# Patient Record
Sex: FEMALE | Race: ASIAN | NOT HISPANIC OR LATINO | Employment: OTHER | URBAN - METROPOLITAN AREA
[De-identification: names, ages, dates, MRNs, and addresses within clinical notes are randomized per-mention and may not be internally consistent; named-entity substitution may affect disease eponyms.]

---

## 2017-01-09 ENCOUNTER — INFUSION (OUTPATIENT)
Dept: ONCOLOGY | Facility: HOSPITAL | Age: 72
End: 2017-01-09

## 2017-01-09 ENCOUNTER — LAB (OUTPATIENT)
Dept: LAB | Facility: HOSPITAL | Age: 72
End: 2017-01-09

## 2017-01-09 DIAGNOSIS — C50.919 MALIGNANT NEOPLASM OF FEMALE BREAST, UNSPECIFIED LATERALITY, UNSPECIFIED SITE OF BREAST: Primary | ICD-10-CM

## 2017-01-09 DIAGNOSIS — C50.011 MALIGNANT NEOPLASM OF AREOLA OF RIGHT BREAST IN FEMALE (HCC): Primary | ICD-10-CM

## 2017-01-09 LAB
BASOPHILS # BLD AUTO: 0.06 10*3/MM3 (ref 0–0.1)
BASOPHILS NFR BLD AUTO: 0.9 % (ref 0–1.1)
DEPRECATED RDW RBC AUTO: 39.6 FL (ref 37–49)
EOSINOPHIL # BLD AUTO: 0.17 10*3/MM3 (ref 0–0.36)
EOSINOPHIL NFR BLD AUTO: 2.7 % (ref 1–5)
ERYTHROCYTE [DISTWIDTH] IN BLOOD BY AUTOMATED COUNT: 12.2 % (ref 11.7–14.5)
HCT VFR BLD AUTO: 33.4 % (ref 34–45)
HGB BLD-MCNC: 11.6 G/DL (ref 11.5–14.9)
IMM GRANULOCYTES # BLD: 0.03 10*3/MM3 (ref 0–0.03)
IMM GRANULOCYTES NFR BLD: 0.5 % (ref 0–0.5)
LYMPHOCYTES # BLD AUTO: 2.1 10*3/MM3 (ref 1–3.5)
LYMPHOCYTES NFR BLD AUTO: 33 % (ref 20–49)
MCH RBC QN AUTO: 30.9 PG (ref 27–33)
MCHC RBC AUTO-ENTMCNC: 34.7 G/DL (ref 32–35)
MCV RBC AUTO: 89.1 FL (ref 83–97)
MONOCYTES # BLD AUTO: 0.62 10*3/MM3 (ref 0.25–0.8)
MONOCYTES NFR BLD AUTO: 9.7 % (ref 4–12)
NEUTROPHILS # BLD AUTO: 3.39 10*3/MM3 (ref 1.5–7)
NEUTROPHILS NFR BLD AUTO: 53.2 % (ref 39–75)
NRBC BLD MANUAL-RTO: 0 /100 WBC (ref 0–0)
PLATELET # BLD AUTO: 203 10*3/MM3 (ref 150–375)
PMV BLD AUTO: 8.7 FL (ref 8.9–12.1)
RBC # BLD AUTO: 3.75 10*6/MM3 (ref 3.9–5)
WBC NRBC COR # BLD: 6.37 10*3/MM3 (ref 4–10)

## 2017-01-09 PROCEDURE — 85025 COMPLETE CBC W/AUTO DIFF WBC: CPT | Performed by: INTERNAL MEDICINE

## 2017-01-09 PROCEDURE — 96402 CHEMO HORMON ANTINEOPL SQ/IM: CPT | Performed by: INTERNAL MEDICINE

## 2017-01-09 PROCEDURE — 36416 COLLJ CAPILLARY BLOOD SPEC: CPT | Performed by: INTERNAL MEDICINE

## 2017-01-09 PROCEDURE — 25010000002 FULVESTRANT PER 25 MG: Performed by: INTERNAL MEDICINE

## 2017-01-09 RX ADMIN — FULVESTRANT 500 MG: 50 INJECTION INTRAMUSCULAR at 11:30

## 2017-01-28 RX ORDER — LEVOTHYROXINE SODIUM 0.03 MG/1
TABLET ORAL
Refills: 2 | COMMUNITY
Start: 2016-12-27

## 2017-01-28 RX ORDER — BLOOD SUGAR DIAGNOSTIC
STRIP MISCELLANEOUS
Refills: 2 | COMMUNITY
Start: 2016-12-27

## 2017-01-30 ENCOUNTER — HOSPITAL ENCOUNTER (OUTPATIENT)
Dept: PET IMAGING | Facility: HOSPITAL | Age: 72
Discharge: HOME | End: 2017-01-30
Attending: INTERNAL MEDICINE | Admitting: INTERNAL MEDICINE

## 2017-01-30 DIAGNOSIS — C50.019: ICD-10-CM

## 2017-01-30 LAB — CREAT BLDA-MCNC: 0.7 MG/DL (ref 0.6–1.3)

## 2017-01-30 PROCEDURE — 82565 ASSAY OF CREATININE: CPT

## 2017-01-30 PROCEDURE — 25510000001 DIATRIZOATE MEGLUMINE & SODIUM PER 1 ML: Performed by: INTERNAL MEDICINE

## 2017-01-30 PROCEDURE — 0 IOPAMIDOL 61 % SOLUTION: Performed by: INTERNAL MEDICINE

## 2017-01-30 PROCEDURE — 71260 CT THORAX DX C+: CPT

## 2017-01-30 PROCEDURE — 74160 CT ABDOMEN W/CONTRAST: CPT

## 2017-01-30 RX ADMIN — DIATRIZOATE MEGLUMINE AND DIATRIZOATE SODIUM 30 ML: 660; 100 LIQUID ORAL; RECTAL at 10:50

## 2017-01-30 RX ADMIN — IOPAMIDOL 85 ML: 612 INJECTION, SOLUTION INTRAVENOUS at 11:30

## 2017-02-06 ENCOUNTER — DOCUMENTATION (OUTPATIENT)
Dept: ONCOLOGY | Facility: CLINIC | Age: 72
End: 2017-02-06

## 2017-02-06 ENCOUNTER — INFUSION (OUTPATIENT)
Dept: ONCOLOGY | Facility: HOSPITAL | Age: 72
End: 2017-02-06

## 2017-02-06 ENCOUNTER — OFFICE VISIT (OUTPATIENT)
Dept: ONCOLOGY | Facility: CLINIC | Age: 72
End: 2017-02-06

## 2017-02-06 ENCOUNTER — APPOINTMENT (OUTPATIENT)
Dept: LAB | Facility: HOSPITAL | Age: 72
End: 2017-02-06

## 2017-02-06 VITALS
BODY MASS INDEX: 23.03 KG/M2 | TEMPERATURE: 98.4 F | OXYGEN SATURATION: 98 % | HEIGHT: 61 IN | RESPIRATION RATE: 12 BRPM | SYSTOLIC BLOOD PRESSURE: 122 MMHG | HEART RATE: 69 BPM | WEIGHT: 122 LBS | DIASTOLIC BLOOD PRESSURE: 80 MMHG

## 2017-02-06 DIAGNOSIS — C50.012 MALIGNANT NEOPLASM OF AREOLA OF LEFT BREAST IN FEMALE (HCC): Primary | ICD-10-CM

## 2017-02-06 DIAGNOSIS — M81.0 OSTEOPOROSIS: ICD-10-CM

## 2017-02-06 DIAGNOSIS — C50.011 MALIGNANT NEOPLASM OF AREOLA OF RIGHT BREAST IN FEMALE (HCC): Primary | ICD-10-CM

## 2017-02-06 DIAGNOSIS — D64.9 ANEMIA, UNSPECIFIED TYPE: ICD-10-CM

## 2017-02-06 DIAGNOSIS — C78.02 MALIGNANT NEOPLASM METASTATIC TO LEFT LUNG (HCC): ICD-10-CM

## 2017-02-06 PROCEDURE — 99215 OFFICE O/P EST HI 40 MIN: CPT | Performed by: INTERNAL MEDICINE

## 2017-02-06 RX ORDER — EXEMESTANE 25 MG/1
25 TABLET ORAL DAILY
Qty: 30 TABLET | Refills: 11 | Status: SHIPPED | OUTPATIENT
Start: 2017-02-06 | End: 2017-03-02 | Stop reason: SDUPTHER

## 2017-02-06 NOTE — PROGRESS NOTES
REASONS FOR FOLLOWUP:   1. Metastatic breast cancer to the lungs, ER/AZ positive, HER2/berkley positive, undergoing hormonal therapy with Arimidex since the middle of March 2009.   2. Response to Arimidex as evidenced by serial CT exams, including on 01/10/2012, showing no significant disease.   3. New left upper lobe pulmonary nodule noted, measuring 1.1 cm by CT scan on 07/05/2012. No other evidence of metastatic disease. Hormonal therapy switched to Faslodex 07/12/2012.   4. Osteoporosis documented by bone density scan from 10/17/2011 and also 02/21/2014. Patient was started on Prolia therapy every 6 months. Patient had a tooth extraction in August 2015. Prolia has been on hold.    5. CT scans of chest, abdomen and pelvis on 04/07/2016 showed stable disease. Faslodex will be continued.      HISTORY OF PRESENT ILLNESS: Ms. Reese is a 71-year-old  female presenting today for 6-month followup, one week off to her recent CT scan for chest and abdomen and pelvis.  Patient is here by herself initially, later her nephew join her, after she was told having disease progression in left upper lobe of the lung.  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  Performance status is ECOG 0-1.  She has some chronic fatigue, however no weight loss, no bone pains.   She denies hot flashes and sweating. She has mild joint achiness, but not getting worse.       Unfortunately her CT scan on 01/30/2017 showed slight a disease progress in the left upper lobe of the lung, by 3 mm, up to 1.4 centimeters.  No evidence of new metastatic disease or bone metastases.    PAST MEDICAL HISTORY:   1. Left breast cancer, stage I, ER and AZ positive, status post mastectomy, chemotherapy, and tamoxifen for five years--finished in 2001.   2. Hypothyroidism diagnosed in 01/2009.   3. Hyperlipidemia.   4. Diabetes, type 2.   5. Osteoporosis, evidenced on bone density scans in October 2011  and 2014. The patient stopped Fosamax because of dental problem suspected to be related to bisphosphonate.       SURGICAL HISTORY: Modified left radical mastectomy and previous breast biopsy in .       ONCOLOGIC/HEMATOLOGIC HISTORY: History from previous dates can be found in the separate document.      CT scan on 2016 showed stable disease with the small left upper lobe pulmonary nodule, no change compared to previous imaging studies, no evidence of metastatic disease in the abdomen or pelvis or bony structure.      MEDICATIONS: The current medication list was reviewed with the patient and updated in the EMR this date per the medical assistant. Medication dosages and frequencies were confirmed to be accurate.       ALLERGIES: LETROZOLE (questionably causing skin rash).       SOCIAL HISTORY: The patient is . She finished college education. 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 No alcohol use. No illegal drug use. No risks for HIV.       FAMILY HISTORY: Her mother  of tuberculosis at the age of 48. A sister had thyroid cancer/lung cancer - no details are available. Two brothers had lung cancer and bone cancers in their 70s - no details available. Her maternal grandmother and her mother both had diabetes. No family history of hypertension or heart disease.       REVIEW OF SYSTEMS:   PAIN: See VITAL SIGNS below.   GENERAL: No change in appetite or weight, no fevers, chills or sweats.   SKIN: No rashes or nonhealing lesions. Fungal infection on her right foot has healed.   HEME/LYMPH: No anemia, easy bruising, bleeding or swollen nodes.   EYES: No vision changes or diplopia.   ENT: No tinnitus, hearing loss, gum bleeding, epistaxis , hoarseness or dysphagia.   RESPIRATORY: No cough, shortness of breath, hemoptysis or wheezing.   CVS: vagor chest pain, but no palpitations, orthopnea, dyspnea on exertion or PND.   GI: No abdominal pain, nausea, vomiting, constipation, diarrhea,  "melena or hematochezia.   : No dysuria or hematuria. No abnormal vaginal discharge or bleeding.   MUSCULOSKELETAL: has mild joint achiness.   NEUROLOGICAL: No dizziness, global weakness, loss of consciousness or seizures.   PSYCHIATRIC: No increased nervousness, mood changes or depression.       VITAL SIGNS:   Vitals:    17 1039   BP: 122/80   Pulse: 69   Resp: 12   Temp: 98.4 °F (36.9 °C)   TempSrc: Oral   SpO2: 98%   Weight: 122 lb (55.3 kg)   Height: 61.42\" (156 cm)  Comment: new height   PainSc: 0-No pain   ECO       PHYSICAL EXAMINATION:   GENERAL: Patient is a well-developed, reasonably-nourished  female not in distress.  However she is very nervous after told her disease progression in the lung.   SKIN: Warm, dry without rashes, purpura or petechiae.   HEAD: Normocephalic.   EYES: Pupils equal, round and reactive to light. EOMs intact. Conjunctivae normal.   EARS: Hearing intact.   NOSE: No discharge.   MOUTH: Tongue is well-papillated; no stomatitis or ulcers. Lips normal.   THROAT: Oropharynx without lesions or exudates.   NECK: Supple with good range of motion; no thyromegaly or masses.   LYMPHATICS: No cervical, supraclavicular, axillary or inguinal adenopathy.   CHEST: Lungs clear to auscultation.   CARDIAC: Regular rate and rhythm without murmurs, rubs or gallops.   ABDOMEN: Soft, nontender with no organomegaly or masses. bowel sounds present   EXTREMITIES: No clubbing, cyanosis or edema.   NEUROLOGICAL: No focal deficits.       LABORATORY DATA:   Lab Results   Component Value Date    WBC 6.37 2017    HGB 11.6 2017    HCT 33.4 (L) 2017    MCV 89.1 2017     2017     Lab Results   Component Value Date    NEUTROABS 3.39 2017     Lab Results   Component Value Date    GLUCOSE 123 (H) 2016    BUN 11 2016    CREATININE 0.70 2017    EGFRIFNONA 77 2016    EGFRIFAFRI 93 2016    BCR 14.7 2016    CO2 26.2 2016    " CALCIUM 10.0 09/06/2016    ALBUMIN 4.10 09/06/2016    LABIL2 1.1 09/06/2016    AST 17 09/06/2016    ALT 14 09/06/2016     SODIUM   Date Value Ref Range Status   09/06/2016 142 136 - 145 mmol/L Final   10/13/2015 145 136 - 145 mmol/L Final     POTASSIUM   Date Value Ref Range Status   09/06/2016 4.5 3.5 - 5.2 mmol/L Final   10/13/2015 4.1 3.5 - 5.2 mmol/L Final     TOTAL BILIRUBIN   Date Value Ref Range Status   09/06/2016 0.5 0.1 - 1.2 mg/dL Final   10/13/2015 0.3 0.1 - 1.2 mg/dL Final     ALKALINE PHOSPHATASE   Date Value Ref Range Status   09/06/2016 84 39 - 117 U/L Final   10/13/2015 98 39 - 117 U/L Final     Imaging studies:   CT CHEST AND ABDOMEN WITH IV CONTRAST 01/30/2017.   COMPARISON: CT chest and abdomen 04/07/2016, 08/14/2015, 02/16/2015      FINDINGS  CHEST: Left upper lobe pulmonary nodule has increased in size measuring  14 x 10 mm versus 11 x 9 mm on the previous examination. Tiny subpleural  nodule on image 44 in the left lower lobe is without change. No new  nodule is present. There has been left mastectomy. There is no  mediastinal, hilar or axillary or internal mammary chain omar  enlargement. Bone windows demonstrate no osseous lesion.      ABDOMEN: The liver, spleen, adrenal glands, pancreas appear within  normal limits. Small bilateral renal cysts are present. There are 3  small 2-3 mm right renal stones and there is 2 mm left lower pole renal  stone. No omar enlargement is evident. Bone windows demonstrate no  osseous lesion.      IMPRESSION:  Left upper lobe nodule exhibits mild increase in size  compared to exam 04/07/2016 consistent with progression. There has been  no other change. No evidence of metastatic disease to the abdomen.    ASSESSMENT:   1. Metastatic breast cancer with metastasis in the lungs, biopsy confirmed. ER/CO positive. Her2 positive.  She has disease progress in the left upper lobe, but a very minimal, increased by 3 mm.  I shared the images with patient and her   nephew.  I do not think this patient is candidate for chemotherapy at this point.  I propose continue hormonal therapy, with combined targeted treatment.  This patient had previous suspect allergic reaction to letrozole, only a few days into therapy back in 2009, so I discussed with the patient that we will not to use letrozole with a combination of Ibrance; instead I recommended using combination of Aromasin plus Afinitor.  I also discussed with her nephew today again after he come to the clinic.  If Afinitor is too expensive for her, in that case I would refer patient for stereotactic radiation therapy to the left upper lobe lesion, and a continue oral aromatase inhibitor Aromasin.  We need repeated a CT scan for chest in 3 months for evaluation of response.    2. Osteoporosis. The patient will continue oral calcium and vitamin D supplementation. We stopped the Prolia after 06/25/2015 because of her tooth extraction.  Patient is more than when they have fear out from the procedure, I proposed to resume Prolia treatment.   3. Mild anemia asymptomatic. Hb is close to normal. We'll monitor with repeat labs in 3 weeks, including on, ferritin, B12 and folic acid.      PLAN:   1. Discontinue Faslodex treatment.  2. Started Aromasin 25 mg daily, I e- scribed to her pharmacy.  3. Start Afinitor 10 mg daily as soon as medicine arrives.  I will ask my pharmacist after persistent order.   4. DEXA bone density scan.    5. Arrange Prolia treatment in about 3 weeks and repeat every 6 months.   6. Chemotherapy teaching lesson for Afinitor.   7. Patient will return in 3 weeks for a reevaluation, we'll check CBC, ferritin and iron B12 folic acid and a BMP, and a tolerance check, expecting she will be on Afinitor for about 7-10 days.         50 minutes, over half of that time were for counseling.  I personally reviewed her CT scan images for the chest.  Questions were answered to patient and her nephews satisfaction.          LETA MAY M.D., Ph.D.    02/06/2017     Dragon disclaimer:  Much of this encounter note is an electronic transcription/translation of spoken language to printed text. The electronic translation of spoken language may permit erroneous, or at times, nonsensical words or phrases to be inadvertently transcribed; Although I have reviewed the note for such errors, some may still exist.

## 2017-02-08 ENCOUNTER — DOCUMENTATION (OUTPATIENT)
Dept: ONCOLOGY | Facility: CLINIC | Age: 72
End: 2017-02-08

## 2017-02-09 ENCOUNTER — DOCUMENTATION (OUTPATIENT)
Dept: ONCOLOGY | Facility: CLINIC | Age: 72
End: 2017-02-09

## 2017-02-09 RX ORDER — EVEROLIMUS 10 MG/1
10 TABLET ORAL DAILY
Qty: 28 TABLET | Refills: 6 | Status: SHIPPED | OUTPATIENT
Start: 2017-02-09 | End: 2019-12-03

## 2017-02-13 ENCOUNTER — APPOINTMENT (OUTPATIENT)
Dept: LAB | Facility: HOSPITAL | Age: 72
End: 2017-02-13

## 2017-02-13 ENCOUNTER — OFFICE VISIT (OUTPATIENT)
Dept: ONCOLOGY | Facility: CLINIC | Age: 72
End: 2017-02-13

## 2017-02-13 VITALS — BODY MASS INDEX: 22.12 KG/M2 | WEIGHT: 118.7 LBS

## 2017-02-13 DIAGNOSIS — C50.012 MALIGNANT NEOPLASM OF AREOLA OF LEFT BREAST IN FEMALE (HCC): Primary | ICD-10-CM

## 2017-02-13 PROCEDURE — G0463 HOSPITAL OUTPT CLINIC VISIT: HCPCS | Performed by: NURSE PRACTITIONER

## 2017-02-13 PROCEDURE — 99214 OFFICE O/P EST MOD 30 MIN: CPT | Performed by: NURSE PRACTITIONER

## 2017-02-13 NOTE — PROGRESS NOTES
Subjective     No primary care provider on file.    PATIENT NAME:  Brie Reese  YOB: 1945  PATIENTS AGE:  71 y.o.  PATIENTS SEX:  female  DATE OF SERVICE:  02/13/2017  PROVIDER:  KENDELL Carbajal      __________ORAL CHEMOTHERAPY PATIENT EDUCATION__________    PATIENT EDUCATION:  Today I met with the patient to discuss ORAL chemotherapy/biotherapy recommended for treatment of her disease.  Also discussed were medication administration, adherence, and proper handling/disposal.  The patient received the Oral Chemotherapy/Biotherapy Plan Summary including diagnosis and specific treatment plan.    SIDE EFFECTS:  Common side effects were discussed with the patient and/or significant other.  Discussion included hair loss/discoloration, anemia/fatigue, infection/chills/fever, appetite, bleeding risk/precautions, constipation, diarrhea, mouth sores, taste alteration, loss of appetite,nausea/vomiting, peripheral neuropathy, skin/nail changes, rash, muscle aches/weakness, photosensitivity, weight gain/loss, hearing loss, dizziness, menopausal symptoms, menstrrual irregularity, reproductive risk, high blood pressure, heart damage, liver damage, lung damage, kidney damage, DVT/PE risk, fluid retention, pleural/pericardial effusion, somnolence, electrolyte/LFT imbalance.    Discussion also included side effects specific to drugs in the treatment plan, specifically Affinitor.    A total of 35 minutes were spent with the patient, with 100% of time spent in education and counseling.      KENDELL Carbajal

## 2017-02-17 ENCOUNTER — DOCUMENTATION (OUTPATIENT)
Dept: ONCOLOGY | Facility: CLINIC | Age: 72
End: 2017-02-17

## 2017-02-17 NOTE — PROGRESS NOTES
2/16/17-Contacted [facility] [phone] to check status of pts Afinitor. I spoke to Lukas who stated pt's copay is $2699.46. They spoke to pt and gave the phone number for [facility] for pt to call. They are waiting to hear back from pt.     I spoke to pt and discussed the copay. She stated she cannot afford this. I asked if she knew what her monthly income is and she didn't know at that moment. She stated she works extra shifts at work because she is the only one in the home. I told her I will look into assistance and contact her back. She is not eligible for the Afinitor copay card as she has a Medicare Part D plan.

## 2017-02-22 DIAGNOSIS — M81.0 OSTEOPOROSIS: ICD-10-CM

## 2017-02-22 DIAGNOSIS — C50.012 MALIGNANT NEOPLASM OF AREOLA OF LEFT BREAST IN FEMALE (HCC): Primary | ICD-10-CM

## 2017-02-27 ENCOUNTER — HOSPITAL ENCOUNTER (OUTPATIENT)
Dept: BONE DENSITY | Facility: HOSPITAL | Age: 72
Discharge: HOME | End: 2017-02-27
Attending: INTERNAL MEDICINE | Admitting: INTERNAL MEDICINE

## 2017-02-27 DIAGNOSIS — M81.0 OSTEOPOROSIS: ICD-10-CM

## 2017-02-27 PROCEDURE — 77080 DXA BONE DENSITY AXIAL: CPT

## 2017-03-02 ENCOUNTER — OFFICE VISIT (OUTPATIENT)
Dept: ONCOLOGY | Facility: CLINIC | Age: 72
End: 2017-03-02

## 2017-03-02 ENCOUNTER — INFUSION (OUTPATIENT)
Dept: ONCOLOGY | Facility: HOSPITAL | Age: 72
End: 2017-03-02

## 2017-03-02 ENCOUNTER — LAB (OUTPATIENT)
Dept: LAB | Facility: HOSPITAL | Age: 72
End: 2017-03-02

## 2017-03-02 VITALS
SYSTOLIC BLOOD PRESSURE: 110 MMHG | HEIGHT: 61 IN | RESPIRATION RATE: 16 BRPM | WEIGHT: 122.8 LBS | BODY MASS INDEX: 23.18 KG/M2 | HEART RATE: 72 BPM | TEMPERATURE: 98.4 F | DIASTOLIC BLOOD PRESSURE: 70 MMHG

## 2017-03-02 DIAGNOSIS — C78.02 MALIGNANT NEOPLASM METASTATIC TO LEFT LUNG (HCC): ICD-10-CM

## 2017-03-02 DIAGNOSIS — D50.9 IRON DEFICIENCY ANEMIA, UNSPECIFIED IRON DEFICIENCY ANEMIA TYPE: ICD-10-CM

## 2017-03-02 DIAGNOSIS — M81.0 OSTEOPOROSIS: ICD-10-CM

## 2017-03-02 DIAGNOSIS — C50.012 MALIGNANT NEOPLASM OF NIPPLE OF LEFT BREAST IN FEMALE (HCC): Primary | ICD-10-CM

## 2017-03-02 DIAGNOSIS — M81.0 OSTEOPOROSIS: Primary | ICD-10-CM

## 2017-03-02 DIAGNOSIS — C50.012 MALIGNANT NEOPLASM OF AREOLA OF LEFT BREAST IN FEMALE (HCC): ICD-10-CM

## 2017-03-02 LAB
ALBUMIN SERPL-MCNC: 4.2 G/DL (ref 3.5–5.2)
ALBUMIN/GLOB SERPL: 1.2 G/DL (ref 1.1–2.4)
ALP SERPL-CCNC: 104 U/L (ref 38–116)
ALT SERPL W P-5'-P-CCNC: 12 U/L (ref 0–33)
ANION GAP SERPL CALCULATED.3IONS-SCNC: 13.4 MMOL/L
AST SERPL-CCNC: 15 U/L (ref 0–32)
BASOPHILS # BLD AUTO: 0.04 10*3/MM3 (ref 0–0.1)
BASOPHILS NFR BLD AUTO: 0.7 % (ref 0–1.1)
BILIRUB SERPL-MCNC: 0.3 MG/DL (ref 0.1–1.2)
BUN BLD-MCNC: 13 MG/DL (ref 6–20)
BUN/CREAT SERPL: 17.3 (ref 7.3–30)
CALCIUM SPEC-SCNC: 9.4 MG/DL (ref 8.5–10.2)
CHLORIDE SERPL-SCNC: 99 MMOL/L (ref 98–107)
CO2 SERPL-SCNC: 27.6 MMOL/L (ref 22–29)
CREAT BLD-MCNC: 0.75 MG/DL (ref 0.6–1.1)
DEPRECATED RDW RBC AUTO: 40.4 FL (ref 37–49)
EOSINOPHIL # BLD AUTO: 0.15 10*3/MM3 (ref 0–0.36)
EOSINOPHIL NFR BLD AUTO: 2.7 % (ref 1–5)
ERYTHROCYTE [DISTWIDTH] IN BLOOD BY AUTOMATED COUNT: 12 % (ref 11.7–14.5)
FERRITIN SERPL-MCNC: 102.4 NG/ML
FOLATE SERPL-MCNC: 19.78 NG/ML (ref 4.78–24.2)
GFR SERPL CREATININE-BSD FRML MDRD: 76 ML/MIN/1.73
GFR SERPL CREATININE-BSD FRML MDRD: 93 ML/MIN/1.73
GLOBULIN UR ELPH-MCNC: 3.5 GM/DL (ref 1.8–3.5)
GLUCOSE BLD-MCNC: 306 MG/DL (ref 74–124)
HCT VFR BLD AUTO: 35.6 % (ref 34–45)
HGB BLD-MCNC: 11.8 G/DL (ref 11.5–14.9)
IMM GRANULOCYTES # BLD: 0.02 10*3/MM3 (ref 0–0.03)
IMM GRANULOCYTES NFR BLD: 0.4 % (ref 0–0.5)
IRON 24H UR-MRATE: 51 MCG/DL (ref 37–145)
IRON SATN MFR SERPL: 12 % (ref 14–48)
LYMPHOCYTES # BLD AUTO: 1.39 10*3/MM3 (ref 1–3.5)
LYMPHOCYTES NFR BLD AUTO: 25.1 % (ref 20–49)
MAGNESIUM SERPL-MCNC: 1.7 MG/DL (ref 1.8–2.5)
MCH RBC QN AUTO: 30.7 PG (ref 27–33)
MCHC RBC AUTO-ENTMCNC: 33.1 G/DL (ref 32–35)
MCV RBC AUTO: 92.7 FL (ref 83–97)
MONOCYTES # BLD AUTO: 0.4 10*3/MM3 (ref 0.25–0.8)
MONOCYTES NFR BLD AUTO: 7.2 % (ref 4–12)
NEUTROPHILS # BLD AUTO: 3.53 10*3/MM3 (ref 1.5–7)
NEUTROPHILS NFR BLD AUTO: 63.9 % (ref 39–75)
NRBC BLD MANUAL-RTO: 0 /100 WBC (ref 0–0)
PHOSPHATE SERPL-MCNC: 3.8 MG/DL (ref 2.5–4.5)
PLATELET # BLD AUTO: 235 10*3/MM3 (ref 150–375)
PMV BLD AUTO: 8.6 FL (ref 8.9–12.1)
POTASSIUM BLD-SCNC: 4.3 MMOL/L (ref 3.5–4.7)
PROT SERPL-MCNC: 7.7 G/DL (ref 6.3–8)
RBC # BLD AUTO: 3.84 10*6/MM3 (ref 3.9–5)
SODIUM BLD-SCNC: 140 MMOL/L (ref 134–145)
TIBC SERPL-MCNC: 410 MCG/DL (ref 249–505)
TRANSFERRIN SERPL-MCNC: 293 MG/DL (ref 200–360)
VIT B12 BLD-MCNC: 769 PG/ML (ref 250–999)
WBC NRBC COR # BLD: 5.53 10*3/MM3 (ref 4–10)

## 2017-03-02 PROCEDURE — 84100 ASSAY OF PHOSPHORUS: CPT | Performed by: INTERNAL MEDICINE

## 2017-03-02 PROCEDURE — 82728 ASSAY OF FERRITIN: CPT | Performed by: INTERNAL MEDICINE

## 2017-03-02 PROCEDURE — 85025 COMPLETE CBC W/AUTO DIFF WBC: CPT | Performed by: INTERNAL MEDICINE

## 2017-03-02 PROCEDURE — 25010000002 DENOSUMAB 60 MG/ML SOLUTION: Performed by: INTERNAL MEDICINE

## 2017-03-02 PROCEDURE — 36415 COLL VENOUS BLD VENIPUNCTURE: CPT | Performed by: INTERNAL MEDICINE

## 2017-03-02 PROCEDURE — 82607 VITAMIN B-12: CPT | Performed by: INTERNAL MEDICINE

## 2017-03-02 PROCEDURE — 96372 THER/PROPH/DIAG INJ SC/IM: CPT | Performed by: INTERNAL MEDICINE

## 2017-03-02 PROCEDURE — 82746 ASSAY OF FOLIC ACID SERUM: CPT | Performed by: INTERNAL MEDICINE

## 2017-03-02 PROCEDURE — 83540 ASSAY OF IRON: CPT | Performed by: INTERNAL MEDICINE

## 2017-03-02 PROCEDURE — 80053 COMPREHEN METABOLIC PANEL: CPT | Performed by: INTERNAL MEDICINE

## 2017-03-02 PROCEDURE — 99214 OFFICE O/P EST MOD 30 MIN: CPT | Performed by: INTERNAL MEDICINE

## 2017-03-02 PROCEDURE — 83735 ASSAY OF MAGNESIUM: CPT | Performed by: INTERNAL MEDICINE

## 2017-03-02 PROCEDURE — 84466 ASSAY OF TRANSFERRIN: CPT | Performed by: INTERNAL MEDICINE

## 2017-03-02 RX ORDER — EXEMESTANE 25 MG/1
25 TABLET ORAL DAILY
Qty: 90 TABLET | Refills: 3 | Status: SHIPPED | OUTPATIENT
Start: 2017-03-02 | End: 2018-03-20 | Stop reason: SDUPTHER

## 2017-03-02 RX ORDER — FERROUS SULFATE 325(65) MG
325 TABLET ORAL
Qty: 90 TABLET | Refills: 3 | Status: SHIPPED | OUTPATIENT
Start: 2017-03-02 | End: 2018-04-09 | Stop reason: SDUPTHER

## 2017-03-02 RX ORDER — ATORVASTATIN CALCIUM 10 MG/1
TABLET, FILM COATED ORAL
Refills: 0 | COMMUNITY
Start: 2017-02-07

## 2017-03-02 RX ADMIN — DENOSUMAB 60 MG: 60 INJECTION SUBCUTANEOUS at 10:19

## 2017-03-02 NOTE — PROGRESS NOTES
REASONS FOR FOLLOWUP:     1.  Metastatic breast cancer to the lungs, ER/NE positive, HER2/berkley positive, undergoing hormonal therapy with Arimidex since the middle of March 2009.     2.  Response to Arimidex as evidenced by serial CT exams, including on 01/10/2012, showing no significant disease.   3.  New left upper lobe pulmonary nodule noted, measuring 1.1 cm by CT scan on 07/05/2012. No other evidence of metastatic disease. Hormonal therapy switched to Faslodex 07/12/2012.   4. Osteoporosis documented by bone density scan from 10/17/2011 and also 02/21/2014. Patient was started on Prolia therapy every 6 months. Patient had a tooth extraction in August 2015. Prolia has been on hold.     5.  CT scans of chest, abdomen and pelvis on 04/07/2016 showed stable disease. Faslodex will be continued.    6.  CT scan examination reported a disease progress on 01/30/2017, patient was switched to Aromasin in early February 2017.  Plan to start concurrent Afinitor as soon as medicine is available.    7.  Repeated a bone density scan on February 27, 2017 reported worsening osteoporosis.  Prolia will be restarted back on 03/02/2017.        HISTORY OF PRESENT ILLNESS: Ms. Reese is a 71-year-old  female presenting today for 4-week followup, accompanied by her nephew.      I saw her about 04 weeks ago, switched her to Aromasin and Afinitor.  Patient reports she has not obtained Afinitor yet because of the cost is $2700 co-pay because of a Medicare part B plan and the medicine is beyond her affordability.  Our pharmacy staff is still looking for assistant program for patient.    She already started on Aromasin once a day since last visit with no apparent side effects.  However she is very nervous about her disease progress.     Physically she is at baseline condition, denies cough, hemoptysis no chest pain.  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 Performance status is ECOG 0-1. She has some  chronic fatigue, however no weight loss, no bone pains. She denies hot flashes and sweating. She has mild joint achiness, but not getting worse.       PAST MEDICAL HISTORY:   1. Left breast cancer, stage I, ER and AZ positive, status post mastectomy, chemotherapy, and tamoxifen for five years--finished in .   2. Hypothyroidism diagnosed in 2009.   3. Hyperlipidemia.   4. Diabetes, type 2.   5. Osteoporosis, evidenced on bone density scans in 2011 and 2014. The patient stopped Fosamax because of dental problem suspected to be related to bisphosphonate.       SURGICAL HISTORY: Modified left radical mastectomy and previous breast biopsy in .       ONCOLOGIC/HEMATOLOGIC HISTORY: History from previous dates can be found in the separate document.       CT scan on 2016 showed stable disease with the small left upper lobe pulmonary nodule, no change compared to previous imaging studies, no evidence of metastatic disease in the abdomen or pelvis or bony structure.       Unfortunately her CT scan on 2017 showed slight disease progress in the left upper lobe of the lung, by 3 mm, up to 1.4 centimeters.  No evidence of new metastatic disease or bone metastases.      Patient was seen on 2017.  We'll propose switch therapy to Aromasin plus Afinitor.      MEDICATIONS: The current medication list was reviewed with the patient and updated in the EMR this date per the medical assistant. Medication dosages and frequencies were confirmed to be accurate.       ALLERGIES: LETROZOLE (questionably causing skin rash).       SOCIAL HISTORY: The patient is . She finished college education. 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 No alcohol use. No illegal drug use. No risks for HIV.       FAMILY HISTORY: Her mother  of tuberculosis at the age of 48. A sister had thyroid cancer/lung cancer - no details are available. Two brothers had lung cancer and bone cancers in their  "70s - no details available. Her maternal grandmother and her mother both had diabetes. No family history of hypertension or heart disease.       REVIEW OF SYSTEMS:   PAIN: See VITAL SIGNS below.   GENERAL: No change in appetite or weight, no fevers, chills or sweats.   SKIN: No rashes or nonhealing lesions. Fungal infection on her right foot has healed.   HEME/LYMPH: No anemia, easy bruising, bleeding or swollen nodes.   EYES: No vision changes or diplopia.   ENT: No tinnitus, hearing loss, gum bleeding, epistaxis , hoarseness or dysphagia.   RESPIRATORY: No cough, shortness of breath, hemoptysis or wheezing.   CVS: vagor chest pain, but no palpitations, orthopnea, dyspnea on exertion or PND.   GI: No abdominal pain, nausea, vomiting, constipation, diarrhea, melena or hematochezia.   : No dysuria or hematuria. No abnormal vaginal discharge or bleeding.   MUSCULOSKELETAL: has mild joint achiness.   NEUROLOGICAL: No dizziness, global weakness, loss of consciousness or seizures.   PSYCHIATRIC: No increased nervousness, mood changes or depression.       VITAL SIGNS:   Vitals:    17 0858   BP: 110/70   Pulse: 72   Resp: 16   Temp: 98.4 °F (36.9 °C)   Weight: 122 lb 12.8 oz (55.7 kg)   Height: 61.42\" (156 cm)   PainSc:   9   PainLoc: Breast   ECO       PHYSICAL EXAMINATION:   GENERAL: Patient is a well-developed, reasonably-nourished  female not in distress.  However she is very anxious about her progressed metastatic disease.   SKIN: Warm, dry without rashes, purpura or petechiae.   HEAD: Normocephalic.   EYES: Pupils equal, round and reactive to light. EOMs intact. Conjunctivae normal.   EARS: Hearing intact.   NOSE: No discharge.   MOUTH: Tongue is well-papillated; no stomatitis or ulcers. Lips normal.   THROAT: Oropharynx without lesions or exudates.   NECK: Supple with good range of motion; no thyromegaly or masses.   LYMPHATICS: No cervical, supraclavicular, axillary or inguinal adenopathy.   CHEST: " Lungs clear to auscultation.   CARDIAC: Regular rate and rhythm without murmurs, rubs or gallops.   ABDOMEN: Soft, nontender with no organomegaly or masses. bowel sounds present   EXTREMITIES: No clubbing, cyanosis or edema.   NEUROLOGICAL: No focal deficits.       LABORATORY DATA:  Lab Results   Component Value Date    WBC 5.53 03/02/2017    HGB 11.8 03/02/2017    HCT 35.6 03/02/2017    MCV 92.7 03/02/2017     03/02/2017     Lab Results   Component Value Date    NEUTROABS 3.53 03/02/2017     Lab Results   Component Value Date    FOLATE 19.78 03/02/2017     Lab Results   Component Value Date    NJKSTACE14 769 03/02/2017     Lab Results   Component Value Date    IRON 51 03/02/2017    TIBC 410 03/02/2017    FERRITIN 102.40 03/02/2017     Iron saturation 12%    ASSESSMENT:   1.  Metastatic breast cancer with metastasis in the lungs, biopsy confirmed. ER/PA positive. Her2 positive. She has disease progress in the left upper lobe from CT scan on January 30, 2017.  I switch patient to Aromasin and Afinitor in early February 2017.  She already started on Aromasin, however has not obtained Afinitor yet, due to significant cost.  Our pharmacy staff still looking for assistant program for this patient.  Hopefully she can start Afinitor soon.     I discussed with patient, and I assured her that she has slow progressive disease, as evidenced by her history since she has metastatic disease since 2009.  It is not likely she would die from breast cancer anytime soon, she will need to live with breast cancer as a chronic disease.  As we know she has a HER-2 positive metastatic disease, however at this point there is no way to start anti-HER-2 therapy using Herceptin since that all will be combined with chemotherapy, and I do not think it is reasonable at this point to start her on chemotherapy because she only has a solitary metastatic left upper lobe pulmonary lesion.     If she cannot obtain Afinitor, we may have to look  for other options, such as stereotactic radiation therapy.     2.  Osteoporosis.  She had a repeated bone density scan on February 27, 2017 which showed a statistically significant worsening osteoporosis in the left hip.  I will restart her back on Procrit earlier today and repeat every 6 months. We stopped the Prolia after 06/25/2015 because of her tooth extraction.  It should be safe for restarting.  The patient will continue oral calcium and vitamin D supplementation.       3.  Mild anemia, asymptomatic. Hb is close to normal.  Laboratory study today reported and no evidence of vitamin B12 or folic acid deficiency.  Her iron panel showed reasonable ferritin level, however iron saturation is slightly low, so she has mild iron deficiency.  Discussed with the patient, for starting oral iron supplementation.  She voiced understanding.    PLAN:     1. Continue Aromasin 25 mg daily, I e- scribed new prescription for 90 day supply to her pharmacy to save money.    2. Try to obtain Afinitor, start 10 mg daily as soon as medicine arrives.    3. Prolia treatment today and repeat every 6 months.    4.  Start oral ferrous sulfate 325 mg once a day.  I described to her pharmacy.  5.  Patient will return in 1 month for reevaluation.           30 minutes, over half of that time were for counseling.  Questions were answered to patient and her nephew's satisfaction.           LETA MAY M.D., Ph.D.   03/02/2017      Dragon disclaimer:  Much of this encounter note is an electronic transcription/translation of spoken language to printed text. The electronic translation of spoken language may permit erroneous, or at times, nonsensical words or phrases to be inadvertently transcribed; Although I have reviewed the note for such errors, some may still exist.

## 2017-03-02 NOTE — PROGRESS NOTES
Pt  on CMP results, spoke with pt, she reports not yet haven taken her medication this morning because she wasn't sure if she could take it with the Prolia shot.  She reports her BG was 160 when she took it this morning before she came to the MD office.  She states she will take her medication once she gets home. Pt is currently asymptomatic    [employer] pt education provided

## 2017-03-30 ENCOUNTER — LAB (OUTPATIENT)
Dept: LAB | Facility: HOSPITAL | Age: 72
End: 2017-03-30

## 2017-03-30 ENCOUNTER — OFFICE VISIT (OUTPATIENT)
Dept: ONCOLOGY | Facility: CLINIC | Age: 72
End: 2017-03-30

## 2017-03-30 VITALS
SYSTOLIC BLOOD PRESSURE: 144 MMHG | TEMPERATURE: 97.9 F | DIASTOLIC BLOOD PRESSURE: 64 MMHG | OXYGEN SATURATION: 100 % | WEIGHT: 121.2 LBS | HEART RATE: 66 BPM | BODY MASS INDEX: 22.88 KG/M2 | RESPIRATION RATE: 16 BRPM | HEIGHT: 61 IN

## 2017-03-30 DIAGNOSIS — D50.9 IRON DEFICIENCY ANEMIA, UNSPECIFIED IRON DEFICIENCY ANEMIA TYPE: ICD-10-CM

## 2017-03-30 DIAGNOSIS — C50.012 MALIGNANT NEOPLASM OF NIPPLE OF LEFT BREAST IN FEMALE (HCC): Primary | ICD-10-CM

## 2017-03-30 DIAGNOSIS — C78.02 MALIGNANT NEOPLASM METASTATIC TO LEFT LUNG (HCC): ICD-10-CM

## 2017-03-30 DIAGNOSIS — C50.211 MALIGNANT NEOPLASM OF UPPER-INNER QUADRANT OF RIGHT FEMALE BREAST (HCC): Primary | ICD-10-CM

## 2017-03-30 DIAGNOSIS — M81.0 OSTEOPOROSIS: ICD-10-CM

## 2017-03-30 LAB
BASOPHILS # BLD AUTO: 0.05 10*3/MM3 (ref 0–0.1)
BASOPHILS NFR BLD AUTO: 0.9 % (ref 0–1.1)
DEPRECATED RDW RBC AUTO: 39.9 FL (ref 37–49)
EOSINOPHIL # BLD AUTO: 0.23 10*3/MM3 (ref 0–0.36)
EOSINOPHIL NFR BLD AUTO: 3.9 % (ref 1–5)
ERYTHROCYTE [DISTWIDTH] IN BLOOD BY AUTOMATED COUNT: 12.1 % (ref 11.7–14.5)
HCT VFR BLD AUTO: 33.5 % (ref 34–45)
HGB BLD-MCNC: 11.6 G/DL (ref 11.5–14.9)
IMM GRANULOCYTES # BLD: 0.01 10*3/MM3 (ref 0–0.03)
IMM GRANULOCYTES NFR BLD: 0.2 % (ref 0–0.5)
LYMPHOCYTES # BLD AUTO: 1.99 10*3/MM3 (ref 1–3.5)
LYMPHOCYTES NFR BLD AUTO: 33.8 % (ref 20–49)
MCH RBC QN AUTO: 31.3 PG (ref 27–33)
MCHC RBC AUTO-ENTMCNC: 34.6 G/DL (ref 32–35)
MCV RBC AUTO: 90.3 FL (ref 83–97)
MONOCYTES # BLD AUTO: 0.68 10*3/MM3 (ref 0.25–0.8)
MONOCYTES NFR BLD AUTO: 11.6 % (ref 4–12)
NEUTROPHILS # BLD AUTO: 2.92 10*3/MM3 (ref 1.5–7)
NEUTROPHILS NFR BLD AUTO: 49.6 % (ref 39–75)
NRBC BLD MANUAL-RTO: 0 /100 WBC (ref 0–0)
PLATELET # BLD AUTO: 222 10*3/MM3 (ref 150–375)
PMV BLD AUTO: 8.6 FL (ref 8.9–12.1)
RBC # BLD AUTO: 3.71 10*6/MM3 (ref 3.9–5)
WBC NRBC COR # BLD: 5.88 10*3/MM3 (ref 4–10)

## 2017-03-30 PROCEDURE — 99213 OFFICE O/P EST LOW 20 MIN: CPT | Performed by: INTERNAL MEDICINE

## 2017-03-30 PROCEDURE — 85025 COMPLETE CBC W/AUTO DIFF WBC: CPT | Performed by: INTERNAL MEDICINE

## 2017-03-30 PROCEDURE — 36416 COLLJ CAPILLARY BLOOD SPEC: CPT | Performed by: INTERNAL MEDICINE

## 2017-05-15 ENCOUNTER — LAB (OUTPATIENT)
Dept: LAB | Facility: HOSPITAL | Age: 72
End: 2017-05-15

## 2017-05-15 ENCOUNTER — HOSPITAL ENCOUNTER (OUTPATIENT)
Dept: PET IMAGING | Facility: HOSPITAL | Age: 72
Discharge: HOME | End: 2017-05-15
Attending: INTERNAL MEDICINE | Admitting: INTERNAL MEDICINE

## 2017-05-15 DIAGNOSIS — C50.919 MALIGNANT NEOPLASM OF FEMALE BREAST, UNSPECIFIED LATERALITY, UNSPECIFIED SITE OF BREAST: Primary | ICD-10-CM

## 2017-05-15 DIAGNOSIS — D64.9 ANEMIA, UNSPECIFIED TYPE: ICD-10-CM

## 2017-05-15 DIAGNOSIS — C78.02 MALIGNANT NEOPLASM METASTATIC TO LEFT LUNG (HCC): ICD-10-CM

## 2017-05-15 DIAGNOSIS — C50.211 MALIGNANT NEOPLASM OF UPPER-INNER QUADRANT OF RIGHT FEMALE BREAST (HCC): ICD-10-CM

## 2017-05-15 DIAGNOSIS — M81.0 OSTEOPOROSIS: ICD-10-CM

## 2017-05-15 LAB
ALBUMIN SERPL-MCNC: 4.8 G/DL (ref 3.5–5.2)
ALBUMIN/GLOB SERPL: 1.1 G/DL (ref 1.1–2.4)
ALP SERPL-CCNC: 134 U/L (ref 38–116)
ALT SERPL W P-5'-P-CCNC: 23 U/L (ref 0–33)
ANION GAP SERPL CALCULATED.3IONS-SCNC: 17.5 MMOL/L
AST SERPL-CCNC: 26 U/L (ref 0–32)
BASOPHILS # BLD AUTO: 0.07 10*3/MM3 (ref 0–0.1)
BASOPHILS NFR BLD AUTO: 1.3 % (ref 0–1.1)
BILIRUB SERPL-MCNC: 0.5 MG/DL (ref 0.1–1.2)
BUN BLD-MCNC: 9 MG/DL (ref 6–20)
BUN/CREAT SERPL: 12.5 (ref 7.3–30)
CALCIUM SPEC-SCNC: 9.5 MG/DL (ref 8.5–10.2)
CANCER AG15-3 SERPL-ACNC: 20.4 U/ML
CHLORIDE SERPL-SCNC: 97 MMOL/L (ref 98–107)
CO2 SERPL-SCNC: 26.5 MMOL/L (ref 22–29)
CREAT BLD-MCNC: 0.72 MG/DL (ref 0.6–1.1)
CREAT BLDA-MCNC: 0.8 MG/DL (ref 0.6–1.3)
DEPRECATED RDW RBC AUTO: 43.3 FL (ref 37–49)
EOSINOPHIL # BLD AUTO: 0.21 10*3/MM3 (ref 0–0.36)
EOSINOPHIL NFR BLD AUTO: 3.8 % (ref 1–5)
ERYTHROCYTE [DISTWIDTH] IN BLOOD BY AUTOMATED COUNT: 12.5 % (ref 11.7–14.5)
FERRITIN SERPL-MCNC: 142.8 NG/ML
GFR SERPL CREATININE-BSD FRML MDRD: 80 ML/MIN/1.73
GFR SERPL CREATININE-BSD FRML MDRD: 97 ML/MIN/1.73
GLOBULIN UR ELPH-MCNC: 4.2 GM/DL (ref 1.8–3.5)
GLUCOSE BLD-MCNC: 112 MG/DL (ref 74–124)
HCT VFR BLD AUTO: 40.4 % (ref 34–45)
HGB BLD-MCNC: 13.3 G/DL (ref 11.5–14.9)
IMM GRANULOCYTES # BLD: 0.02 10*3/MM3 (ref 0–0.03)
IMM GRANULOCYTES NFR BLD: 0.4 % (ref 0–0.5)
IRON 24H UR-MRATE: 105 MCG/DL (ref 37–145)
IRON SATN MFR SERPL: 23 % (ref 14–48)
LYMPHOCYTES # BLD AUTO: 1.91 10*3/MM3 (ref 1–3.5)
LYMPHOCYTES NFR BLD AUTO: 34.1 % (ref 20–49)
MCH RBC QN AUTO: 31.4 PG (ref 27–33)
MCHC RBC AUTO-ENTMCNC: 32.9 G/DL (ref 32–35)
MCV RBC AUTO: 95.5 FL (ref 83–97)
MONOCYTES # BLD AUTO: 0.47 10*3/MM3 (ref 0.25–0.8)
MONOCYTES NFR BLD AUTO: 8.4 % (ref 4–12)
NEUTROPHILS # BLD AUTO: 2.92 10*3/MM3 (ref 1.5–7)
NEUTROPHILS NFR BLD AUTO: 52 % (ref 39–75)
NRBC BLD MANUAL-RTO: 0.4 /100 WBC (ref 0–0)
PLATELET # BLD AUTO: 259 10*3/MM3 (ref 150–375)
PMV BLD AUTO: 9.1 FL (ref 8.9–12.1)
POTASSIUM BLD-SCNC: 4.5 MMOL/L (ref 3.5–4.7)
PROT SERPL-MCNC: 9 G/DL (ref 6.3–8)
RBC # BLD AUTO: 4.23 10*6/MM3 (ref 3.9–5)
SODIUM BLD-SCNC: 141 MMOL/L (ref 134–145)
TIBC SERPL-MCNC: 463 MCG/DL (ref 249–505)
TRANSFERRIN SERPL-MCNC: 331 MG/DL (ref 200–360)
WBC NRBC COR # BLD: 5.6 10*3/MM3 (ref 4–10)

## 2017-05-15 PROCEDURE — 85025 COMPLETE CBC W/AUTO DIFF WBC: CPT | Performed by: INTERNAL MEDICINE

## 2017-05-15 PROCEDURE — 84466 ASSAY OF TRANSFERRIN: CPT | Performed by: INTERNAL MEDICINE

## 2017-05-15 PROCEDURE — 80053 COMPREHEN METABOLIC PANEL: CPT | Performed by: INTERNAL MEDICINE

## 2017-05-15 PROCEDURE — 0 IOPAMIDOL 61 % SOLUTION: Performed by: INTERNAL MEDICINE

## 2017-05-15 PROCEDURE — 83540 ASSAY OF IRON: CPT | Performed by: INTERNAL MEDICINE

## 2017-05-15 PROCEDURE — 71260 CT THORAX DX C+: CPT

## 2017-05-15 PROCEDURE — 86300 IMMUNOASSAY TUMOR CA 15-3: CPT | Performed by: INTERNAL MEDICINE

## 2017-05-15 PROCEDURE — 36415 COLL VENOUS BLD VENIPUNCTURE: CPT | Performed by: INTERNAL MEDICINE

## 2017-05-15 PROCEDURE — 82728 ASSAY OF FERRITIN: CPT | Performed by: INTERNAL MEDICINE

## 2017-05-15 PROCEDURE — 82565 ASSAY OF CREATININE: CPT

## 2017-05-15 RX ADMIN — IOPAMIDOL 75 ML: 612 INJECTION, SOLUTION INTRAVENOUS at 08:01

## 2017-05-23 ENCOUNTER — OFFICE VISIT (OUTPATIENT)
Dept: ONCOLOGY | Facility: CLINIC | Age: 72
End: 2017-05-23

## 2017-05-23 ENCOUNTER — APPOINTMENT (OUTPATIENT)
Dept: LAB | Facility: HOSPITAL | Age: 72
End: 2017-05-23

## 2017-05-23 VITALS
OXYGEN SATURATION: 100 % | BODY MASS INDEX: 22.2 KG/M2 | RESPIRATION RATE: 14 BRPM | SYSTOLIC BLOOD PRESSURE: 128 MMHG | DIASTOLIC BLOOD PRESSURE: 60 MMHG | TEMPERATURE: 98 F | HEIGHT: 61 IN | HEART RATE: 72 BPM | WEIGHT: 117.6 LBS

## 2017-05-23 DIAGNOSIS — C50.412 MALIGNANT NEOPLASM OF UPPER-OUTER QUADRANT OF LEFT FEMALE BREAST (HCC): Primary | ICD-10-CM

## 2017-05-23 DIAGNOSIS — D50.0 IRON DEFICIENCY ANEMIA DUE TO CHRONIC BLOOD LOSS: ICD-10-CM

## 2017-05-23 DIAGNOSIS — C78.02 MALIGNANT NEOPLASM METASTATIC TO LEFT LUNG (HCC): ICD-10-CM

## 2017-05-23 DIAGNOSIS — C50.912 MALIGNANT NEOPLASM OF LEFT FEMALE BREAST, UNSPECIFIED SITE OF BREAST: ICD-10-CM

## 2017-05-23 DIAGNOSIS — M81.0 OSTEOPOROSIS: ICD-10-CM

## 2017-05-23 PROCEDURE — G0463 HOSPITAL OUTPT CLINIC VISIT: HCPCS | Performed by: INTERNAL MEDICINE

## 2017-05-23 PROCEDURE — 99215 OFFICE O/P EST HI 40 MIN: CPT | Performed by: INTERNAL MEDICINE

## 2017-05-27 ENCOUNTER — HOSPITAL ENCOUNTER (EMERGENCY)
Facility: HOSPITAL | Age: 72
Discharge: HOME | End: 2017-05-27
Attending: EMERGENCY MEDICINE | Admitting: EMERGENCY MEDICINE

## 2017-05-27 VITALS
BODY MASS INDEX: 21.6 KG/M2 | SYSTOLIC BLOOD PRESSURE: 152 MMHG | OXYGEN SATURATION: 99 % | DIASTOLIC BLOOD PRESSURE: 81 MMHG | WEIGHT: 110 LBS | RESPIRATION RATE: 16 BRPM | TEMPERATURE: 97.1 F | HEIGHT: 60 IN | HEART RATE: 88 BPM

## 2017-05-27 DIAGNOSIS — R21 RASH: Primary | ICD-10-CM

## 2017-05-27 PROCEDURE — 96375 TX/PRO/DX INJ NEW DRUG ADDON: CPT

## 2017-05-27 PROCEDURE — 96376 TX/PRO/DX INJ SAME DRUG ADON: CPT

## 2017-05-27 PROCEDURE — 25010000002 METHYLPREDNISOLONE PER 125 MG: Performed by: NURSE PRACTITIONER

## 2017-05-27 PROCEDURE — 96374 THER/PROPH/DIAG INJ IV PUSH: CPT

## 2017-05-27 PROCEDURE — 25010000002 DIPHENHYDRAMINE PER 50 MG: Performed by: NURSE PRACTITIONER

## 2017-05-27 PROCEDURE — 99282 EMERGENCY DEPT VISIT SF MDM: CPT

## 2017-05-27 RX ORDER — FAMOTIDINE 10 MG/ML
20 INJECTION, SOLUTION INTRAVENOUS ONCE
Status: COMPLETED | OUTPATIENT
Start: 2017-05-27 | End: 2017-05-27

## 2017-05-27 RX ORDER — METHYLPREDNISOLONE 4 MG/1
TABLET ORAL
Qty: 21 TABLET | Refills: 0 | OUTPATIENT
Start: 2017-05-27 | End: 2017-07-11

## 2017-05-27 RX ORDER — METHYLPREDNISOLONE SODIUM SUCCINATE 125 MG/2ML
125 INJECTION, POWDER, LYOPHILIZED, FOR SOLUTION INTRAMUSCULAR; INTRAVENOUS ONCE
Status: COMPLETED | OUTPATIENT
Start: 2017-05-27 | End: 2017-05-27

## 2017-05-27 RX ORDER — DIPHENHYDRAMINE HCL 25 MG
25 TABLET ORAL EVERY 6 HOURS PRN
Qty: 12 TABLET | Refills: 0 | Status: SHIPPED | OUTPATIENT
Start: 2017-05-27 | End: 2020-02-27

## 2017-05-27 RX ORDER — DIPHENHYDRAMINE HYDROCHLORIDE 50 MG/ML
12.5 INJECTION INTRAMUSCULAR; INTRAVENOUS ONCE
Status: COMPLETED | OUTPATIENT
Start: 2017-05-27 | End: 2017-05-27

## 2017-05-27 RX ADMIN — DIPHENHYDRAMINE HYDROCHLORIDE 12.5 MG: 50 INJECTION, SOLUTION INTRAMUSCULAR; INTRAVENOUS at 09:08

## 2017-05-27 RX ADMIN — FAMOTIDINE 20 MG: 10 INJECTION, SOLUTION INTRAVENOUS at 09:08

## 2017-05-27 RX ADMIN — METHYLPREDNISOLONE SODIUM SUCCINATE 125 MG: 125 INJECTION, POWDER, FOR SOLUTION INTRAMUSCULAR; INTRAVENOUS at 09:08

## 2017-05-27 RX ADMIN — DIPHENHYDRAMINE HYDROCHLORIDE 12.5 MG: 50 INJECTION INTRAMUSCULAR; INTRAVENOUS at 10:10

## 2017-06-02 ENCOUNTER — CONSULT (OUTPATIENT)
Dept: RADIATION ONCOLOGY | Facility: HOSPITAL | Age: 72
End: 2017-06-02

## 2017-06-02 ENCOUNTER — APPOINTMENT (OUTPATIENT)
Dept: RADIATION ONCOLOGY | Facility: HOSPITAL | Age: 72
End: 2017-06-02

## 2017-06-02 VITALS
SYSTOLIC BLOOD PRESSURE: 125 MMHG | DIASTOLIC BLOOD PRESSURE: 76 MMHG | BODY MASS INDEX: 23.44 KG/M2 | WEIGHT: 120 LBS | HEART RATE: 96 BPM | OXYGEN SATURATION: 100 %

## 2017-06-02 DIAGNOSIS — C78.02 MALIGNANT NEOPLASM METASTATIC TO LEFT LUNG (HCC): Primary | ICD-10-CM

## 2017-06-02 PROCEDURE — 77290 THER RAD SIMULAJ FIELD CPLX: CPT | Performed by: RADIOLOGY

## 2017-06-02 PROCEDURE — G0463 HOSPITAL OUTPT CLINIC VISIT: HCPCS | Performed by: RADIOLOGY

## 2017-06-02 PROCEDURE — 77263 THER RADIOLOGY TX PLNG CPLX: CPT | Performed by: RADIOLOGY

## 2017-06-02 PROCEDURE — 77334 RADIATION TREATMENT AID(S): CPT | Performed by: RADIOLOGY

## 2017-06-02 PROCEDURE — 99204 OFFICE O/P NEW MOD 45 MIN: CPT | Performed by: RADIOLOGY

## 2017-06-02 PROCEDURE — 77370 RADIATION PHYSICS CONSULT: CPT | Performed by: RADIOLOGY

## 2017-06-02 NOTE — PROGRESS NOTES
Subjective     Carlos Castillo MD PhD     Diagnosis Plan   1. Malignant neoplasm metastatic to left lung          Mrs. Reese is a very pleasant 71-year-old lady from the [facility] who underwent a modified radical mastectomy for breast cancer in 1995.  She was found to have metastatic disease to the lungs, 3 small nodules, in February 2009, biopsy describing an adenocarcinoma compatible with a breast primary.  She was treated with Arimidex initially with good response in long-term stability of the single residual lesion in the left upper lobe.  More recently, now 8 years since discovery of her lung disease the left upper lobe nodule show some slight increase in size.  CT scan of the chest on 1/31/17 noted the lesion to have enlarged from 11 x 9 mm to 14 x 10 mm.  No new disease was described.  Most recent CT scan of the chest last month on 5/15 demonstrates additional progression with lesion now measuring 16 x 12 mm.  Again no evidence for new disease.  The abdomen and pelvis are also free of any evidence of new metastatic disease on imaging.  Clinically she looks well and has nothing of significance relating to this nodule on review of systems save for a cough.  We are asked to evaluate her for possible stereotactic radiosurgery to this left upper lobe nodule.                                         Review of Systems   Constitutional: Positive for appetite change, fatigue and unexpected weight change. Negative for chills and fever.   HENT: Negative for trouble swallowing.    Eyes: Positive for visual disturbance.   Respiratory: Positive for cough. Negative for apnea, choking, chest tightness, shortness of breath, wheezing and stridor.    Cardiovascular: Negative for chest pain.   Gastrointestinal: Negative for nausea and vomiting.   Musculoskeletal: Negative for back pain and neck pain.   Neurological: Negative for dizziness, speech difficulty, light-headedness and headaches.   Psychiatric/Behavioral: Positive  for dysphoric mood.         Past Medical History:   Diagnosis Date   • Breast cancer, Left     1998, 2009 metastisized to lungs   • Diabetes mellitus     type 2    • Hyperlipidemia    • Hypothyroidism 01/2009   • Left upper pulmonary nodule 07/05/2012   • Osteoporosis 10/17/2011         Past Surgical History:   Procedure Laterality Date   • BREAST BIOPSY Left 1995   • MASTECTOMY RADICAL Left 1995   • MOUTH SURGERY  08/2015    tooth extraction          Social History     Social History   • Marital status:      Spouse name: N/A   • Number of children: N/A   • Years of education: College     Occupational History   •  [employer]     Social History Main Topics   • Smoking status: Never Smoker   • Smokeless tobacco: Never Used   • Alcohol use No   • Drug use: No   • Sexual activity: Not on file     Other Topics Concern   • Not on file     Social History Narrative         Family History   Problem Relation Age of Onset   • Tuberculosis Mother    • Diabetes Mother    • Thyroid cancer Sister    • Lung cancer Sister    • Lung cancer Brother      In his 70s.   • Bone cancer Brother      in his 70s.   • Diabetes Maternal Grandmother    • Lung cancer Brother      In his 70s.    • Bone cancer Brother      in his 70s.   • Hypertension Neg Hx    • Heart disease Neg Hx           Objective    Physical Exam  alert spontaneous in no apparent distress.    Current Outpatient Prescriptions on File Prior to Visit   Medication Sig Dispense Refill   • ACCU-CHEK DMITRIY PLUS test strip USE TO TEST BLOOD SUGAR DAILY AS DIRECTED  2   • ACETAMINOPHEN PO Take  by mouth as needed.     • atorvastatin (LIPITOR) 10 MG tablet TAKE 1 TABLET BY MOUTH AT BEDTIME  0   • CVS VITAMIN D 2000 UNITS capsule TAKE 1 CAPSULE EVERY DAY  6   • diphenhydrAMINE (BENADRYL) 25 MG tablet Take 1 tablet by mouth Every 6 (Six) Hours As Needed for Itching. 12 tablet 0   • everolimus (AFINITOR) 10 MG tablet Take 1 tablet by mouth Daily. 28 tablet 6   •  ferrous sulfate 325 (65 FE) MG tablet Take 1 tablet by mouth Daily With Breakfast. 90 tablet 3   • Fulvestrant (FASLODEX IM) Inject  into the shoulder, thigh, or buttocks every 30 (thirty) days.     • GLIMEPIRIDE PO Take 4 mg by mouth 2 (two) times a day.     • HYDROcodone-acetaminophen (NORCO) 5-325 MG per tablet Take 1 tablet by mouth Every 4 (Four) Hours As Needed for moderate pain (4-6). 16 tablet 0   • levothyroxine (SYNTHROID, LEVOTHROID) 25 MCG tablet TAKE 1 TABLET BY MOUTH EVERY DAY  2   • metFORMIN (GLUCOPHAGE) 500 MG tablet TAKE 3 TABLETS BY MOUTH EVERY MORNING, AND 2 TABLETS EVERY EVENING WITH FOOD FOR DIABETES  3   • MethylPREDNISolone (MEDROL, PERFECTO,) 4 MG tablet Take as directed on package instructions. 21 tablet 0   • NAPROXEN SODIUM PO Take  by mouth as needed.       No current facility-administered medications on file prior to visit.        ALLERGIES:    Allergies   Allergen Reactions   • Letrozole      Questionably causing skin rash.       /76  Pulse 96  Wt 120 lb (54.4 kg)  SpO2 100%  BMI 23.44 kg/m2     Current Status 5/23/2017   ECOG score 0         Assessment/Plan  Solitary metastatic lung nodule from breast primary now 22 years post diagnosis.  We will plan to treat her stereotactically with a dose aim of 25 Gy in a single fraction or 45 Gy in 3 fractions.  We started the planning process this morning with  4D CT simulation in the Department.  Planning will probably take a week to week and a half.        I spent greater than 45 minutes and face-to-face time with the patient and 40 minutes of that time was spent in counseling and coordination of care including indications, goals, logistics, alternatives, risks both common and rare, review of imaging, discussion her surveillance, and potential outcomes.            Doug Castro MD

## 2017-06-06 PROCEDURE — 77293 RESPIRATOR MOTION MGMT SIMUL: CPT | Performed by: RADIOLOGY

## 2017-06-06 PROCEDURE — 77301 RADIOTHERAPY DOSE PLAN IMRT: CPT | Performed by: RADIOLOGY

## 2017-06-06 PROCEDURE — 77300 RADIATION THERAPY DOSE PLAN: CPT | Performed by: RADIOLOGY

## 2017-06-08 PROCEDURE — 77470 SPECIAL RADIATION TREATMENT: CPT | Performed by: RADIOLOGY

## 2017-06-09 ENCOUNTER — DOCUMENTATION (OUTPATIENT)
Dept: RADIATION ONCOLOGY | Facility: HOSPITAL | Age: 72
End: 2017-06-09

## 2017-06-09 ENCOUNTER — RADIATION ONCOLOGY WEEKLY ASSESSMENT (OUTPATIENT)
Dept: RADIATION ONCOLOGY | Facility: HOSPITAL | Age: 72
End: 2017-06-09

## 2017-06-09 DIAGNOSIS — C78.02 MALIGNANT NEOPLASM METASTATIC TO LEFT LUNG (HCC): Primary | ICD-10-CM

## 2017-06-09 PROCEDURE — FACE2FACE: Performed by: RADIOLOGY

## 2017-06-09 PROCEDURE — 77435 SBRT MANAGEMENT: CPT | Performed by: RADIOLOGY

## 2017-06-09 PROCEDURE — 77338 DESIGN MLC DEVICE FOR IMRT: CPT | Performed by: RADIOLOGY

## 2017-06-09 PROCEDURE — 77373 STRTCTC BDY RAD THER TX DLVR: CPT | Performed by: RADIOLOGY

## 2017-06-09 PROCEDURE — 77336 RADIATION PHYSICS CONSULT: CPT | Performed by: RADIOLOGY

## 2017-06-09 NOTE — PROGRESS NOTES
Physician Weekly Management Note    Diagnosis: Breast cancer with solitary lung metastasis     RT Details:  Treatment #1/1 - SRS, 25 Gy    Notes on Treatment course, Films, Medical progress:    Tolerated single treatment without any treatment-related issues.  CT scan of the chest is scheduled for approximately 8/30/17.    Weekly Management:  Medication reviewed?   Yes  New medications given?   No  Problemlist reviewed?   Yes  Problem added?   No      Technical aspects reviewed:  Weekly OBI approved?   Yes  Weekly port films approved?   Yes  Change requests noted on port film?   No  Patient setup and plan reviewed?   Yes    Chart Reviewed:  Continue current treatment plan?   Yes  Treatment plan change requested?   No  CBC reviewed?   No  Concurrent Chemo?   No    Objective     Toxicities:   None     Review of Systems   As above    There were no vitals filed for this visit.    Current Status 5/23/2017   ECOG score 0       Physical Exam  As above      Problem Summary List    Diagnosis:  No diagnosis found.  Pathology:       Past Medical History:   Diagnosis Date   • Breast cancer, Left     1998, 2009 metastisized to lungs   • Diabetes mellitus     type 2    • Hyperlipidemia    • Hypothyroidism 01/2009   • Left upper pulmonary nodule 07/05/2012   • Osteoporosis 10/17/2011         Past Surgical History:   Procedure Laterality Date   • BREAST BIOPSY Left 1995   • MASTECTOMY RADICAL Left 1995   • MOUTH SURGERY  08/2015    tooth extraction          Current Outpatient Prescriptions on File Prior to Visit   Medication Sig Dispense Refill   • ACCU-CHEK DMITRIY PLUS test strip USE TO TEST BLOOD SUGAR DAILY AS DIRECTED  2   • ACETAMINOPHEN PO Take  by mouth as needed.     • atorvastatin (LIPITOR) 10 MG tablet TAKE 1 TABLET BY MOUTH AT BEDTIME  0   • CVS VITAMIN D 2000 UNITS capsule TAKE 1 CAPSULE EVERY DAY  6   • diphenhydrAMINE (BENADRYL) 25 MG tablet Take 1 tablet by mouth Every 6 (Six) Hours As Needed for Itching. 12 tablet 0    • everolimus (AFINITOR) 10 MG tablet Take 1 tablet by mouth Daily. 28 tablet 6   • ferrous sulfate 325 (65 FE) MG tablet Take 1 tablet by mouth Daily With Breakfast. 90 tablet 3   • Fulvestrant (FASLODEX IM) Inject  into the shoulder, thigh, or buttocks every 30 (thirty) days.     • GLIMEPIRIDE PO Take 4 mg by mouth 2 (two) times a day.     • HYDROcodone-acetaminophen (NORCO) 5-325 MG per tablet Take 1 tablet by mouth Every 4 (Four) Hours As Needed for moderate pain (4-6). 16 tablet 0   • levothyroxine (SYNTHROID, LEVOTHROID) 25 MCG tablet TAKE 1 TABLET BY MOUTH EVERY DAY  2   • metFORMIN (GLUCOPHAGE) 500 MG tablet TAKE 3 TABLETS BY MOUTH EVERY MORNING, AND 2 TABLETS EVERY EVENING WITH FOOD FOR DIABETES  3   • MethylPREDNISolone (MEDROL, PERFECTO,) 4 MG tablet Take as directed on package instructions. 21 tablet 0   • NAPROXEN SODIUM PO Take  by mouth as needed.       No current facility-administered medications on file prior to visit.        Allergies   Allergen Reactions   • Letrozole      Questionably causing skin rash.       Primary care MD:    Sussy Payne MD    Oncologist:  KIAN Castillo M.D.    Seen and approved by:  Doug Castro MD  06/09/2017

## 2017-06-09 NOTE — PROGRESS NOTES
"  Physician Stereotactic Management Note    Diagnosis:     1. Malignant neoplasm metastatic to left lung    Solitary lung metastasis from breast, left upper lobe    Treatment Number and Dose:  Treatment #1/1      Dose:  25/25 Gy    I was personally present at the machine for the delivery of the above treatment.     I provided direct supervision of the patient's set up, treatment parameters and image guidance. I provided corrections and approval of the above prior to the treatment, in real time. I was present for any adjustments required due to patient motion, target movement or equipment issues.    The ongoing images for localization, tumor tracking, gating and beam's eye view localization images will also be approved.     Notes on treatment course, films, medical progress and plan:    Doing well. Tolerated treatment without difficulty. No problems or questions.    Problem added:  None  Medications added:   None  Ancillary referrals made: None    I have reviewed and marked as \"reviewed\" the current medications, allergies and problem list in the patients EMR.    Patient's Care Team:  Patient Care Team:  Sussy Payne MD as PCP - General (Internal Medicine)  Carlos Castillo MD PhD as Consulting Physician (Hematology and Oncology)  Sussy Payne MD as Referring Physician (Internal Medicine)  Doug Castro MD as Consulting Physician (Radiation Oncology)    Seen and approved by:  Doug Castro MD, 06/09/2017  "

## 2017-06-21 ENCOUNTER — DOCUMENTATION (OUTPATIENT)
Dept: RADIATION ONCOLOGY | Facility: HOSPITAL | Age: 72
End: 2017-06-21

## 2017-06-21 NOTE — PROGRESS NOTES
COMPLETION / CLOSURE NOTE    Diagnosis: Breast cancer with solitary lung metastasis     Dates of treatment:  6/9/2017    Treatment Course:    Treatment Site - L LUNG  Treatment Intent - Curative  Total Dose in cGy - 2500  Number of Treatments - 1  Dose per fraction - See below  Fractions per day - 1 fx/day  Fractions per week - 1 fx/week  Treatment Type - Stereotactic, Rapid Arc  Energy - 6 MVP  Normalization - Volumetric Normalization-- see below  Imaging/Field Verification - IGRT using CBCT daily  Additional comments: - 25Gy single fraction % covers 95% PTV   CBCT Bony auto then soft tissue adjust to GTV    Tolerance: Tolerated the above treatments _without any immediate treatment related issues_____ and completed the total treatments in 0 elapsed days.    Disposition:  I understand Dr. Castillo has a CT the chest scheduled for late August.  I'll see her back when the study is complete.     Doug Castro Jr., MD

## 2017-06-27 ENCOUNTER — DOCUMENTATION (OUTPATIENT)
Dept: RADIATION ONCOLOGY | Facility: HOSPITAL | Age: 72
End: 2017-06-27

## 2017-06-28 ENCOUNTER — LAB (OUTPATIENT)
Dept: LAB | Facility: HOSPITAL | Age: 72
End: 2017-06-28
Attending: INTERNAL MEDICINE

## 2017-06-28 ENCOUNTER — TRANSCRIBE ORDERS (OUTPATIENT)
Dept: ADMINISTRATIVE | Facility: HOSPITAL | Age: 72
End: 2017-06-28

## 2017-06-28 DIAGNOSIS — Z00.01 ENCOUNTER FOR GENERAL ADULT MEDICAL EXAMINATION WITH ABNORMAL FINDINGS: Primary | ICD-10-CM

## 2017-06-28 DIAGNOSIS — Z00.01 ENCOUNTER FOR GENERAL ADULT MEDICAL EXAMINATION WITH ABNORMAL FINDINGS: ICD-10-CM

## 2017-06-28 LAB
25(OH)D3 SERPL-MCNC: 35.1 NG/ML (ref 30–100)
ALBUMIN SERPL-MCNC: 4.1 G/DL (ref 3.5–5.2)
ALBUMIN UR-MCNC: <1.2 MG/L
ALBUMIN/GLOB SERPL: 1.1 G/DL
ALP SERPL-CCNC: 100 U/L (ref 39–117)
ALT SERPL W P-5'-P-CCNC: 20 U/L (ref 1–33)
ANION GAP SERPL CALCULATED.3IONS-SCNC: 11.9 MMOL/L
AST SERPL-CCNC: 22 U/L (ref 1–32)
BACTERIA UR QL AUTO: NORMAL /HPF
BILIRUB SERPL-MCNC: 0.4 MG/DL (ref 0.1–1.2)
BILIRUB UR QL STRIP: NEGATIVE
BUN BLD-MCNC: 10 MG/DL (ref 8–23)
BUN/CREAT SERPL: 12.3 (ref 7–25)
CALCIUM SPEC-SCNC: 9.8 MG/DL (ref 8.6–10.5)
CHLORIDE SERPL-SCNC: 100 MMOL/L (ref 98–107)
CHOLEST SERPL-MCNC: 153 MG/DL (ref 0–200)
CLARITY UR: CLEAR
CO2 SERPL-SCNC: 26.1 MMOL/L (ref 22–29)
COLOR UR: YELLOW
CREAT BLD-MCNC: 0.81 MG/DL (ref 0.57–1)
CREAT UR-MCNC: 61.4 MG/DL
CREAT UR-MCNC: 61.4 MG/DL
CREAT UR-MCNC: 62.1 MG/DL
DEPRECATED RDW RBC AUTO: 40.9 FL (ref 37–54)
ERYTHROCYTE [DISTWIDTH] IN BLOOD BY AUTOMATED COUNT: 12.7 % (ref 11.7–13)
GFR SERPL CREATININE-BSD FRML MDRD: 70 ML/MIN/1.73
GFR SERPL CREATININE-BSD FRML MDRD: 85 ML/MIN/1.73
GLOBULIN UR ELPH-MCNC: 3.9 GM/DL
GLUCOSE BLD-MCNC: 142 MG/DL (ref 65–99)
GLUCOSE UR STRIP-MCNC: NEGATIVE MG/DL
HBA1C MFR BLD: 8.5 % (ref 4.8–5.6)
HCT VFR BLD AUTO: 32.5 % (ref 35.6–45.5)
HDLC SERPL-MCNC: 43 MG/DL (ref 40–60)
HGB BLD-MCNC: 11.6 G/DL (ref 11.9–15.5)
HGB UR QL STRIP.AUTO: NEGATIVE
HYALINE CASTS UR QL AUTO: NORMAL /LPF
KETONES UR QL STRIP: NEGATIVE
LDLC SERPL CALC-MCNC: 90 MG/DL (ref 0–100)
LDLC/HDLC SERPL: 2.1 {RATIO}
LEUKOCYTE ESTERASE UR QL STRIP.AUTO: NEGATIVE
MCH RBC QN AUTO: 31.6 PG (ref 26.9–32)
MCHC RBC AUTO-ENTMCNC: 35.7 G/DL (ref 32.4–36.3)
MCV RBC AUTO: 88.6 FL (ref 80.5–98.2)
MICROALBUMIN/CREAT UR: NORMAL MG/G
NITRITE UR QL STRIP: NEGATIVE
PH UR STRIP.AUTO: 6 [PH] (ref 5–8)
PLATELET # BLD AUTO: 223 10*3/MM3 (ref 140–500)
PMV BLD AUTO: 8.4 FL (ref 6–12)
POTASSIUM BLD-SCNC: 4.3 MMOL/L (ref 3.5–5.2)
PROT SERPL-MCNC: 8 G/DL (ref 6–8.5)
PROT UR QL STRIP: NEGATIVE
PROT UR-MCNC: 6 MG/DL
PROT UR-MCNC: 7 MG/DL
PROT/CREAT UR: 114 MG/G CREA
RBC # BLD AUTO: 3.67 10*6/MM3 (ref 3.9–5.2)
RBC # UR: NORMAL /HPF
REF LAB TEST METHOD: NORMAL
SODIUM BLD-SCNC: 138 MMOL/L (ref 136–145)
SP GR UR STRIP: 1.01 (ref 1–1.03)
SQUAMOUS #/AREA URNS HPF: NORMAL /HPF
TRIGL SERPL-MCNC: 98 MG/DL (ref 0–150)
TSH SERPL DL<=0.05 MIU/L-ACNC: 4.35 MIU/ML (ref 0.27–4.2)
UROBILINOGEN UR QL STRIP: NORMAL
VLDLC SERPL-MCNC: 19.6 MG/DL (ref 5–40)
WBC NRBC COR # BLD: 6.56 10*3/MM3 (ref 4.5–10.7)
WBC UR QL AUTO: NORMAL /HPF

## 2017-06-28 PROCEDURE — 80053 COMPREHEN METABOLIC PANEL: CPT

## 2017-06-28 PROCEDURE — 83036 HEMOGLOBIN GLYCOSYLATED A1C: CPT

## 2017-06-28 PROCEDURE — 36415 COLL VENOUS BLD VENIPUNCTURE: CPT

## 2017-06-28 PROCEDURE — 85027 COMPLETE CBC AUTOMATED: CPT

## 2017-06-28 PROCEDURE — 82306 VITAMIN D 25 HYDROXY: CPT

## 2017-06-28 PROCEDURE — 82043 UR ALBUMIN QUANTITATIVE: CPT

## 2017-06-28 PROCEDURE — 80061 LIPID PANEL: CPT

## 2017-06-28 PROCEDURE — 82570 ASSAY OF URINE CREATININE: CPT

## 2017-06-28 PROCEDURE — 81001 URINALYSIS AUTO W/SCOPE: CPT

## 2017-06-28 PROCEDURE — 84443 ASSAY THYROID STIM HORMONE: CPT

## 2017-06-28 PROCEDURE — 84156 ASSAY OF PROTEIN URINE: CPT

## 2017-07-02 ENCOUNTER — HOSPITAL ENCOUNTER (EMERGENCY)
Facility: HOSPITAL | Age: 72
Discharge: HOME | End: 2017-07-02
Attending: EMERGENCY MEDICINE | Admitting: EMERGENCY MEDICINE

## 2017-07-02 ENCOUNTER — APPOINTMENT (OUTPATIENT)
Dept: CT IMAGING | Facility: HOSPITAL | Age: 72
End: 2017-07-02

## 2017-07-02 VITALS
RESPIRATION RATE: 16 BRPM | TEMPERATURE: 97.7 F | HEART RATE: 64 BPM | SYSTOLIC BLOOD PRESSURE: 151 MMHG | HEIGHT: 61 IN | BODY MASS INDEX: 21.9 KG/M2 | WEIGHT: 116 LBS | OXYGEN SATURATION: 98 % | DIASTOLIC BLOOD PRESSURE: 77 MMHG
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
## 2017-07-02 DIAGNOSIS — S00.83XA FACIAL CONTUSION, INITIAL ENCOUNTER: ICD-10-CM

## 2017-07-02 PROCEDURE — 70450 CT HEAD/BRAIN W/O DYE: CPT

## 2017-07-02 PROCEDURE — 99282 EMERGENCY DEPT VISIT SF MDM: CPT

## 2017-07-02 NOTE — ED PROVIDER NOTES
" EMERGENCY DEPARTMENT ENCOUNTER    CHIEF COMPLAINT  Chief Complaint: Mechanical Fall  History given by: Patient, Son  History limited by: N/A  Room Number: 01/01  PMD: Sussy Payne MD      HPI:  Pt is a 71 y.o. female who presents complaining of a mechanical fall onset today. Pt's son states that the pt was walking when she tripped on the curb. Pt states that she caught herself with her hands and reports bilateral knee abrasions. Pt states that she has ambulated since falling. Pt reports head trauma, but denies LOC. Pt denies N/V, numbness/tingling, HA, CP, SOA, neck pain, back pain, and dizziness.     Duration: Today  Onset: Gradual  Timing: Constant  Location: N/A  Radiation: N/A  Quality: \"mechanical fall\"  Intensity/Severity: Moderate  Progression: Worsening  Associated Symptoms: Bilateral knee abrasions  Aggravating Factors: None  Alleviating Factors: None  Previous Episodes: None  Treatment before arrival: None    PAST MEDICAL HISTORY  Active Ambulatory Problems     Diagnosis Date Noted   • Malignant neoplasm of female breast 03/14/2016   • Anemia 04/12/2016   • Iron deficiency anemia 04/20/2016   • Malignant neoplasm metastatic to left lung 02/06/2017   • Osteoporosis 02/06/2017     Resolved Ambulatory Problems     Diagnosis Date Noted   • No Resolved Ambulatory Problems     Past Medical History:   Diagnosis Date   • Breast cancer, Left    • Diabetes mellitus    • Hyperlipidemia    • Hypothyroidism 01/2009   • Left upper pulmonary nodule 07/05/2012   • Osteoporosis 10/17/2011       PAST SURGICAL HISTORY  Past Surgical History:   Procedure Laterality Date   • BREAST BIOPSY Left 1995   • MASTECTOMY RADICAL Left 1995   • MOUTH SURGERY  08/2015    tooth extraction        FAMILY HISTORY  Family History   Problem Relation Age of Onset   • Tuberculosis Mother    • Diabetes Mother    • Thyroid cancer Sister    • Lung cancer Sister    • Lung cancer Brother      In his 70s.   • Bone cancer Brother      in his 70s.   • " Diabetes Maternal Grandmother    • Lung cancer Brother      In his 70s.    • Bone cancer Brother      in his 70s.   • Hypertension Neg Hx    • Heart disease Neg Hx        SOCIAL HISTORY  Social History     Social History   • Marital status:      Spouse name: N/A   • Number of children: N/A   • Years of education: College     Occupational History   •  [employer]     Social History Main Topics   • Smoking status: Never Smoker   • Smokeless tobacco: Never Used   • Alcohol use No   • Drug use: No   • Sexual activity: Not on file     Other Topics Concern   • Not on file     Social History Narrative       ALLERGIES  Letrozole    REVIEW OF SYSTEMS  Review of Systems   Constitutional: Negative for chills and fever.   HENT: Negative for congestion and sore throat.         Head trauma   Respiratory: Negative for cough and shortness of breath.    Cardiovascular: Negative for chest pain.   Gastrointestinal: Negative for abdominal pain, diarrhea and vomiting.   Genitourinary: Negative for difficulty urinating and dysuria.   Musculoskeletal: Negative for neck pain.   Skin: Positive for wound (bilateral knee abrasions). Negative for pallor and rash.   Neurological: Negative for weakness, numbness and headaches.   All other systems reviewed and are negative.      PHYSICAL EXAM  ED Triage Vitals   Temp Heart Rate Resp BP SpO2   07/02/17 1615 07/02/17 1615 07/02/17 1615 07/02/17 1615 07/02/17 1615   97.2 °F (36.2 °C) 78 16 132/73 98 %      Temp src Heart Rate Source Patient Position BP Location FiO2 (%)   07/02/17 1615 -- -- -- --   Tympanic           Physical Exam   Constitutional: She is oriented to person, place, and time and well-developed, well-nourished, and in no distress. No distress.   HENT:   Head: Normocephalic. Head is with abrasion (to R nose).       Eyes: EOM are normal. Pupils are equal, round, and reactive to light.   Neck: Normal range of motion. Neck supple.   Cardiovascular: Normal rate, regular  rhythm and normal heart sounds.    Pulmonary/Chest: Effort normal and breath sounds normal. No respiratory distress.   Abdominal: Soft. There is no tenderness. There is no rebound and no guarding.   Musculoskeletal: Normal range of motion. She exhibits no edema.        Right knee: Normal. She exhibits normal range of motion.        Left knee: Normal. She exhibits normal range of motion.   Neurological: She is alert and oriented to person, place, and time. She has normal sensation and normal strength.   Skin: Skin is warm and dry. Abrasion (to bilateral knees) noted. No rash noted.   Psychiatric: Mood and affect normal.   Nursing note and vitals reviewed.      RADIOLOGY  CT Head Without Contrast    (Results Pending)      CT Head - shows NAD. Interpreted by radiologist and discussed with me.    I ordered the above noted radiological studies. Interpreted by radiologist. Reviewed by me in PACS.       PROCEDURES  Procedures      PROGRESS AND CONSULTS  ED Course     5:40 PM:  Vitals: BP: 132/73 HR: 78 Temp: 97.2 °F (36.2 °C) (Tympanic) O2 sat: 98%  D/w pt plan for head CT for further evaluation.    1745- Reviewed pt's history and workup with Dr. Peoples. After bedside evaluation, Dr. Peoples agrees with the plan of care.    6:42 PM:  Vitals: BP: 132/73 HR: 78 Temp: 97.2 °F (36.2 °C) (Tympanic) O2 sat: 98%  Rechecked pt. Pt is resting comfortably. Discussed with pt normal head CT and plan for discharge. Pt understands and agrees with the plan, all questions answered.      MEDICAL DECISION MAKING  Results were reviewed/discussed with the patient and they were also made aware of online access. Pt also made aware that some labs, such as cultures, will not be resulted during ER visit and follow up with PMD is necessary.     MDM  Number of Diagnoses or Management Options  Facial contusion, initial encounter:   Fall, initial encounter:   Diagnosis management comments: No evidence of ICH.  No neuro deficits, ambulates well.          Amount and/or Complexity of Data Reviewed  Tests in the radiology section of CPT®: ordered and reviewed           DIAGNOSIS  Final diagnoses:   Fall, initial encounter   Facial contusion, initial encounter       DISPOSITION  1946- DISCHARGE    Patient discharged in stable condition.    Reviewed implications of results, diagnosis, meds, responsibility to follow up, warning signs and symptoms of possible worsening, potential complications and reasons to return to ER.    Patient/Family voiced understanding of above instructions.    Discussed plan for discharge, as there is no emergent indication for admission.  Pt/family is agreeable and understands need for follow up and repeat testing.  Pt is aware that discharge does not mean that nothing is wrong but it indicates no emergency is present that requires admission and they must continue care with follow-up as given below or physician of their choice.     FOLLOW-UP  Sussy Payne MD  [address] [id number] [phone]    In 2 days  if no improvement         Medication List      Stop          MethylPREDNISolone 4 MG tablet   Commonly known as:  MEDROL (PERFECTO)           Latest Documented Vital Signs:  As of 6:46 PM  BP- 132/73 HR- 78 Temp- 97.2 °F (36.2 °C) (Tympanic) O2 sat- 98%    --  Documentation assistance provided by hugo De Anda for BENJAMÍN Henao.  Information recorded by the scribe was done at my direction and has been verified and validated by me.     Pola De Anda  07/02/17 7299       BENJAMÍN Gaines  07/02/17 2042

## 2017-07-02 NOTE — ED PROVIDER NOTES
I supervised care provided by the midlevel provider.    We have discussed this patient's history, physical exam, and treatment plan.   I have reviewed the note and personally saw and examined the patient and agree with the plan of care.    Pt presents to the ED c/o facial injury sustained earlier today s/p fall in which pt stumbled forward. Pt denies LOC, abd pain, CP, dyspnea, nausea, and vomiting. On physical exam, there is a right facial abrasion. No step-off. PERRL. CT Head is negative acute.                   Documentation assistance provided by Nai Cutler. Information recorded by the scribe was done at my direction and has been verified and validated by me.     Entered by Nai Cutler, acting as scribe for Dr. Shelton MD.        Nai Cutler  07/02/17 1822       Jose L Peoples MD  07/02/17 1822

## 2017-07-10 ENCOUNTER — HOSPITAL ENCOUNTER (OUTPATIENT)
Dept: GENERAL RADIOLOGY | Facility: HOSPITAL | Age: 72
Discharge: HOME | End: 2017-07-10
Attending: INTERNAL MEDICINE | Admitting: INTERNAL MEDICINE

## 2017-07-10 DIAGNOSIS — R60.9 SWELLING: ICD-10-CM

## 2017-07-10 PROCEDURE — 72040 X-RAY EXAM NECK SPINE 2-3 VW: CPT

## 2017-08-18 ENCOUNTER — TELEPHONE (OUTPATIENT)
Dept: ONCOLOGY | Facility: CLINIC | Age: 72
End: 2017-08-18

## 2017-08-18 NOTE — TELEPHONE ENCOUNTER
----- Message from Carlos Castillo MD PhD sent at 8/17/2017 11:59 PM EDT -----  Regarding: RE: PT WANTS YOU TO KNOW ...  Yes, Yohana.  Thanks !    ----- Message -----     From: Beata Lebron     Sent: 8/17/2017   9:03 AM       To: Carlos Castillo MD PhD  Subject: PT WANTS YOU TO KNOW ...                         PT'S NEPHEW CALLED STATING THE PATIENT HAS A FAMILY EMERGENCY OUT OF THE COUN[place] AND SHE WILL NEED TO LEAVE FOR ABOUT 4 WEEKS (SHE WAS SCHEDULED TO HAVE CT SCAN AND FU WITH YOU IN EARLY SEPT).  SHE SHOULD RETURN MID-LATE SEPT AND WILL R/S HER APPTS THEN, BUT SHE WANTED YOU TO BE AWARE OF THIS AND MAKE SURE THIS WAS OKAY.    YOHANA

## 2017-09-25 ENCOUNTER — HOSPITAL ENCOUNTER (OUTPATIENT)
Dept: PET IMAGING | Facility: HOSPITAL | Age: 72
Discharge: HOME | End: 2017-09-25
Attending: INTERNAL MEDICINE | Admitting: INTERNAL MEDICINE

## 2017-09-25 ENCOUNTER — LAB (OUTPATIENT)
Dept: LAB | Facility: HOSPITAL | Age: 72
End: 2017-09-25

## 2017-09-25 DIAGNOSIS — C78.02 MALIGNANT NEOPLASM METASTATIC TO LEFT LUNG (HCC): ICD-10-CM

## 2017-09-25 DIAGNOSIS — C50.412 MALIGNANT NEOPLASM OF UPPER-OUTER QUADRANT OF LEFT FEMALE BREAST (HCC): ICD-10-CM

## 2017-09-25 DIAGNOSIS — D50.9 IRON DEFICIENCY ANEMIA, UNSPECIFIED IRON DEFICIENCY ANEMIA TYPE: ICD-10-CM

## 2017-09-25 DIAGNOSIS — D64.9 ANEMIA, UNSPECIFIED TYPE: Primary | ICD-10-CM

## 2017-09-25 LAB
ALBUMIN SERPL-MCNC: 4.1 G/DL (ref 3.5–5.2)
ALBUMIN/GLOB SERPL: 1.1 G/DL (ref 1.1–2.4)
ALP SERPL-CCNC: 106 U/L (ref 38–116)
ALT SERPL W P-5'-P-CCNC: 17 U/L (ref 0–33)
ANION GAP SERPL CALCULATED.3IONS-SCNC: 15.2 MMOL/L
AST SERPL-CCNC: 19 U/L (ref 0–32)
BASOPHILS # BLD AUTO: 0.05 10*3/MM3 (ref 0–0.1)
BASOPHILS NFR BLD AUTO: 1.1 % (ref 0–1.1)
BILIRUB SERPL-MCNC: 0.5 MG/DL (ref 0.1–1.2)
BUN BLD-MCNC: 15 MG/DL (ref 6–20)
BUN/CREAT SERPL: 19.5 (ref 7.3–30)
CALCIUM SPEC-SCNC: 9.6 MG/DL (ref 8.5–10.2)
CHLORIDE SERPL-SCNC: 99 MMOL/L (ref 98–107)
CO2 SERPL-SCNC: 26.8 MMOL/L (ref 22–29)
CREAT BLD-MCNC: 0.77 MG/DL (ref 0.6–1.1)
CREAT BLDA-MCNC: 0.9 MG/DL (ref 0.6–1.3)
DEPRECATED RDW RBC AUTO: 43.8 FL (ref 37–49)
EOSINOPHIL # BLD AUTO: 0.1 10*3/MM3 (ref 0–0.36)
EOSINOPHIL NFR BLD AUTO: 2.2 % (ref 1–5)
ERYTHROCYTE [DISTWIDTH] IN BLOOD BY AUTOMATED COUNT: 12.1 % (ref 11.7–14.5)
GFR SERPL CREATININE-BSD FRML MDRD: 74 ML/MIN/1.73
GFR SERPL CREATININE-BSD FRML MDRD: 90 ML/MIN/1.73
GLOBULIN UR ELPH-MCNC: 3.9 GM/DL (ref 1.8–3.5)
GLUCOSE BLD-MCNC: 103 MG/DL (ref 74–124)
HCT VFR BLD AUTO: 40.2 % (ref 34–45)
HGB BLD-MCNC: 13.1 G/DL (ref 11.5–14.9)
IMM GRANULOCYTES # BLD: 0.01 10*3/MM3 (ref 0–0.03)
IMM GRANULOCYTES NFR BLD: 0.2 % (ref 0–0.5)
LYMPHOCYTES # BLD AUTO: 1.71 10*3/MM3 (ref 1–3.5)
LYMPHOCYTES NFR BLD AUTO: 38.2 % (ref 20–49)
MCH RBC QN AUTO: 31.9 PG (ref 27–33)
MCHC RBC AUTO-ENTMCNC: 32.6 G/DL (ref 32–35)
MCV RBC AUTO: 97.8 FL (ref 83–97)
MONOCYTES # BLD AUTO: 0.39 10*3/MM3 (ref 0.25–0.8)
MONOCYTES NFR BLD AUTO: 8.7 % (ref 4–12)
NEUTROPHILS # BLD AUTO: 2.22 10*3/MM3 (ref 1.5–7)
NEUTROPHILS NFR BLD AUTO: 49.6 % (ref 39–75)
NRBC BLD MANUAL-RTO: 0 /100 WBC (ref 0–0)
PLATELET # BLD AUTO: 270 10*3/MM3 (ref 150–375)
PMV BLD AUTO: 8.6 FL (ref 8.9–12.1)
POTASSIUM BLD-SCNC: 4.3 MMOL/L (ref 3.5–4.7)
PROT SERPL-MCNC: 8 G/DL (ref 6.3–8)
RBC # BLD AUTO: 4.11 10*6/MM3 (ref 3.9–5)
SODIUM BLD-SCNC: 141 MMOL/L (ref 134–145)
WBC NRBC COR # BLD: 4.48 10*3/MM3 (ref 4–10)

## 2017-09-25 PROCEDURE — 0 IOPAMIDOL 61 % SOLUTION: Performed by: INTERNAL MEDICINE

## 2017-09-25 PROCEDURE — 71260 CT THORAX DX C+: CPT

## 2017-09-25 PROCEDURE — 82565 ASSAY OF CREATININE: CPT

## 2017-09-25 PROCEDURE — 85025 COMPLETE CBC W/AUTO DIFF WBC: CPT | Performed by: INTERNAL MEDICINE

## 2017-09-25 PROCEDURE — 80053 COMPREHEN METABOLIC PANEL: CPT

## 2017-09-25 PROCEDURE — 36415 COLL VENOUS BLD VENIPUNCTURE: CPT | Performed by: INTERNAL MEDICINE

## 2017-09-25 RX ADMIN — IOPAMIDOL 75 ML: 612 INJECTION, SOLUTION INTRAVENOUS at 08:32

## 2017-10-02 ENCOUNTER — INFUSION (OUTPATIENT)
Dept: ONCOLOGY | Facility: HOSPITAL | Age: 72
End: 2017-10-02

## 2017-10-02 ENCOUNTER — APPOINTMENT (OUTPATIENT)
Dept: LAB | Facility: HOSPITAL | Age: 72
End: 2017-10-02

## 2017-10-02 ENCOUNTER — OFFICE VISIT (OUTPATIENT)
Dept: ONCOLOGY | Facility: CLINIC | Age: 72
End: 2017-10-02

## 2017-10-02 VITALS
DIASTOLIC BLOOD PRESSURE: 70 MMHG | WEIGHT: 119.4 LBS | HEIGHT: 61 IN | HEART RATE: 68 BPM | RESPIRATION RATE: 16 BRPM | BODY MASS INDEX: 22.54 KG/M2 | TEMPERATURE: 98.1 F | SYSTOLIC BLOOD PRESSURE: 108 MMHG

## 2017-10-02 DIAGNOSIS — M81.6 LOCALIZED OSTEOPOROSIS WITHOUT CURRENT PATHOLOGICAL FRACTURE: Primary | ICD-10-CM

## 2017-10-02 DIAGNOSIS — Z17.0 MALIGNANT NEOPLASM OF CENTRAL PORTION OF LEFT BREAST IN FEMALE, ESTROGEN RECEPTOR POSITIVE (HCC): Primary | ICD-10-CM

## 2017-10-02 DIAGNOSIS — C50.112 MALIGNANT NEOPLASM OF CENTRAL PORTION OF LEFT BREAST IN FEMALE, ESTROGEN RECEPTOR POSITIVE (HCC): Primary | ICD-10-CM

## 2017-10-02 DIAGNOSIS — D50.9 IRON DEFICIENCY ANEMIA, UNSPECIFIED IRON DEFICIENCY ANEMIA TYPE: ICD-10-CM

## 2017-10-02 PROCEDURE — 99215 OFFICE O/P EST HI 40 MIN: CPT | Performed by: INTERNAL MEDICINE

## 2017-10-02 PROCEDURE — 25010000002 DENOSUMAB 60 MG/ML SOLUTION: Performed by: INTERNAL MEDICINE

## 2017-10-02 PROCEDURE — 96372 THER/PROPH/DIAG INJ SC/IM: CPT | Performed by: INTERNAL MEDICINE

## 2017-10-02 RX ORDER — EXEMESTANE 25 MG/1
TABLET ORAL
Refills: 3 | COMMUNITY
Start: 2017-09-05 | End: 2019-04-08 | Stop reason: SDUPTHER

## 2017-10-02 RX ADMIN — DENOSUMAB 60 MG: 60 INJECTION SUBCUTANEOUS at 09:02

## 2017-10-02 NOTE — PROGRESS NOTES
1. Metastatic breast cancer to the lungs, ER/MS positive, HER2/berkley positive, undergoing hormonal therapy with Arimidex since the middle of March 2009.   2. Response to Arimidex as evidenced by serial CT exams, including on 01/10/2012, showing no significant disease.   3. New left upper lobe pulmonary nodule noted, measuring 1.1 cm by CT scan on 07/05/2012. No other evidence of metastatic disease. Hormonal therapy switched to Faslodex 07/12/2012.   4. Osteoporosis documented by bone density scan from 10/17/2011 and also 02/21/2014. Patient was started on Prolia therapy every 6 months. Patient had tooth extraction in August 2015. Prolia has been on hold.   5. CT scans of chest, abdomen and pelvis on 04/07/2016 showed stable disease. Faslodex will be continued.   6. CT scan examination reported disease progress on 01/30/2017, patient was switched to Aromasin in early February 2017. However she was unable to start Afinitor due to cost.   7. Repeated bone density scan on February 27, 2017 reported worsening osteoporosis. Prolia was restarted back on 03/02/2017. Plan for Q6 month treatment.   8.  Repeated a CT of the chest on 5/15/2017 showed progressive left upper lobe pulmonary nodule.  Patient had stereotactic radiation therapy in early June 2017.  Aromasin was continued.   9.  CT scan of the chest on 9/25/2017 reported a post treatment changes in the left upper lobe.      HISTORY OF PRESENT ILLNESS: Ms. Reese is a 71-year-old  female presenting today for 4 month followup, one week after her chest CT scan.  Patient is accompanied by her nephew.       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 Her performance status is ECOG 0. No dyspnea.   She reports no fever sweating.    she is on Aromasin with good tolerance. She denies arthralgia, hot flashes or sweating. 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  She denies bone pains.     The chest CT scan on 09/25/2017  reported post irradiation therapy in the left upper lobe.        PAST MEDICAL HISTORY:    1.  Left breast cancer, stage I, ER and KS positive, status post mastectomy, chemotherapy, and tamoxifen for five years--finished in .    2.  Hypothyroidism diagnosed in 2009.    3.  Hyperlipidemia.    4.  Diabetes, type 2.    5.  Osteoporosis, evidenced on bone density scans in 2011 and 2014. The patient stopped Fosamax because of dental problem suspected to be related to bisphosphonate.       SURGICAL HISTORY: Modified left radical mastectomy and previous breast biopsy in .       ONCOLOGIC/HEMATOLOGIC HISTORY: History from previous dates can be found in the separate document.       CT scan on 2016 showed stable disease with the small left upper lobe pulmonary nodule, no change compared to previous imaging studies, no evidence of metastatic disease in the abdomen or pelvis or bony structure.       Unfortunately her CT scan on 2017 showed slight disease progress in the left upper lobe of the lung, by 3 mm, up to 1.4 centimeters. No evidence of new metastatic disease or bone metastases.       Patient was seen on 2017. We'll propose switch therapy to Aromasin plus Afinitor.  however she could not afford Afinitor because the cost. Our pharmacy staff trialed but was not successful. So she is only on Aromasin with good tolerance.        MEDICATIONS: The current medication list was reviewed with the patient and updated in the EMR this date per the medical assistant. Medication dosages and frequencies were confirmed to be accurate.       ALLERGIES: LETROZOLE (questionably causing skin rash).       SOCIAL HISTORY: The patient is . She finished college education. 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 No alcohol use. No illegal drug use. No risks for HIV.       FAMILY HISTORY: Her mother  of tuberculosis at the age of 48. A sister had thyroid cancer/lung cancer -  "no details are available. Two brothers had lung cancer and bone cancers in their 70s - no details available. Her maternal grandmother and her mother both had diabetes. No family history of hypertension or heart disease.       REVIEW OF SYSTEMS:   PAIN: See VITAL SIGNS below.   GENERAL: No change in appetite or weight, no fevers, chills or sweats.   SKIN: Diffuse skin rashes on her neck, upper chest, arms.    HEME/LYMPH: No anemia, easy bruising, bleeding or swollen nodes.   EYES: No vision changes or diplopia.   ENT: No tinnitus, hearing loss, gum bleeding, epistaxis , hoarseness or dysphagia.   RESPIRATORY: No cough, shortness of breath, hemoptysis or wheezing.   CVS:no chest pain, palpitations, orthopnea, dyspnea on exertion or PND.   GI: No abdominal pain, nausea, vomiting, constipation, diarrhea, melena or hematochezia.   : No dysuria or hematuria. No abnormal vaginal discharge or bleeding.   MUSCULOSKELETAL: has mild joint achiness.   NEUROLOGICAL: No dizziness, global weakness, loss of consciousness or seizures.   PSYCHIATRIC: No increased nervousness, mood changes or depression.       VITAL SIGNS:   Vitals:    10/02/17 0824   BP: 108/70   Pulse: 68   Resp: 16   Temp: 98.1 °F (36.7 °C)   Weight: 119 lb 6.4 oz (54.2 kg)   Height: 61.02\" (155 cm)  Comment: new ht.   PainSc: 8  Comment: left breast   ECO       PHYSICAL EXAMINATION:   GENERAL: Patient is a well-developed, reasonably-nourished  female not in distress. However she is very anxious.    SKIN: No rashes, no urpura or petechiae.   HEAD: Normocephalic.   EYES: Pupils equal, round and reactive to light. EOMs intact. Conjunctivae normal.   EARS: Hearing intact.   NOSE: No discharge.   MOUTH: Tongue is well-papillated; no stomatitis or ulcers. Lips normal.   THROAT: Oropharynx without lesions or exudates.   NECK: Supple with good range of motion; no thyromegaly or masses.   LYMPHATICS: No cervical, supraclavicular, axillary adenopathy.   CHEST: " Lungs clear to auscultation.   CARDIAC: Regular rate and rhythm without murmurs, rubs or gallops.   ABDOMEN: Soft, nontender with no organomegaly or masses. bowel sounds normal.   EXTREMITIES: No clubbing, cyanosis or edema.   NEUROLOGICAL: No focal deficits.       LABORATORY DATA:     Lab Results   Component Value Date    WBC 4.48 09/25/2017    HGB 13.1 09/25/2017    HCT 40.2 09/25/2017    MCV 97.8 (H) 09/25/2017     09/25/2017     Lab Results   Component Value Date    NEUTROABS 2.22 09/25/2017     Lab Results   Component Value Date    GLUCOSE 103 09/25/2017    BUN 15 09/25/2017    CREATININE 0.90 09/25/2017    EGFRIFNONA 74 09/25/2017    EGFRIFAFRI 90 09/25/2017    BCR 19.5 09/25/2017    K 4.3 09/25/2017    CO2 26.8 09/25/2017    CALCIUM 9.6 09/25/2017    ALBUMIN 4.10 09/25/2017    LABIL2 1.1 09/25/2017    AST 19 09/25/2017    ALT 17 09/25/2017     Sodium   Date Value Ref Range Status   09/25/2017 141 134 - 145 mmol/L Final     Potassium   Date Value Ref Range Status   09/25/2017 4.3 3.5 - 4.7 mmol/L Final     Total Bilirubin   Date Value Ref Range Status   09/25/2017 0.5 0.1 - 1.2 mg/dL Final     Alkaline Phosphatase   Date Value Ref Range Status   09/25/2017 106 38 - 116 U/L Final   ]    Lab Results   Component Value Date    IRON 105 05/15/2017    TIBC 463 05/15/2017    FERRITIN 142.80 05/15/2017    irron sats  23%     Image study:    CT SCAN OF THE CHEST WITH CONTRAST 09/25/2017       CLINICAL HISTORY: 71-year-old female with history of breast carcinoma.  Radiation therapy. Follow-up lung mass..      TECHNIQUE: Spiral CT images were obtained through the chest with IV  contrast and were reconstructed in 3 mm thick axial slices. Radiation  dose reduction techniques were utilized, including automated exposure  control and exposure modulation based on body size.       COMPARISON: CT scan of the chest dated 05/15/2017      FINDINGS: The previous CT chest showed a 1.6 cm diameter lobulated  fairly  well-circumscribed nodule in the left upper lobe near the lung  apex. There is now an irregular area of ill-defined focus of abnormal  increased density in the left upper lobe in the previous location of the  nodule which is no longer identified. This is consistent with radiation  therapy induced fibrosis and pneumonitis. No discrete lung nodules are  currently identified. There are no pleural effusions. There is no  mediastinal or hilar or axillary lymphadenopathy. The ascending thoracic  aorta is mildly dilated. It measures 3.2 cm in diameter. This is  unchanged. The patient is status post left mastectomy without breast  reconstruction. The chest wall is otherwise unremarkable.      IMPRESSION:  Interval resolution of a well-circumscribed pulmonary nodule  in the left upper lobe following radiation therapy. There is focal  ill-defined increased density in the previous location of the nodule  consistent with post RT change. Status post left mastectomy. Otherwise  unremarkable CT scan of the chest.      ASSESSMENT:   1. Metastatic breast cancer with metastasis in the lungs, biopsy confirmed. ER/FL positive. Her2 positive.  she had a good response for the past many years.  However since early 2017, CT scan showed disease progression with a solitary lung metastases.  We switched her to Aromasin in early February 2017, however she could not obtain Afinitor, because of the cost issue.  We obtained a chest CT scan on 5/15/2017 which showed indeed further disease progression.  Patient was treated with stereotactic radiation therapy in early June 2017.  We obtained CT scan last week on 9/25/2017 which showed post radiation therapy changes.      I personally reviewed the CT scan images.  I recommended patient continue taking Aromasin.         2. Osteoporosis. She had repeated bone density scan on February 27, 2017 which showed statistically significant worsening osteoporosis in the left hip.  We restarted her back on Prolia  On 03/02/17 and repeat every 6 months. The patient will continue oral calcium and vitamin D supplementation.       3. Mild anemia,with mild iron deficiency.  Patient has been taking oral iron supplementation with good results, laboratory study showed improved with ferritin and iron saturation, and normalized hemoglobin.  Patient was instructed to continue oral iron supplementation and oral vitamin B12 supplementation.          PLAN:       1. Continue Aromasin 25 mg daily.   2.  Patient is due for Aredia today.  3.  Continue oral ferrous sulfate 325 mg once a day.     4.  Repeated chest and abdomen CT in 6 month for reevaluation.  We will also obtain laboratory study for ferritin and iron level together with CBC and CMP.  She will be due for next dose of Prolia on day of MD visit.           More than 40 minutes, over half of that time were for counseling.  I reviewed his CT scan report with the patient and the her nephew.   Questions were answered to patient and her nephew's satisfaction.           LETA MAY M.D., Ph.D.   10/02/2017           Dragon disclaimer:  Much of this encounter note is an electronic transcription/translation of spoken language to printed text. The electronic translation of spoken language may permit erroneous, or at times, nonsensical words or phrases to be inadvertently transcribed; Although I have reviewed the note for such errors, some may still exist.

## 2017-10-29 ENCOUNTER — APPOINTMENT (OUTPATIENT)
Dept: GENERAL RADIOLOGY | Facility: HOSPITAL | Age: 72
End: 2017-10-29

## 2017-10-29 ENCOUNTER — HOSPITAL ENCOUNTER (EMERGENCY)
Facility: HOSPITAL | Age: 72
Discharge: HOME | End: 2017-10-29
Attending: EMERGENCY MEDICINE | Admitting: EMERGENCY MEDICINE

## 2017-10-29 ENCOUNTER — APPOINTMENT (OUTPATIENT)
Dept: CT IMAGING | Facility: HOSPITAL | Age: 72
End: 2017-10-29

## 2017-10-29 VITALS
BODY MASS INDEX: 21.9 KG/M2 | DIASTOLIC BLOOD PRESSURE: 59 MMHG | SYSTOLIC BLOOD PRESSURE: 128 MMHG | HEIGHT: 61 IN | RESPIRATION RATE: 16 BRPM | OXYGEN SATURATION: 98 % | HEART RATE: 77 BPM | WEIGHT: 116 LBS | TEMPERATURE: 99.6 F

## 2017-10-29 DIAGNOSIS — J18.9 PNEUMONIA OF LEFT UPPER LOBE DUE TO INFECTIOUS ORGANISM: Primary | ICD-10-CM

## 2017-10-29 LAB
FLUAV AG NPH QL: NEGATIVE
FLUBV AG NPH QL IA: NEGATIVE

## 2017-10-29 PROCEDURE — 70450 CT HEAD/BRAIN W/O DYE: CPT

## 2017-10-29 PROCEDURE — 71020 HC CHEST PA AND LATERAL: CPT

## 2017-10-29 PROCEDURE — 87804 INFLUENZA ASSAY W/OPTIC: CPT | Performed by: PHYSICIAN ASSISTANT

## 2017-10-29 PROCEDURE — 99283 EMERGENCY DEPT VISIT LOW MDM: CPT

## 2017-10-29 RX ORDER — AMOXICILLIN AND CLAVULANATE POTASSIUM 875; 125 MG/1; MG/1
1 TABLET, FILM COATED ORAL 2 TIMES DAILY
Qty: 14 TABLET | Refills: 0 | Status: SHIPPED | OUTPATIENT
Start: 2017-10-29 | End: 2019-01-08

## 2017-11-20 ENCOUNTER — HOSPITAL ENCOUNTER (EMERGENCY)
Facility: HOSPITAL | Age: 72
Discharge: HOME | End: 2017-11-20
Attending: EMERGENCY MEDICINE | Admitting: EMERGENCY MEDICINE

## 2017-11-20 VITALS
OXYGEN SATURATION: 98 % | SYSTOLIC BLOOD PRESSURE: 145 MMHG | TEMPERATURE: 98.3 F | DIASTOLIC BLOOD PRESSURE: 98 MMHG | HEART RATE: 72 BPM | WEIGHT: 121 LBS | BODY MASS INDEX: 22.84 KG/M2 | RESPIRATION RATE: 18 BRPM | HEIGHT: 61 IN

## 2017-11-20 DIAGNOSIS — N30.01 ACUTE CYSTITIS WITH HEMATURIA: Primary | ICD-10-CM

## 2017-11-20 LAB
ALBUMIN SERPL-MCNC: 3.9 G/DL (ref 3.5–5.2)
ALBUMIN/GLOB SERPL: 1 G/DL
ALP SERPL-CCNC: 144 U/L (ref 39–117)
ALT SERPL W P-5'-P-CCNC: 15 U/L (ref 1–33)
ANION GAP SERPL CALCULATED.3IONS-SCNC: 9.9 MMOL/L
AST SERPL-CCNC: 22 U/L (ref 1–32)
BACTERIA UR QL AUTO: ABNORMAL /HPF
BASOPHILS # BLD AUTO: 0.03 10*3/MM3 (ref 0–0.2)
BASOPHILS NFR BLD AUTO: 0.4 % (ref 0–1.5)
BILIRUB SERPL-MCNC: 0.3 MG/DL (ref 0.1–1.2)
BILIRUB UR QL STRIP: NEGATIVE
BUN BLD-MCNC: 15 MG/DL (ref 8–23)
BUN/CREAT SERPL: 17 (ref 7–25)
CALCIUM SPEC-SCNC: 9.4 MG/DL (ref 8.6–10.5)
CHLORIDE SERPL-SCNC: 97 MMOL/L (ref 98–107)
CLARITY UR: ABNORMAL
CO2 SERPL-SCNC: 26.1 MMOL/L (ref 22–29)
COLOR UR: YELLOW
CREAT BLD-MCNC: 0.88 MG/DL (ref 0.57–1)
DEPRECATED RDW RBC AUTO: 42.8 FL (ref 37–54)
EOSINOPHIL # BLD AUTO: 0.17 10*3/MM3 (ref 0–0.7)
EOSINOPHIL NFR BLD AUTO: 2.3 % (ref 0.3–6.2)
ERYTHROCYTE [DISTWIDTH] IN BLOOD BY AUTOMATED COUNT: 12.3 % (ref 11.7–13)
GFR SERPL CREATININE-BSD FRML MDRD: 63 ML/MIN/1.73
GFR SERPL CREATININE-BSD FRML MDRD: 77 ML/MIN/1.73
GLOBULIN UR ELPH-MCNC: 3.9 GM/DL
GLUCOSE BLD-MCNC: 210 MG/DL (ref 65–99)
GLUCOSE UR STRIP-MCNC: ABNORMAL MG/DL
HCT VFR BLD AUTO: 35.3 % (ref 35.6–45.5)
HGB BLD-MCNC: 11.7 G/DL (ref 11.9–15.5)
HGB UR QL STRIP.AUTO: ABNORMAL
HYALINE CASTS UR QL AUTO: ABNORMAL /LPF
IMM GRANULOCYTES # BLD: 0 10*3/MM3 (ref 0–0.03)
IMM GRANULOCYTES NFR BLD: 0 % (ref 0–0.5)
KETONES UR QL STRIP: NEGATIVE
LEUKOCYTE ESTERASE UR QL STRIP.AUTO: ABNORMAL
LYMPHOCYTES # BLD AUTO: 1.51 10*3/MM3 (ref 0.9–4.8)
LYMPHOCYTES NFR BLD AUTO: 20.5 % (ref 19.6–45.3)
MCH RBC QN AUTO: 31.9 PG (ref 26.9–32)
MCHC RBC AUTO-ENTMCNC: 33.1 G/DL (ref 32.4–36.3)
MCV RBC AUTO: 96.2 FL (ref 80.5–98.2)
MONOCYTES # BLD AUTO: 0.57 10*3/MM3 (ref 0.2–1.2)
MONOCYTES NFR BLD AUTO: 7.7 % (ref 5–12)
NEUTROPHILS # BLD AUTO: 5.08 10*3/MM3 (ref 1.9–8.1)
NEUTROPHILS NFR BLD AUTO: 69.1 % (ref 42.7–76)
NITRITE UR QL STRIP: NEGATIVE
PH UR STRIP.AUTO: 5.5 [PH] (ref 5–8)
PLATELET # BLD AUTO: 221 10*3/MM3 (ref 140–500)
PMV BLD AUTO: 8.7 FL (ref 6–12)
POTASSIUM BLD-SCNC: 4.4 MMOL/L (ref 3.5–5.2)
PROT SERPL-MCNC: 7.8 G/DL (ref 6–8.5)
PROT UR QL STRIP: ABNORMAL
RBC # BLD AUTO: 3.67 10*6/MM3 (ref 3.9–5.2)
RBC # UR: ABNORMAL /HPF
REF LAB TEST METHOD: ABNORMAL
SODIUM BLD-SCNC: 133 MMOL/L (ref 136–145)
SP GR UR STRIP: 1.02 (ref 1–1.03)
SQUAMOUS #/AREA URNS HPF: ABNORMAL /HPF
UROBILINOGEN UR QL STRIP: ABNORMAL
WBC NRBC COR # BLD: 7.36 10*3/MM3 (ref 4.5–10.7)
WBC UR QL AUTO: ABNORMAL /HPF

## 2017-11-20 PROCEDURE — 81001 URINALYSIS AUTO W/SCOPE: CPT | Performed by: EMERGENCY MEDICINE

## 2017-11-20 PROCEDURE — 85025 COMPLETE CBC W/AUTO DIFF WBC: CPT | Performed by: EMERGENCY MEDICINE

## 2017-11-20 PROCEDURE — 99284 EMERGENCY DEPT VISIT MOD MDM: CPT

## 2017-11-20 PROCEDURE — 80053 COMPREHEN METABOLIC PANEL: CPT | Performed by: EMERGENCY MEDICINE

## 2017-11-20 PROCEDURE — 87086 URINE CULTURE/COLONY COUNT: CPT | Performed by: EMERGENCY MEDICINE

## 2017-11-20 RX ORDER — SULFAMETHOXAZOLE AND TRIMETHOPRIM 800; 160 MG/1; MG/1
1 TABLET ORAL ONCE
Status: COMPLETED | OUTPATIENT
Start: 2017-11-20 | End: 2017-11-20

## 2017-11-20 RX ORDER — SULFAMETHOXAZOLE AND TRIMETHOPRIM 800; 160 MG/1; MG/1
1 TABLET ORAL 2 TIMES DAILY
Qty: 20 TABLET | Refills: 0 | Status: SHIPPED | OUTPATIENT
Start: 2017-11-20 | End: 2019-12-03

## 2017-11-20 RX ORDER — PHENAZOPYRIDINE HYDROCHLORIDE 100 MG/1
100 TABLET, FILM COATED ORAL 3 TIMES DAILY PRN
Qty: 9 TABLET | Refills: 0 | Status: SHIPPED | OUTPATIENT
Start: 2017-11-20 | End: 2020-02-27

## 2017-11-20 RX ADMIN — SULFAMETHOXAZOLE AND TRIMETHOPRIM 160 MG: 800; 160 TABLET ORAL at 02:37

## 2017-11-20 NOTE — ED PROVIDER NOTES
EMERGENCY DEPARTMENT ENCOUNTER    CHIEF COMPLAINT  Chief Complaint: Dysuria  History given by: Patient  History limited by:   Room Number: 12/12  PMD: Sussy Payne MD      HPI:  Pt is a 72 y.o. female who presents complaining of dysuria that onset 2 days ago. She describes the pain as a burning only during urination. Yesterday she reports having some hematuria. Pt denies any flank pain or abd pain. She also denies any fever, chills, or N/V. Pt reports some increased urinary frequency and urgency. Pt is currently on chemo for breast cancer.     Duration: 2 days  Onset: sudden  Timing: intermittent  Quality: burning  Intensity/Severity: moderate  Progression: unchanged  Associated Symptoms: hematuria, urinary frequency, urinary urgency  Aggravating Factors: urination  Alleviating Factors: none  Previous Episodes: none  Treatment before arrival: none    PAST MEDICAL HISTORY  Active Ambulatory Problems     Diagnosis Date Noted   • Malignant neoplasm of female breast 03/14/2016   • Anemia 04/12/2016   • Iron deficiency anemia 04/20/2016   • Malignant neoplasm metastatic to left lung 02/06/2017   • Osteoporosis 02/06/2017     Resolved Ambulatory Problems     Diagnosis Date Noted   • No Resolved Ambulatory Problems     Past Medical History:   Diagnosis Date   • Breast cancer, Left    • Diabetes mellitus    • Hyperlipidemia    • Hypothyroidism 01/2009   • Left upper pulmonary nodule 07/05/2012   • Osteoporosis 10/17/2011       PAST SURGICAL HISTORY  Past Surgical History:   Procedure Laterality Date   • BREAST BIOPSY Left 1995   • MASTECTOMY RADICAL Left 1995   • MOUTH SURGERY  08/2015    tooth extraction        FAMILY HISTORY  Family History   Problem Relation Age of Onset   • Tuberculosis Mother    • Diabetes Mother    • Thyroid cancer Sister    • Lung cancer Sister    • Lung cancer Brother      In his 70s.   • Bone cancer Brother      in his 70s.   • Diabetes Maternal Grandmother    • Lung cancer Brother      In his 70s.     • Bone cancer Brother      in his 70s.   • Hypertension Neg Hx    • Heart disease Neg Hx        SOCIAL HISTORY  Social History     Social History   • Marital status:      Spouse name: N/A   • Number of children: N/A   • Years of education: College     Occupational History   •  [employer]     Social History Main Topics   • Smoking status: Never Smoker   • Smokeless tobacco: Never Used   • Alcohol use No   • Drug use: No   • Sexual activity: Not on file     Other Topics Concern   • Not on file     Social History Narrative       ALLERGIES  Letrozole    REVIEW OF SYSTEMS  Review of Systems   Constitutional: Negative for fever.   HENT: Negative for sore throat.    Eyes: Negative.    Respiratory: Negative for cough and shortness of breath.    Cardiovascular: Negative for chest pain.   Gastrointestinal: Negative for abdominal pain, diarrhea and vomiting.   Genitourinary: Positive for dysuria, frequency, hematuria and urgency.   Musculoskeletal: Negative for neck pain.   Skin: Negative for rash.   Allergic/Immunologic: Negative.    Neurological: Negative for weakness, numbness and headaches.   Hematological: Negative.    Psychiatric/Behavioral: Negative.    All other systems reviewed and are negative.      PHYSICAL EXAM  ED Triage Vitals   Temp Heart Rate Resp BP SpO2   11/20/17 0036 11/20/17 0035 11/20/17 0035 -- 11/20/17 0035   98.3 °F (36.8 °C) 80 14  98 %      Temp src Heart Rate Source Patient Position BP Location FiO2 (%)   11/20/17 0036 -- -- -- --   Tympanic           Physical Exam   Constitutional: She is oriented to person, place, and time and well-developed, well-nourished, and in no distress. No distress.   HENT:   Head: Normocephalic and atraumatic.   Eyes: EOM are normal. Pupils are equal, round, and reactive to light.   Neck: Normal range of motion. Neck supple.   Cardiovascular: Normal rate, regular rhythm and normal heart sounds.    Pulmonary/Chest: Effort normal and breath sounds  normal. No respiratory distress.   Abdominal: Soft. There is no tenderness. There is no rebound and no guarding.   Musculoskeletal: Normal range of motion. She exhibits no edema.   Neurological: She is alert and oriented to person, place, and time. She has normal sensation and normal strength.   Skin: Skin is warm and dry. No rash noted.   Psychiatric: Mood and affect normal.   Nursing note and vitals reviewed.      LAB RESULTS  Lab Results (last 24 hours)     Procedure Component Value Units Date/Time    CBC & Differential [[id number]] Collected:  11/20/17 0108    Specimen:  Blood Updated:  11/20/17 0125    Narrative:       The following orders were created for panel order CBC & Differential.  Procedure                               Abnormality         Status                     ---------                               -----------         ------                     CBC Auto Differential[[id number]]        Abnormal            Final result                 Please view results for these tests on the individual orders.    Comprehensive Metabolic Panel [[id number]]  (Abnormal) Collected:  11/20/17 0108    Specimen:  Blood Updated:  11/20/17 0143     Glucose 210 (H) mg/dL      BUN 15 mg/dL      Creatinine 0.88 mg/dL      Sodium 133 (L) mmol/L      Potassium 4.4 mmol/L      Chloride 97 (L) mmol/L      CO2 26.1 mmol/L      Calcium 9.4 mg/dL      Total Protein 7.8 g/dL      Albumin 3.90 g/dL      ALT (SGPT) 15 U/L      AST (SGOT) 22 U/L      Alkaline Phosphatase 144 (H) U/L      Total Bilirubin 0.3 mg/dL      eGFR Non African Amer 63 mL/min/1.73      eGFR  African Amer 77 mL/min/1.73      Globulin 3.9 gm/dL      A/G Ratio 1.0 g/dL      BUN/Creatinine Ratio 17.0     Anion Gap 9.9 mmol/L     Narrative:       The MDRD GFR formula is only valid for adults with stable renal function between ages 18 and 70.    CBC Auto Differential [[id number]]  (Abnormal) Collected:  11/20/17 0108    Specimen:  Blood Updated:  11/20/17 0125     WBC  7.36 10*3/mm3      RBC 3.67 (L) 10*6/mm3      Hemoglobin 11.7 (L) g/dL      Hematocrit 35.3 (L) %      MCV 96.2 fL      MCH 31.9 pg      MCHC 33.1 g/dL      RDW 12.3 %      RDW-SD 42.8 fl      MPV 8.7 fL      Platelets 221 10*3/mm3      Neutrophil % 69.1 %      Lymphocyte % 20.5 %      Monocyte % 7.7 %      Eosinophil % 2.3 %      Basophil % 0.4 %      Immature Grans % 0.0 %      Neutrophils, Absolute 5.08 10*3/mm3      Lymphocytes, Absolute 1.51 10*3/mm3      Monocytes, Absolute 0.57 10*3/mm3      Eosinophils, Absolute 0.17 10*3/mm3      Basophils, Absolute 0.03 10*3/mm3      Immature Grans, Absolute 0.00 10*3/mm3     Urinalysis With / Culture If Indicated - Urine, Clean Catch [[id number]]  (Abnormal) Collected:  11/20/17 0116    Specimen:  Urine from Urine, Clean Catch Updated:  11/20/17 0136     Color, UA Yellow     Appearance, UA Cloudy (A)     pH, UA 5.5     Specific Gravity, UA 1.017     Glucose,  mg/dL (1+) (A)     Ketones, UA Negative     Bilirubin, UA Negative     Blood, UA Large (3+) (A)     Protein,  mg/dL (2+) (A)     Leuk Esterase, UA Large (3+) (A)     Nitrite, UA Negative     Urobilinogen, UA 0.2 E.U./dL    Urinalysis, Microscopic Only - Urine, Clean Catch [[id number]]  (Abnormal) Collected:  11/20/17 0116    Specimen:  Urine from Urine, Clean Catch Updated:  11/20/17 0159     RBC, UA  /HPF      Unable to determine due to loaded field (A)     WBC, UA Too Numerous to Count (A) /HPF      Bacteria, UA  /HPF      Unable to determine due to loaded field (A)     Squamous Epithelial Cells, UA  /HPF      Unable to determine due to loaded field (A)     Hyaline Casts, UA  /LPF      Unable to determine due to loaded field     Methodology Manual Light Microscopy    Urine Culture - Urine, Urine, Clean Catch [[id number]] Collected:  11/20/17 0116    Specimen:  Urine from Urine, Clean Catch Updated:  11/20/17 0135          I ordered the above labs and reviewed the  results    PROCEDURES  Procedures      PROGRESS AND CONSULTS  ED Course   12:53 AM  UA and blood work ordered for further evaluation.   2:06 AM  Rechecked with pt. Informed pt of her UA showing UTI and plan to discharge with abx. Pt understands and agrees with plan. All concerns addressed.       MEDICAL DECISION MAKING  Results were reviewed/discussed with the patient and they were also made aware of online access. Pt also made aware that some labs, such as cultures, will not be resulted during ER visit and follow up with PMD is necessary.     MDM  Number of Diagnoses or Management Options  Acute cystitis with hematuria:      Amount and/or Complexity of Data Reviewed  Clinical lab tests: ordered and reviewed (UA: WBC TNTC, Bacteria TNTC)           DIAGNOSIS  Final diagnoses:   Acute cystitis with hematuria       DISPOSITION  DISCHARGE    Patient discharged in stable condition.    Reviewed implications of results, diagnosis, meds, responsibility to follow up, warning signs and symptoms of possible worsening, potential complications and reasons to return to ER.    Patient/Family voiced understanding of above instructions.    Discussed plan for discharge, as there is no emergent indication for admission.  Pt/family is agreeable and understands need for follow up and repeat testing.  Pt is aware that discharge does not mean that nothing is wrong but it indicates no emergency is present that requires admission and they must continue care with follow-up as given below or physician of their choice.     FOLLOW-UP  Sussy Payne MD  [facility] [id number] [phone]    Call           Medication List      New Prescriptions          phenazopyridine 100 MG tablet   Commonly known as:  PYRIDIUM   Take 1 tablet by mouth 3 (Three) Times a Day As Needed for bladder spasms.         sulfamethoxazole-trimethoprim 800-160 MG per tablet   Commonly known as:  BACTRIM DS,SEPTRA DS   Take 1 tablet by mouth 2  (Two) Times a Day.         Stop          amoxicillin-clavulanate 875-125 MG per tablet   Commonly known as:  AUGMENTIN                   Latest Documented Vital Signs:  As of 2:08 AM  BP- 147/75 HR- 80 Temp- 98.3 °F (36.8 °C) (Tympanic) O2 sat- 98%    --  Documentation assistance provided by hugo Malone for Dr. Gonsalves.  Information recorded by the jennieibty was done at my direction and has been verified and validated by me.     Fidencio Malone  11/20/17 0158       Fidencio Malone  11/20/17 0208       Demarcus Gonsalves MD  11/20/17 0701

## 2017-11-21 LAB
BACTERIA SPEC AEROBE CULT: NORMAL
BACTERIA SPEC AEROBE CULT: NORMAL

## 2017-11-27 ENCOUNTER — APPOINTMENT (OUTPATIENT)
Dept: GENERAL RADIOLOGY | Facility: HOSPITAL | Age: 72
End: 2017-11-27

## 2017-11-27 ENCOUNTER — HOSPITAL ENCOUNTER (EMERGENCY)
Facility: HOSPITAL | Age: 72
Discharge: HOME | End: 2017-11-27
Attending: EMERGENCY MEDICINE | Admitting: EMERGENCY MEDICINE

## 2017-11-27 VITALS
RESPIRATION RATE: 16 BRPM | HEART RATE: 72 BPM | OXYGEN SATURATION: 94 % | WEIGHT: 120 LBS | HEIGHT: 61 IN | TEMPERATURE: 97.7 F | BODY MASS INDEX: 22.66 KG/M2 | DIASTOLIC BLOOD PRESSURE: 67 MMHG | SYSTOLIC BLOOD PRESSURE: 129 MMHG

## 2017-11-27 DIAGNOSIS — S80.02XA CONTUSION OF LEFT KNEE, INITIAL ENCOUNTER: Primary | ICD-10-CM

## 2017-11-27 PROCEDURE — 99283 EMERGENCY DEPT VISIT LOW MDM: CPT

## 2017-11-27 PROCEDURE — 73560 X-RAY EXAM OF KNEE 1 OR 2: CPT

## 2018-01-28 ENCOUNTER — HOSPITAL ENCOUNTER (EMERGENCY)
Facility: HOSPITAL | Age: 73
Discharge: HOME | End: 2018-01-28
Attending: EMERGENCY MEDICINE | Admitting: EMERGENCY MEDICINE

## 2018-01-28 VITALS
BODY MASS INDEX: 22.28 KG/M2 | HEART RATE: 70 BPM | SYSTOLIC BLOOD PRESSURE: 138 MMHG | RESPIRATION RATE: 16 BRPM | WEIGHT: 118 LBS | HEIGHT: 61 IN | OXYGEN SATURATION: 98 % | DIASTOLIC BLOOD PRESSURE: 74 MMHG | TEMPERATURE: 98.3 F

## 2018-01-28 DIAGNOSIS — M54.2 NECK PAIN: Primary | ICD-10-CM

## 2018-01-28 PROCEDURE — 99283 EMERGENCY DEPT VISIT LOW MDM: CPT

## 2018-01-28 RX ORDER — NAPROXEN 500 MG/1
500 TABLET ORAL 2 TIMES DAILY WITH MEALS
Qty: 20 TABLET | Refills: 0 | Status: SHIPPED | OUTPATIENT
Start: 2018-01-28 | End: 2019-12-03

## 2018-01-28 RX ORDER — CYCLOBENZAPRINE HCL 10 MG
10 TABLET ORAL 3 TIMES DAILY
Qty: 15 TABLET | Refills: 0 | Status: SHIPPED | OUTPATIENT
Start: 2018-01-28 | End: 2019-02-06

## 2018-02-09 ENCOUNTER — TRANSCRIBE ORDERS (OUTPATIENT)
Dept: LAB | Facility: HOSPITAL | Age: 73
End: 2018-02-09

## 2018-02-09 ENCOUNTER — LAB (OUTPATIENT)
Dept: LAB | Facility: HOSPITAL | Age: 73
End: 2018-02-09
Attending: INTERNAL MEDICINE

## 2018-02-09 DIAGNOSIS — L30.1: ICD-10-CM

## 2018-02-09 DIAGNOSIS — E55.9 VITAMIN D DEFICIENCY: ICD-10-CM

## 2018-02-09 DIAGNOSIS — Z00.01 ENCOUNTER FOR GENERAL ADULT MEDICAL EXAMINATION WITH ABNORMAL FINDINGS: ICD-10-CM

## 2018-02-09 DIAGNOSIS — E03.9 HYPOTHYROIDISM, UNSPECIFIED TYPE: ICD-10-CM

## 2018-02-09 DIAGNOSIS — Z00.01 ENCOUNTER FOR GENERAL ADULT MEDICAL EXAMINATION WITH ABNORMAL FINDINGS: Primary | ICD-10-CM

## 2018-02-09 LAB
25(OH)D3 SERPL-MCNC: 32.1 NG/ML (ref 30–100)
ALBUMIN SERPL-MCNC: 4.1 G/DL (ref 3.5–5.2)
ALBUMIN/GLOB SERPL: 1 G/DL
ALP SERPL-CCNC: 105 U/L (ref 39–117)
ALT SERPL W P-5'-P-CCNC: 26 U/L (ref 1–33)
ANION GAP SERPL CALCULATED.3IONS-SCNC: 13.1 MMOL/L
AST SERPL-CCNC: 23 U/L (ref 1–32)
BACTERIA UR QL AUTO: NORMAL /HPF
BILIRUB SERPL-MCNC: 0.4 MG/DL (ref 0.1–1.2)
BILIRUB UR QL STRIP: NEGATIVE
BUN BLD-MCNC: 22 MG/DL (ref 8–23)
BUN/CREAT SERPL: 22.7 (ref 7–25)
CALCIUM SPEC-SCNC: 9.9 MG/DL (ref 8.6–10.5)
CHLORIDE SERPL-SCNC: 98 MMOL/L (ref 98–107)
CHOLEST SERPL-MCNC: 136 MG/DL (ref 0–200)
CLARITY UR: CLEAR
CO2 SERPL-SCNC: 25.9 MMOL/L (ref 22–29)
COLOR UR: YELLOW
CREAT BLD-MCNC: 0.97 MG/DL (ref 0.57–1)
CREAT UR-MCNC: 118 MG/DL
DEPRECATED RDW RBC AUTO: 41.8 FL (ref 37–54)
ERYTHROCYTE [DISTWIDTH] IN BLOOD BY AUTOMATED COUNT: 12.2 % (ref 11.7–13)
GFR SERPL CREATININE-BSD FRML MDRD: 57 ML/MIN/1.73
GFR SERPL CREATININE-BSD FRML MDRD: 69 ML/MIN/1.73
GLOBULIN UR ELPH-MCNC: 4 GM/DL
GLUCOSE BLD-MCNC: 152 MG/DL (ref 65–99)
GLUCOSE UR STRIP-MCNC: NEGATIVE MG/DL
HBA1C MFR BLD: 8.4 % (ref 4.8–5.6)
HCT VFR BLD AUTO: 37.1 % (ref 35.6–45.5)
HDLC SERPL-MCNC: 43 MG/DL (ref 40–60)
HGB BLD-MCNC: 12.3 G/DL (ref 11.9–15.5)
HGB UR QL STRIP.AUTO: NEGATIVE
HYALINE CASTS UR QL AUTO: NORMAL /LPF
KETONES UR QL STRIP: NEGATIVE
LDLC SERPL CALC-MCNC: 53 MG/DL (ref 0–100)
LDLC/HDLC SERPL: 1.24 {RATIO}
LEUKOCYTE ESTERASE UR QL STRIP.AUTO: ABNORMAL
MCH RBC QN AUTO: 31.5 PG (ref 26.9–32)
MCHC RBC AUTO-ENTMCNC: 33.2 G/DL (ref 32.4–36.3)
MCV RBC AUTO: 94.9 FL (ref 80.5–98.2)
NITRITE UR QL STRIP: NEGATIVE
PH UR STRIP.AUTO: <=5 [PH] (ref 5–8)
PLATELET # BLD AUTO: 234 10*3/MM3 (ref 140–500)
PMV BLD AUTO: 8.7 FL (ref 6–12)
POTASSIUM BLD-SCNC: 4.4 MMOL/L (ref 3.5–5.2)
PROT SERPL-MCNC: 8.1 G/DL (ref 6–8.5)
PROT UR QL STRIP: NEGATIVE
PROT UR-MCNC: 9 MG/DL
RBC # BLD AUTO: 3.91 10*6/MM3 (ref 3.9–5.2)
RBC # UR: NORMAL /HPF
REF LAB TEST METHOD: NORMAL
SODIUM BLD-SCNC: 137 MMOL/L (ref 136–145)
SP GR UR STRIP: 1.02 (ref 1–1.03)
SQUAMOUS #/AREA URNS HPF: NORMAL /HPF
TRIGL SERPL-MCNC: 198 MG/DL (ref 0–150)
TSH SERPL DL<=0.05 MIU/L-ACNC: 6.45 MIU/ML (ref 0.27–4.2)
UROBILINOGEN UR QL STRIP: ABNORMAL
VLDLC SERPL-MCNC: 39.6 MG/DL (ref 5–40)
WBC NRBC COR # BLD: 7.03 10*3/MM3 (ref 4.5–10.7)
WBC UR QL AUTO: NORMAL /HPF

## 2018-02-09 PROCEDURE — 36415 COLL VENOUS BLD VENIPUNCTURE: CPT

## 2018-02-09 PROCEDURE — 81001 URINALYSIS AUTO W/SCOPE: CPT

## 2018-02-09 PROCEDURE — 84443 ASSAY THYROID STIM HORMONE: CPT

## 2018-02-09 PROCEDURE — 83036 HEMOGLOBIN GLYCOSYLATED A1C: CPT

## 2018-02-09 PROCEDURE — 84156 ASSAY OF PROTEIN URINE: CPT

## 2018-02-09 PROCEDURE — 80061 LIPID PANEL: CPT

## 2018-02-09 PROCEDURE — 82306 VITAMIN D 25 HYDROXY: CPT

## 2018-02-09 PROCEDURE — 80053 COMPREHEN METABOLIC PANEL: CPT

## 2018-02-09 PROCEDURE — 82570 ASSAY OF URINE CREATININE: CPT

## 2018-02-09 PROCEDURE — 85027 COMPLETE CBC AUTOMATED: CPT

## 2018-02-20 ENCOUNTER — DOCUMENTATION (OUTPATIENT)
Dept: ONCOLOGY | Facility: CLINIC | Age: 73
End: 2018-02-20

## 2018-02-20 NOTE — PROGRESS NOTES
DOCTOR #2 NOTE:    In Angeli Castillo's absence, I took a phone call from the patient's oral surgeon.  The oral surgeon stated the patient needs 6 teeth removed.  She wanted to know recent bisphosphonate history.  I told her Prolia was given in March and in October 2017.  Next dose is scheduled when she sees Dr. Castillo, June 2018.    Surgeon states these teeth need to come out.  Therefore, although there is a risk of osteonecrosis of the jaw, regarding Prolia timing, it seems now would be a reasonable time to remove these teeth.

## 2018-03-20 RX ORDER — EXEMESTANE 25 MG/1
25 TABLET ORAL DAILY
Qty: 90 TABLET | Refills: 3 | Status: SHIPPED | OUTPATIENT
Start: 2018-03-20 | End: 2018-06-18

## 2018-04-09 RX ORDER — FERROUS SULFATE 325(65) MG
325 TABLET ORAL
Qty: 90 TABLET | Refills: 2 | Status: SHIPPED | OUTPATIENT
Start: 2018-04-09 | End: 2023-02-27

## 2018-05-09 ENCOUNTER — HOSPITAL ENCOUNTER (EMERGENCY)
Facility: HOSPITAL | Age: 73
Discharge: HOME | End: 2018-05-09
Attending: EMERGENCY MEDICINE | Admitting: EMERGENCY MEDICINE

## 2018-05-09 VITALS
WEIGHT: 116 LBS | DIASTOLIC BLOOD PRESSURE: 96 MMHG | HEART RATE: 62 BPM | BODY MASS INDEX: 21.9 KG/M2 | SYSTOLIC BLOOD PRESSURE: 131 MMHG | HEIGHT: 61 IN | RESPIRATION RATE: 18 BRPM | TEMPERATURE: 98.3 F | OXYGEN SATURATION: 98 %

## 2018-05-09 DIAGNOSIS — E11.65 TYPE 2 DIABETES MELLITUS WITH HYPERGLYCEMIA, WITHOUT LONG-TERM CURRENT USE OF INSULIN (HCC): Primary | ICD-10-CM

## 2018-05-09 LAB
GLUCOSE BLDC GLUCOMTR-MCNC: 108 MG/DL (ref 70–130)
GLUCOSE BLDC GLUCOMTR-MCNC: 154 MG/DL (ref 70–130)
GLUCOSE BLDC GLUCOMTR-MCNC: 227 MG/DL (ref 70–130)
HOLD SPECIMEN: NORMAL
HOLD SPECIMEN: NORMAL
WHOLE BLOOD HOLD SPECIMEN: NORMAL
WHOLE BLOOD HOLD SPECIMEN: NORMAL

## 2018-05-09 PROCEDURE — 82962 GLUCOSE BLOOD TEST: CPT

## 2018-05-09 PROCEDURE — 99283 EMERGENCY DEPT VISIT LOW MDM: CPT

## 2018-05-09 PROCEDURE — 36415 COLL VENOUS BLD VENIPUNCTURE: CPT

## 2018-05-09 RX ORDER — SODIUM CHLORIDE 0.9 % (FLUSH) 0.9 %
10 SYRINGE (ML) INJECTION AS NEEDED
Status: DISCONTINUED | OUTPATIENT
Start: 2018-05-09 | End: 2018-05-09 | Stop reason: HOSPADM

## 2018-05-09 NOTE — ED PROVIDER NOTES
MD ATTESTATION NOTE    The RUDI and I have discussed this patient's history, physical exam, and treatment plan.  I have reviewed the documentation and personally had a face to face interaction with the patient. I affirm the documentation and agree with the treatment and plan.  The attached note describes my personal findings.    Pt reports that she has h/o diabetes for which pt is on Glipizide and Metformin. Pt reports that this AM, pt checked her blood glucose. Pt's glucometer read as 600; consequently, pt took 3 pills of Metformin and 1 pill of Glipizide. Pt reports that she was asymptomatic with her elevated BG and is currently denying chest pain, dyspnea, dizziness/lightheadedness, N/V/D, and palpitations. On physical exam, pt is alert and oriented x3. Heart is RRR. Lungs are CTAB. Skin is warm and dry. Pt's BG is currently 108 -> pt will be provided with food in the ER and her BG will be rechecked afterwards. If pt is not hypoglycemic, pt will be discharged. Pt was strongly educated on importance of taking her diabetes medications as prescribed.        Documentation assistance provided by Nai Cutler. Information recorded by the scribe was done at my direction and has been verified and validated by me.     Entered by Nai Cutler, acting as scribe for Dr. Shelton MD.           Nai Cutler  05/09/18 [id number]       Jose L Peoples MD  05/09/18 2190

## 2018-05-09 NOTE — ED PROVIDER NOTES
EMERGENCY DEPARTMENT ENCOUNTER    CHIEF COMPLAINT  Chief Complaint: hyperglycemia   History given by: patient, family  History limited by: N/A  Room Number: 40/40  PMD: Sussy Payne MD      HPI:  Pt is a 72 y.o. female who states that she has hx of type 2 diabetes for which she is on 500mg metformin (3 pills in the morning, 2 pills at night) and 4mg glimepiride BID. She states that today at about 1140, she noticed that her blood glucose was elevated at 600 which is not normal for her. Consequently, she took 3 of her metformin pills and 1 of her glimepiride pills which are her regular morning doses (did not take any additional doses of either medication) and came to ED for further evaluation. She has not yet rechecked her blood glucose level. She reports that she ate a taco last night and bread this morning but this is usual for her. She notes that she has been asymptomatic and denies fevers, chills, cough, chest pain, dyspnea, pain and difficulty with urination, abd pain, and N/V/D. Past Medical History of diabetes, breast cancer with metastases to lung (currently undergoing radiation therapy), hyperlipidemia, and hypothyroidism.     Duration: occurred today at about 1140  Timing: constant  Location: generalized  Radiation: none  Quality: reported elevated blood glucose of 600  Intensity/Severity: moderate  Progression: unknown- pt has not yet rechecked blood glucose after taking metformin and glimepiride  Associated Symptoms: none  Aggravating Factors: none  Alleviating Factors: none  Previous Episodes: none  Treatment before arrival: Pt states that she took 3 of her metformin pills and 1 of her glimepiride pills prior to ED arrival for hyperglycemia.     PAST MEDICAL HISTORY  Active Ambulatory Problems     Diagnosis Date Noted   • Malignant neoplasm of female breast 03/14/2016   • Anemia 04/12/2016   • Iron deficiency anemia 04/20/2016   • Malignant neoplasm metastatic to left lung 02/06/2017   • Osteoporosis  02/06/2017     Resolved Ambulatory Problems     Diagnosis Date Noted   • No Resolved Ambulatory Problems     Past Medical History:   Diagnosis Date   • Breast cancer, Left    • Diabetes mellitus    • Hyperlipidemia    • Hypothyroidism 01/2009   • Left upper pulmonary nodule 07/05/2012   • Osteoporosis 10/17/2011       PAST SURGICAL HISTORY  Past Surgical History:   Procedure Laterality Date   • BREAST BIOPSY Left 1995   • MASTECTOMY RADICAL Left 1995   • MOUTH SURGERY  08/2015    tooth extraction        FAMILY HISTORY  Family History   Problem Relation Age of Onset   • Tuberculosis Mother    • Diabetes Mother    • Thyroid cancer Sister    • Lung cancer Sister    • Lung cancer Brother      In his 70s.   • Bone cancer Brother      in his 70s.   • Diabetes Maternal Grandmother    • Lung cancer Brother      In his 70s.    • Bone cancer Brother      in his 70s.   • Hypertension Neg Hx    • Heart disease Neg Hx        SOCIAL HISTORY  Social History     Social History   • Marital status:      Spouse name: N/A   • Number of children: N/A   • Years of education: College     Occupational History   •  [employer]     Social History Main Topics   • Smoking status: Never Smoker   • Smokeless tobacco: Never Used   • Alcohol use No   • Drug use: No   • Sexual activity: Not on file     Other Topics Concern   • Not on file     Social History Narrative   • No narrative on file         ALLERGIES  Review of patient's allergies indicates no known allergies.    REVIEW OF SYSTEMS  Review of Systems   Constitutional: Negative for chills and fever.   HENT: Negative for sore throat.    Respiratory: Negative for cough and shortness of breath.    Cardiovascular: Negative for chest pain.   Gastrointestinal: Negative for abdominal pain, diarrhea, nausea and vomiting.   Genitourinary: Negative for difficulty urinating and dysuria.   Musculoskeletal: Negative for back pain.   Psychiatric/Behavioral: The patient is not  nervous/anxious.        PHYSICAL EXAM  ED Triage Vitals   Temp Heart Rate Resp BP SpO2   05/09/18 1722 05/09/18 1300 05/09/18 1300 05/09/18 1358 05/09/18 1300   98.4 °F (36.9 °C) 88 16 133/71 94 %     Physical Exam   Constitutional: She is oriented to person, place, and time and well-developed, well-nourished, and in no distress.   HENT:   Head: Normocephalic.   Mouth/Throat: Mucous membranes are normal.   Eyes: No scleral icterus.   Neck: Normal range of motion.   Cardiovascular: Normal rate, regular rhythm and normal heart sounds.    Pulmonary/Chest: Effort normal and breath sounds normal. No respiratory distress.   Abdominal: Soft. There is no tenderness. There is no rebound and no guarding.   Musculoskeletal: Normal range of motion.   Neurological: She is alert and oriented to person, place, and time. She has normal motor skills and normal sensation.   Skin: Skin is warm and dry.   Psychiatric: Mood and affect normal.   Nursing note and vitals reviewed.      LAB RESULTS  Recent Results (from the past 24 hour(s))   POC Glucose Once    Collection Time: 05/09/18  1:09 PM   Result Value Ref Range    Glucose 227 (H) 70 - 130 mg/dL   Light Blue Top    Collection Time: 05/09/18  2:13 PM   Result Value Ref Range    Extra Tube hold for add-on    Green Top (Gel)    Collection Time: 05/09/18  2:13 PM   Result Value Ref Range    Extra Tube Hold for add-ons.    Lavender Top    Collection Time: 05/09/18  2:13 PM   Result Value Ref Range    Extra Tube hold for add-on    Gold Top - SST    Collection Time: 05/09/18  2:13 PM   Result Value Ref Range    Extra Tube Hold for add-ons.    POC Glucose Once    Collection Time: 05/09/18  5:27 PM   Result Value Ref Range    Glucose 108 70 - 130 mg/dL   POC Glucose Once    Collection Time: 05/09/18  6:33 PM   Result Value Ref Range    Glucose 154 (H) 70 - 130 mg/dL       I ordered the above labs and reviewed the results      PROGRESS AND CONSULTS  1727- Blood glucose is 108.   1730-  Reviewed pt's history and workup with Dr. Peoples.  At bedside evaluation, they agree with the plan of care.  1732- Pt used her home blood glucose monitor in ED and her blood glucose reading was 113. Pt has been provided with food.   1835- Per RN, after pt ate food, her blood glucose increased to 154.   1837- Rechecked pt. She is resting comfortably and is in no acute distress. Reviewed implications of results (including initial blood glucose of 227, 2nd blood glucose of 108, and final blood glucose of 154), diagnosis, meds, responsibility to follow up, warning signs and symptoms of possible worsening, potential complications and reasons to return to ER with patient.  Discussed all results and noted any abnormalities with patient.  Discussed absolute need to recheck abnormalities and condition with PMD. Advised pt to monitor blood glucose closely, to take her diabetes medications as prescribed, to never take additional doses of her diabetes medications, and to well hydrate.   Discussed plan for discharge, as there is no emergent indication for admission.  Pt is agreeable and understands need for follow up and repeat testing.  Pt is aware that discharge does not mean that nothing is wrong but it indicates no emergency is present.  Pt is discharged with instructions to follow up with primary care doctor to have their blood pressure rechecked.       DIAGNOSIS  Final diagnoses:   Type 2 diabetes mellitus with hyperglycemia, without long-term current use of insulin       FOLLOW UP   Sussy Payne MD  [facility] [id number] [phone]    Call in 1 day          COURSE & MEDICAL DECISION MAKING  Pertinent Labs that were ordered and reviewed are noted above.  Results were reviewed/discussed with the patient and they were also made aware of online assess.   Pt also made aware that some labs, such as cultures, will not be resulted during ER visit and follow up with PMD is necessary.  "    MEDICATIONS GIVEN IN ER  Medications   sodium chloride 0.9 % flush 10 mL (not administered)       /86 (Patient Position: Lying)   Pulse 61   Temp 98.4 °F (36.9 °C) (Oral)   Resp 16   Ht 154.9 cm (61\")   Wt 52.6 kg (116 lb)   SpO2 99%   BMI 21.92 kg/m²       I personally reviewed the past medical history, past surgical history, social history, family history, current medications and allergies as they appear in this chart.  The scribe's note accurately reflects the work and decisions made by me.     Documentation assistance provided by hugo Cutler for LIANG Landa on 5/9/2018 at 6:53 PM. Information recorded by the scribe was done at my direction and has been verified and validated by me.       Brynn Cutler  05/09/18 1902       Mya Pedersen, KENDELL  05/09/18 1937    "

## 2018-05-09 NOTE — DISCHARGE INSTRUCTIONS
Continue current home medications  Increase fluids  Follow up with pmd as needed  Return to er for any new or worsening symptoms

## 2018-05-09 NOTE — ED TRIAGE NOTES
Pt states she checked her blood sugar at home. Meter read 600. STATes she immediately took 3 metformin pills and 1 glipizide.  No other complaints at this time

## 2018-06-18 ENCOUNTER — HOSPITAL ENCOUNTER (OUTPATIENT)
Dept: PET IMAGING | Facility: HOSPITAL | Age: 73
Discharge: HOME | End: 2018-06-18
Attending: INTERNAL MEDICINE

## 2018-06-18 ENCOUNTER — TELEPHONE (OUTPATIENT)
Dept: ONCOLOGY | Facility: CLINIC | Age: 73
End: 2018-06-18

## 2018-06-21 ENCOUNTER — HOSPITAL ENCOUNTER (OUTPATIENT)
Dept: PET IMAGING | Facility: HOSPITAL | Age: 73
End: 2018-06-21
Attending: INTERNAL MEDICINE

## 2018-06-21 ENCOUNTER — HOSPITAL ENCOUNTER (OUTPATIENT)
Dept: CT IMAGING | Facility: HOSPITAL | Age: 73
Discharge: HOME | End: 2018-06-21
Attending: INTERNAL MEDICINE | Admitting: INTERNAL MEDICINE

## 2018-06-21 ENCOUNTER — HOSPITAL ENCOUNTER (OUTPATIENT)
Dept: CT IMAGING | Facility: HOSPITAL | Age: 73
End: 2018-06-21
Attending: INTERNAL MEDICINE

## 2018-06-21 DIAGNOSIS — Z17.0 MALIGNANT NEOPLASM OF CENTRAL PORTION OF LEFT BREAST IN FEMALE, ESTROGEN RECEPTOR POSITIVE (HCC): ICD-10-CM

## 2018-06-21 DIAGNOSIS — D50.9 IRON DEFICIENCY ANEMIA, UNSPECIFIED IRON DEFICIENCY ANEMIA TYPE: ICD-10-CM

## 2018-06-21 DIAGNOSIS — C50.112 MALIGNANT NEOPLASM OF CENTRAL PORTION OF LEFT BREAST IN FEMALE, ESTROGEN RECEPTOR POSITIVE (HCC): ICD-10-CM

## 2018-06-21 LAB
ALBUMIN SERPL-MCNC: 4 G/DL (ref 3.5–5.2)
ALBUMIN/GLOB SERPL: 0.9 G/DL
ALP SERPL-CCNC: 107 U/L (ref 39–117)
ALT SERPL W P-5'-P-CCNC: 23 U/L (ref 1–33)
ANION GAP SERPL CALCULATED.3IONS-SCNC: 11.4 MMOL/L
AST SERPL-CCNC: 24 U/L (ref 1–32)
BASOPHILS # BLD AUTO: 0.02 10*3/MM3 (ref 0–0.2)
BASOPHILS NFR BLD AUTO: 0.4 % (ref 0–1.5)
BILIRUB SERPL-MCNC: 0.4 MG/DL (ref 0.1–1.2)
BUN BLD-MCNC: 18 MG/DL (ref 8–23)
BUN/CREAT SERPL: 24.7 (ref 7–25)
CALCIUM SPEC-SCNC: 9.5 MG/DL (ref 8.6–10.5)
CHLORIDE SERPL-SCNC: 102 MMOL/L (ref 98–107)
CO2 SERPL-SCNC: 25.6 MMOL/L (ref 22–29)
CREAT BLD-MCNC: 0.73 MG/DL (ref 0.57–1)
CREAT BLDA-MCNC: 0.7 MG/DL (ref 0.6–1.3)
DEPRECATED RDW RBC AUTO: 43 FL (ref 37–54)
EOSINOPHIL # BLD AUTO: 0.12 10*3/MM3 (ref 0–0.7)
EOSINOPHIL NFR BLD AUTO: 2.6 % (ref 0.3–6.2)
ERYTHROCYTE [DISTWIDTH] IN BLOOD BY AUTOMATED COUNT: 12.5 % (ref 11.7–13)
FERRITIN SERPL-MCNC: 219.6 NG/ML (ref 13–150)
GFR SERPL CREATININE-BSD FRML MDRD: 79 ML/MIN/1.73
GFR SERPL CREATININE-BSD FRML MDRD: 95 ML/MIN/1.73
GLOBULIN UR ELPH-MCNC: 4.3 GM/DL
GLUCOSE BLD-MCNC: 142 MG/DL (ref 65–99)
HCT VFR BLD AUTO: 36.9 % (ref 35.6–45.5)
HGB BLD-MCNC: 12.3 G/DL (ref 11.9–15.5)
IMM GRANULOCYTES # BLD: 0 10*3/MM3 (ref 0–0.03)
IMM GRANULOCYTES NFR BLD: 0 % (ref 0–0.5)
IRON 24H UR-MRATE: 60 MCG/DL (ref 37–145)
IRON SATN MFR SERPL: 15 % (ref 20–50)
LYMPHOCYTES # BLD AUTO: 1.72 10*3/MM3 (ref 0.9–4.8)
LYMPHOCYTES NFR BLD AUTO: 37.7 % (ref 19.6–45.3)
MCH RBC QN AUTO: 31.3 PG (ref 26.9–32)
MCHC RBC AUTO-ENTMCNC: 33.3 G/DL (ref 32.4–36.3)
MCV RBC AUTO: 93.9 FL (ref 80.5–98.2)
MONOCYTES # BLD AUTO: 0.39 10*3/MM3 (ref 0.2–1.2)
MONOCYTES NFR BLD AUTO: 8.6 % (ref 5–12)
NEUTROPHILS # BLD AUTO: 2.31 10*3/MM3 (ref 1.9–8.1)
NEUTROPHILS NFR BLD AUTO: 50.7 % (ref 42.7–76)
PLATELET # BLD AUTO: 226 10*3/MM3 (ref 140–500)
PMV BLD AUTO: 8.7 FL (ref 6–12)
POTASSIUM BLD-SCNC: 4.1 MMOL/L (ref 3.5–5.2)
PROT SERPL-MCNC: 8.3 G/DL (ref 6–8.5)
RBC # BLD AUTO: 3.93 10*6/MM3 (ref 3.9–5.2)
SODIUM BLD-SCNC: 139 MMOL/L (ref 136–145)
TIBC SERPL-MCNC: 413 MCG/DL (ref 298–536)
TRANSFERRIN SERPL-MCNC: 277 MG/DL (ref 200–360)
WBC NRBC COR # BLD: 4.56 10*3/MM3 (ref 4.5–10.7)

## 2018-06-21 PROCEDURE — 25010000002 IOPAMIDOL 61 % SOLUTION: Performed by: INTERNAL MEDICINE

## 2018-06-21 PROCEDURE — 71260 CT THORAX DX C+: CPT

## 2018-06-21 PROCEDURE — 85025 COMPLETE CBC W/AUTO DIFF WBC: CPT | Performed by: INTERNAL MEDICINE

## 2018-06-21 PROCEDURE — 83540 ASSAY OF IRON: CPT | Performed by: INTERNAL MEDICINE

## 2018-06-21 PROCEDURE — 74160 CT ABDOMEN W/CONTRAST: CPT

## 2018-06-21 PROCEDURE — 82728 ASSAY OF FERRITIN: CPT | Performed by: INTERNAL MEDICINE

## 2018-06-21 PROCEDURE — 84466 ASSAY OF TRANSFERRIN: CPT | Performed by: INTERNAL MEDICINE

## 2018-06-21 PROCEDURE — 82565 ASSAY OF CREATININE: CPT

## 2018-06-21 PROCEDURE — 80053 COMPREHEN METABOLIC PANEL: CPT | Performed by: INTERNAL MEDICINE

## 2018-06-21 RX ADMIN — IOPAMIDOL 85 ML: 612 INJECTION, SOLUTION INTRAVENOUS at 08:48

## 2018-06-22 ENCOUNTER — HOSPITAL ENCOUNTER (OUTPATIENT)
Dept: CT IMAGING | Facility: HOSPITAL | Age: 73
Discharge: HOME | End: 2018-06-22
Attending: INTERNAL MEDICINE

## 2018-06-22 DIAGNOSIS — C50.919 BREAST CANCER IN FEMALE (HCC): ICD-10-CM

## 2018-06-22 PROCEDURE — 71260 CT THORAX DX C+: CPT

## 2018-06-22 PROCEDURE — 25010000002 IOPAMIDOL 61 % SOLUTION: Performed by: INTERNAL MEDICINE

## 2018-06-22 PROCEDURE — 74160 CT ABDOMEN W/CONTRAST: CPT

## 2018-06-22 RX ADMIN — IOPAMIDOL 85 ML: 612 INJECTION, SOLUTION INTRAVENOUS at 13:50

## 2018-06-26 ENCOUNTER — INFUSION (OUTPATIENT)
Dept: ONCOLOGY | Facility: HOSPITAL | Age: 73
End: 2018-06-26

## 2018-06-26 ENCOUNTER — OFFICE VISIT (OUTPATIENT)
Dept: ONCOLOGY | Facility: CLINIC | Age: 73
End: 2018-06-26

## 2018-06-26 ENCOUNTER — APPOINTMENT (OUTPATIENT)
Dept: LAB | Facility: HOSPITAL | Age: 73
End: 2018-06-26

## 2018-06-26 ENCOUNTER — LAB (OUTPATIENT)
Dept: LAB | Facility: HOSPITAL | Age: 73
End: 2018-06-26
Attending: INTERNAL MEDICINE

## 2018-06-26 ENCOUNTER — TRANSCRIBE ORDERS (OUTPATIENT)
Dept: LAB | Facility: HOSPITAL | Age: 73
End: 2018-06-26

## 2018-06-26 VITALS
BODY MASS INDEX: 23.16 KG/M2 | DIASTOLIC BLOOD PRESSURE: 70 MMHG | HEIGHT: 60 IN | TEMPERATURE: 98.6 F | RESPIRATION RATE: 16 BRPM | WEIGHT: 118 LBS | HEART RATE: 62 BPM | SYSTOLIC BLOOD PRESSURE: 120 MMHG | OXYGEN SATURATION: 100 %

## 2018-06-26 DIAGNOSIS — M81.6 LOCALIZED OSTEOPOROSIS WITHOUT CURRENT PATHOLOGICAL FRACTURE: ICD-10-CM

## 2018-06-26 DIAGNOSIS — M81.6 LOCALIZED OSTEOPOROSIS WITHOUT CURRENT PATHOLOGICAL FRACTURE: Primary | ICD-10-CM

## 2018-06-26 DIAGNOSIS — Z17.0 MALIGNANT NEOPLASM OF CENTRAL PORTION OF LEFT BREAST IN FEMALE, ESTROGEN RECEPTOR POSITIVE (HCC): Primary | ICD-10-CM

## 2018-06-26 DIAGNOSIS — C78.02 MALIGNANT NEOPLASM METASTATIC TO LEFT LUNG (HCC): ICD-10-CM

## 2018-06-26 DIAGNOSIS — C50.112 MALIGNANT NEOPLASM OF CENTRAL PORTION OF LEFT BREAST IN FEMALE, ESTROGEN RECEPTOR POSITIVE (HCC): Primary | ICD-10-CM

## 2018-06-26 DIAGNOSIS — E03.9 HYPOTHYROIDISM, UNSPECIFIED TYPE: ICD-10-CM

## 2018-06-26 DIAGNOSIS — N18.9 CRI (CHRONIC RENAL INSUFFICIENCY), UNSPECIFIED STAGE: ICD-10-CM

## 2018-06-26 DIAGNOSIS — N18.9 CRI (CHRONIC RENAL INSUFFICIENCY), UNSPECIFIED STAGE: Primary | ICD-10-CM

## 2018-06-26 DIAGNOSIS — D50.9 IRON DEFICIENCY ANEMIA, UNSPECIFIED IRON DEFICIENCY ANEMIA TYPE: ICD-10-CM

## 2018-06-26 LAB
ALBUMIN SERPL-MCNC: 3.9 G/DL (ref 3.5–5.2)
ALBUMIN UR-MCNC: <1.2 MG/L
ALBUMIN/GLOB SERPL: 1.1 G/DL
ALP SERPL-CCNC: 114 U/L (ref 39–117)
ALT SERPL W P-5'-P-CCNC: 31 U/L (ref 1–33)
ANION GAP SERPL CALCULATED.3IONS-SCNC: 9.2 MMOL/L
ANION GAP SERPL CALCULATED.3IONS-SCNC: 9.9 MMOL/L
AST SERPL-CCNC: 19 U/L (ref 1–32)
BACTERIA UR QL AUTO: NORMAL /HPF
BILIRUB SERPL-MCNC: 0.3 MG/DL (ref 0.1–1.2)
BILIRUB UR QL STRIP: NEGATIVE
BUN BLD-MCNC: 11 MG/DL (ref 8–23)
BUN BLD-MCNC: 11 MG/DL (ref 8–23)
BUN/CREAT SERPL: 13.9 (ref 7–25)
BUN/CREAT SERPL: 14.1 (ref 7–25)
CALCIUM SPEC-SCNC: 9.6 MG/DL (ref 8.6–10.5)
CALCIUM SPEC-SCNC: 9.6 MG/DL (ref 8.6–10.5)
CHLORIDE SERPL-SCNC: 100 MMOL/L (ref 98–107)
CHLORIDE SERPL-SCNC: 98 MMOL/L (ref 98–107)
CLARITY UR: CLEAR
CO2 SERPL-SCNC: 29.1 MMOL/L (ref 22–29)
CO2 SERPL-SCNC: 29.8 MMOL/L (ref 22–29)
COLOR UR: YELLOW
CREAT BLD-MCNC: 0.78 MG/DL (ref 0.57–1)
CREAT BLD-MCNC: 0.79 MG/DL (ref 0.57–1)
GFR SERPL CREATININE-BSD FRML MDRD: 72 ML/MIN/1.73
GFR SERPL CREATININE-BSD FRML MDRD: 73 ML/MIN/1.73
GFR SERPL CREATININE-BSD FRML MDRD: 87 ML/MIN/1.73
GFR SERPL CREATININE-BSD FRML MDRD: 88 ML/MIN/1.73
GLOBULIN UR ELPH-MCNC: 3.6 GM/DL
GLUCOSE BLD-MCNC: 223 MG/DL (ref 65–99)
GLUCOSE BLD-MCNC: 224 MG/DL (ref 65–99)
GLUCOSE UR STRIP-MCNC: NEGATIVE MG/DL
HBA1C MFR BLD: 8.4 % (ref 4.8–5.6)
HGB UR QL STRIP.AUTO: NEGATIVE
HYALINE CASTS UR QL AUTO: NORMAL /LPF
KETONES UR QL STRIP: NEGATIVE
LEUKOCYTE ESTERASE UR QL STRIP.AUTO: NEGATIVE
MAGNESIUM SERPL-MCNC: 1.8 MG/DL (ref 1.6–2.4)
NITRITE UR QL STRIP: NEGATIVE
PH UR STRIP.AUTO: <=5 [PH] (ref 5–8)
PHOSPHATE SERPL-MCNC: 4.1 MG/DL (ref 2.5–4.5)
POTASSIUM BLD-SCNC: 4.7 MMOL/L (ref 3.5–5.2)
POTASSIUM BLD-SCNC: 4.7 MMOL/L (ref 3.5–5.2)
PROT SERPL-MCNC: 7.5 G/DL (ref 6–8.5)
PROT UR QL STRIP: NEGATIVE
RBC # UR: NORMAL /HPF
REF LAB TEST METHOD: NORMAL
SODIUM BLD-SCNC: 137 MMOL/L (ref 136–145)
SODIUM BLD-SCNC: 139 MMOL/L (ref 136–145)
SP GR UR STRIP: 1.02 (ref 1–1.03)
SQUAMOUS #/AREA URNS HPF: NORMAL /HPF
TSH SERPL DL<=0.05 MIU/L-ACNC: 4.19 MIU/ML (ref 0.27–4.2)
UROBILINOGEN UR QL STRIP: NORMAL
WBC UR QL AUTO: NORMAL /HPF

## 2018-06-26 PROCEDURE — 83735 ASSAY OF MAGNESIUM: CPT | Performed by: INTERNAL MEDICINE

## 2018-06-26 PROCEDURE — 96372 THER/PROPH/DIAG INJ SC/IM: CPT | Performed by: INTERNAL MEDICINE

## 2018-06-26 PROCEDURE — 25010000002 DENOSUMAB 60 MG/ML SOLUTION: Performed by: INTERNAL MEDICINE

## 2018-06-26 PROCEDURE — 81001 URINALYSIS AUTO W/SCOPE: CPT

## 2018-06-26 PROCEDURE — 36415 COLL VENOUS BLD VENIPUNCTURE: CPT

## 2018-06-26 PROCEDURE — 99214 OFFICE O/P EST MOD 30 MIN: CPT | Performed by: INTERNAL MEDICINE

## 2018-06-26 PROCEDURE — 84443 ASSAY THYROID STIM HORMONE: CPT

## 2018-06-26 PROCEDURE — 80053 COMPREHEN METABOLIC PANEL: CPT

## 2018-06-26 PROCEDURE — 82043 UR ALBUMIN QUANTITATIVE: CPT

## 2018-06-26 PROCEDURE — 83036 HEMOGLOBIN GLYCOSYLATED A1C: CPT

## 2018-06-26 PROCEDURE — 84100 ASSAY OF PHOSPHORUS: CPT | Performed by: INTERNAL MEDICINE

## 2018-06-26 RX ORDER — PHENOL 1.4 %
600 AEROSOL, SPRAY (ML) MUCOUS MEMBRANE DAILY
COMMUNITY
End: 2022-08-22 | Stop reason: SDUPTHER

## 2018-06-26 RX ADMIN — DENOSUMAB 60 MG: 60 INJECTION SUBCUTANEOUS at 09:37

## 2018-06-26 NOTE — PROGRESS NOTES
DOCTOR #2 NOTE:    1. Metastatic breast cancer to the lungs, ER/NE positive, HER2/berkley positive, undergoing hormonal therapy with Arimidex since the middle of March 2009.   2. Response to Arimidex as evidenced by serial CT exams, including on 01/10/2012, showing no significant disease.   3. New left upper lobe pulmonary nodule noted, measuring 1.1 cm by CT scan on 07/05/2012. No other evidence of metastatic disease. Hormonal therapy switched to Faslodex 07/12/2012.   4. Osteoporosis documented by bone density scan from 10/17/2011 and also 02/21/2014. Patient was started on Prolia therapy every 6 months. Patient had tooth extraction in August 2015. Prolia has been on hold.   5. CT scans of chest, abdomen and pelvis on 04/07/2016 showed stable disease. Faslodex will be continued.   6. CT scan examination reported disease progress on 01/30/2017, patient was switched to Aromasin in early February 2017. However she was unable to start Afinitor due to cost.   7. Repeated bone density scan on February 27, 2017 reported worsening osteoporosis. Prolia was restarted back on 03/02/2017. Plan for Q6 month treatment.   8.  Repeated a CT of the chest on 5/15/2017 showed progressive left upper lobe pulmonary nodule.  Patient had stereotactic radiation therapy in early June 2017.  Aromasin was continued.   9.  CT scan of the chest on 9/25/2017 reported post treatment changes in the left upper lobe.   10.  CT scan examination for chest abdomen and pelvis with IV contrast on 6/21/2018 reported no evidence of disease progression.        HISTORY OF PRESENT ILLNESS: Ms. Reese is a 72-year-old  female presenting today for 9-month followup, 5 days after her CT scans.  Patient is accompanied by her nephew.       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 Her performance status is ECOG 0. No dyspnea, no cough, hemoptysis.   She reports no fever or sweating.    she is on Aromasin with good  tolerance. She denies arthralgia, hot flashes or sweating. 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  She denies bone pains.     The CT scans for chest abdomen pelvis with IV contrast on 6/21/2018 reported no evidence of disease progression.      This patient had teeth extraction in February 2018.  She now has full-mouth denture.      PAST MEDICAL HISTORY:    1.  Left breast cancer, stage I, ER and NE positive, status post mastectomy, chemotherapy, and tamoxifen for five years--finished in 2001.    2.  Hypothyroidism diagnosed in 01/2009.    3.  Hyperlipidemia.    4.  Diabetes, type 2.    5.  Osteoporosis, evidenced on bone density scans in October 2011 and February 2014. The patient stopped Fosamax because of dental problem suspected to be related to bisphosphonate.   6.  Teeth extraction in February 2018.      SURGICAL HISTORY: Modified left radical mastectomy and previous breast biopsy in 1995.       ONCOLOGIC/HEMATOLOGIC HISTORY: History from previous dates can be found in the separate document.       CT scan on 04/07/2016 showed stable disease with the small left upper lobe pulmonary nodule, no change compared to previous imaging studies, no evidence of metastatic disease in the abdomen or pelvis or bony structure.       Unfortunately her CT scan on 01/30/2017 showed slight disease progress in the left upper lobe of the lung, by 3 mm, up to 1.4 centimeters. No evidence of new metastatic disease or bone metastases.       Patient was seen on February 7, 2017. We'll propose switch therapy to Aromasin plus Afinitor.  however she could not afford Afinitor because the cost. Our pharmacy staff tried to apply for premedication, but was not successful. So she is only on Aromasin with good tolerance.      Repeated a CT of the chest on 5/15/2017 showed progressive left upper lobe pulmonary nodule.  Patient had stereotactic radiation therapy in early June 2017.  Aromasin was continued.       MEDICATIONS: The  "current medication list was reviewed with the patient and updated in the EMR this date per the medical assistant. Medication dosages and frequencies were confirmed to be accurate.       ALLERGIES: LETROZOLE (questionably causing skin rash).       SOCIAL HISTORY: The patient is . She finished college education. 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 No alcohol use. No illegal drug use. No risks for HIV.       FAMILY HISTORY: Her mother  of tuberculosis at the age of 48. A sister had thyroid cancer/lung cancer - no details are available. Two brothers had lung cancer and bone cancers in their 70s - no details available. Her maternal grandmother and her mother both had diabetes. No family history of hypertension or heart disease.       REVIEW OF SYSTEMS:   PAIN: See VITAL SIGNS below.   GENERAL: No change in appetite or weight, no fevers, chills or sweats.   SKIN: Diffuse skin rashes on her neck, upper chest, arms.    HEME/LYMPH: No anemia, easy bruising, bleeding or swollen nodes.   EYES: No vision changes or diplopia.   ENT: No tinnitus, hearing loss, gum bleeding, epistaxis , hoarseness or dysphagia.   RESPIRATORY: No cough, shortness of breath, hemoptysis or wheezing.   CVS:no chest pain, palpitations, orthopnea, dyspnea on exertion.   GI: No abdominal pain, nausea, vomiting, constipation, diarrhea, melena or hematochezia.   : No dysuria or hematuria. No abnormal vaginal discharge or bleeding.   MUSCULOSKELETAL: has mild joint achiness.   NEUROLOGICAL: No dizziness, global weakness, loss of consciousness or seizures.   PSYCHIATRIC: No increased nervousness, mood changes or depression.       VITAL SIGNS:   Vitals:    18 0840   BP: 120/70   Pulse: 62   Resp: 16   Temp: 98.6 °F (37 °C)   SpO2: 100%  Comment: at rest   Weight: 53.5 kg (118 lb)   Height: 152 cm (59.84\")  Comment: new ht.without shoes   PainSc: 0-No pain   ECO       PHYSICAL EXAMINATION:   GENERAL: well-developed, " fairly-nourished  female not in distress.   SKIN: No rashes, no urpura or petechiae.   HEAD: Normocephalic.   EYES: Pupils equal, round and reactive to light. EOMs intact. Conjunctivae normal.   EARS: Hearing intact.   NOSE: No discharge.   MOUTH: Tongue is well-papillated; no stomatitis or ulcers. Lips normal.  She has full mouth dentures.  THROAT: Oropharynx without lesions or exudates.   NECK: Supple with good range of motion; no thyromegaly or masses.   LYMPHATICS: No cervical, supraclavicular, axillary adenopathy.   CHEST: Lungs clear to auscultation.   CARDIAC: Regular rate and rhythm without murmurs, rubs or gallops.   ABDOMEN: Soft, no tender, no organomegaly or masses. bowel sounds normal.   EXTREMITIES: No clubbing, cyanosis or edema.   NEUROLOGICAL: No focal deficits.       LABORATORY DATA:     Lab Results   Component Value Date    WBC 4.56 06/21/2018    HGB 12.3 06/21/2018    HCT 36.9 06/21/2018    MCV 93.9 06/21/2018     06/21/2018     Lab Results   Component Value Date    NEUTROABS 2.31 06/21/2018     Lab Results   Component Value Date    GLUCOSE 224 (H) 06/26/2018    BUN 11 06/26/2018    CREATININE 0.79 06/26/2018    EGFRIFNONA 72 06/26/2018    EGFRIFAFRI 87 06/26/2018    BCR 13.9 06/26/2018    K 4.7 06/26/2018    CO2 29.8 (H) 06/26/2018    CALCIUM 9.6 06/26/2018    ALBUMIN 4.00 06/21/2018    LABIL2 0.9 06/21/2018    AST 24 06/21/2018    ALT 23 06/21/2018     Sodium   Date Value Ref Range Status   06/26/2018 139 136 - 145 mmol/L Final     Potassium   Date Value Ref Range Status   06/26/2018 4.7 3.5 - 5.2 mmol/L Final     Total Bilirubin   Date Value Ref Range Status   06/21/2018 0.4 0.1 - 1.2 mg/dL Final     Alkaline Phosphatase   Date Value Ref Range Status   06/21/2018 107 39 - 117 U/L Final   ]    Lab Results   Component Value Date    IRON 60 06/21/2018    TIBC 413 06/21/2018    FERRITIN 219.60 (H) 06/21/2018    iron sats  15%     Image study:  CT SCANS OF THE CHEST AND ABDOMEN WITH  INTRAVENOUS CONTRAST     HISTORY: Metastatic breast cancer. Follow-up evaluation.     The CT scan was performed through the chest and abdomen with oral and  intravenous contrast and compared to the previous CT scan of the chest  dated 09/25/2017 and CT scan of the abdomen dated 01/30/2017. The  following findings are present:  1. There is progressive localized fibrotic scarring and parenchymal  reaction in the left upper lobe consistent with further radiation  changes at the site of a previous metastatic lesion. No definite  underlying mass is seen by CT scan. The lungs are otherwise clear.  2. There is no mediastinal or hilar or axillary adenopathy. There is a  very tiny pericardial effusion which is unchanged.  3. The liver, spleen, pancreas, both adrenal glands, both kidneys appear  normal and unchanged except for a 4 mm nonobstructing stone in the right  kidney and small cortical cysts in each kidney that are unchanged.  4. At bone windows, no suspicious bone lesions are seen.         ASSESSMENT:   1. Metastatic breast cancer with metastasis in the lungs, biopsy confirmed. ER/AZ positive. Her2 positive.  she had good response for the past many years.  However since early 2017, CT scan showed disease progression with a solitary lung metastases.  We switched her to Aromasin in early February 2017, however she could not obtain Afinitor, because of the cost issue.      We obtained a chest CT scan on 5/15/2017 which showed indeed further disease progression.  Patient was treated with stereotactic radiation therapy in early June 2017.  We obtained CT scan twice on 9/25/2017 and on 6/21/2018 which showed post radiation therapy changes.      I recommended patient to continue Aromasin.         2. Osteoporosis. She had repeated bone density scan on February 27, 2017 which showed statistically significant worsening osteoporosis in the left hip.  We restarted her back on Prolia on 03/02/17 and repeat every 6 months. The  patient will continue oral calcium and vitamin D supplementation.     She is due for Prolia today.  She had multiple teeth extraction in late February 2018.  Her previous prolia was in December 2017.  We will resume Prolia today.      3. Mild anemia,with mild iron deficiency.  Patient has been taking oral iron supplementation with good results, laboratory study showed improved with ferritin and iron saturation, and normalized hemoglobin.  Patient was instructed to continue oral iron supplementation and oral vitamin B12 supplementation.          PLAN:       1. Continue Aromasin 25 mg daily.   2.  Patient is due for Prolia today.  3.  Continue oral ferrous sulfate 325 mg once a day.     4.  She will return in 6 month for reevaluation.  We will also obtain laboratory study for ferritin and iron level together with CBC and CMP.          More than 25 minutes, over half of that time were for counseling.  I reviewed his CT scan report with the patient and the her nephew.           LETA MAY M.D., Ph.D.   6/26/2018

## 2018-10-08 ENCOUNTER — LAB (OUTPATIENT)
Dept: LAB | Facility: HOSPITAL | Age: 73
End: 2018-10-08
Attending: INTERNAL MEDICINE

## 2018-10-08 ENCOUNTER — TRANSCRIBE ORDERS (OUTPATIENT)
Dept: ADMINISTRATIVE | Facility: HOSPITAL | Age: 73
End: 2018-10-08

## 2018-10-08 DIAGNOSIS — E11.8 TYPE 2 DIABETES MELLITUS WITH COMPLICATION, UNSPECIFIED WHETHER LONG TERM INSULIN USE: ICD-10-CM

## 2018-10-08 DIAGNOSIS — E03.9 HYPOTHYROIDISM, UNSPECIFIED TYPE: Primary | ICD-10-CM

## 2018-10-08 DIAGNOSIS — L30.1: ICD-10-CM

## 2018-10-08 DIAGNOSIS — E03.9 HYPOTHYROIDISM, UNSPECIFIED TYPE: ICD-10-CM

## 2018-10-08 DIAGNOSIS — D64.9 ANEMIA, UNSPECIFIED TYPE: ICD-10-CM

## 2018-10-08 LAB
ANION GAP SERPL CALCULATED.3IONS-SCNC: 10.9 MMOL/L
BUN BLD-MCNC: 13 MG/DL (ref 8–23)
BUN/CREAT SERPL: 16.5 (ref 7–25)
CALCIUM SPEC-SCNC: 9.5 MG/DL (ref 8.6–10.5)
CHLORIDE SERPL-SCNC: 101 MMOL/L (ref 98–107)
CHOLEST SERPL-MCNC: 141 MG/DL (ref 0–200)
CO2 SERPL-SCNC: 25.1 MMOL/L (ref 22–29)
CREAT BLD-MCNC: 0.79 MG/DL (ref 0.57–1)
DEPRECATED RDW RBC AUTO: 42.2 FL (ref 37–54)
ERYTHROCYTE [DISTWIDTH] IN BLOOD BY AUTOMATED COUNT: 12.4 % (ref 11.7–13)
GFR SERPL CREATININE-BSD FRML MDRD: 72 ML/MIN/1.73
GFR SERPL CREATININE-BSD FRML MDRD: 87 ML/MIN/1.73
GLUCOSE BLD-MCNC: 223 MG/DL (ref 65–99)
HBA1C MFR BLD: 8.48 % (ref 4.8–5.6)
HCT VFR BLD AUTO: 34.9 % (ref 35.6–45.5)
HDLC SERPL-MCNC: 51 MG/DL (ref 40–60)
HGB BLD-MCNC: 11.7 G/DL (ref 11.9–15.5)
LDLC SERPL CALC-MCNC: 73 MG/DL (ref 0–100)
LDLC/HDLC SERPL: 1.42 {RATIO}
MCH RBC QN AUTO: 31.4 PG (ref 26.9–32)
MCHC RBC AUTO-ENTMCNC: 33.5 G/DL (ref 32.4–36.3)
MCV RBC AUTO: 93.6 FL (ref 80.5–98.2)
PLATELET # BLD AUTO: 244 10*3/MM3 (ref 140–500)
PMV BLD AUTO: 8.4 FL (ref 6–12)
POTASSIUM BLD-SCNC: 4.1 MMOL/L (ref 3.5–5.2)
RBC # BLD AUTO: 3.73 10*6/MM3 (ref 3.9–5.2)
SODIUM BLD-SCNC: 137 MMOL/L (ref 136–145)
TRIGL SERPL-MCNC: 87 MG/DL (ref 0–150)
TSH SERPL DL<=0.05 MIU/L-ACNC: 3.39 MIU/ML (ref 0.27–4.2)
VLDLC SERPL-MCNC: 17.4 MG/DL (ref 5–40)
WBC NRBC COR # BLD: 4.63 10*3/MM3 (ref 4.5–10.7)

## 2018-10-08 PROCEDURE — 85027 COMPLETE CBC AUTOMATED: CPT

## 2018-10-08 PROCEDURE — 36415 COLL VENOUS BLD VENIPUNCTURE: CPT

## 2018-10-08 PROCEDURE — 80048 BASIC METABOLIC PNL TOTAL CA: CPT

## 2018-10-08 PROCEDURE — 84443 ASSAY THYROID STIM HORMONE: CPT

## 2018-10-08 PROCEDURE — 83036 HEMOGLOBIN GLYCOSYLATED A1C: CPT

## 2018-10-08 PROCEDURE — 80061 LIPID PANEL: CPT

## 2019-01-07 DIAGNOSIS — C78.02 MALIGNANT NEOPLASM METASTATIC TO LEFT LUNG (HCC): ICD-10-CM

## 2019-01-07 DIAGNOSIS — M81.6 LOCALIZED OSTEOPOROSIS WITHOUT CURRENT PATHOLOGICAL FRACTURE: ICD-10-CM

## 2019-01-08 ENCOUNTER — LAB (OUTPATIENT)
Dept: LAB | Facility: HOSPITAL | Age: 74
End: 2019-01-08

## 2019-01-08 ENCOUNTER — TELEPHONE (OUTPATIENT)
Dept: ONCOLOGY | Facility: CLINIC | Age: 74
End: 2019-01-08

## 2019-01-08 ENCOUNTER — APPOINTMENT (OUTPATIENT)
Dept: ONCOLOGY | Facility: HOSPITAL | Age: 74
End: 2019-01-08

## 2019-01-08 ENCOUNTER — OFFICE VISIT (OUTPATIENT)
Dept: ONCOLOGY | Facility: CLINIC | Age: 74
End: 2019-01-08

## 2019-01-08 VITALS
HEART RATE: 74 BPM | TEMPERATURE: 97.7 F | DIASTOLIC BLOOD PRESSURE: 73 MMHG | RESPIRATION RATE: 14 BRPM | SYSTOLIC BLOOD PRESSURE: 144 MMHG | BODY MASS INDEX: 21.03 KG/M2 | OXYGEN SATURATION: 98 % | HEIGHT: 61 IN | WEIGHT: 111.4 LBS

## 2019-01-08 DIAGNOSIS — M81.6 LOCALIZED OSTEOPOROSIS WITHOUT CURRENT PATHOLOGICAL FRACTURE: ICD-10-CM

## 2019-01-08 DIAGNOSIS — C78.02 MALIGNANT NEOPLASM METASTATIC TO LEFT LUNG (HCC): ICD-10-CM

## 2019-01-08 DIAGNOSIS — D64.9 ANEMIA, UNSPECIFIED TYPE: Primary | ICD-10-CM

## 2019-01-08 DIAGNOSIS — C50.112 MALIGNANT NEOPLASM OF CENTRAL PORTION OF LEFT BREAST IN FEMALE, ESTROGEN RECEPTOR POSITIVE (HCC): ICD-10-CM

## 2019-01-08 DIAGNOSIS — Z17.0 MALIGNANT NEOPLASM OF CENTRAL PORTION OF LEFT BREAST IN FEMALE, ESTROGEN RECEPTOR POSITIVE (HCC): ICD-10-CM

## 2019-01-08 DIAGNOSIS — E83.42 HYPOMAGNESEMIA: ICD-10-CM

## 2019-01-08 LAB
ALBUMIN SERPL-MCNC: 3.9 G/DL (ref 3.5–5.2)
ALBUMIN/GLOB SERPL: 1.1 G/DL (ref 1.1–2.4)
ALP SERPL-CCNC: 107 U/L (ref 38–116)
ALT SERPL W P-5'-P-CCNC: 23 U/L (ref 0–33)
ANION GAP SERPL CALCULATED.3IONS-SCNC: 10.9 MMOL/L
AST SERPL-CCNC: 23 U/L (ref 0–32)
BASOPHILS # BLD AUTO: 0.04 10*3/MM3 (ref 0–0.1)
BASOPHILS NFR BLD AUTO: 0.9 % (ref 0–1.1)
BILIRUB SERPL-MCNC: 0.4 MG/DL (ref 0.1–1.2)
BUN BLD-MCNC: 12 MG/DL (ref 6–20)
BUN/CREAT SERPL: 13 (ref 7.3–30)
CALCIUM SPEC-SCNC: 9.4 MG/DL (ref 8.5–10.2)
CHLORIDE SERPL-SCNC: 104 MMOL/L (ref 98–107)
CO2 SERPL-SCNC: 27.1 MMOL/L (ref 22–29)
CREAT BLD-MCNC: 0.92 MG/DL (ref 0.6–1.1)
DEPRECATED RDW RBC AUTO: 41.3 FL (ref 37–49)
EOSINOPHIL # BLD AUTO: 0.11 10*3/MM3 (ref 0–0.36)
EOSINOPHIL NFR BLD AUTO: 2.5 % (ref 1–5)
ERYTHROCYTE [DISTWIDTH] IN BLOOD BY AUTOMATED COUNT: 11.9 % (ref 11.7–14.5)
FERRITIN SERPL-MCNC: 219.1 NG/ML
FOLATE SERPL-MCNC: 12.78 NG/ML (ref 4.78–24.2)
GFR SERPL CREATININE-BSD FRML MDRD: 60 ML/MIN/1.73
GFR SERPL CREATININE-BSD FRML MDRD: 73 ML/MIN/1.73
GLOBULIN UR ELPH-MCNC: 3.6 GM/DL (ref 1.8–3.5)
GLUCOSE BLD-MCNC: 254 MG/DL (ref 74–124)
HCT VFR BLD AUTO: 34.4 % (ref 34–45)
HGB BLD-MCNC: 11.3 G/DL (ref 11.5–14.9)
IMM GRANULOCYTES # BLD AUTO: 0.01 10*3/MM3 (ref 0–0.03)
IMM GRANULOCYTES NFR BLD AUTO: 0.2 % (ref 0–0.5)
IRON 24H UR-MRATE: 103 MCG/DL (ref 37–145)
IRON SATN MFR SERPL: 29 % (ref 14–48)
LYMPHOCYTES # BLD AUTO: 1.56 10*3/MM3 (ref 1–3.5)
LYMPHOCYTES NFR BLD AUTO: 35.2 % (ref 20–49)
MAGNESIUM SERPL-MCNC: 1.6 MG/DL (ref 1.8–2.5)
MCH RBC QN AUTO: 31.1 PG (ref 27–33)
MCHC RBC AUTO-ENTMCNC: 32.8 G/DL (ref 32–35)
MCV RBC AUTO: 94.8 FL (ref 83–97)
MONOCYTES # BLD AUTO: 0.42 10*3/MM3 (ref 0.25–0.8)
MONOCYTES NFR BLD AUTO: 9.5 % (ref 4–12)
NEUTROPHILS # BLD AUTO: 2.29 10*3/MM3 (ref 1.5–7)
NEUTROPHILS NFR BLD AUTO: 51.7 % (ref 39–75)
NRBC BLD AUTO-RTO: 0 /100 WBC (ref 0–0)
PHOSPHATE SERPL-MCNC: 4.6 MG/DL (ref 2.5–4.5)
PLATELET # BLD AUTO: 253 10*3/MM3 (ref 150–375)
PMV BLD AUTO: 8.4 FL (ref 8.9–12.1)
POTASSIUM BLD-SCNC: 4.4 MMOL/L (ref 3.5–4.7)
PROT SERPL-MCNC: 7.5 G/DL (ref 6.3–8)
RBC # BLD AUTO: 3.63 10*6/MM3 (ref 3.9–5)
SODIUM BLD-SCNC: 142 MMOL/L (ref 134–145)
TIBC SERPL-MCNC: 353 MCG/DL (ref 249–505)
TRANSFERRIN SERPL-MCNC: 252 MG/DL (ref 200–360)
VIT B12 BLD-MCNC: 598 PG/ML (ref 211–946)
WBC NRBC COR # BLD: 4.43 10*3/MM3 (ref 4–10)

## 2019-01-08 PROCEDURE — 80053 COMPREHEN METABOLIC PANEL: CPT

## 2019-01-08 PROCEDURE — 99215 OFFICE O/P EST HI 40 MIN: CPT | Performed by: INTERNAL MEDICINE

## 2019-01-08 PROCEDURE — 82746 ASSAY OF FOLIC ACID SERUM: CPT | Performed by: INTERNAL MEDICINE

## 2019-01-08 PROCEDURE — 83735 ASSAY OF MAGNESIUM: CPT

## 2019-01-08 PROCEDURE — 84466 ASSAY OF TRANSFERRIN: CPT | Performed by: INTERNAL MEDICINE

## 2019-01-08 PROCEDURE — 36415 COLL VENOUS BLD VENIPUNCTURE: CPT | Performed by: INTERNAL MEDICINE

## 2019-01-08 PROCEDURE — 84100 ASSAY OF PHOSPHORUS: CPT

## 2019-01-08 PROCEDURE — 85025 COMPLETE CBC W/AUTO DIFF WBC: CPT

## 2019-01-08 PROCEDURE — 82728 ASSAY OF FERRITIN: CPT | Performed by: INTERNAL MEDICINE

## 2019-01-08 PROCEDURE — 83540 ASSAY OF IRON: CPT | Performed by: INTERNAL MEDICINE

## 2019-01-08 PROCEDURE — 82607 VITAMIN B-12: CPT | Performed by: INTERNAL MEDICINE

## 2019-01-08 RX ORDER — LANOLIN ALCOHOL/MO/W.PET/CERES
400 CREAM (GRAM) TOPICAL 2 TIMES DAILY
Qty: 180 TABLET | Refills: 3 | Status: SHIPPED | OUTPATIENT
Start: 2019-01-08 | End: 2019-12-10 | Stop reason: SDUPTHER

## 2019-01-08 NOTE — PROGRESS NOTES
DOCTOR #2 NOTE:    1. Metastatic breast cancer to the lungs, ER/HI positive, HER2/berkley positive, undergoing hormonal therapy with Arimidex since the middle of March 2009.   2. Response to Arimidex as evidenced by serial CT exams, including on 01/10/2012, showing no significant disease.   3. New left upper lobe pulmonary nodule noted, measuring 1.1 cm by CT scan on 07/05/2012. No other evidence of metastatic disease. Hormonal therapy switched to Faslodex 07/12/2012.   4. Osteoporosis documented by bone density scan from 10/17/2011 and also 02/21/2014. Patient was started on Prolia therapy every 6 months. Patient had tooth extraction in August 2015. Prolia has been on hold.   5. CT scans of chest, abdomen and pelvis on 04/07/2016 showed stable disease. Faslodex will be continued.   6. CT scan examination reported disease progress on 01/30/2017, patient was switched to Aromasin in early February 2017. However she was unable to start Afinitor due to cost.   7. Repeated bone density scan on 2/27/2017 reported worsening osteoporosis. Prolia was restarted back on 03/02/2017. Plan for Q6 month treatment.   8.  Repeated chest CT on 5/15/2017 showed progressive left upper lobe pulmonary nodule.  Patient had stereotactic radiation therapy in early June 2017.  Aromasin was continued.   9.  CT scan of the chest on 9/25/2017 reported post treatment changes in the left upper lobe.   10.  CT scan examination for chest abdomen and pelvis with IV contrast on 6/21/2018 reported no evidence of disease progression.        HISTORY OF PRESENT ILLNESS: Ms. Reese is a 73-year-old  female presenting today for 6-month followup, accompanied by her nephew.       She reports at baseline condition, she just returned from a trip in[place]  [place] she has retired.  Her performance status is ECOG 0.  Upon further questioning, patient reports she continues to have pain in the left lower jaw, since her teeth extraction in February 2018.  She now has  full-mouth denture.    Patient otherwise reports no dyspnea, no cough, hemoptysis.  She reports no fever or sweating.   She is on Aromasin with good tolerance. She denies arthralgia, hot flashes or sweating.  She denies bone pains.         PAST MEDICAL HISTORY:    1.  Left breast cancer, stage I, ER and [place] positive, status post mastectomy, chemotherapy, and tamoxifen for five years--finished in 2001.    2.  Hypothyroidism diagnosed in 01/2009.    3.  Hyperlipidemia.    4.  Diabetes, type 2.    5.  Osteoporosis, evidenced on bone density scans in October 2011 and February 2014. The patient stopped Fosamax because of dental problem suspected to be related to bisphosphonate.   6.  Teeth extraction in February 2018.      SURGICAL HISTORY: Modified left radical mastectomy and previous breast biopsy in 1995.       ONCOLOGIC/HEMATOLOGIC HISTORY: History from previous dates can be found in the separate document.       CT scan on 04/07/2016 showed stable disease with the small left upper lobe pulmonary nodule, no change compared to previous imaging studies, no evidence of metastatic disease in the abdomen or pelvis or bony structure.       Unfortunately her CT scan on 01/30/2017 showed slight disease progress in the left upper lobe of the lung, by 3 mm, up to 1.4 centimeters. No evidence of new metastatic disease or bone metastases.       Patient was seen on February 7, 2017. We'll propose switch therapy to Aromasin plus Afinitor.  however she could not afford Afinitor because the cost. Our pharmacy staff tried to apply for premedication, but was not successful. So she is only on Aromasin with good tolerance.      Repeated a CT of the chest on 5/15/2017 showed progressive left upper lobe pulmonary nodule.  Patient had stereotactic radiation therapy in early June 2017.  Aromasin was continued.       MEDICATIONS: The current medication list was reviewed with the patient and updated in the EMR this date per the medical  "assistant. Medication dosages and frequencies were confirmed to be accurate.       ALLERGIES: LETROZOLE (questionably causing skin rash).       SOCIAL HISTORY: The patient is . She finished college education. 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 No alcohol use. No illegal drug use. No risks for HIV.       FAMILY HISTORY: Her mother  of tuberculosis at the age of 48. A sister had thyroid cancer/lung cancer - no details are available. Two brothers had lung cancer and bone cancers in their 70s - no details available. Her maternal grandmother and her mother both had diabetes. No family history of hypertension or heart disease.       REVIEW OF SYSTEMS:   PAIN: See VITAL SIGNS below.   GENERAL: No change in appetite or weight, no fevers, chills or sweats.   SKIN: Diffuse skin rashes on her neck, upper chest, arms.    HEME/LYMPH: No anemia, easy bruising, bleeding or swollen nodes.   EYES: No vision changes or diplopia.   ENT: No tinnitus, hearing loss, gum bleeding, epistaxis , hoarseness or dysphagia.   RESPIRATORY: No cough, shortness of breath, hemoptysis or wheezing.   CVS:no chest pain, palpitations, orthopnea, dyspnea on exertion.   GI: No abdominal pain, nausea, vomiting, constipation, diarrhea, melena or hematochezia.   : No dysuria or hematuria. No abnormal vaginal discharge or bleeding.   MUSCULOSKELETAL: has mild joint achiness.   NEUROLOGICAL: No dizziness, global weakness, loss of consciousness or seizures.   PSYCHIATRIC: No increased nervousness, mood changes or depression.       VITAL SIGNS:   Vitals:    19 1002   BP: 144/73   Pulse: 74   Resp: 14   Temp: 97.7 °F (36.5 °C)   SpO2: 98%  Comment: at rest   Weight: 50.5 kg (111 lb 6.4 oz)   Height: 154 cm (60.63\")  Comment: new ht.   PainSc: Mild to moderate    PainLoc: Left low gum/ jaw    ECO         PHYSICAL EXAMINATION:   GENERAL: well-developed, fairly-nourished  female not in distress.   SKIN: No rashes, no " urpura or petechiae.   HEAD: Normocephalic.   EYES: Pupils equal, round and reactive to light. Conjunctivae normal.   EARS: Hearing intact.   NOSE: No discharge.   MOUTH: Tongue is well-papillated; no stomatitis or ulcers. Lips normal.  She has full mouth dentures.  THROAT: Oropharynx without lesions or exudates.   NECK: no thyromegaly or masses.   LYMPHATICS: No cervical, supraclavicular, axillary adenopathy.   CHEST: Lungs clear to auscultation.  Normal respiratory effort.  CARDIAC: Regular rate and rhythm without murmurs.  Normal S1 and S2.  ABDOMEN: Soft, no tender, no organomegaly or masses. bowel sounds normal.   EXTREMITIES: No clubbing, cyanosis or edema.   NEUROLOGICAL: No focal deficits.       LABORATORY DATA:     Lab Results   Component Value Date    WBC 4.43 01/08/2019    HGB 11.3 (L) 01/08/2019    HCT 34.4 01/08/2019    MCV 94.8 01/08/2019     01/08/2019     Lab Results   Component Value Date    NEUTROABS 2.29 01/08/2019     Lab Results   Component Value Date    GLUCOSE 254 (H) 01/08/2019    BUN 12 01/08/2019    CREATININE 0.92 01/08/2019    EGFRIFNONA 60 (L) 01/08/2019    EGFRIFAFRI 73 01/08/2019    BCR 13.0 01/08/2019    K 4.4 01/08/2019    CO2 27.1 01/08/2019    CALCIUM 9.4 01/08/2019    ALBUMIN 3.90 01/08/2019    AST 23 01/08/2019    ALT 23 01/08/2019     Sodium   Date Value Ref Range Status   01/08/2019 142 134 - 145 mmol/L Final     Potassium   Date Value Ref Range Status   01/08/2019 4.4 3.5 - 4.7 mmol/L Final     Total Bilirubin   Date Value Ref Range Status   01/08/2019 0.4 0.1 - 1.2 mg/dL Final     Alkaline Phosphatase   Date Value Ref Range Status   01/08/2019 107 38 - 116 U/L Final   ]  Magnesium 1.6.          ASSESSMENT:   1. Metastatic breast cancer with metastasis in the lungs, biopsy confirmed. ER/LA positive. Her2 positive.  she had good response to Arimidex for the past many years.  However since early 2017, CT scan showed disease progression with a solitary lung metastases.  We  switched her to Aromasin in early February 2017, however she could not obtain Afinitor, because of the cost issue.      We obtained a chest CT scan on 5/15/2017 which showed indeed further disease progression.  Patient was treated with stereotactic radiation therapy in early June 2017.  We obtained CT scan twice on 9/25/2017 and on 6/21/2018 which showed post radiation therapy changes.      Review of systems today has no suspicion for disease recurrence.  Physical examination is also benign. I recommended patient to continue Aromasin.  We'll obtain CT scan examination in 6 months for reassessment.        2. Osteoporosis. She had repeated bone density scan on February 27, 2017 which showed statistically significant worsening osteoporosis in the left hip.  We restarted her back on Prolia on 03/02/17 and repeat every 6 months. The patient will continue oral calcium and vitamin D supplementation.     Her last dose of Prolia was in June 2018.  Patient reports today that she continues to have pain involving the left lower jaw after her teeth extraction in February 2018.  I am concerned about a possibility of necrosis of the jaw bone, so I recommended to discontinue prolia.  She will need to continue to follow-up with her dentist.      3. Mild anemia,with mild iron deficiency.  Patient has been taking oral iron supplementation with good results, laboratory study showed improved with ferritin and iron saturation, and normalized hemoglobin.     For the past 6 months, patient has worsening anemia, however still mild.  I recommended to retest her iron panel together with a B12 and folic acid level for reassessment.      Patient was instructed to continue oral iron supplementation and oral vitamin B12 supplementation.       4.  Hypomagnesemia.  Mild.  It was started patient on oral magnesium oxide.       PLAN:       1. Continue Aromasin 25 mg daily.   2.  Discontinued Prolia.   3.  Repeat iron study together with B12 and folic  acid level.  4.  Continue oral ferrous sulfate 325 mg once a day.     5.  Start oral magnesium oxide 400 mg twice a day.  I e- scribed to her pharmacy.   6.  She will return in 6 month for reevaluation.  We'll obtain chest, abdomen and pelvis CT scan examination.  We will also obtain laboratory study for ferritin and iron level together with CBC, magnesium and CMP.            His nephew helped with history today.        LETA MAY M.D., Ph.D.   1/8/2019          Addendum:    Lab Results   Component Value Date    MLNIQUVX60 598 01/08/2019     Lab Results   Component Value Date    FOLATE 12.78 01/08/2019     Lab Results   Component Value Date    IRON 103 01/08/2019    TIBC 353 01/08/2019    FERRITIN 219.10 01/08/2019   Iron saturation 29%.     Laboratory study showed no evidence of deficiency of iron, B12 or folic acid.       LETA MAY M.D., Ph.D.   1/10/2019        CC: Sussy Payne M.D.

## 2019-01-08 NOTE — PROGRESS NOTES
1. Metastatic breast cancer to the lungs, ER/VA positive, HER2/berkley positive, undergoing hormonal therapy with Arimidex since the middle of March 2009.   2. Response to Arimidex as evidenced by serial CT exams, including on 01/10/2012, showing no significant disease.   3. New left upper lobe pulmonary nodule noted, measuring 1.1 cm by CT scan on 07/05/2012. No other evidence of metastatic disease. Hormonal therapy switched to Faslodex 07/12/2012.   4. Osteoporosis documented by bone density scan from 10/17/2011 and also 02/21/2014. Patient was started on Prolia therapy every 6 months. Patient had tooth extraction in August 2015. Prolia has been on hold.   5. CT scans of chest, abdomen and pelvis on 04/07/2016 showed stable disease. Faslodex will be continued.   6. CT scan examination reported disease progress on 01/30/2017, patient was switched to Aromasin in early February 2017. However she was unable to start Afinitor due to cost.   7. Repeated bone density scan on February 27, 2017 reported worsening osteoporosis. Prolia was restarted back on 03/02/2017. Plan for Q6 month treatment.   8.  Repeated a CT of the chest on 5/15/2017 showed progressive left upper lobe pulmonary nodule.  Patient had stereotactic radiation therapy in early June 2017.  Aromasin was continued.   9.  CT scan of the chest on 9/25/2017 reported a post treatment changes in the left upper lobe.      HISTORY OF PRESENT ILLNESS: Ms. Reese is a 71-year-old  female presenting today for 4 month followup, one week after her chest CT scan.  Patient is accompanied by her nephew.       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 Her performance status is ECOG 0. No dyspnea.   She reports no fever sweating.    she is on Aromasin with good tolerance. She denies arthralgia, hot flashes or sweating. 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  She denies bone pains.     The chest CT scan on 09/25/2017  reported post irradiation therapy in the left upper lobe.        PAST MEDICAL HISTORY:    1.  Left breast cancer, stage I, ER and [place] positive, status post mastectomy, chemotherapy, and tamoxifen for five years--finished in .    2.  Hypothyroidism diagnosed in 2009.    3.  Hyperlipidemia.    4.  Diabetes, type 2.    5.  Osteoporosis, evidenced on bone density scans in 2011 and 2014. The patient stopped Fosamax because of dental problem suspected to be related to bisphosphonate.       SURGICAL HISTORY: Modified left radical mastectomy and previous breast biopsy in .       ONCOLOGIC/HEMATOLOGIC HISTORY: History from previous dates can be found in the separate document.       CT scan on 2016 showed stable disease with the small left upper lobe pulmonary nodule, no change compared to previous imaging studies, no evidence of metastatic disease in the abdomen or pelvis or bony structure.       Unfortunately her CT scan on 2017 showed slight disease progress in the left upper lobe of the lung, by 3 mm, up to 1.4 centimeters. No evidence of new metastatic disease or bone metastases.       Patient was seen on 2017. We'll propose switch therapy to Aromasin plus Afinitor.  however she could not afford Afinitor because the cost. Our pharmacy staff trialed but was not successful. So she is only on Aromasin with good tolerance.        MEDICATIONS: The current medication list was reviewed with the patient and updated in the EMR this date per the medical assistant. Medication dosages and frequencies were confirmed to be accurate.       ALLERGIES: LETROZOLE (questionably causing skin rash).       SOCIAL HISTORY: The patient is . She finished college education. 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 No alcohol use. No illegal drug use. No risks for HIV.       FAMILY HISTORY: Her mother  of tuberculosis at the age of 48. A sister had thyroid cancer/lung cancer -  "no details are available. Two brothers had lung cancer and bone cancers in their 70s - no details available. Her maternal grandmother and her mother both had diabetes. No family history of hypertension or heart disease.       REVIEW OF SYSTEMS:   PAIN: See VITAL SIGNS below.   GENERAL: No change in appetite or weight, no fevers, chills or sweats.   SKIN: Diffuse skin rashes on her neck, upper chest, arms.    HEME/LYMPH: No anemia, easy bruising, bleeding or swollen nodes.   EYES: No vision changes or diplopia.   ENT: No tinnitus, hearing loss, gum bleeding, epistaxis , hoarseness or dysphagia.   RESPIRATORY: No cough, shortness of breath, hemoptysis or wheezing.   CVS:no chest pain, palpitations, orthopnea, dyspnea on exertion or PND.   GI: No abdominal pain, nausea, vomiting, constipation, diarrhea, melena or hematochezia.   : No dysuria or hematuria. No abnormal vaginal discharge or bleeding.   MUSCULOSKELETAL: has mild joint achiness.   NEUROLOGICAL: No dizziness, global weakness, loss of consciousness or seizures.   PSYCHIATRIC: No increased nervousness, mood changes or depression.       VITAL SIGNS:   Vitals:    19 1002   BP: 144/73   Pulse: 74   Resp: 14   Temp: 97.7 °F (36.5 °C)   SpO2: 98%  Comment: at rest   Weight: 50.5 kg (111 lb 6.4 oz)   Height: 154 cm (60.63\")  Comment: new ht.   PainSc: 10-Worst pain ever   PainLoc: Comment: left breast   ECO       PHYSICAL EXAMINATION:   GENERAL: Patient is a well-developed, reasonably-nourished  female not in distress. However she is very anxious.    SKIN: No rashes, no urpura or petechiae.   HEAD: Normocephalic.   EYES: Pupils equal, round and reactive to light. EOMs intact. Conjunctivae normal.   EARS: Hearing intact.   NOSE: No discharge.   MOUTH: Tongue is well-papillated; no stomatitis or ulcers. Lips normal.   THROAT: Oropharynx without lesions or exudates.   NECK: Supple with good range of motion; no thyromegaly or masses.   LYMPHATICS: No " cervical, supraclavicular, axillary adenopathy.   CHEST: Lungs clear to auscultation.   CARDIAC: Regular rate and rhythm without murmurs, rubs or gallops.   ABDOMEN: Soft, nontender with no organomegaly or masses. bowel sounds normal.   EXTREMITIES: No clubbing, cyanosis or edema.   NEUROLOGICAL: No focal deficits.       LABORATORY DATA:     Lab Results   Component Value Date    WBC 4.43 01/08/2019    HGB 11.3 (L) 01/08/2019    HCT 34.4 01/08/2019    MCV 94.8 01/08/2019     01/08/2019     Lab Results   Component Value Date    NEUTROABS 2.29 01/08/2019     Lab Results   Component Value Date    GLUCOSE 223 (H) 10/08/2018    BUN 13 10/08/2018    CREATININE 0.79 10/08/2018    EGFRIFNONA 72 10/08/2018    EGFRIFAFRI 87 10/08/2018    BCR 16.5 10/08/2018    K 4.1 10/08/2018    CO2 25.1 10/08/2018    CALCIUM 9.5 10/08/2018    ALBUMIN 3.90 06/26/2018    AST 19 06/26/2018    ALT 31 06/26/2018     Sodium   Date Value Ref Range Status   10/08/2018 137 136 - 145 mmol/L Final     Potassium   Date Value Ref Range Status   10/08/2018 4.1 3.5 - 5.2 mmol/L Final     Total Bilirubin   Date Value Ref Range Status   06/26/2018 0.3 0.1 - 1.2 mg/dL Final     Alkaline Phosphatase   Date Value Ref Range Status   06/26/2018 114 39 - 117 U/L Final   ]    Lab Results   Component Value Date    IRON 60 06/21/2018    TIBC 413 06/21/2018    FERRITIN 219.60 (H) 06/21/2018    irron sats  23%     Image study:    CT SCAN OF THE CHEST WITH CONTRAST 09/25/2017       CLINICAL HISTORY: 71-year-old female with history of breast carcinoma.  Radiation therapy. Follow-up lung mass..      TECHNIQUE: Spiral CT images were obtained through the chest with IV  contrast and were reconstructed in 3 mm thick axial slices. Radiation  dose reduction techniques were utilized, including automated exposure  control and exposure modulation based on body size.       COMPARISON: CT scan of the chest dated 05/15/2017      FINDINGS: The previous CT chest showed a 1.6 cm  diameter lobulated  fairly well-circumscribed nodule in the left upper lobe near the lung  apex. There is now an irregular area of ill-defined focus of abnormal  increased density in the left upper lobe in the previous location of the  nodule which is no longer identified. This is consistent with radiation  therapy induced fibrosis and pneumonitis. No discrete lung nodules are  currently identified. There are no pleural effusions. There is no  mediastinal or hilar or axillary lymphadenopathy. The ascending thoracic  aorta is mildly dilated. It measures 3.2 cm in diameter. This is  unchanged. The patient is status post left mastectomy without breast  reconstruction. The chest wall is otherwise unremarkable.      IMPRESSION:  Interval resolution of a well-circumscribed pulmonary nodule  in the left upper lobe following radiation therapy. There is focal  ill-defined increased density in the previous location of the nodule  consistent with post RT change. Status post left mastectomy. Otherwise  unremarkable CT scan of the chest.      ASSESSMENT:   1. Metastatic breast cancer with metastasis in the lungs, biopsy confirmed. ER/LA positive. Her2 positive.  she had a good response for the past many years.  However since early 2017, CT scan showed disease progression with a solitary lung metastases.  We switched her to Aromasin in early February 2017, however she could not obtain Afinitor, because of the cost issue.  We obtained a chest CT scan on 5/15/2017 which showed indeed further disease progression.  Patient was treated with stereotactic radiation therapy in early June 2017.  We obtained CT scan last week on 9/25/2017 which showed post radiation therapy changes.      I personally reviewed the CT scan images.  I recommended patient continue taking Aromasin.         2. Osteoporosis. She had repeated bone density scan on February 27, 2017 which showed statistically significant worsening osteoporosis in the left hip.  We  restarted her back on Prolia On 03/02/17 and repeat every 6 months. The patient will continue oral calcium and vitamin D supplementation.       3. Mild anemia,with mild iron deficiency.  Patient has been taking oral iron supplementation with good results, laboratory study showed improved with ferritin and iron saturation, and normalized hemoglobin.  Patient was instructed to continue oral iron supplementation and oral vitamin B12 supplementation.          PLAN:       1. Continue Aromasin 25 mg daily.   2.  Patient is due for Aredia today.  3.  Continue oral ferrous sulfate 325 mg once a day.     4.  Repeated chest and abdomen CT in 6 month for reevaluation.  We will also obtain laboratory study for ferritin and iron level together with CBC and CMP.  She will be due for next dose of Prolia on day of MD visit.           More than 40 minutes, over half of that time were for counseling.  I reviewed his CT scan report with the patient and the her nephew.   Questions were answered to patient and her nephew's satisfaction.           LETA MAY M.D., Ph.D.   10/02/2017           Dragon disclaimer:  Much of this encounter note is an electronic transcription/translation of spoken language to printed text. The electronic translation of spoken language may permit erroneous, or at times, nonsensical words or phrases to be inadvertently transcribed; Although I have reviewed the note for such errors, some may still exist.

## 2019-01-08 NOTE — TELEPHONE ENCOUNTER
----- Message from Zoraida Palacios sent at 1/8/2019 11:30 AM EST -----  [phone] her nephew Pelon  Would like to let Dr. Castillo know why her vit. D might be low

## 2019-02-06 ENCOUNTER — HOSPITAL ENCOUNTER (EMERGENCY)
Facility: HOSPITAL | Age: 74
Discharge: HOME | End: 2019-02-06
Attending: EMERGENCY MEDICINE | Admitting: EMERGENCY MEDICINE

## 2019-02-06 ENCOUNTER — APPOINTMENT (OUTPATIENT)
Dept: GENERAL RADIOLOGY | Facility: HOSPITAL | Age: 74
End: 2019-02-06

## 2019-02-06 VITALS
RESPIRATION RATE: 16 BRPM | OXYGEN SATURATION: 100 % | HEART RATE: 67 BPM | BODY MASS INDEX: 21.99 KG/M2 | HEIGHT: 61 IN | TEMPERATURE: 99 F | DIASTOLIC BLOOD PRESSURE: 97 MMHG | SYSTOLIC BLOOD PRESSURE: 126 MMHG | WEIGHT: 116.5 LBS

## 2019-02-06 DIAGNOSIS — S20.212A RIB CONTUSION, LEFT, INITIAL ENCOUNTER: Primary | ICD-10-CM

## 2019-02-06 DIAGNOSIS — J06.9 VIRAL URI WITH COUGH: ICD-10-CM

## 2019-02-06 DIAGNOSIS — D17.0 LIPOMA OF NECK: ICD-10-CM

## 2019-02-06 PROCEDURE — 99284 EMERGENCY DEPT VISIT MOD MDM: CPT

## 2019-02-06 PROCEDURE — 71101 X-RAY EXAM UNILAT RIBS/CHEST: CPT

## 2019-02-06 RX ORDER — BACLOFEN 10 MG/1
10 TABLET ORAL 3 TIMES DAILY
Qty: 20 TABLET | Refills: 0 | Status: SHIPPED | OUTPATIENT
Start: 2019-02-06 | End: 2019-02-06 | Stop reason: SDUPTHER

## 2019-02-06 RX ORDER — BACLOFEN 10 MG/1
10 TABLET ORAL 3 TIMES DAILY
Qty: 20 TABLET | Refills: 0 | Status: SHIPPED | OUTPATIENT
Start: 2019-02-06 | End: 2019-02-06

## 2019-02-06 RX ORDER — METHOCARBAMOL 500 MG/1
500 TABLET, FILM COATED ORAL 3 TIMES DAILY
Qty: 15 TABLET | Refills: 0 | Status: SHIPPED | OUTPATIENT
Start: 2019-02-06 | End: 2020-02-27

## 2019-02-06 RX ORDER — GUAIFENESIN DEXTROMETHORPHAN HYDROBROMIDE ORAL SOLUTION 10; 100 MG/5ML; MG/5ML
5 SOLUTION ORAL EVERY 12 HOURS
Qty: 180 ML | Refills: 0 | Status: SHIPPED | OUTPATIENT
Start: 2019-02-06 | End: 2022-08-22

## 2019-02-06 RX ORDER — BACLOFEN 10 MG/1
10 TABLET ORAL ONCE
Status: COMPLETED | OUTPATIENT
Start: 2019-02-06 | End: 2019-02-06

## 2019-02-06 RX ORDER — LIDOCAINE 50 MG/G
1 PATCH TOPICAL EVERY 24 HOURS
Qty: 15 PATCH | Refills: 0 | Status: SHIPPED | OUTPATIENT
Start: 2019-02-06 | End: 2020-02-27

## 2019-02-06 RX ADMIN — BACLOFEN 10 MG: 10 TABLET ORAL at 21:34

## 2019-02-07 NOTE — ED NOTES
"Pt to room 25 with c/o cough x 1 month, \"sometimes phlegm, sometimes dry\", denies CP denies SOB. Pt also states she fell about 3 weeks ago and has had left sided rib pain since.     Nicky Garcia, RN  02/06/19 2046    "

## 2019-02-07 NOTE — ED PROVIDER NOTES
Pt is a 73 y.o. female who presents to the ED complaining of L sided rib pain for the past 3 weeks following mechanical fall. Pt family states she has been taking OTC medication every 4-6 hours for the pain.    On exam, pt is well appearing and in NAD    Imaging reviewed and XR results negative.    Plan: I agree with plan to discharge due to lack of acute finding, and plan for symptomatic management with OTC medication.    The RUDI and I have discussed this patient's history, physical exam, and treatment plan.  I have reviewed the documentation and personally had a face to face interaction with the patient. I affirm the documentation and agree with the treatment and plan.  The attached note describes my personal findings.      Documentation assistance provided by hugo Milligan for Dr. Dhillon. Information recorded by the scribe was done at my direction and has been verified and validated by me.    Documentation assistance provided by hugo Sanchez for Dr. Dhillon.  Information recorded by the scribe was done at my direction and has been verified and validated by me.       Mya Sanchez  02/06/19 [id number]       Aftab Dhillon MD  02/07/19 [id number]

## 2019-02-07 NOTE — ED NOTES
"Pt reports productive cough x1 month. Pt states \"I went to my PCP today and was suppose to have an xray but they were out of town.\" Denies fever and chest pain. C/o slipping and falling 3 weeks ago. C/o left rib pain and left shoulder pain.          Shyann Garcias RN  02/06/19 2034    "

## 2019-02-07 NOTE — ED PROVIDER NOTES
EMERGENCY DEPARTMENT ENCOUNTER    CHIEF COMPLAINT  Chief Complaint: left rib pain  History given by: patient; patient's spouse  History limited by: nothing  Room Number: 25/25  PMD: Sussy Payne MD      HPI:  Pt is a 73 y.o. female who presents to the ED [with her spouse bedside] complaining of left rib pain that began about three weeks ago s/p a mechanical fall. Patient denies that the pain worsens with coughing or deep inspiration. Patient also complains of left shoulder pain for the past three weeks r/t the mechanical fall, posterior neck pain for the past year, and a productive cough for the past month. Patient denies chest pain, shortness of breath, fever, or abdominal pain. Patient reports that she went to her PCP earlier today to have an XR done of her left ribs, but her PCP was out of town so she came to the ER for further evaluation. Patient has a hx of osteoporosis, left breast cancer, diabetes mellitus, and hyperlipidemia. The patient is not currently anticoagulated. There are no other complaints at this time.    Duration: three weeks  Onset: sudden  Timing: constant  Location: left ribs  Radiation: none specified  Quality: pain  Intensity/Severity: moderate  Progression: not specified  Associated Symptoms: left shoulder pain for the past three weeks r/t the mechanical fall, posterior neck pain for the past year, and a productive cough for the past month  Aggravating Factors: Patient denies that the pain worsens with coughing or deep inspiration.  Alleviating Factors: none specified  Previous Episodes: none specified  Treatment before arrival: Patient reports that she went to her PCP earlier today to have an XR done of her left ribs, but her PCP was out of town so she came to the ER for further evaluation.    PAST MEDICAL HISTORY  Active Ambulatory Problems     Diagnosis Date Noted   • Malignant neoplasm of female breast (CMS/HCC) 03/14/2016   • Anemia 04/12/2016   • Iron deficiency anemia 04/20/2016   •  Malignant neoplasm metastatic to left lung (CMS/HCC) 02/06/2017   • Osteoporosis 02/06/2017   • Hypomagnesemia 01/08/2019     Resolved Ambulatory Problems     Diagnosis Date Noted   • No Resolved Ambulatory Problems     Past Medical History:   Diagnosis Date   • Breast cancer, Left    • Diabetes mellitus (CMS/HCC)    • Hyperlipidemia    • Hypothyroidism 01/2009   • Left upper pulmonary nodule 07/05/2012   • Osteoporosis 10/17/2011       PAST SURGICAL HISTORY  Past Surgical History:   Procedure Laterality Date   • BREAST BIOPSY Left 1995   • MASTECTOMY RADICAL Left 1995   • MOUTH SURGERY  08/2015    tooth extraction        FAMILY HISTORY  Family History   Problem Relation Age of Onset   • Tuberculosis Mother    • Diabetes Mother    • Thyroid cancer Sister    • Lung cancer Sister    • Lung cancer Brother         In his 70s.   • Bone cancer Brother         in his 70s.   • Diabetes Maternal Grandmother    • Lung cancer Brother         In his 70s.    • Bone cancer Brother         in his 70s.   • Hypertension Neg Hx    • Heart disease Neg Hx        SOCIAL HISTORY  Social History     Socioeconomic History   • Marital status:      Spouse name: Not on file   • Number of children: Not on file   • Years of education: College   • Highest education level: Not on file   Social Needs   • Financial resource strain: Not on file   • Food insecurity - worry: Not on file   • Food insecurity - inability: Not on file   • Transportation needs - medical: Not on file   • Transportation needs - non-medical: Not on file   Occupational History     Employer: [employer]   Tobacco Use   • Smoking status: Never Smoker   • Smokeless tobacco: Never Used   Substance and Sexual Activity   • Alcohol use: No   • Drug use: No   • Sexual activity: Not on file   Other Topics Concern   • Not on file   Social History Narrative   • Not on file       ALLERGIES  Patient has no known allergies.    REVIEW OF SYSTEMS  Review of Systems    Constitutional: Negative for fever.   HENT: Negative for sore throat.    Eyes: Negative.    Respiratory: Positive for cough (productive). Negative for shortness of breath.    Cardiovascular: Negative for chest pain.   Gastrointestinal: Negative for abdominal pain, diarrhea and vomiting.   Genitourinary: Negative for dysuria.   Musculoskeletal: Positive for arthralgias (left rib and left shoulder) and neck pain (posterior).   Skin: Negative for rash.   Neurological: Negative for weakness, numbness and headaches.   Hematological: Negative.    Psychiatric/Behavioral: Negative.    All other systems reviewed and are negative.      PHYSICAL EXAM  ED Triage Vitals   Temp Heart Rate Resp BP SpO2   02/06/19 2032 02/06/19 2032 02/06/19 2032 02/06/19 2048 02/06/19 2032   99 °F (37.2 °C) 91 16 127/70 99 %      Temp src Heart Rate Source Patient Position BP Location FiO2 (%)   02/06/19 2032 -- 02/06/19 2048 02/06/19 2048 --   Tympanic  Lying Right arm        Physical Exam   Constitutional: She is oriented to person, place, and time. No distress.   HENT:   Head: Normocephalic and atraumatic.   Eyes: EOM are normal. Pupils are equal, round, and reactive to light.   Neck: Normal range of motion. Neck supple.   There is a lipoma to the left of C7. There is tenderness to the C7. There are trapeizus muscle spasms present on exam.   Cardiovascular: Normal rate, regular rhythm, normal heart sounds and intact distal pulses. Exam reveals no gallop and no friction rub.   No murmur heard.  Pulmonary/Chest: Effort normal. No respiratory distress. She has no wheezes. She has rhonchi (mild) in the left lower field. She has no rales. She exhibits tenderness (anterior lateral lower left ribs).   Abdominal: Soft. There is no tenderness. There is no rebound and no guarding.   Musculoskeletal: Normal range of motion. She exhibits no edema.   Neurological: She is alert and oriented to person, place, and time. She has normal sensation and normal  strength.   Skin: Skin is warm and dry. No rash noted.   Psychiatric: Mood and affect normal.   Nursing note and vitals reviewed.      RADIOLOGY  XR Ribs Left With PA Chest   Final Result   Negative left rib series.       This report was finalized on 2/6/2019 9:42 PM by Jarrett Peralta M.D.               I ordered the above noted radiological studies. Interpreted by radiologist. Reviewed by me in PACS.       PROCEDURES  Procedures      PROGRESS AND CONSULTS  2113 Ordered Baclofen for pain. Also ordered Left Ribs XR for further evaluation.    2255 Reviewed patient's history and workup with Dr. Dhillon (ER Physician). After a bedside evaluation, he agrees with the plan to discharge the patient home.    2309 Rechecked the patient who is resting comfortably and in NAD. Patient is stable. BP- 130/72 HR- 67 Temp- 99 °F (37.2 °C) (Tympanic) O2 sat- 98% on RA. Informed the patient of her unremarkable L Ribs XR. Discussed the plan for discharge with a prescription for Robitussin-DM, Lidoderm, and Robaxin and instructions to f/u with her PCP for further evaluation and management. Strict RTER warnings given. Pt understands and agrees with the plan, all questions answered.      MEDICAL DECISION MAKING  Results were reviewed/discussed with the patient and they were also made aware of online access. Pt also made aware that some labs, such as cultures, will not be resulted during ER visit and follow up with PMD is necessary.     MDM  Number of Diagnoses or Management Options  Lipoma of neck:   Rib contusion, left, initial encounter:   Viral URI with cough:      Amount and/or Complexity of Data Reviewed  Tests in the radiology section of CPT®: ordered and reviewed (Xr Ribs Left With Pa Chest    Result Date: 2/6/2019  Narrative: LEFT RIB SERIES WITH PA CHEST X-RAY  CLINICAL HISTORY: Fall left anteriolateral rib injury.  FINDINGS: A total of 7 views of the left ribs were obtained. The exam includes a PA chest x-ray. There is no evidence  of rib fracture or other osseous abnormality of the ribs. There is no pneumothorax identified. Masslike density in the left upper lobe is noted and has been described on previous chest CT from June 21, 2018 in this patient with history of metastatic breast cancer.       Impression: Negative left rib series.  This report was finalized on 2/6/2019 9:42 PM by Jarrett Peralta M.D.)  Decide to obtain previous medical records or to obtain history from someone other than the patient: yes (Epic)  Obtain history from someone other than the patient: yes (Patient's spouse)  Review and summarize past medical records: yes (The patient has no pertinent recent records in [facility].)  Independent visualization of images, tracings, or specimens: yes    Patient Progress  Patient progress: stable         DIAGNOSIS  Final diagnoses:   Rib contusion, left, initial encounter   Viral URI with cough   Lipoma of neck       DISPOSITION  DISCHARGE    Patient discharged in stable condition.    Reviewed implications of results, diagnosis, meds, responsibility to follow up, warning signs and symptoms of possible worsening, potential complications and reasons to return to ER, including any new or worsening symptoms.    Patient/Family voiced understanding of above instructions.    Discussed plan for discharge, as there is no emergent indication for admission. Patient referred to primary care provider for BP management due to today's BP. Pt/family is agreeable and understands need for follow up and repeat testing.  Pt is aware that discharge does not mean that nothing is wrong but it indicates no emergency is present that requires admission and they must continue care with follow-up as given below or physician of their choice.     FOLLOW-UP  Sussy Payne MD  [address] [facility] [place] [address]  [phone]    Schedule an appointment as soon as possible for a visit in 1 week  For further evaluation and treatment if not better          Medication List      New Prescriptions    dextromethorphan-guaifenesin  MG/5ML liquid  Commonly known as:  ROBITUSSIN-DM  Take 5 mL by mouth Every 12 (Twelve) Hours.     lidocaine 5 %  Commonly known as:  LIDODERM  Place 1 patch on the skin as directed by provider Daily. Remove & Discard   patch within 12 hours or as directed by MD     methocarbamol 500 MG tablet  Commonly known as:  ROBAXIN  Take 1 tablet by mouth 3 (Three) Times a Day.              Latest Documented Vital Signs:  As of 6:26 AM  BP- 126/97 HR- 67 Temp- 99 °F (37.2 °C) (Tympanic) O2 sat- 100%    --  Documentation assistance provided by hugo Mae for Fidencio Noriega PA-C.  Information recorded by the scribe was done at my direction and has been verified and validated by me.       Ameena Mae  02/06/19 [id number]       Saulo Noriega III, PA  02/07/19 2254

## 2019-04-02 ENCOUNTER — TRANSCRIBE ORDERS (OUTPATIENT)
Dept: ADMINISTRATIVE | Facility: HOSPITAL | Age: 74
End: 2019-04-02

## 2019-04-02 ENCOUNTER — LAB (OUTPATIENT)
Dept: LAB | Facility: HOSPITAL | Age: 74
End: 2019-04-02

## 2019-04-02 ENCOUNTER — HOSPITAL ENCOUNTER (OUTPATIENT)
Dept: GENERAL RADIOLOGY | Facility: HOSPITAL | Age: 74
Discharge: HOME | End: 2019-04-02
Admitting: INTERNAL MEDICINE

## 2019-04-02 DIAGNOSIS — E11.22 TYPE 2 DIABETES MELLITUS WITH STAGE 2 CHRONIC KIDNEY DISEASE, UNSPECIFIED WHETHER LONG TERM INSULIN USE (HCC): Primary | ICD-10-CM

## 2019-04-02 DIAGNOSIS — D64.9 ANEMIA, UNSPECIFIED TYPE: ICD-10-CM

## 2019-04-02 DIAGNOSIS — N18.2 TYPE 2 DIABETES MELLITUS WITH STAGE 2 CHRONIC KIDNEY DISEASE, UNSPECIFIED WHETHER LONG TERM INSULIN USE (HCC): Primary | ICD-10-CM

## 2019-04-02 DIAGNOSIS — R52 PAIN: ICD-10-CM

## 2019-04-02 DIAGNOSIS — E11.22 TYPE 2 DIABETES MELLITUS WITH STAGE 2 CHRONIC KIDNEY DISEASE, UNSPECIFIED WHETHER LONG TERM INSULIN USE (HCC): ICD-10-CM

## 2019-04-02 DIAGNOSIS — N18.2 TYPE 2 DIABETES MELLITUS WITH STAGE 2 CHRONIC KIDNEY DISEASE, UNSPECIFIED WHETHER LONG TERM INSULIN USE (HCC): ICD-10-CM

## 2019-04-02 LAB
DEPRECATED RDW RBC AUTO: 42.8 FL (ref 37–54)
ERYTHROCYTE [DISTWIDTH] IN BLOOD BY AUTOMATED COUNT: 12.1 % (ref 12.3–15.4)
HBA1C MFR BLD: 8.66 % (ref 4.8–5.6)
HCT VFR BLD AUTO: 36.3 % (ref 34–46.6)
HGB BLD-MCNC: 11.8 G/DL (ref 12–15.9)
MCH RBC QN AUTO: 31.3 PG (ref 26.6–33)
MCHC RBC AUTO-ENTMCNC: 32.5 G/DL (ref 31.5–35.7)
MCV RBC AUTO: 96.3 FL (ref 79–97)
PLATELET # BLD AUTO: 257 10*3/MM3 (ref 140–450)
PMV BLD AUTO: 8.9 FL (ref 6–12)
RBC # BLD AUTO: 3.77 10*6/MM3 (ref 3.77–5.28)
TSH SERPL DL<=0.05 MIU/L-ACNC: 2.79 MIU/ML (ref 0.27–4.2)
WBC NRBC COR # BLD: 5.11 10*3/MM3 (ref 3.4–10.8)

## 2019-04-02 PROCEDURE — 83036 HEMOGLOBIN GLYCOSYLATED A1C: CPT

## 2019-04-02 PROCEDURE — 72040 X-RAY EXAM NECK SPINE 2-3 VW: CPT

## 2019-04-02 PROCEDURE — 36415 COLL VENOUS BLD VENIPUNCTURE: CPT

## 2019-04-02 PROCEDURE — 84443 ASSAY THYROID STIM HORMONE: CPT

## 2019-04-02 PROCEDURE — 85027 COMPLETE CBC AUTOMATED: CPT

## 2019-04-08 RX ORDER — EXEMESTANE 25 MG/1
TABLET ORAL
Qty: 90 TABLET | Refills: 3 | Status: SHIPPED | OUTPATIENT
Start: 2019-04-08 | End: 2020-02-27

## 2019-05-06 ENCOUNTER — TELEPHONE (OUTPATIENT)
Dept: ONCOLOGY | Facility: HOSPITAL | Age: 74
End: 2019-05-06

## 2019-05-06 NOTE — TELEPHONE ENCOUNTER
----- Message from Pool Huff sent at 5/6/2019  9:02 AM EDT -----  Contact: [phone]  Pt is having cataract surg on wed the 5-8-19

## 2019-05-06 NOTE — TELEPHONE ENCOUNTER
Pt called and wanted to know if it would be ok with Dr Castillo for her to have cataract surgery. It is scheduled for 5/08.Reviewed with Dr Castillo and pt may have cataract surgery. Pt made aware and v/u.

## 2019-06-10 ENCOUNTER — HOSPITAL ENCOUNTER (OUTPATIENT)
Dept: CT IMAGING | Facility: HOSPITAL | Age: 74
Discharge: HOME | End: 2019-06-10
Attending: INTERNAL MEDICINE | Admitting: INTERNAL MEDICINE

## 2019-06-10 DIAGNOSIS — E83.42 HYPOMAGNESEMIA: ICD-10-CM

## 2019-06-10 DIAGNOSIS — D64.9 ANEMIA, UNSPECIFIED TYPE: ICD-10-CM

## 2019-06-10 DIAGNOSIS — C50.112 MALIGNANT NEOPLASM OF CENTRAL PORTION OF LEFT BREAST IN FEMALE, ESTROGEN RECEPTOR POSITIVE (HCC): ICD-10-CM

## 2019-06-10 DIAGNOSIS — Z17.0 MALIGNANT NEOPLASM OF CENTRAL PORTION OF LEFT BREAST IN FEMALE, ESTROGEN RECEPTOR POSITIVE (HCC): ICD-10-CM

## 2019-06-10 LAB
ALBUMIN SERPL-MCNC: 4.3 G/DL (ref 3.5–5.2)
ALBUMIN/GLOB SERPL: 1.2 G/DL
ALP SERPL-CCNC: 112 U/L (ref 39–117)
ALT SERPL W P-5'-P-CCNC: 13 U/L (ref 1–33)
ANION GAP SERPL CALCULATED.3IONS-SCNC: 12.3 MMOL/L
AST SERPL-CCNC: 19 U/L (ref 1–32)
BASOPHILS # BLD AUTO: 0.05 10*3/MM3 (ref 0–0.2)
BASOPHILS NFR BLD AUTO: 1.2 % (ref 0–1.5)
BILIRUB SERPL-MCNC: 0.3 MG/DL (ref 0.2–1.2)
BUN BLD-MCNC: 12 MG/DL (ref 8–23)
BUN/CREAT SERPL: 14.5 (ref 7–25)
CALCIUM SPEC-SCNC: 10.2 MG/DL (ref 8.6–10.5)
CHLORIDE SERPL-SCNC: 103 MMOL/L (ref 98–107)
CO2 SERPL-SCNC: 28.7 MMOL/L (ref 22–29)
CREAT BLD-MCNC: 0.83 MG/DL (ref 0.57–1)
CREAT BLDA-MCNC: 0.8 MG/DL (ref 0.6–1.3)
DEPRECATED RDW RBC AUTO: 43.4 FL (ref 37–54)
EOSINOPHIL # BLD AUTO: 0.16 10*3/MM3 (ref 0–0.4)
EOSINOPHIL NFR BLD AUTO: 4 % (ref 0.3–6.2)
ERYTHROCYTE [DISTWIDTH] IN BLOOD BY AUTOMATED COUNT: 12 % (ref 12.3–15.4)
FERRITIN SERPL-MCNC: 188 NG/ML (ref 13–150)
GFR SERPL CREATININE-BSD FRML MDRD: 68 ML/MIN/1.73
GFR SERPL CREATININE-BSD FRML MDRD: 82 ML/MIN/1.73
GLOBULIN UR ELPH-MCNC: 3.6 GM/DL
GLUCOSE BLD-MCNC: 201 MG/DL (ref 65–99)
HCT VFR BLD AUTO: 35.9 % (ref 34–46.6)
HGB BLD-MCNC: 11.5 G/DL (ref 12–15.9)
IMM GRANULOCYTES # BLD AUTO: 0.01 10*3/MM3 (ref 0–0.05)
IMM GRANULOCYTES NFR BLD AUTO: 0.2 % (ref 0–0.5)
IRON 24H UR-MRATE: 75 MCG/DL (ref 37–145)
IRON SATN MFR SERPL: 18 % (ref 20–50)
LYMPHOCYTES # BLD AUTO: 1.34 10*3/MM3 (ref 0.7–3.1)
LYMPHOCYTES NFR BLD AUTO: 33.3 % (ref 19.6–45.3)
MAGNESIUM SERPL-MCNC: 1.8 MG/DL (ref 1.6–2.4)
MCH RBC QN AUTO: 31.8 PG (ref 26.6–33)
MCHC RBC AUTO-ENTMCNC: 32 G/DL (ref 31.5–35.7)
MCV RBC AUTO: 99.2 FL (ref 79–97)
MONOCYTES # BLD AUTO: 0.37 10*3/MM3 (ref 0.1–0.9)
MONOCYTES NFR BLD AUTO: 9.2 % (ref 5–12)
NEUTROPHILS # BLD AUTO: 2.09 10*3/MM3 (ref 1.7–7)
NEUTROPHILS NFR BLD AUTO: 52.1 % (ref 42.7–76)
NRBC BLD AUTO-RTO: 0 /100 WBC (ref 0–0.2)
PHOSPHATE SERPL-MCNC: 3.5 MG/DL (ref 2.5–4.5)
PLATELET # BLD AUTO: 253 10*3/MM3 (ref 140–450)
PMV BLD AUTO: 8.5 FL (ref 6–12)
POTASSIUM BLD-SCNC: 4.9 MMOL/L (ref 3.5–5.2)
PROT SERPL-MCNC: 7.9 G/DL (ref 6–8.5)
RBC # BLD AUTO: 3.62 10*6/MM3 (ref 3.77–5.28)
SODIUM BLD-SCNC: 144 MMOL/L (ref 136–145)
TIBC SERPL-MCNC: 410 MCG/DL (ref 298–536)
TRANSFERRIN SERPL-MCNC: 275 MG/DL (ref 200–360)
WBC NRBC COR # BLD: 4.02 10*3/MM3 (ref 3.4–10.8)

## 2019-06-10 PROCEDURE — 84466 ASSAY OF TRANSFERRIN: CPT | Performed by: INTERNAL MEDICINE

## 2019-06-10 PROCEDURE — 84100 ASSAY OF PHOSPHORUS: CPT | Performed by: INTERNAL MEDICINE

## 2019-06-10 PROCEDURE — 25010000002 IOPAMIDOL 61 % SOLUTION: Performed by: INTERNAL MEDICINE

## 2019-06-10 PROCEDURE — 80053 COMPREHEN METABOLIC PANEL: CPT | Performed by: INTERNAL MEDICINE

## 2019-06-10 PROCEDURE — 83735 ASSAY OF MAGNESIUM: CPT | Performed by: INTERNAL MEDICINE

## 2019-06-10 PROCEDURE — 71260 CT THORAX DX C+: CPT

## 2019-06-10 PROCEDURE — 85025 COMPLETE CBC W/AUTO DIFF WBC: CPT | Performed by: INTERNAL MEDICINE

## 2019-06-10 PROCEDURE — 83540 ASSAY OF IRON: CPT | Performed by: INTERNAL MEDICINE

## 2019-06-10 PROCEDURE — 74177 CT ABD & PELVIS W/CONTRAST: CPT

## 2019-06-10 PROCEDURE — 82565 ASSAY OF CREATININE: CPT

## 2019-06-10 PROCEDURE — 82728 ASSAY OF FERRITIN: CPT | Performed by: INTERNAL MEDICINE

## 2019-06-10 RX ADMIN — IOPAMIDOL 85 ML: 612 INJECTION, SOLUTION INTRAVENOUS at 09:40

## 2019-06-17 ENCOUNTER — OFFICE VISIT (OUTPATIENT)
Dept: ONCOLOGY | Facility: CLINIC | Age: 74
End: 2019-06-17

## 2019-06-17 ENCOUNTER — APPOINTMENT (OUTPATIENT)
Dept: LAB | Facility: HOSPITAL | Age: 74
End: 2019-06-17

## 2019-06-17 VITALS
TEMPERATURE: 98.6 F | BODY MASS INDEX: 20.67 KG/M2 | WEIGHT: 109.5 LBS | RESPIRATION RATE: 14 BRPM | HEIGHT: 61 IN | OXYGEN SATURATION: 96 % | HEART RATE: 74 BPM | SYSTOLIC BLOOD PRESSURE: 121 MMHG | DIASTOLIC BLOOD PRESSURE: 75 MMHG

## 2019-06-17 DIAGNOSIS — D64.9 ANEMIA, UNSPECIFIED TYPE: ICD-10-CM

## 2019-06-17 DIAGNOSIS — C50.112 MALIGNANT NEOPLASM OF CENTRAL PORTION OF LEFT BREAST IN FEMALE, ESTROGEN RECEPTOR POSITIVE (HCC): Primary | ICD-10-CM

## 2019-06-17 DIAGNOSIS — M81.6 LOCALIZED OSTEOPOROSIS WITHOUT CURRENT PATHOLOGICAL FRACTURE: ICD-10-CM

## 2019-06-17 DIAGNOSIS — Z17.0 MALIGNANT NEOPLASM OF CENTRAL PORTION OF LEFT BREAST IN FEMALE, ESTROGEN RECEPTOR POSITIVE (HCC): Primary | ICD-10-CM

## 2019-06-17 PROCEDURE — G0463 HOSPITAL OUTPT CLINIC VISIT: HCPCS | Performed by: INTERNAL MEDICINE

## 2019-06-17 PROCEDURE — 99214 OFFICE O/P EST MOD 30 MIN: CPT | Performed by: INTERNAL MEDICINE

## 2019-06-17 NOTE — PROGRESS NOTES
DOCTOR #2 NOTE:    1. Metastatic breast cancer to the lungs, ER/WA positive, HER2/berkley positive, undergoing hormonal therapy with Arimidex since the middle of March 2009.   2. Response to Arimidex as evidenced by serial CT exams, including on 01/10/2012, showing no significant disease.   3. New left upper lobe pulmonary nodule noted, measuring 1.1 cm by CT scan on 07/05/2012. No other evidence of metastatic disease. Hormonal therapy switched to Faslodex 07/12/2012.   4. Osteoporosis documented by bone density scan from 10/17/2011 and also 02/21/2014. Patient was started on Prolia therapy every 6 months. Patient had tooth extraction in August 2015. Prolia has been on hold.   5. CT scans of chest, abdomen and pelvis on 04/07/2016 showed stable disease. Faslodex will be continued.   6. CT scan examination reported disease progress on 01/30/2017, patient was switched to Aromasin in early February 2017. However she was unable to start Afinitor due to cost.   7. Repeated bone density scan on 2/27/2017 reported worsening osteoporosis. Prolia was restarted back on 03/02/2017. Plan for Q6 month treatment.   8.  Repeated chest CT on 5/15/2017 showed progressive left upper lobe pulmonary nodule.  Patient had stereotactic radiation therapy in early June 2017.  Aromasin was continued.   9.  CT scan of the chest on 9/25/2017 reported post treatment changes in the left upper lobe.   10.  CT scan examination for chest abdomen and pelvis with IV contrast on 6/21/2018 reported no evidence of disease progression.        HISTORY OF PRESENT ILLNESS: Ms. Reese is a 73-year-old  female presenting today for 6-month followup to discuss results of CT scan examination and the laboratory studies, accompanied by her nephew who helped with history.       She reports at baseline condition, she complains of diffuse generalized body achiness.  She denies weight loss no fever no sweating no headaches.  Her performance status is ECOG 0.  Patient  reports she continues to have pain in the left lower jaw, since her teeth extraction in February 2018.  She now has full-mouth denture.    Patient otherwise reports no dyspnea, no cough, hemoptysis.  She reports no fever or sweating.   She is on Aromasin with good tolerance. She denies arthralgia, hot flashes or sweating.  She denies bone pains.       CT scan examination reported no active disease on 6/10/2019.  Had a reasonable iron studies and also improve the magnesium level.  Mild anemic with hemoglobin 11.5.    PAST MEDICAL HISTORY:    1.  Left breast cancer, stage I, ER and CA positive, status post mastectomy, chemotherapy, and tamoxifen for five years--finished in 2001.    2.  Hypothyroidism diagnosed in 01/2009.    3.  Hyperlipidemia.    4.  Diabetes, type 2.    5.  Osteoporosis, evidenced on bone density scans in October 2011 and February 2014. The patient stopped Fosamax because of dental problem suspected to be related to bisphosphonate.   6.  Teeth extraction in February 2018.      SURGICAL HISTORY: Modified left radical mastectomy and previous breast biopsy in 1995.       ONCOLOGIC/HEMATOLOGIC HISTORY: History from previous dates can be found in the separate document.       CT scan on 04/07/2016 showed stable disease with the small left upper lobe pulmonary nodule, no change compared to previous imaging studies, no evidence of metastatic disease in the abdomen or pelvis or bony structure.       Unfortunately her CT scan on 01/30/2017 showed slight disease progress in the left upper lobe of the lung, by 3 mm, up to 1.4 centimeters. No evidence of new metastatic disease or bone metastases.       Patient was seen on February 7, 2017. We'll propose switch therapy to Aromasin plus Afinitor.  however she could not afford Afinitor because the cost. Our pharmacy staff tried to apply for premedication, but was not successful. So she is only on Aromasin with good tolerance.      Repeated a CT of the chest on  5/15/2017 showed progressive left upper lobe pulmonary nodule.  Patient had stereotactic radiation therapy in early 2017.  Aromasin was continued.       MEDICATIONS: The current medication list was reviewed with the patient and updated in the EMR this date per the medical assistant. Medication dosages and frequencies were confirmed to be accurate.       ALLERGIES: LETROZOLE (questionably causing skin rash).       SOCIAL HISTORY: The patient is . She finished college education. 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 No alcohol use. No illegal drug use. No risks for HIV.       FAMILY HISTORY: Her mother  of tuberculosis at the age of 48. A sister had thyroid cancer/lung cancer - no details are available. Two brothers had lung cancer and bone cancers in their 70s - no details available. Her maternal grandmother and her mother both had diabetes. No family history of hypertension or heart disease.       REVIEW OF SYSTEMS:   PAIN: See VITAL SIGNS below.   GENERAL: No change in appetite or weight, no fevers, chills or sweats.   SKIN: Diffuse skin rashes on her neck, upper chest, arms.    HEME/LYMPH: No anemia, easy bruising, bleeding or swollen nodes.   EYES: No vision changes or diplopia.   ENT: No tinnitus, hearing loss, gum bleeding, epistaxis , hoarseness or dysphagia.   RESPIRATORY: No cough, shortness of breath, hemoptysis or wheezing.   CVS:no chest pain, palpitations, orthopnea, dyspnea on exertion.   GI: No abdominal pain, nausea, vomiting, constipation, diarrhea, melena or hematochezia.   : No dysuria or hematuria. No abnormal vaginal discharge or bleeding.   MUSCULOSKELETAL: has mild joint achiness.   NEUROLOGICAL: No dizziness, global weakness, loss of consciousness or seizures.   PSYCHIATRIC: No increased nervousness, mood changes or depression.       VITAL SIGNS:   Vitals:    19 1020   BP: 121/75   Pulse: 74   Resp: 14   Temp: 98.6 °F (37 °C)   SpO2: 96%   Weight: 49.7 kg  "(109 lb 8 oz)   Height: 154 cm (60.63\")  Comment: new ht.   PainSc: 10-Worst pain ever   PainLoc: Comment: both breast     ECO         PHYSICAL EXAMINATION:     GENERAL:  Well-developed, well-nourished in no acute distress.   SKIN:  Warm, dry without rashes, purpura or petechiae.  HEAD:  Normocephalic.  EYES:  Pupils equal, round and reactive to light.  EOMs intact.  Conjunctivae normal.  EARS:  Hearing intact.  NOSE:  Septum midline.  No excoriations or nasal discharge.  MOUTH:  Tongue is well-papillated; no stomatitis or ulcers.  Lips normal.  THROAT:  Oropharynx without lesions or exudates.  NECK:  Supple with good range of motion; no thyromegaly or masses, no JVD.  LYMPHATICS:  No cervical, supraclavicular, axillary or inguinal adenopathy.  CHEST:  Lungs clear to percussion and auscultation. Good airflow.  CARDIAC:  Regular rate and rhythm without murmurs, rubs or gallops. Normal S1,S2.  ABDOMEN:  Soft, nontender with no organomegaly or masses.  EXTREMITIES:  No clubbing, cyanosis or edema.  NEUROLOGICAL:  Cranial Nerves II-XII grossly intact.  No focal neurological deficits.   PSYCHIATRIC:  Normal affect and mood.          LABORATORY DATA:     Lab Results   Component Value Date    WBC 4.02 06/10/2019    HGB 11.5 (L) 06/10/2019    HCT 35.9 06/10/2019    MCV 99.2 (H) 06/10/2019     06/10/2019     Lab Results   Component Value Date    NEUTROABS 2.09 06/10/2019     Lab Results   Component Value Date    GLUCOSE 201 (H) 06/10/2019    BUN 12 06/10/2019    CREATININE 0.80 06/10/2019    EGFRIFNONA 68 06/10/2019    EGFRIFAFRI 82 06/10/2019    BCR 14.5 06/10/2019    K 4.9 06/10/2019    CO2 28.7 06/10/2019    CALCIUM 10.2 06/10/2019    ALBUMIN 4.30 06/10/2019    AST 19 06/10/2019    ALT 13 06/10/2019     Sodium   Date Value Ref Range Status   06/10/2019 144 136 - 145 mmol/L Final     Potassium   Date Value Ref Range Status   06/10/2019 4.9 3.5 - 5.2 mmol/L Final     Total Bilirubin   Date Value Ref Range Status "   06/10/2019 0.3 0.2 - 1.2 mg/dL Final     Alkaline Phosphatase   Date Value Ref Range Status   06/10/2019 112 39 - 117 U/L Final   ]      Lab Results   Component Value Date    ZXJXGLHK79 598 01/08/2019     Lab Results   Component Value Date    FOLATE 12.78 01/08/2019     Lab Results   Component Value Date    IRON 75 06/10/2019    TIBC 410 06/10/2019    FERRITIN 188.00 (H) 06/10/2019   Iron saturation 18%.       IMAGE STUDY:  CT CHEST W CONTRAST-, CT ABDOMEN PELVIS W CONTRAST-6/10/2019.       CLINICAL HISTORY: 79-year-old female with history of breast carcinoma.  Status post mastectomy. Restaging. Evaluate for metastatic disease.     TECHNIQUE: Spiral CT images were acquired through the chest abdomen and  pelvis with oral and IV contrast and were reconstructed in 3 mm thick  axial slices.     Radiation dose reduction techniques were utilized, including automated  exposure control and exposure modulation based on body size.     COMPARISON: CT imaging of the chest and abdomen dated 06/21/2018.     FINDINGS: The patient is status post left mastectomy without breast  reconstruction. The chest wall is otherwise unremarkable. There is no  mediastinal or hilar or axillary lymphadenopathy. Lung window images  again show a wedge-shaped area of dense consolidation and volume loss in  the medial aspect of the left upper lobe consistent with fibrosis and  atelectasis due to radiation therapy that appears unchanged. There are  no acute infiltrates or discrete lung masses. There are no pleural  effusions.     The liver, spleen, pancreas, kidneys, and adrenal glands appear within  normal limits. The stomach and small and large bowel are unremarkable.  No lymphadenopathy is identified in the abdomen or pelvis.     Bone window images demonstrate no lytic or blastic lesions.     IMPRESSION:  Status post left mastectomy without breast reconstruction.  Focal area of dense consolidation and atelectasis in the left upper lobe  showing no  change since a CT dated 06/21/2018. This is consistent with  radiation therapy induced fibrosis and atelectasis. There is no evidence  of metastatic disease in the chest, abdomen or pelvis.     This report was finalized on 6/11/2019 10:39 AM by Dr. Juvenal Otto M.D.       ASSESSMENT:   1. Metastatic breast cancer with metastasis in the lungs, biopsy confirmed. ER/MT positive. Her2 positive.  she had good response to Arimidex for many years.  However since early 2017, CT scan showed disease progression with a solitary lung metastases.  We switched her to Aromasin in early February 2017, however she could not obtain Afinitor, because of the cost issue.      We obtained a chest CT scan on 5/15/2017 which showed further disease progression.  Patient was treated with stereotactic radiation therapy in early June 2017.  We obtained CT scan twice on 9/25/2017 and on 6/21/2018 which showed post radiation therapy changes.      Most recently we obtained a CT scan examination again on 6/10/2019 which showed no evidence of active disease.  I discussed with the patient today, and recommended patient to continue Aromasin.  We'll obtain CT scan examination in 12 months for reassessment.        2. Osteoporosis. She had bone density scan on 2/27/2017 which showed statistically significant worsening osteoporosis in the left hip.  We restarted her back on Prolia on 03/02/17 and repeat every 6 months. The patient will continue oral calcium and vitamin D supplementation.     Her last dose of Prolia was in June 2018.  Patient reported that she had pain involving the left lower jaw after her teeth extraction in February 2018.  We concerned about possibility of necrosis of the jaw bone, so I recommended to discontinue prolia.          3. Mild anemia,with mild iron deficiency.  Patient has been taking oral iron supplementation with good results, laboratory study showed improved with ferritin and iron saturation, and normalized hemoglobin.      Her iron study on 16 2019 reported excellent ferritin 188, and the marginal iron saturation 18%.     Patient was instructed to continue oral iron supplementation and oral vitamin B12 supplementation.       4.  Hypomagnesemia.  She was started on oral magnesium oxide.  Repeat labs showed improved and marginally normal magnesium level 1.8 on 6/10/2019.  I asked patient to continue oral magnesium oxide.       PLAN:       1. Continue Aromasin 25 mg daily.   2. Continue oral ferrous sulfate 325 mg once a day.     3.  Continue oral magnesium oxide 400 mg twice a day.    4.  She will return in 6 month for reevaluation.  We'll obtain chest, abdomen and pelvis CT scan examination in 12 months.  We will also obtain laboratory study for CBC, magnesium and CMP.            His nephew helped with history today.        LETA MAY M.D., Ph.D.   6/17/2019.          CC: Sussy Payne M.D.

## 2019-06-29 ENCOUNTER — HOSPITAL ENCOUNTER (EMERGENCY)
Facility: HOSPITAL | Age: 74
Discharge: HOME | End: 2019-06-29
Attending: EMERGENCY MEDICINE | Admitting: EMERGENCY MEDICINE

## 2019-06-29 ENCOUNTER — APPOINTMENT (OUTPATIENT)
Dept: GENERAL RADIOLOGY | Facility: HOSPITAL | Age: 74
End: 2019-06-29

## 2019-06-29 VITALS
RESPIRATION RATE: 16 BRPM | HEIGHT: 61 IN | HEART RATE: 67 BPM | WEIGHT: 111.1 LBS | BODY MASS INDEX: 20.97 KG/M2 | DIASTOLIC BLOOD PRESSURE: 64 MMHG | OXYGEN SATURATION: 97 % | SYSTOLIC BLOOD PRESSURE: 123 MMHG | TEMPERATURE: 100.6 F

## 2019-06-29 DIAGNOSIS — J20.4 ACUTE BRONCHITIS DUE TO PARAINFLUENZA VIRUS: Primary | ICD-10-CM

## 2019-06-29 LAB
ALBUMIN SERPL-MCNC: 3.9 G/DL (ref 3.5–5.2)
ALBUMIN/GLOB SERPL: 1.1 G/DL
ALP SERPL-CCNC: 118 U/L (ref 39–117)
ALT SERPL W P-5'-P-CCNC: 12 U/L (ref 1–33)
ANION GAP SERPL CALCULATED.3IONS-SCNC: 11.3 MMOL/L (ref 5–15)
AST SERPL-CCNC: 12 U/L (ref 1–32)
B PARAPERT DNA SPEC QL NAA+PROBE: NOT DETECTED
B PERT DNA SPEC QL NAA+PROBE: NOT DETECTED
BASOPHILS # BLD AUTO: 0.03 10*3/MM3 (ref 0–0.2)
BASOPHILS NFR BLD AUTO: 0.5 % (ref 0–1.5)
BILIRUB SERPL-MCNC: 0.2 MG/DL (ref 0.2–1.2)
BUN BLD-MCNC: 9 MG/DL (ref 8–23)
BUN/CREAT SERPL: 10.5 (ref 7–25)
C PNEUM DNA NPH QL NAA+NON-PROBE: NOT DETECTED
CALCIUM SPEC-SCNC: 9.3 MG/DL (ref 8.6–10.5)
CHLORIDE SERPL-SCNC: 100 MMOL/L (ref 98–107)
CO2 SERPL-SCNC: 24.7 MMOL/L (ref 22–29)
CREAT BLD-MCNC: 0.86 MG/DL (ref 0.57–1)
D-LACTATE SERPL-SCNC: 1.9 MMOL/L (ref 0.5–2)
DEPRECATED RDW RBC AUTO: 40.2 FL (ref 37–54)
EOSINOPHIL # BLD AUTO: 0.14 10*3/MM3 (ref 0–0.4)
EOSINOPHIL NFR BLD AUTO: 2.1 % (ref 0.3–6.2)
ERYTHROCYTE [DISTWIDTH] IN BLOOD BY AUTOMATED COUNT: 11.6 % (ref 12.3–15.4)
FLUAV H1 2009 PAND RNA NPH QL NAA+PROBE: NOT DETECTED
FLUAV H1 HA GENE NPH QL NAA+PROBE: NOT DETECTED
FLUAV H3 RNA NPH QL NAA+PROBE: NOT DETECTED
FLUAV SUBTYP SPEC NAA+PROBE: NOT DETECTED
FLUBV RNA ISLT QL NAA+PROBE: NOT DETECTED
GFR SERPL CREATININE-BSD FRML MDRD: 65 ML/MIN/1.73
GFR SERPL CREATININE-BSD FRML MDRD: 79 ML/MIN/1.73
GLOBULIN UR ELPH-MCNC: 3.6 GM/DL
GLUCOSE BLD-MCNC: 245 MG/DL (ref 65–99)
HADV DNA SPEC NAA+PROBE: NOT DETECTED
HCOV 229E RNA SPEC QL NAA+PROBE: NOT DETECTED
HCOV HKU1 RNA SPEC QL NAA+PROBE: NOT DETECTED
HCOV NL63 RNA SPEC QL NAA+PROBE: NOT DETECTED
HCOV OC43 RNA SPEC QL NAA+PROBE: NOT DETECTED
HCT VFR BLD AUTO: 32 % (ref 34–46.6)
HGB BLD-MCNC: 10.5 G/DL (ref 12–15.9)
HMPV RNA NPH QL NAA+NON-PROBE: NOT DETECTED
HPIV1 RNA SPEC QL NAA+PROBE: NOT DETECTED
HPIV2 RNA SPEC QL NAA+PROBE: NOT DETECTED
HPIV3 RNA NPH QL NAA+PROBE: DETECTED
HPIV4 P GENE NPH QL NAA+PROBE: NOT DETECTED
IMM GRANULOCYTES # BLD AUTO: 0.03 10*3/MM3 (ref 0–0.05)
IMM GRANULOCYTES NFR BLD AUTO: 0.5 % (ref 0–0.5)
LYMPHOCYTES # BLD AUTO: 1.18 10*3/MM3 (ref 0.7–3.1)
LYMPHOCYTES NFR BLD AUTO: 18 % (ref 19.6–45.3)
M PNEUMO IGG SER IA-ACNC: NOT DETECTED
MCH RBC QN AUTO: 31.2 PG (ref 26.6–33)
MCHC RBC AUTO-ENTMCNC: 32.8 G/DL (ref 31.5–35.7)
MCV RBC AUTO: 95 FL (ref 79–97)
MONOCYTES # BLD AUTO: 0.57 10*3/MM3 (ref 0.1–0.9)
MONOCYTES NFR BLD AUTO: 8.7 % (ref 5–12)
NEUTROPHILS # BLD AUTO: 4.61 10*3/MM3 (ref 1.7–7)
NEUTROPHILS NFR BLD AUTO: 70.2 % (ref 42.7–76)
NRBC BLD AUTO-RTO: 0 /100 WBC (ref 0–0.2)
PLATELET # BLD AUTO: 254 10*3/MM3 (ref 140–450)
PMV BLD AUTO: 8.6 FL (ref 6–12)
POTASSIUM BLD-SCNC: 4.2 MMOL/L (ref 3.5–5.2)
PROCALCITONIN SERPL-MCNC: 0.03 NG/ML (ref 0.1–0.25)
PROT SERPL-MCNC: 7.5 G/DL (ref 6–8.5)
RBC # BLD AUTO: 3.37 10*6/MM3 (ref 3.77–5.28)
RHINOVIRUS RNA SPEC NAA+PROBE: NOT DETECTED
RSV RNA NPH QL NAA+NON-PROBE: NOT DETECTED
SODIUM BLD-SCNC: 136 MMOL/L (ref 136–145)
TROPONIN T SERPL-MCNC: <0.01 NG/ML (ref 0–0.03)
WBC NRBC COR # BLD: 6.56 10*3/MM3 (ref 3.4–10.8)

## 2019-06-29 PROCEDURE — 87798 DETECT AGENT NOS DNA AMP: CPT | Performed by: EMERGENCY MEDICINE

## 2019-06-29 PROCEDURE — 87486 CHLMYD PNEUM DNA AMP PROBE: CPT | Performed by: EMERGENCY MEDICINE

## 2019-06-29 PROCEDURE — 99284 EMERGENCY DEPT VISIT MOD MDM: CPT

## 2019-06-29 PROCEDURE — 93005 ELECTROCARDIOGRAM TRACING: CPT | Performed by: EMERGENCY MEDICINE

## 2019-06-29 PROCEDURE — 93010 ELECTROCARDIOGRAM REPORT: CPT | Performed by: INTERNAL MEDICINE

## 2019-06-29 PROCEDURE — 87633 RESP VIRUS 12-25 TARGETS: CPT | Performed by: EMERGENCY MEDICINE

## 2019-06-29 PROCEDURE — 83605 ASSAY OF LACTIC ACID: CPT | Performed by: EMERGENCY MEDICINE

## 2019-06-29 PROCEDURE — 84484 ASSAY OF TROPONIN QUANT: CPT | Performed by: EMERGENCY MEDICINE

## 2019-06-29 PROCEDURE — 87040 BLOOD CULTURE FOR BACTERIA: CPT | Performed by: EMERGENCY MEDICINE

## 2019-06-29 PROCEDURE — 85025 COMPLETE CBC W/AUTO DIFF WBC: CPT | Performed by: EMERGENCY MEDICINE

## 2019-06-29 PROCEDURE — 87581 M.PNEUMON DNA AMP PROBE: CPT | Performed by: EMERGENCY MEDICINE

## 2019-06-29 PROCEDURE — 80053 COMPREHEN METABOLIC PANEL: CPT | Performed by: EMERGENCY MEDICINE

## 2019-06-29 PROCEDURE — 71046 X-RAY EXAM CHEST 2 VIEWS: CPT

## 2019-06-29 PROCEDURE — 84145 PROCALCITONIN (PCT): CPT | Performed by: EMERGENCY MEDICINE

## 2019-06-29 RX ORDER — ACETAMINOPHEN 500 MG
1000 TABLET ORAL ONCE
Status: COMPLETED | OUTPATIENT
Start: 2019-06-29 | End: 2019-06-29

## 2019-06-29 RX ORDER — SODIUM CHLORIDE 0.9 % (FLUSH) 0.9 %
10 SYRINGE (ML) INJECTION AS NEEDED
Status: DISCONTINUED | OUTPATIENT
Start: 2019-06-29 | End: 2019-06-30 | Stop reason: HOSPADM

## 2019-06-29 RX ADMIN — ACETAMINOPHEN 1000 MG: 500 TABLET, FILM COATED ORAL at 20:59

## 2019-06-29 RX ADMIN — SODIUM CHLORIDE, POTASSIUM CHLORIDE, SODIUM LACTATE AND CALCIUM CHLORIDE 1000 ML: 600; 310; 30; 20 INJECTION, SOLUTION INTRAVENOUS at 20:15

## 2019-07-04 LAB
BACTERIA SPEC AEROBE CULT: NORMAL
BACTERIA SPEC AEROBE CULT: NORMAL

## 2019-10-28 ENCOUNTER — APPOINTMENT (OUTPATIENT)
Dept: LAB | Facility: HOSPITAL | Age: 74
End: 2019-10-28

## 2019-10-28 ENCOUNTER — TRANSCRIBE ORDERS (OUTPATIENT)
Dept: ADMINISTRATIVE | Facility: HOSPITAL | Age: 74
End: 2019-10-28

## 2019-10-28 ENCOUNTER — LAB (OUTPATIENT)
Dept: LAB | Facility: HOSPITAL | Age: 74
End: 2019-10-28

## 2019-10-28 DIAGNOSIS — R79.9 ABNORMAL FINDING OF BLOOD CHEMISTRY, UNSPECIFIED: ICD-10-CM

## 2019-10-28 DIAGNOSIS — I10 HYPERTENSION, UNSPECIFIED TYPE: Primary | ICD-10-CM

## 2019-10-28 DIAGNOSIS — I10 HYPERTENSION, UNSPECIFIED TYPE: ICD-10-CM

## 2019-10-28 LAB
ALBUMIN SERPL-MCNC: 4 G/DL (ref 3.5–5.2)
ALBUMIN/GLOB SERPL: 1.1 G/DL
ALP SERPL-CCNC: 116 U/L (ref 39–117)
ALT SERPL W P-5'-P-CCNC: 31 U/L (ref 1–33)
ANION GAP SERPL CALCULATED.3IONS-SCNC: 9.8 MMOL/L (ref 5–15)
AST SERPL-CCNC: 27 U/L (ref 1–32)
BILIRUB SERPL-MCNC: 0.4 MG/DL (ref 0.2–1.2)
BUN BLD-MCNC: 11 MG/DL (ref 8–23)
BUN/CREAT SERPL: 13.6 (ref 7–25)
CALCIUM SPEC-SCNC: 9.2 MG/DL (ref 8.6–10.5)
CHLORIDE SERPL-SCNC: 101 MMOL/L (ref 98–107)
CHOLEST SERPL-MCNC: 126 MG/DL (ref 0–200)
CO2 SERPL-SCNC: 27.2 MMOL/L (ref 22–29)
CREAT BLD-MCNC: 0.81 MG/DL (ref 0.57–1)
GFR SERPL CREATININE-BSD FRML MDRD: 70 ML/MIN/1.73
GFR SERPL CREATININE-BSD FRML MDRD: 84 ML/MIN/1.73
GLOBULIN UR ELPH-MCNC: 3.5 GM/DL
GLUCOSE BLD-MCNC: 156 MG/DL (ref 65–99)
HBA1C MFR BLD: 8.65 % (ref 4.8–5.6)
HDLC SERPL-MCNC: 49 MG/DL (ref 40–60)
LDLC SERPL CALC-MCNC: 67 MG/DL (ref 0–100)
LDLC/HDLC SERPL: 1.37 {RATIO}
POTASSIUM BLD-SCNC: 4.4 MMOL/L (ref 3.5–5.2)
PROT SERPL-MCNC: 7.5 G/DL (ref 6–8.5)
SODIUM BLD-SCNC: 138 MMOL/L (ref 136–145)
TRIGL SERPL-MCNC: 50 MG/DL (ref 0–150)
VLDLC SERPL-MCNC: 10 MG/DL (ref 5–40)

## 2019-10-28 PROCEDURE — 80053 COMPREHEN METABOLIC PANEL: CPT

## 2019-10-28 PROCEDURE — 80061 LIPID PANEL: CPT

## 2019-10-28 PROCEDURE — 83036 HEMOGLOBIN GLYCOSYLATED A1C: CPT

## 2019-10-28 PROCEDURE — 36415 COLL VENOUS BLD VENIPUNCTURE: CPT

## 2019-12-03 ENCOUNTER — OFFICE VISIT (OUTPATIENT)
Dept: ONCOLOGY | Facility: CLINIC | Age: 74
End: 2019-12-03

## 2019-12-03 ENCOUNTER — LAB (OUTPATIENT)
Dept: LAB | Facility: HOSPITAL | Age: 74
End: 2019-12-03

## 2019-12-03 VITALS
HEIGHT: 61 IN | WEIGHT: 109.4 LBS | BODY MASS INDEX: 20.65 KG/M2 | DIASTOLIC BLOOD PRESSURE: 72 MMHG | HEART RATE: 75 BPM | TEMPERATURE: 98.2 F | RESPIRATION RATE: 14 BRPM | SYSTOLIC BLOOD PRESSURE: 116 MMHG | OXYGEN SATURATION: 99 %

## 2019-12-03 DIAGNOSIS — C50.112 MALIGNANT NEOPLASM OF CENTRAL PORTION OF LEFT BREAST IN FEMALE, ESTROGEN RECEPTOR POSITIVE (HCC): Primary | ICD-10-CM

## 2019-12-03 DIAGNOSIS — D50.9 IRON DEFICIENCY ANEMIA, UNSPECIFIED IRON DEFICIENCY ANEMIA TYPE: ICD-10-CM

## 2019-12-03 DIAGNOSIS — E83.42 HYPOMAGNESEMIA: ICD-10-CM

## 2019-12-03 DIAGNOSIS — Z17.0 MALIGNANT NEOPLASM OF CENTRAL PORTION OF LEFT BREAST IN FEMALE, ESTROGEN RECEPTOR POSITIVE (HCC): ICD-10-CM

## 2019-12-03 DIAGNOSIS — Z17.0 MALIGNANT NEOPLASM OF CENTRAL PORTION OF LEFT BREAST IN FEMALE, ESTROGEN RECEPTOR POSITIVE (HCC): Primary | ICD-10-CM

## 2019-12-03 DIAGNOSIS — C50.112 MALIGNANT NEOPLASM OF CENTRAL PORTION OF LEFT BREAST IN FEMALE, ESTROGEN RECEPTOR POSITIVE (HCC): ICD-10-CM

## 2019-12-03 LAB
ALBUMIN SERPL-MCNC: 4.3 G/DL (ref 3.5–5.2)
ALBUMIN/GLOB SERPL: 1.2 G/DL (ref 1.1–2.4)
ALP SERPL-CCNC: 109 U/L (ref 38–116)
ALT SERPL W P-5'-P-CCNC: 16 U/L (ref 0–33)
ANION GAP SERPL CALCULATED.3IONS-SCNC: 11.7 MMOL/L (ref 5–15)
AST SERPL-CCNC: 15 U/L (ref 0–32)
BASOPHILS # BLD AUTO: 0.04 10*3/MM3 (ref 0–0.2)
BASOPHILS NFR BLD AUTO: 0.9 % (ref 0–1.5)
BILIRUB SERPL-MCNC: 0.4 MG/DL (ref 0.2–1.2)
BUN BLD-MCNC: 16 MG/DL (ref 6–20)
BUN/CREAT SERPL: 18.6 (ref 7.3–30)
CALCIUM SPEC-SCNC: 9.8 MG/DL (ref 8.5–10.2)
CHLORIDE SERPL-SCNC: 100 MMOL/L (ref 98–107)
CO2 SERPL-SCNC: 28.3 MMOL/L (ref 22–29)
CREAT BLD-MCNC: 0.86 MG/DL (ref 0.6–1.1)
DEPRECATED RDW RBC AUTO: 41.7 FL (ref 37–54)
EOSINOPHIL # BLD AUTO: 0.14 10*3/MM3 (ref 0–0.4)
EOSINOPHIL NFR BLD AUTO: 3 % (ref 0.3–6.2)
ERYTHROCYTE [DISTWIDTH] IN BLOOD BY AUTOMATED COUNT: 12 % (ref 12.3–15.4)
GFR SERPL CREATININE-BSD FRML MDRD: 65 ML/MIN/1.73
GFR SERPL CREATININE-BSD FRML MDRD: 78 ML/MIN/1.73
GLOBULIN UR ELPH-MCNC: 3.5 GM/DL (ref 1.8–3.5)
GLUCOSE BLD-MCNC: 156 MG/DL (ref 74–124)
HCT VFR BLD AUTO: 34.7 % (ref 34–46.6)
HGB BLD-MCNC: 11.7 G/DL (ref 12–15.9)
IMM GRANULOCYTES # BLD AUTO: 0.01 10*3/MM3 (ref 0–0.05)
IMM GRANULOCYTES NFR BLD AUTO: 0.2 % (ref 0–0.5)
LYMPHOCYTES # BLD AUTO: 1.3 10*3/MM3 (ref 0.7–3.1)
LYMPHOCYTES NFR BLD AUTO: 28.3 % (ref 19.6–45.3)
MAGNESIUM SERPL-MCNC: 1.5 MG/DL (ref 1.8–2.5)
MCH RBC QN AUTO: 32 PG (ref 26.6–33)
MCHC RBC AUTO-ENTMCNC: 33.7 G/DL (ref 31.5–35.7)
MCV RBC AUTO: 94.8 FL (ref 79–97)
MONOCYTES # BLD AUTO: 0.42 10*3/MM3 (ref 0.1–0.9)
MONOCYTES NFR BLD AUTO: 9.1 % (ref 5–12)
NEUTROPHILS # BLD AUTO: 2.69 10*3/MM3 (ref 1.7–7)
NEUTROPHILS NFR BLD AUTO: 58.5 % (ref 42.7–76)
NRBC BLD AUTO-RTO: 0 /100 WBC (ref 0–0.2)
PLATELET # BLD AUTO: 234 10*3/MM3 (ref 140–450)
PMV BLD AUTO: 8.4 FL (ref 6–12)
POTASSIUM BLD-SCNC: 4.3 MMOL/L (ref 3.5–4.7)
PROT SERPL-MCNC: 7.8 G/DL (ref 6.3–8)
RBC # BLD AUTO: 3.66 10*6/MM3 (ref 3.77–5.28)
SODIUM BLD-SCNC: 140 MMOL/L (ref 134–145)
WBC NRBC COR # BLD: 4.6 10*3/MM3 (ref 3.4–10.8)

## 2019-12-03 PROCEDURE — 83735 ASSAY OF MAGNESIUM: CPT

## 2019-12-03 PROCEDURE — 80053 COMPREHEN METABOLIC PANEL: CPT

## 2019-12-03 PROCEDURE — 99214 OFFICE O/P EST MOD 30 MIN: CPT | Performed by: INTERNAL MEDICINE

## 2019-12-03 PROCEDURE — 85025 COMPLETE CBC W/AUTO DIFF WBC: CPT

## 2019-12-03 PROCEDURE — 36415 COLL VENOUS BLD VENIPUNCTURE: CPT

## 2019-12-03 RX ORDER — SENNOSIDES 8.6 MG
650 CAPSULE ORAL EVERY 8 HOURS PRN
Qty: 180 TABLET | Refills: 1 | Status: SHIPPED | OUTPATIENT
Start: 2019-12-03

## 2019-12-03 RX ORDER — MAGNESIUM OXIDE 400 MG/1
2 TABLET ORAL 2 TIMES DAILY
Refills: 3 | COMMUNITY
Start: 2019-10-10 | End: 2022-08-22 | Stop reason: SDUPTHER

## 2019-12-03 RX ORDER — LISINOPRIL 10 MG/1
10 TABLET ORAL DAILY
Refills: 2 | COMMUNITY
Start: 2019-11-04 | End: 2020-06-14

## 2019-12-03 RX ORDER — OMEPRAZOLE 20 MG/1
CAPSULE, DELAYED RELEASE ORAL
Refills: 2 | COMMUNITY
Start: 2019-09-20 | End: 2021-03-29

## 2019-12-03 NOTE — PROGRESS NOTES
DOCTOR #2 NOTE:    1. Metastatic breast cancer to the lungs, ER/OR positive, HER2/berkley positive, undergoing hormonal therapy with Arimidex since the middle of March 2009.   2. Response to Arimidex as evidenced by serial CT exams, including on 01/10/2012, showing no significant disease.   3. New left upper lobe pulmonary nodule noted, measuring 1.1 cm by CT scan on 07/05/2012. No other evidence of metastatic disease. Hormonal therapy switched to Faslodex 07/12/2012.   4. Osteoporosis documented by bone density scan from 10/17/2011 and also 02/21/2014. Patient was started on Prolia therapy every 6 months. Patient had tooth extraction in August 2015. Prolia has been on hold.   5. CT scans of chest, abdomen and pelvis on 04/07/2016 showed stable disease. Faslodex will be continued.   6. CT scan examination reported disease progress on 01/30/2017, patient was switched to Aromasin in early February 2017. However she was unable to start Afinitor due to cost.   7. Repeated bone density scan on 2/27/2017 reported worsening osteoporosis. Prolia was restarted back on 03/02/2017. Plan for Q6 month treatment.   8.  Repeated chest CT on 5/15/2017 showed progressive left upper lobe pulmonary nodule.  Patient had stereotactic radiation therapy in early June 2017.  Aromasin was continued.   9.  CT scan of the chest on 9/25/2017 reported post treatment changes in the left upper lobe.   10.  CT scan examination for chest abdomen and pelvis with IV contrast on 6/21/2018 reported no evidence of disease progression.        HISTORY OF PRESENT ILLNESS: Ms. Reese is a 74-year-old  female who has a history of type 2 diabetes, hypothyroidism, hyperlipidemia presenting today for 6-month followup, accompanied by her nephew who helped with history.       She reports at baseline condition, she retired.  She denies weight loss, no fever, no sweating, no headaches.  Her performance status is ECOG 0.  Patient had teeth extraction in February  2018.  She now has full-mouth denture.    Patient otherwise reports no dyspnea, no cough, hemoptysis.  She reports no fever or sweating.   She is on Aromasin with good tolerance. She denies arthralgia, hot flashes or sweating.  She denies bone pains.       Laboratory study today reported marginal anemia hemoglobin 11.7, otherwise normal CBC.  Chemistry lab reported glucose 156 otherwise unremarkable.       PAST MEDICAL HISTORY:    1.  Left breast cancer, stage I, ER and MA positive, status post mastectomy, chemotherapy, and tamoxifen for five years--finished in 2001.    2.  Hypothyroidism diagnosed in 01/2009.    3.  Hyperlipidemia.    4.  Diabetes, type 2.    5.  Osteoporosis, evidenced on bone density scans in October 2011 and February 2014. The patient stopped Fosamax because of dental problem suspected to be related to bisphosphonate.   6.  Teeth extraction in February 2018.      SURGICAL HISTORY: Modified left radical mastectomy and previous breast biopsy in 1995.       ONCOLOGIC/HEMATOLOGIC HISTORY: History from previous dates can be found in the separate document.       CT scan on 04/07/2016 showed stable disease with the small left upper lobe pulmonary nodule, no change compared to previous imaging studies, no evidence of metastatic disease in the abdomen or pelvis or bony structure.       Unfortunately her CT scan on 01/30/2017 showed slight disease progress in the left upper lobe of the lung, by 3 mm, up to 1.4 centimeters. No evidence of new metastatic disease or bone metastases.       Patient was seen on February 7, 2017. We'll propose switch therapy to Aromasin plus Afinitor.  however she could not afford Afinitor because the cost. Our pharmacy staff tried to apply for premedication, but was not successful. So she is only on Aromasin with good tolerance.      Repeated a CT of the chest on 5/15/2017 showed progressive left upper lobe pulmonary nodule.  Patient had stereotactic radiation therapy in early  2017.  Aromasin was continued.     CT scan examination reported no active disease on 6/10/2019.  Had a reasonable iron studies including ferritin 188 ng/mL, free iron 75 TIBC 410 and iron saturation 18%.  And also improve the magnesium level 1.8.  Mild anemic with hemoglobin 11.5 but normal platelets 253,000 and WBC 4020.  Chemistry lab reported normal CMP except elevated glucose 201.         MEDICATIONS: The current medication list was reviewed with the patient and updated in the EMR this date per the medical assistant. Medication dosages and frequencies were confirmed to be accurate.       ALLERGIES: LETROZOLE (questionably causing skin rash).       SOCIAL HISTORY: The patient is . She finished college education. 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 No alcohol use. No illegal drug use. No risks for HIV.       FAMILY HISTORY: Her mother  of tuberculosis at the age of 48. A sister had thyroid cancer/lung cancer - no details are available. Two brothers had lung cancer and bone cancers in their 70s - no details available. Her maternal grandmother and her mother both had diabetes. No family history of hypertension or heart disease.       REVIEW OF SYSTEMS:   PAIN: See VITAL SIGNS below.   GENERAL: No change in appetite or weight, no fevers, chills or sweats.   SKIN: Diffuse skin rashes on her neck, upper chest, arms.    HEME/LYMPH: No anemia, easy bruising, bleeding or swollen nodes.   EYES: No vision changes or diplopia.   ENT: No tinnitus, hearing loss, gum bleeding, epistaxis , hoarseness or dysphagia.   RESPIRATORY: No cough, shortness of breath, hemoptysis or wheezing.   CVS:no chest pain, palpitations, orthopnea, dyspnea on exertion.   GI: No abdominal pain, nausea, vomiting, constipation, diarrhea, melena or hematochezia.   : No dysuria or hematuria. No abnormal vaginal discharge or bleeding.   MUSCULOSKELETAL: has mild joint achiness.   NEUROLOGICAL: No dizziness, global  "weakness, loss of consciousness or seizures.   PSYCHIATRIC: No increased nervousness, mood changes or depression.    No change of review of system on 12/3/2019.      VITAL SIGNS:   Vitals:    19 0846   BP: 116/72   Pulse: 75   Resp: 14   Temp: 98.2 °F (36.8 °C)   SpO2: 99%   Weight: 49.6 kg (109 lb 6.4 oz)   Height: 155 cm (61.02\")  Comment: new ht.   PainSc:   9   PainLoc: Comment: left breast     ECO         PHYSICAL EXAMINATION:     GENERAL:  Well-developed, well-nourished  female in no acute distress.   SKIN:  Warm, dry without rashes, purpura or petechiae.  HEAD:  Normocephalic.  EYES:  Pupils equal, round and reactive to light.   Conjunctivae normal.  EARS:  Hearing intact.  NOSE:  No nasal discharge.  MOUTH:  Tongue is well-papillated; no stomatitis or ulcers.  Lips normal.  THROAT:  Oropharynx without lesions or exudates.  NECK:  Supple with good range of motion; no thyromegaly or masses.  LYMPHATICS:  No cervical, supraclavicular, axillary adenopathy.  CHEST:  Lungs clear to auscultation. Good airflow.  Normal respiratory effort.  CARDIAC:  Regular rate and rhythm without murmurs, rubs or gallops. Normal S1,S2.  ABDOMEN:  Soft, nontender with no organomegaly or masses.  Bowel sounds normal.  EXTREMITIES:  No clubbing, cyanosis or edema.  NEUROLOGICAL:  Cranial Nerves II-XII grossly intact.  No focal neurological deficits.   PSYCHIATRIC:  Normal affect and mood.          LABORATORY DATA:     Lab Results   Component Value Date    WBC 4.60 2019    HGB 11.7 (L) 2019    HCT 34.7 2019    MCV 94.8 2019     2019     Lab Results   Component Value Date    NEUTROABS 2.69 2019     Lab Results   Component Value Date    GLUCOSE 156 (H) 2019    BUN 16 2019    CREATININE 0.86 2019    EGFRIFNONA 65 2019    EGFRIFAFRI 78 2019    BCR 18.6 2019    K 4.3 2019    CO2 28.3 2019    CALCIUM 9.8 2019    ALBUMIN 4.30 " 12/03/2019    AST 15 12/03/2019    ALT 16 12/03/2019     Sodium   Date Value Ref Range Status   12/03/2019 140 134 - 145 mmol/L Final     Potassium   Date Value Ref Range Status   12/03/2019 4.3 3.5 - 4.7 mmol/L Final     Total Bilirubin   Date Value Ref Range Status   12/03/2019 0.4 0.2 - 1.2 mg/dL Final     Alkaline Phosphatase   Date Value Ref Range Status   12/03/2019 109 38 - 116 U/L Final   ]    IMAGE STUDY: No images studies in 6/10/2019.        ASSESSMENT:   1. Metastatic breast cancer with metastasis in the lungs, biopsy confirmed. ER/[place] positive. Her2 positive.  she had good response to Arimidex for many years.  However since early 2017, CT scan showed disease progression with a solitary lung metastases.  We switched her to Aromasin in early February 2017, however she could not obtain Afinitor, because of the cost issue.      We obtained a chest CT scan on 5/15/2017 which showed further disease progression.  Patient was treated with stereotactic radiation therapy in early June 2017.  We obtained CT scan twice on 9/25/2017 and on 6/21/2018 which showed post radiation therapy changes.      We obtained CT scan examination again on 6/10/2019 which showed no evidence of active disease.      Patient reports she is at baseline condition.  Physical examination today is no evidence for palpable disease.  She continues to tolerate endocrine therapy with Aromasin.  I recommended to obtain CT scan examination in 6 months for reevaluation of her metastatic breast cancer, which would be about 12 months from her previous scans.         2. Osteoporosis. She had bone density scan on 2/27/2017 which showed statistically significant worsening osteoporosis in the left hip.  We restarted her back on Prolia on 03/02/17 and repeat every 6 months.     Her last dose of Prolia was in June 2018.  Patient reported that she had pain involving the left lower jaw after her teeth extraction in February 2018.  We concerned about  possibility of necrosis of the jaw bone, so I recommended to discontinue prolia.      The patient will continue oral calcium and vitamin D supplementation.     3. Mild anemia,with mild iron deficiency.  Patient has been taking oral iron supplementation with good results, laboratory study showed improved with ferritin and iron saturation, and normalized hemoglobin.     Her iron study on 6/10/2019 reported excellent ferritin 188, and the marginal iron saturation 18%.     Patient was instructed to continue oral iron supplementation and oral vitamin B12 supplementation.  We will repeat iron studies in 6 months.      4.  Hypomagnesemia.  She was started on oral magnesium oxide.  Repeat labs showed improved and marginally normal magnesium level 1.8 on 6/10/2019.  I asked patient to continue oral magnesium oxide.       PLAN:       1. Continue Aromasin 25 mg daily.   2. Continue oral ferrous sulfate 325 mg once a day.     3.  Continue oral magnesium oxide 400 mg twice a day.    4.  Obtain CT scan for chest abdomen pelvis with IV contrast together with laboratory studies CBC CMP, iron studies and magnesium level in 6 months, 1 week prior to MD visit.     His nephew helped with history today.        LETA MAY M.D., Ph.D.   12/3/2019          CC: Sussy Payne M.D.

## 2019-12-06 ENCOUNTER — TELEPHONE (OUTPATIENT)
Dept: ONCOLOGY | Facility: HOSPITAL | Age: 74
End: 2019-12-06

## 2019-12-06 NOTE — TELEPHONE ENCOUNTER
LVM for patient to call us back and inbasket sent to Dr Castillo to let him know this is already on patient's med list. We will confirm with patient when she returns call. Note printed and left in triage to follow up on.     ----- Message from Carlos Castillo MD PhD sent at 12/5/2019 10:13 PM EST -----  Please call patient, and prescribe oral magnesium oxide 400 mg twice a day.     Thank you very much!     WJZ     ----- Message -----  From: Lab, Background User  Sent: 12/3/2019   8:43 AM  To: Carlos Castillo MD PhD

## 2019-12-10 ENCOUNTER — TELEPHONE (OUTPATIENT)
Dept: ONCOLOGY | Facility: HOSPITAL | Age: 74
End: 2019-12-10

## 2019-12-10 RX ORDER — LANOLIN ALCOHOL/MO/W.PET/CERES
400 CREAM (GRAM) TOPICAL 2 TIMES DAILY
Qty: 60 TABLET | Refills: 1 | Status: SHIPPED | OUTPATIENT
Start: 2019-12-10 | End: 2020-02-27 | Stop reason: SDUPTHER

## 2019-12-10 NOTE — TELEPHONE ENCOUNTER
Called pt and informed her of her low magnesium level. Advised her to take oral magnesium 400mg BID. She v/u. Escribed this to pt's pharmacy.

## 2020-01-13 ENCOUNTER — HOSPITAL ENCOUNTER (OUTPATIENT)
Dept: GENERAL RADIOLOGY | Facility: HOSPITAL | Age: 75
Discharge: HOME | End: 2020-01-13
Admitting: INTERNAL MEDICINE

## 2020-01-13 DIAGNOSIS — R05.9 COUGH: ICD-10-CM

## 2020-01-13 PROCEDURE — 71046 X-RAY EXAM CHEST 2 VIEWS: CPT

## 2020-02-27 ENCOUNTER — APPOINTMENT (OUTPATIENT)
Dept: GENERAL RADIOLOGY | Facility: HOSPITAL | Age: 75
End: 2020-02-27

## 2020-02-27 ENCOUNTER — HOSPITAL ENCOUNTER (EMERGENCY)
Facility: HOSPITAL | Age: 75
Discharge: HOME | End: 2020-02-28
Attending: EMERGENCY MEDICINE | Admitting: EMERGENCY MEDICINE

## 2020-02-27 DIAGNOSIS — R05.9 COUGH: Primary | ICD-10-CM

## 2020-02-27 LAB

## 2020-02-27 PROCEDURE — 0100U HC BIOFIRE FILMARRAY RESP PANEL 2: CPT | Performed by: NURSE PRACTITIONER

## 2020-02-27 PROCEDURE — 99284 EMERGENCY DEPT VISIT MOD MDM: CPT

## 2020-02-27 PROCEDURE — 71046 X-RAY EXAM CHEST 2 VIEWS: CPT

## 2020-02-27 RX ORDER — PROMETHAZINE HYDROCHLORIDE AND CODEINE PHOSPHATE 6.25; 1 MG/5ML; MG/5ML
5 SYRUP ORAL ONCE
Status: COMPLETED | OUTPATIENT
Start: 2020-02-27 | End: 2020-02-28

## 2020-02-27 RX ORDER — PROMETHAZINE HYDROCHLORIDE, PHENYLEPHRINE HYDROCHLORIDE AND CODEINE PHOSPHATE 6.25; 5; 1 MG/5ML; MG/5ML; MG/5ML
5 SOLUTION ORAL EVERY 4 HOURS PRN
Qty: 118 ML | Refills: 0 | Status: SHIPPED | OUTPATIENT
Start: 2020-02-27 | End: 2020-06-14 | Stop reason: SDUPTHER

## 2020-02-28 VITALS
OXYGEN SATURATION: 100 % | RESPIRATION RATE: 18 BRPM | HEART RATE: 77 BPM | DIASTOLIC BLOOD PRESSURE: 71 MMHG | TEMPERATURE: 96.9 F | WEIGHT: 117 LBS | HEIGHT: 61 IN | BODY MASS INDEX: 22.09 KG/M2 | SYSTOLIC BLOOD PRESSURE: 134 MMHG

## 2020-02-28 RX ADMIN — PROMETHAZINE HYDROCHLORIDE AND CODEINE PHOSPHATE 5 ML: 6.25; 1 SOLUTION ORAL at 00:26

## 2020-05-26 ENCOUNTER — HOSPITAL ENCOUNTER (OUTPATIENT)
Dept: PET IMAGING | Facility: HOSPITAL | Age: 75
End: 2020-05-26

## 2020-05-26 RX ORDER — EXEMESTANE 25 MG/1
TABLET ORAL
Qty: 90 TABLET | Refills: 1 | Status: SHIPPED | OUTPATIENT
Start: 2020-05-26 | End: 2020-12-08 | Stop reason: SDUPTHER

## 2020-06-03 ENCOUNTER — HOSPITAL ENCOUNTER (OUTPATIENT)
Dept: PET IMAGING | Facility: HOSPITAL | Age: 75
Discharge: HOME | End: 2020-06-03
Admitting: INTERNAL MEDICINE

## 2020-06-03 ENCOUNTER — LAB (OUTPATIENT)
Dept: LAB | Facility: HOSPITAL | Age: 75
End: 2020-06-03

## 2020-06-03 DIAGNOSIS — C50.112 MALIGNANT NEOPLASM OF CENTRAL PORTION OF LEFT BREAST IN FEMALE, ESTROGEN RECEPTOR POSITIVE (HCC): ICD-10-CM

## 2020-06-03 DIAGNOSIS — D50.9 IRON DEFICIENCY ANEMIA, UNSPECIFIED IRON DEFICIENCY ANEMIA TYPE: ICD-10-CM

## 2020-06-03 DIAGNOSIS — Z17.0 MALIGNANT NEOPLASM OF CENTRAL PORTION OF LEFT BREAST IN FEMALE, ESTROGEN RECEPTOR POSITIVE (HCC): ICD-10-CM

## 2020-06-03 LAB
ALBUMIN SERPL-MCNC: 4.4 G/DL (ref 3.5–5.2)
ALBUMIN/GLOB SERPL: 1.2 G/DL (ref 1.1–2.4)
ALP SERPL-CCNC: 116 U/L (ref 38–116)
ALT SERPL W P-5'-P-CCNC: 19 U/L (ref 0–33)
ANION GAP SERPL CALCULATED.3IONS-SCNC: 12.3 MMOL/L (ref 5–15)
AST SERPL-CCNC: 21 U/L (ref 0–32)
BASOPHILS # BLD AUTO: 0.04 10*3/MM3 (ref 0–0.2)
BASOPHILS NFR BLD AUTO: 0.9 % (ref 0–1.5)
BILIRUB SERPL-MCNC: 0.3 MG/DL (ref 0.2–1.2)
BUN BLD-MCNC: 26 MG/DL (ref 6–20)
BUN/CREAT SERPL: 29.5 (ref 7.3–30)
CALCIUM SPEC-SCNC: 9.7 MG/DL (ref 8.5–10.2)
CHLORIDE SERPL-SCNC: 100 MMOL/L (ref 98–107)
CO2 SERPL-SCNC: 28.7 MMOL/L (ref 22–29)
CREAT BLD-MCNC: 0.88 MG/DL (ref 0.6–1.1)
CREAT BLDA-MCNC: 1 MG/DL (ref 0.6–1.3)
DEPRECATED RDW RBC AUTO: 43.5 FL (ref 37–54)
EOSINOPHIL # BLD AUTO: 0.61 10*3/MM3 (ref 0–0.4)
EOSINOPHIL NFR BLD AUTO: 13 % (ref 0.3–6.2)
ERYTHROCYTE [DISTWIDTH] IN BLOOD BY AUTOMATED COUNT: 12.1 % (ref 12.3–15.4)
FERRITIN SERPL-MCNC: 210.9 NG/ML (ref 13–150)
GFR SERPL CREATININE-BSD FRML MDRD: 63 ML/MIN/1.73
GFR SERPL CREATININE-BSD FRML MDRD: 76 ML/MIN/1.73
GLOBULIN UR ELPH-MCNC: 3.7 GM/DL (ref 1.8–3.5)
GLUCOSE BLD-MCNC: 166 MG/DL (ref 74–124)
HCT VFR BLD AUTO: 36 % (ref 34–46.6)
HGB BLD-MCNC: 11.7 G/DL (ref 12–15.9)
IMM GRANULOCYTES # BLD AUTO: 0.01 10*3/MM3 (ref 0–0.05)
IMM GRANULOCYTES NFR BLD AUTO: 0.2 % (ref 0–0.5)
IRON 24H UR-MRATE: 94 MCG/DL (ref 37–145)
IRON SATN MFR SERPL: 24 % (ref 14–48)
LYMPHOCYTES # BLD AUTO: 1.38 10*3/MM3 (ref 0.7–3.1)
LYMPHOCYTES NFR BLD AUTO: 29.5 % (ref 19.6–45.3)
MCH RBC QN AUTO: 31.8 PG (ref 26.6–33)
MCHC RBC AUTO-ENTMCNC: 32.5 G/DL (ref 31.5–35.7)
MCV RBC AUTO: 97.8 FL (ref 79–97)
MONOCYTES # BLD AUTO: 0.49 10*3/MM3 (ref 0.1–0.9)
MONOCYTES NFR BLD AUTO: 10.5 % (ref 5–12)
NEUTROPHILS # BLD AUTO: 2.15 10*3/MM3 (ref 1.7–7)
NEUTROPHILS NFR BLD AUTO: 45.9 % (ref 42.7–76)
NRBC BLD AUTO-RTO: 0 /100 WBC (ref 0–0.2)
PLATELET # BLD AUTO: 260 10*3/MM3 (ref 140–450)
PMV BLD AUTO: 8.7 FL (ref 6–12)
POTASSIUM BLD-SCNC: 4 MMOL/L (ref 3.5–4.7)
PROT SERPL-MCNC: 8.1 G/DL (ref 6.3–8)
RBC # BLD AUTO: 3.68 10*6/MM3 (ref 3.77–5.28)
SODIUM BLD-SCNC: 141 MMOL/L (ref 134–145)
TIBC SERPL-MCNC: 395 MCG/DL (ref 249–505)
TRANSFERRIN SERPL-MCNC: 282 MG/DL (ref 200–360)
WBC NRBC COR # BLD: 4.68 10*3/MM3 (ref 3.4–10.8)

## 2020-06-03 PROCEDURE — 71260 CT THORAX DX C+: CPT

## 2020-06-03 PROCEDURE — 84466 ASSAY OF TRANSFERRIN: CPT

## 2020-06-03 PROCEDURE — 80053 COMPREHEN METABOLIC PANEL: CPT

## 2020-06-03 PROCEDURE — 0 DIATRIZOATE MEGLUMINE & SODIUM PER 1 ML: Performed by: INTERNAL MEDICINE

## 2020-06-03 PROCEDURE — 85025 COMPLETE CBC W/AUTO DIFF WBC: CPT

## 2020-06-03 PROCEDURE — 83540 ASSAY OF IRON: CPT

## 2020-06-03 PROCEDURE — 82565 ASSAY OF CREATININE: CPT

## 2020-06-03 PROCEDURE — 82728 ASSAY OF FERRITIN: CPT

## 2020-06-03 PROCEDURE — 36415 COLL VENOUS BLD VENIPUNCTURE: CPT

## 2020-06-03 PROCEDURE — 74177 CT ABD & PELVIS W/CONTRAST: CPT

## 2020-06-03 PROCEDURE — 25010000002 IOPAMIDOL 61 % SOLUTION: Performed by: INTERNAL MEDICINE

## 2020-06-03 RX ADMIN — DIATRIZOATE MEGLUMINE AND DIATRIZOATE SODIUM 30 ML: 660; 100 LIQUID ORAL; RECTAL at 07:30

## 2020-06-03 RX ADMIN — IOPAMIDOL 85 ML: 612 INJECTION, SOLUTION INTRAVENOUS at 08:20

## 2020-06-09 ENCOUNTER — OFFICE VISIT (OUTPATIENT)
Dept: ONCOLOGY | Facility: CLINIC | Age: 75
End: 2020-06-09

## 2020-06-09 ENCOUNTER — APPOINTMENT (OUTPATIENT)
Dept: LAB | Facility: HOSPITAL | Age: 75
End: 2020-06-09

## 2020-06-09 VITALS
DIASTOLIC BLOOD PRESSURE: 75 MMHG | HEIGHT: 61 IN | WEIGHT: 119.6 LBS | RESPIRATION RATE: 14 BRPM | BODY MASS INDEX: 22.58 KG/M2 | TEMPERATURE: 98.4 F | OXYGEN SATURATION: 97 % | SYSTOLIC BLOOD PRESSURE: 147 MMHG | HEART RATE: 77 BPM

## 2020-06-09 DIAGNOSIS — C78.02 MALIGNANT NEOPLASM METASTATIC TO LEFT LUNG (HCC): ICD-10-CM

## 2020-06-09 DIAGNOSIS — M81.6 LOCALIZED OSTEOPOROSIS WITHOUT CURRENT PATHOLOGICAL FRACTURE: ICD-10-CM

## 2020-06-09 DIAGNOSIS — E83.42 HYPOMAGNESEMIA: ICD-10-CM

## 2020-06-09 DIAGNOSIS — Z17.0 MALIGNANT NEOPLASM OF CENTRAL PORTION OF LEFT BREAST IN FEMALE, ESTROGEN RECEPTOR POSITIVE (HCC): Primary | ICD-10-CM

## 2020-06-09 DIAGNOSIS — C50.112 MALIGNANT NEOPLASM OF CENTRAL PORTION OF LEFT BREAST IN FEMALE, ESTROGEN RECEPTOR POSITIVE (HCC): Primary | ICD-10-CM

## 2020-06-09 DIAGNOSIS — D50.9 IRON DEFICIENCY ANEMIA, UNSPECIFIED IRON DEFICIENCY ANEMIA TYPE: ICD-10-CM

## 2020-06-09 PROCEDURE — 99214 OFFICE O/P EST MOD 30 MIN: CPT | Performed by: INTERNAL MEDICINE

## 2020-06-09 NOTE — PROGRESS NOTES
REASON FOR FOLLOW-UP:    1. Metastatic breast cancer to the lungs, ER/DC positive, HER2/berkley positive, undergoing hormonal therapy with Arimidex since the middle of March 2009.   2. Response to Arimidex as evidenced by serial CT exams, including on 01/10/2012, showing no significant disease.   3. New left upper lobe pulmonary nodule noted, measuring 1.1 cm by CT scan on 07/05/2012. No other evidence of metastatic disease. Hormonal therapy switched to Faslodex 07/12/2012.   4. Osteoporosis documented by bone density scan from 10/17/2011 and also 02/21/2014. Patient was started on Prolia therapy every 6 months. Patient had tooth extraction in August 2015. Prolia has been on hold.   5. CT scans of chest, abdomen and pelvis on 04/07/2016 showed stable disease. Faslodex will be continued.   6. CT scan examination reported disease progress on 01/30/2017, patient was switched to Aromasin in early February 2017. However she was unable to start Afinitor due to cost.   7. Repeated bone density scan on 2/27/2017 reported worsening osteoporosis. Prolia was restarted back on 03/02/2017. Plan for Q6 month treatment.   8.  Repeated chest CT on 5/15/2017 showed progressive left upper lobe pulmonary nodule.  Patient had stereotactic radiation therapy in early June 2017.  Aromasin was continued.   9.  CT scan of the chest on 9/25/2017 reported post treatment changes in the left upper lobe.   10.  CT scan examination for chest abdomen and pelvis with IV contrast on 6/21/2018 reported no evidence of disease progression.    11.  CT abdomen/chest/pelvis from 6/3/2020 showed no acute polyp process or suspicious pulmonary lesion has developed.      HISTORY OF PRESENT ILLNESS: Ms. Reese is a 74 y.o.  female who has a history of type 2 diabetes, hypothyroidism, hyperlipidemia presenting today for 6-month followup for her metastatic breast cancer to discuss the results of CT scan examination and laboratory studies obtained on 6/3/2020.       Patient is taking Exemestane as prescribed, as well as magnesium and Vitamin D.    Patient did have a cough in the office and she stated that she went to the ED to be tested for COVID-19 but the test was negative. She also denies leaving the country recently she has not left for the [facility] for a period of time now.     Patient denies abdominal pain, bone pain, edema in the legs.  She has excellent performance that is ECOG 0.      Her CT scan for chest abdomen and pelvis with IV contrast on 6/3/2020 reported no evidence of worsening metastatic disease.      Laboratory studies on 6/3/2020 reported stable mild anemia with Hb 11.7, with MCV 97.8, normal platelets 260,000 and a WBC 4680.  Iron study reported ferritin 210, saturation 24%.  Unremarkable CMP with normal glucose, electrolytes and liver function panel and renal function.        PAST MEDICAL HISTORY:    1.  Left breast cancer, stage I, ER and [place] positive, status post mastectomy, chemotherapy, and tamoxifen for five years--finished in 2001.    2.  Hypothyroidism diagnosed in 01/2009.    3.  Hyperlipidemia.    4.  Diabetes, type 2.    5.  Osteoporosis, evidenced on bone density scans in October 2011 and February 2014. The patient stopped Fosamax because of dental problem suspected to be related to bisphosphonate.   6.  Teeth extraction in February 2018.      SURGICAL HISTORY: Modified left radical mastectomy and previous breast biopsy in 1995.       ONCOLOGIC/HEMATOLOGIC HISTORY: History from previous dates can be found in the separate document.       CT scan on 04/07/2016 showed stable disease with the small left upper lobe pulmonary nodule, no change compared to previous imaging studies, no evidence of metastatic disease in the abdomen or pelvis or bony structure.       Unfortunately her CT scan on 01/30/2017 showed slight disease progress in the left upper lobe of the lung, by 3 mm, up to 1.4 centimeters. No evidence of new metastatic disease or bone  metastases.       Patient was seen on February 7, 2017. We'll propose switch therapy to Aromasin plus Afinitor.  however she could not afford Afinitor because the cost. Our pharmacy staff tried to apply for premedication, but was not successful. So she is only on Aromasin with good tolerance.      Repeated a CT of the chest on 5/15/2017 showed progressive left upper lobe pulmonary nodule.  Patient had stereotactic radiation therapy in early June 2017.  Aromasin was continued.     CT scan examination reported no active disease on 6/10/2019.  Had a reasonable iron studies including ferritin 188 ng/mL, free iron 75 TIBC 410 and iron saturation 18%.  And also improve the magnesium level 1.8.  Mild anemic with hemoglobin 11.5 but normal platelets 253,000 and WBC 4020.  Chemistry lab reported normal CMP except elevated glucose which was at 201.     Patient presented on 12/3/2019 for 6-month followup.  She reports at baseline condition, she retired.  She denies weight loss, no fever, no sweating, no headaches.  Her performance status is ECOG 0.  Patient had teeth extraction in February 2018.  She now has full-mouth denture.    Patient otherwise reports no dyspnea, no cough, hemoptysis.  She reports no fever or sweating.   She is on Aromasin with good tolerance. She denies arthralgia, hot flashes or sweating.  She denies bone pains.       Laboratory study on 12/3/2019 reported marginal anemia hemoglobin 11.7, otherwise normal CBC.  Chemistry lab reported magnesium 1.5, glucose 156 otherwise unremarkable.       MEDICATIONS: The current medication list was reviewed with the patient and updated in the EMR this date per the medical assistant. Medication dosages and frequencies were confirmed to be accurate.       ALLERGIES: LETROZOLE (questionably causing skin rash).       SOCIAL HISTORY: The patient is . She finished college education. 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 She has never smoked. No alcohol  "use. No illegal drug use. No risks for HIV.       FAMILY HISTORY: Her mother  of tuberculosis at the age of 48. A sister had thyroid cancer/lung cancer - no details are available. Two brothers had lung cancer and bone cancers in their 70s - no details available. Her maternal grandmother and her mother both had diabetes. No family history of hypertension or heart disease.       REVIEW OF SYSTEMS:   GENERAL: No change in appetite or weight, no fevers, chills or sweats.   SKIN: Diffuse skin rashes on her neck, upper chest, arms.    HEME/LYMPH: No anemia, easy bruising, bleeding or swollen nodes.   EYES: No vision changes or diplopia.   ENT: No tinnitus, hearing loss, gum bleeding, epistaxis , hoarseness or dysphagia.   RESPIRATORY: No cough, shortness of breath, hemoptysis or wheezing.   CVS:no chest pain, palpitations, orthopnea, dyspnea on exertion.   GI: No abdominal pain, nausea, vomiting, constipation, diarrhea, melena or hematochezia.   : No dysuria or hematuria. No abnormal vaginal discharge or bleeding.   MUSCULOSKELETAL: no joint pain or bone pain at this time.   NEUROLOGICAL: No dizziness, global weakness, loss of consciousness or seizures.   PSYCHIATRIC: No increased nervousness, mood changes or depression.       VITAL SIGNS:   Vitals:    20 0905   BP: 147/75   Pulse: 77   Resp: 14   Temp: 98.4 °F (36.9 °C)   SpO2: 97%   Weight: 54.3 kg (119 lb 9.6 oz)   Height: 154.9 cm (60.98\")   PainSc: 0-No pain     ECO      PHYSICAL EXAMINATION:     GENERAL:  Well-developed, well-nourished  female in no acute distress.   SKIN:  Warm, dry without rashes, purpura or petechiae.  HEAD:  Normocephalic.  EYES:  Pupils equal, round and reactive to light.  EOMs intact.  Conjunctivae normal.  EARS:  Hearing intact.  NOSE: Patient wears mask due to the pandemic coronavirus infection.   MOUTH: Same as above.  THROAT: Same as above.  NECK:  Supple; no thyromegaly or masses, no JVD.  LYMPHATICS:  No cervical, " supraclavicular, axillary adenopathy.  CHEST: Normal respiratory effort. Lungs clear to auscultation. Good airflow.  CARDIAC:  Regular rate and rhythm without murmurs, rubs or gallops. Normal S1,S2.  ABDOMEN:  Soft, nontender with no organomegaly or masses.  Bowel sounds normal.  EXTREMITIES:  No clubbing, cyanosis or edema.  NEUROLOGICAL:  Cranial Nerves II-XII grossly intact.  No focal neurological deficits.  PSYCHIATRIC:  Normal affect and mood.        LABORATORY DATA:     Component      Latest Ref Rng & Units 6/3/2020   WBC      3.40 - 10.80 10*3/mm3 4.68   RBC      3.77 - 5.28 10*6/mm3 3.68 (L)   Hemoglobin      12.0 - 15.9 g/dL 11.7 (L)   Hematocrit      34.0 - 46.6 % 36.0   MCV      79.0 - 97.0 fL 97.8 (H)   MCH      26.6 - 33.0 pg 31.8   MCHC      31.5 - 35.7 g/dL 32.5   RDW      12.3 - 15.4 % 12.1 (L)   RDW-SD      37.0 - 54.0 fl 43.5   MPV      6.0 - 12.0 fL 8.7   Platelets      140 - 450 10*3/mm3 260   Neutrophil Rel %      42.7 - 76.0 % 45.9   Lymphocyte Rel %      19.6 - 45.3 % 29.5   Monocyte Rel %      5.0 - 12.0 % 10.5   Eosinophil Rel %      0.3 - 6.2 % 13.0 (H)   Basophil Rel %      0.0 - 1.5 % 0.9   Immature Granulocyte Rel %      0.0 - 0.5 % 0.2   Neutrophils Absolute      1.70 - 7.00 10*3/mm3 2.15   Lymphocytes Absolute      0.70 - 3.10 10*3/mm3 1.38   Monocytes Absolute      0.10 - 0.90 10*3/mm3 0.49   Eosinophils Absolute      0.00 - 0.40 10*3/mm3 0.61 (H)   Basophils Absolute      0.00 - 0.20 10*3/mm3 0.04   Immature Grans, Absolute      0.00 - 0.05 10*3/mm3 0.01   nRBC      0.0 - 0.2 /100 WBC 0.0   Glucose      74 - 124 mg/dL 166 (H)   BUN      6 - 20 mg/dL 26 (H)   Creatinine      0.60 - 1.10 mg/dL 0.88   Sodium      134 - 145 mmol/L 141   Potassium      3.5 - 4.7 mmol/L 4.0   Chloride      98 - 107 mmol/L 100       RECENT IMAGING:  CT CHEST W CONTRAST-, CT ABDOMEN/PELVIS/CHEST W CONTRAST- 6/3/2020     Radiation dose reduction techniques were utilized, including automated  exposure control  and exposure modulation based on body size.     CLINICAL INFORMATION: Followup metastatic breast carcinoma to the lungs.  Previous mastectomy and chemotherapy.     COMPARISON: CT chest, abdomen and pelvis 06/10/2019.     FINDINGS: Mastectomy site on the left is stable and satisfactory in  appearance. Right breast parenchyma is satisfactory in appearance.  Typical axillary lymph nodes. No lytic or sclerotic bone lesion. Similar  to the previous examination there is an area of consolidation/fibrosis  extending from the left suprahilar region to the pleural surface of the  left upper lobe apex. This is near identical to the previous  examination. No pleural effusion, acute airspace disease or pulmonary  mass has developed. Cardiac size stable, no pericardial abnormality.  Esophagus is satisfactory in course and caliber. No mediastinal or hilar  mass/lymphadenopathy. Diameter of thoracic aorta is within normal  limits.     The liver is satisfactory in appearance. The gallbladder is normal, no  biliary duct dilatation or pancreatic abnormality. Spleen is small in  size, stable, both adrenal glands have a typical appearance.     There are 3 tiny nonobstructing right renal calculi the largest of these  3 mm. There are 2 small right-sided lower pole renal cyst the largest of  these 6 mm. There is 5 mm cyst arising from the lower pole of the left  kidney and a nonobstructing 1-2 mm lower pole calculus. The renal  cortical pattern of enhancement is symmetric and satisfactory, no  suspicious renal mass.     Diameter of the aorta is within normal limits. Urinary bladder is  collapsed. No intraluminal filling defects seen. The uterus is  retroverted, size appropriate. No adnexal abnormality. No large or small  bowel abnormality seen. The stomach is satisfactory in appearance.     CONCLUSION: Stable imaging of the chest, there is again within the left  upper lobe and area consolidation/fibrosis similar to the  previous  examination. No acute polyp process or suspicious pulmonary lesion has  developed. There are bilateral small renal cysts and nonobstructing  renal calculi, no indication of abdominal/pelvic metastatic disease.    ASSESSMENT:   1. Metastatic breast cancer with metastasis in the lungs, biopsy confirmed. ER/PA positive. Her2 positive.    · She had good response to Arimidex for many years.  However since early 2017, CT scan showed disease progression with a solitary lung metastases.  We switched her to Aromasin in early February 2017, however she could not obtain Afinitor, because of the cost issue.     · We obtained a chest CT scan on 5/15/2017 which showed further disease progression.  Patient was treated with stereotactic radiation therapy in early June 2017.    · We obtained CT scan twice on 9/25/2017 and on 6/21/2018 which showed post radiation therapy changes.    · We obtained CT scan examination again on 6/10/2019 which showed no evidence of active disease.    · Patient reports she is at baseline condition.  Physical examination is no evidence for palpable disease.  She continues to tolerate endocrine therapy with Aromasin as of 12/3/2020.    · CT scan examination on 6/3/2020 showed no evidence of disease progression.  Aromasin will be continued.     2. Osteoporosis. She had bone density scan on 2/27/2017 which showed statistically significant worsening osteoporosis in the left hip.  We restarted her back on Prolia on 03/02/17 and repeat every 6 months.   · Her last dose of Prolia was in June 2018.  Patient reported that she had pain involving the left lower jaw after her teeth extraction in February 2018.  We concerned about possibility of necrosis of the jaw bone, so I recommended to discontinue prolia.    · The patient will continue oral calcium and vitamin D supplementation.     3. Mild anemia,with mild iron deficiency.    · Patient has been taking oral iron supplementation with good results,  laboratory study showed improved with ferritin and iron saturation, and normalized hemoglobin.    · Her iron study on 6/10/2019 reported excellent ferritin 188, and the marginal iron saturation 18%.   · Iron study on 6/3/2020 reported ferritin 210 and iron saturation 24%.  Patient was instructed to continue oral iron supplementation and oral vitamin B12 supplementation.  We will repeat iron studies in 6 months.      4.  Hypomagnesemia.    · She was started on oral magnesium oxide.  Repeat labs showed improved and marginally normal magnesium level 1.8 on 6/10/2019.    · Recurrent hypomagnesemia 1.5 on 12/3/2019.  I asked patient to continue oral magnesium oxide.    PLAN:   1. Continue Aromasin 25 mg daily.   2. Continue oral ferrous sulfate 325 mg once a day.     3.  Continue oral magnesium oxide 400 mg twice a day.    4.  Follow-up with me in 6 months with laboratory study including CBC CMP, ferritin, iron profile and magnesium check.    I reviewed the results of CT scan images with the patient and her family member.  Questions were answered to their satisfaction.     By signing my name here, I Justin Presley, attest that all documentation on 06/09/20 has been prepared under the direction and in the presence of Dr. Leta May MD.      I reviewed the note scribed by Justin Presley and made appropriate corrections.      LETA MAY M.D., Ph.D.          CC: Sussy Payne M.D.

## 2020-06-14 ENCOUNTER — HOSPITAL ENCOUNTER (EMERGENCY)
Facility: HOSPITAL | Age: 75
Discharge: HOME | End: 2020-06-14
Attending: EMERGENCY MEDICINE | Admitting: EMERGENCY MEDICINE

## 2020-06-14 ENCOUNTER — APPOINTMENT (OUTPATIENT)
Dept: GENERAL RADIOLOGY | Facility: HOSPITAL | Age: 75
End: 2020-06-14

## 2020-06-14 ENCOUNTER — APPOINTMENT (OUTPATIENT)
Dept: CT IMAGING | Facility: HOSPITAL | Age: 75
End: 2020-06-14

## 2020-06-14 VITALS
HEIGHT: 60 IN | WEIGHT: 120 LBS | BODY MASS INDEX: 23.56 KG/M2 | HEART RATE: 72 BPM | RESPIRATION RATE: 16 BRPM | SYSTOLIC BLOOD PRESSURE: 123 MMHG | TEMPERATURE: 98.2 F | DIASTOLIC BLOOD PRESSURE: 68 MMHG | OXYGEN SATURATION: 98 %

## 2020-06-14 DIAGNOSIS — C50.919 METASTATIC BREAST CANCER: ICD-10-CM

## 2020-06-14 DIAGNOSIS — R05.9 COUGH: ICD-10-CM

## 2020-06-14 DIAGNOSIS — R05.3 CHRONIC COUGH: Primary | ICD-10-CM

## 2020-06-14 LAB
ANION GAP SERPL CALCULATED.3IONS-SCNC: 11.8 MMOL/L (ref 5–15)
BASOPHILS # BLD AUTO: 0.04 10*3/MM3 (ref 0–0.2)
BASOPHILS NFR BLD AUTO: 0.7 % (ref 0–1.5)
BUN BLD-MCNC: 15 MG/DL (ref 8–23)
BUN/CREAT SERPL: 13.6 (ref 7–25)
CALCIUM SPEC-SCNC: 8.7 MG/DL (ref 8.6–10.5)
CHLORIDE SERPL-SCNC: 102 MMOL/L (ref 98–107)
CO2 SERPL-SCNC: 25.2 MMOL/L (ref 22–29)
CREAT BLD-MCNC: 1.1 MG/DL (ref 0.57–1)
DEPRECATED RDW RBC AUTO: 44 FL (ref 37–54)
EOSINOPHIL # BLD AUTO: 0.28 10*3/MM3 (ref 0–0.4)
EOSINOPHIL NFR BLD AUTO: 5 % (ref 0.3–6.2)
ERYTHROCYTE [DISTWIDTH] IN BLOOD BY AUTOMATED COUNT: 12.3 % (ref 12.3–15.4)
GFR SERPL CREATININE-BSD FRML MDRD: 49 ML/MIN/1.73
GFR SERPL CREATININE-BSD FRML MDRD: 59 ML/MIN/1.73
GLUCOSE BLD-MCNC: 256 MG/DL (ref 65–99)
HCT VFR BLD AUTO: 32.7 % (ref 34–46.6)
HGB BLD-MCNC: 10.9 G/DL (ref 12–15.9)
IMM GRANULOCYTES # BLD AUTO: 0.02 10*3/MM3 (ref 0–0.05)
IMM GRANULOCYTES NFR BLD AUTO: 0.4 % (ref 0–0.5)
LYMPHOCYTES # BLD AUTO: 1.32 10*3/MM3 (ref 0.7–3.1)
LYMPHOCYTES NFR BLD AUTO: 23.4 % (ref 19.6–45.3)
MCH RBC QN AUTO: 32 PG (ref 26.6–33)
MCHC RBC AUTO-ENTMCNC: 33.3 G/DL (ref 31.5–35.7)
MCV RBC AUTO: 95.9 FL (ref 79–97)
MONOCYTES # BLD AUTO: 0.49 10*3/MM3 (ref 0.1–0.9)
MONOCYTES NFR BLD AUTO: 8.7 % (ref 5–12)
NEUTROPHILS # BLD AUTO: 3.49 10*3/MM3 (ref 1.7–7)
NEUTROPHILS NFR BLD AUTO: 61.8 % (ref 42.7–76)
NRBC BLD AUTO-RTO: 0 /100 WBC (ref 0–0.2)
PLATELET # BLD AUTO: 223 10*3/MM3 (ref 140–450)
PMV BLD AUTO: 8.3 FL (ref 6–12)
POTASSIUM BLD-SCNC: 4.4 MMOL/L (ref 3.5–5.2)
RBC # BLD AUTO: 3.41 10*6/MM3 (ref 3.77–5.28)
SODIUM BLD-SCNC: 139 MMOL/L (ref 136–145)
WBC NRBC COR # BLD: 5.64 10*3/MM3 (ref 3.4–10.8)

## 2020-06-14 PROCEDURE — 80048 BASIC METABOLIC PNL TOTAL CA: CPT | Performed by: EMERGENCY MEDICINE

## 2020-06-14 PROCEDURE — 71045 X-RAY EXAM CHEST 1 VIEW: CPT

## 2020-06-14 PROCEDURE — 99283 EMERGENCY DEPT VISIT LOW MDM: CPT

## 2020-06-14 PROCEDURE — 99284 EMERGENCY DEPT VISIT MOD MDM: CPT

## 2020-06-14 PROCEDURE — 85025 COMPLETE CBC W/AUTO DIFF WBC: CPT | Performed by: EMERGENCY MEDICINE

## 2020-06-14 RX ORDER — PROMETHAZINE HYDROCHLORIDE, PHENYLEPHRINE HYDROCHLORIDE AND CODEINE PHOSPHATE 6.25; 5; 1 MG/5ML; MG/5ML; MG/5ML
5 SOLUTION ORAL EVERY 4 HOURS PRN
Qty: 118 ML | Refills: 0 | Status: SHIPPED | OUTPATIENT
Start: 2020-06-14 | End: 2020-06-14 | Stop reason: SDUPTHER

## 2020-06-14 RX ORDER — SODIUM CHLORIDE 0.9 % (FLUSH) 0.9 %
10 SYRINGE (ML) INJECTION AS NEEDED
Status: DISCONTINUED | OUTPATIENT
Start: 2020-06-14 | End: 2020-06-14 | Stop reason: HOSPADM

## 2020-06-14 RX ORDER — PROMETHAZINE HYDROCHLORIDE, PHENYLEPHRINE HYDROCHLORIDE AND CODEINE PHOSPHATE 6.25; 5; 1 MG/5ML; MG/5ML; MG/5ML
5 SOLUTION ORAL EVERY 4 HOURS PRN
Qty: 118 ML | Refills: 0 | Status: SHIPPED | OUTPATIENT
Start: 2020-06-14 | End: 2020-06-17

## 2020-06-14 NOTE — ED TRIAGE NOTES
Patient presents via private vehicle from home.  Patient reports coughing since January.  Patient was placed in face mask during first look triage.  Patient was wearing a face mask throughout encounter.  I wore personal protective equipment throughout the encounter.  Hand hygiene was performed before and after patient encounter.

## 2020-06-14 NOTE — ED NOTES
Pt family called and given an update with patient permission that pt will be discharged and to head this way.      Kusum Kline, RN  06/14/20 [id number]

## 2020-06-15 NOTE — ED PROVIDER NOTES
EMERGENCY DEPARTMENT ENCOUNTER    Room Number:  23/23  Date of encounter:  6/14/2020  PCP: Sussy Payne MD  Historian: Patient      HPI:  Chief Complaint: Cough  A complete HPI/ROS/PMH/PSH/SH/FH are unobtainable due to: None    Context: Brie Reese is a 74 y.o. female who presents to the ED c/o cough.  Onset December or January of this most recent year.  Symptoms are constant.  Not worsening or improving but rather remaining rather constant.  Nothing makes her symptoms better or worse.  She denies having any fever.  She traveled to the [facility] near the onset of her symptoms.  She reports that she has been tested for coronavirus and that this was negative.  She has a history of breast cancer that is metastatic to the lungs.  She denies having any shortness of breath.  However, she does note that she has discomfort to the lower ribs bilaterally whenever she coughs.  When she is breathing normally she has absolutely no pain.      PAST MEDICAL HISTORY  Active Ambulatory Problems     Diagnosis Date Noted   • Malignant neoplasm of female breast (CMS/HCC) 03/14/2016   • Anemia 04/12/2016   • Iron deficiency anemia 04/20/2016   • Malignant neoplasm metastatic to left lung (CMS/HCC) 02/06/2017   • Osteoporosis 02/06/2017   • Hypomagnesemia 01/08/2019     Resolved Ambulatory Problems     Diagnosis Date Noted   • No Resolved Ambulatory Problems     Past Medical History:   Diagnosis Date   • Breast cancer, Left    • Diabetes mellitus (CMS/HCC)    • Hyperlipidemia    • Hypothyroidism 01/2009   • Left upper pulmonary nodule 07/05/2012         PAST SURGICAL HISTORY  Past Surgical History:   Procedure Laterality Date   • BREAST BIOPSY Left 1995   • MASTECTOMY RADICAL Left 1995   • MOUTH SURGERY  08/2015    tooth extraction          FAMILY HISTORY  Family History   Problem Relation Age of Onset   • Tuberculosis Mother    • Diabetes Mother    • Thyroid cancer Sister    • Lung cancer Sister    • Lung cancer Brother         In  his 70s.   • Bone cancer Brother         in his 70s.   • Diabetes Maternal Grandmother    • Lung cancer Brother         In his 70s.    • Bone cancer Brother         in his 70s.   • Hypertension Neg Hx    • Heart disease Neg Hx          SOCIAL HISTORY  Social History     Socioeconomic History   • Marital status:      Spouse name: Not on file   • Number of children: Not on file   • Years of education: College   • Highest education level: Not on file   Occupational History     Employer: [employer]   Tobacco Use   • Smoking status: Never Smoker   • Smokeless tobacco: Never Used   Substance and Sexual Activity   • Alcohol use: No   • Drug use: No   • Sexual activity: Defer         ALLERGIES  Patient has no known allergies.        REVIEW OF SYSTEMS  Review of Systems     All systems reviewed and negative except for those discussed in HPI.       PHYSICAL EXAM    I have reviewed the triage vital signs and nursing notes.    ED Triage Vitals   Temp Heart Rate Resp BP SpO2   06/14/20 1433 06/14/20 1431 06/14/20 1431 06/14/20 1446 06/14/20 1431   98.2 °F (36.8 °C) 100 19 143/79 99 %      Temp src Heart Rate Source Patient Position BP Location FiO2 (%)   06/14/20 1433 06/14/20 1431 06/14/20 1446 06/14/20 1446 --   Tympanic Monitor Lying Right arm        Physical Exam  GENERAL: not distressed, very pleasant   HENT: nares patent  EYES: no scleral icterus  CV: regular rhythm, regular rate  RESPIRATORY: normal effort, clear to auscultation bilaterally  ABDOMEN: soft, nontender  MUSCULOSKELETAL: no deformity, no lower extremity edema or tenderness  NEURO: alert, moves all extremities, follows commands  SKIN: warm, dry        LAB RESULTS  Recent Results (from the past 24 hour(s))   Basic Metabolic Panel    Collection Time: 06/14/20  3:43 PM   Result Value Ref Range    Glucose 256 (H) 65 - 99 mg/dL    BUN 15 8 - 23 mg/dL    Creatinine 1.10 (H) 0.57 - 1.00 mg/dL    Sodium 139 136 - 145 mmol/L    Potassium 4.4 3.5 -  5.2 mmol/L    Chloride 102 98 - 107 mmol/L    CO2 25.2 22.0 - 29.0 mmol/L    Calcium 8.7 8.6 - 10.5 mg/dL    eGFR  African Amer 59 (L) >60 mL/min/1.73    eGFR Non African Amer 49 (L) >60 mL/min/1.73    BUN/Creatinine Ratio 13.6 7.0 - 25.0    Anion Gap 11.8 5.0 - 15.0 mmol/L   CBC Auto Differential    Collection Time: 06/14/20  3:43 PM   Result Value Ref Range    WBC 5.64 3.40 - 10.80 10*3/mm3    RBC 3.41 (L) 3.77 - 5.28 10*6/mm3    Hemoglobin 10.9 (L) 12.0 - 15.9 g/dL    Hematocrit 32.7 (L) 34.0 - 46.6 %    MCV 95.9 79.0 - 97.0 fL    MCH 32.0 26.6 - 33.0 pg    MCHC 33.3 31.5 - 35.7 g/dL    RDW 12.3 12.3 - 15.4 %    RDW-SD 44.0 37.0 - 54.0 fl    MPV 8.3 6.0 - 12.0 fL    Platelets 223 140 - 450 10*3/mm3    Neutrophil % 61.8 42.7 - 76.0 %    Lymphocyte % 23.4 19.6 - 45.3 %    Monocyte % 8.7 5.0 - 12.0 %    Eosinophil % 5.0 0.3 - 6.2 %    Basophil % 0.7 0.0 - 1.5 %    Immature Grans % 0.4 0.0 - 0.5 %    Neutrophils, Absolute 3.49 1.70 - 7.00 10*3/mm3    Lymphocytes, Absolute 1.32 0.70 - 3.10 10*3/mm3    Monocytes, Absolute 0.49 0.10 - 0.90 10*3/mm3    Eosinophils, Absolute 0.28 0.00 - 0.40 10*3/mm3    Basophils, Absolute 0.04 0.00 - 0.20 10*3/mm3    Immature Grans, Absolute 0.02 0.00 - 0.05 10*3/mm3    nRBC 0.0 0.0 - 0.2 /100 WBC       Ordered the above labs and independently reviewed the results.        RADIOLOGY  Xr Chest 1 View    Result Date: 6/14/2020  ONE VIEW PORTABLE CHEST  HISTORY: Metastatic breast cancer. Chronic cough.  FINDINGS: The lungs are well-expanded with some chronic fibrotic scarring in the left upper lobe similar to the chest x-ray dated 02/27/2020 and recent chest x-ray dated 06/03/2020. There is some minimal vague increased density at the right base medially which is perhaps slightly more prominent since the previous study and potentially representing minimal developing pneumonia in this location. The heart remains slightly enlarged.  This report was finalized on 6/14/2020 4:52 PM by Dr. Jade  AMADOR Springer.        I ordered the above noted radiological studies. Reviewed by me and discussed with radiologist.  See dictation for official radiology interpretation.      PROCEDURES    Procedures      MEDICATIONS GIVEN IN ER    Medications - No data to display      PROGRESS, DATA ANALYSIS, CONSULTS, AND MEDICAL DECISION MAKING    All labs have been independently reviewed by me.  All radiology studies have been reviewed by me and discussed with radiologist dictating the report.   EKG's independently viewed and interpreted by me.  Discussion below represents my analysis of pertinent findings related to patient's condition, differential diagnosis, treatment plan and final disposition.    Differential diagnosis includes metastatic disease to the lungs, pulmonary embolism, influenza, pneumonia, other viral syndrome, medication side effect such as lisinopril.    ED Course as of Jun 14 2013   Sun Jun 14, 2020   1510 On medical chart review, patient was seen on 2/27/2020.  Patient had a chest x-ray was performed for a chronic cough.  Patient has known metastatic breast cancer.    [TD]   1640 Creatinine(!): 1.10 [TD]   1640 Hemoglobin(!): 10.9 [TD]   1640 WBC: 5.64 [TD]   1642 Chest x-ray read by Dr. Springer.  This shows minimal vague increased density at the right lung base medially which could be a sign of minimal early developing pneumonia.    [TD]      ED Course User Index  [TD] Denys Foster II, MD       I discussed the case with Dr. Schwab who is on-call for Dr. Castillo, oncology.  Patient believes that she is likely on lisinopril still although she is not 100% positive.  Patient is when I discontinue lisinopril.    I then discussed the case with the patient's PCP, .  She does not think the patient needs antibiotics as this is been still constant with no worsening of symptoms.  Furthermore, the patient's white blood cell count is normal.  I think this is a very reasonable clinically.  Patient  really just wants to have some medication to suppress her cough.  I am therefore going to provide her some cough syrup with codeine.    PPE: Both the patient and I wore a surgical mask throughout the entire patient encounter.     AS OF 20:13 VITALS:    BP - 123/68  HR - 72  TEMP - 98.2 °F (36.8 °C) (Tympanic)  O2 SATS - 98%        DIAGNOSIS  Final diagnoses:   Chronic cough   Metastatic breast cancer (CMS/HCC)         DISPOSITION  DISCHARGE    FOLLOW-UP  Sussy Payne MD  [facility] [place] [address]  [phone]    Call in 1 day  To update on your symptoms and to let her know if he develop any other infectious symptoms such as fever or develop shortness of breath         Medication List      Stop    lisinopril 10 MG tablet  Commonly known as:  JUANCARLOS CAMPBELL Thomas Edward II, MD  06/14/20 2013

## 2020-07-28 ENCOUNTER — APPOINTMENT (OUTPATIENT)
Dept: GENERAL RADIOLOGY | Facility: HOSPITAL | Age: 75
End: 2020-07-28

## 2020-07-28 ENCOUNTER — HOSPITAL ENCOUNTER (EMERGENCY)
Facility: HOSPITAL | Age: 75
Discharge: HOME | End: 2020-07-29
Attending: EMERGENCY MEDICINE | Admitting: EMERGENCY MEDICINE

## 2020-07-28 DIAGNOSIS — Z20.822 SUSPECTED COVID-19 VIRUS INFECTION: ICD-10-CM

## 2020-07-28 DIAGNOSIS — J20.9 ACUTE BRONCHITIS, UNSPECIFIED ORGANISM: Primary | ICD-10-CM

## 2020-07-28 PROCEDURE — 85025 COMPLETE CBC W/AUTO DIFF WBC: CPT | Performed by: EMERGENCY MEDICINE

## 2020-07-28 PROCEDURE — 80053 COMPREHEN METABOLIC PANEL: CPT | Performed by: EMERGENCY MEDICINE

## 2020-07-28 PROCEDURE — 84145 PROCALCITONIN (PCT): CPT | Performed by: EMERGENCY MEDICINE

## 2020-07-28 PROCEDURE — 99284 EMERGENCY DEPT VISIT MOD MDM: CPT

## 2020-07-28 PROCEDURE — 83605 ASSAY OF LACTIC ACID: CPT | Performed by: EMERGENCY MEDICINE

## 2020-07-28 PROCEDURE — 71045 X-RAY EXAM CHEST 1 VIEW: CPT

## 2020-07-28 PROCEDURE — 0202U NFCT DS 22 TRGT SARS-COV-2: CPT | Performed by: EMERGENCY MEDICINE

## 2020-07-28 RX ORDER — ACETAMINOPHEN 500 MG
1000 TABLET ORAL ONCE
Status: COMPLETED | OUTPATIENT
Start: 2020-07-28 | End: 2020-07-28

## 2020-07-28 RX ORDER — SODIUM CHLORIDE 0.9 % (FLUSH) 0.9 %
10 SYRINGE (ML) INJECTION AS NEEDED
Status: DISCONTINUED | OUTPATIENT
Start: 2020-07-28 | End: 2020-07-29 | Stop reason: HOSPADM

## 2020-07-28 RX ADMIN — ACETAMINOPHEN 1000 MG: 500 TABLET ORAL at 23:50

## 2020-07-29 VITALS
TEMPERATURE: 99 F | DIASTOLIC BLOOD PRESSURE: 76 MMHG | HEIGHT: 61 IN | OXYGEN SATURATION: 96 % | RESPIRATION RATE: 18 BRPM | BODY MASS INDEX: 22.67 KG/M2 | SYSTOLIC BLOOD PRESSURE: 142 MMHG | HEART RATE: 99 BPM

## 2020-07-29 LAB
ALBUMIN SERPL-MCNC: 3.8 G/DL (ref 3.5–5.2)
ALBUMIN/GLOB SERPL: 1.1 G/DL
ALP SERPL-CCNC: 109 U/L (ref 39–117)
ALT SERPL W P-5'-P-CCNC: 19 U/L (ref 1–33)
ANION GAP SERPL CALCULATED.3IONS-SCNC: 8.8 MMOL/L (ref 5–15)
AST SERPL-CCNC: 19 U/L (ref 1–32)
B PARAPERT DNA SPEC QL NAA+PROBE: NOT DETECTED
B PERT DNA SPEC QL NAA+PROBE: NOT DETECTED
BASOPHILS # BLD AUTO: 0.04 10*3/MM3 (ref 0–0.2)
BASOPHILS NFR BLD AUTO: 0.8 % (ref 0–1.5)
BILIRUB SERPL-MCNC: <0.2 MG/DL (ref 0–1.2)
BUN SERPL-MCNC: 15 MG/DL (ref 8–23)
BUN/CREAT SERPL: 16.5 (ref 7–25)
C PNEUM DNA NPH QL NAA+NON-PROBE: NOT DETECTED
CALCIUM SPEC-SCNC: 8.8 MG/DL (ref 8.6–10.5)
CHLORIDE SERPL-SCNC: 100 MMOL/L (ref 98–107)
CO2 SERPL-SCNC: 24.2 MMOL/L (ref 22–29)
CREAT SERPL-MCNC: 0.91 MG/DL (ref 0.57–1)
D-LACTATE SERPL-SCNC: 1.9 MMOL/L (ref 0.5–2)
DEPRECATED RDW RBC AUTO: 39.5 FL (ref 37–54)
EOSINOPHIL # BLD AUTO: 0.06 10*3/MM3 (ref 0–0.4)
EOSINOPHIL NFR BLD AUTO: 1.2 % (ref 0.3–6.2)
ERYTHROCYTE [DISTWIDTH] IN BLOOD BY AUTOMATED COUNT: 11.9 % (ref 12.3–15.4)
FLUAV H1 2009 PAND RNA NPH QL NAA+PROBE: NOT DETECTED
FLUAV H1 HA GENE NPH QL NAA+PROBE: NOT DETECTED
FLUAV H3 RNA NPH QL NAA+PROBE: NOT DETECTED
FLUAV SUBTYP SPEC NAA+PROBE: NOT DETECTED
FLUBV RNA ISLT QL NAA+PROBE: NOT DETECTED
GFR SERPL CREATININE-BSD FRML MDRD: 61 ML/MIN/1.73
GFR SERPL CREATININE-BSD FRML MDRD: 73 ML/MIN/1.73
GLOBULIN UR ELPH-MCNC: 3.4 GM/DL
GLUCOSE SERPL-MCNC: 142 MG/DL (ref 65–99)
HADV DNA SPEC NAA+PROBE: NOT DETECTED
HCOV 229E RNA SPEC QL NAA+PROBE: NOT DETECTED
HCOV HKU1 RNA SPEC QL NAA+PROBE: NOT DETECTED
HCOV NL63 RNA SPEC QL NAA+PROBE: NOT DETECTED
HCOV OC43 RNA SPEC QL NAA+PROBE: NOT DETECTED
HCT VFR BLD AUTO: 32.3 % (ref 34–46.6)
HGB BLD-MCNC: 11.2 G/DL (ref 12–15.9)
HMPV RNA NPH QL NAA+NON-PROBE: NOT DETECTED
HPIV1 RNA SPEC QL NAA+PROBE: NOT DETECTED
HPIV2 RNA SPEC QL NAA+PROBE: NOT DETECTED
HPIV3 RNA NPH QL NAA+PROBE: NOT DETECTED
HPIV4 P GENE NPH QL NAA+PROBE: NOT DETECTED
IMM GRANULOCYTES # BLD AUTO: 0.01 10*3/MM3 (ref 0–0.05)
IMM GRANULOCYTES NFR BLD AUTO: 0.2 % (ref 0–0.5)
LYMPHOCYTES # BLD AUTO: 1.09 10*3/MM3 (ref 0.7–3.1)
LYMPHOCYTES NFR BLD AUTO: 22.3 % (ref 19.6–45.3)
M PNEUMO IGG SER IA-ACNC: NOT DETECTED
MCH RBC QN AUTO: 31.7 PG (ref 26.6–33)
MCHC RBC AUTO-ENTMCNC: 34.7 G/DL (ref 31.5–35.7)
MCV RBC AUTO: 91.5 FL (ref 79–97)
MONOCYTES # BLD AUTO: 0.71 10*3/MM3 (ref 0.1–0.9)
MONOCYTES NFR BLD AUTO: 14.5 % (ref 5–12)
NEUTROPHILS NFR BLD AUTO: 2.97 10*3/MM3 (ref 1.7–7)
NEUTROPHILS NFR BLD AUTO: 61 % (ref 42.7–76)
NRBC BLD AUTO-RTO: 0 /100 WBC (ref 0–0.2)
PLATELET # BLD AUTO: 217 10*3/MM3 (ref 140–450)
PMV BLD AUTO: 8.4 FL (ref 6–12)
POTASSIUM SERPL-SCNC: 4.4 MMOL/L (ref 3.5–5.2)
PROCALCITONIN SERPL-MCNC: 0.03 NG/ML (ref 0–0.25)
PROT SERPL-MCNC: 7.2 G/DL (ref 6–8.5)
RBC # BLD AUTO: 3.53 10*6/MM3 (ref 3.77–5.28)
RHINOVIRUS RNA SPEC NAA+PROBE: NOT DETECTED
RSV RNA NPH QL NAA+NON-PROBE: NOT DETECTED
SARS-COV-2 RNA NPH QL NAA+NON-PROBE: NOT DETECTED
SODIUM SERPL-SCNC: 133 MMOL/L (ref 136–145)
WBC # BLD AUTO: 4.88 10*3/MM3 (ref 3.4–10.8)

## 2020-07-29 RX ORDER — GUAIFENESIN AND CODEINE PHOSPHATE 100; 10 MG/5ML; MG/5ML
5 SOLUTION ORAL 4 TIMES DAILY PRN
Qty: 180 ML | Refills: 0 | Status: SHIPPED | OUTPATIENT
Start: 2020-07-29 | End: 2022-08-22

## 2020-07-29 RX ORDER — GUAIFENESIN AND CODEINE PHOSPHATE 100; 10 MG/5ML; MG/5ML
10 SOLUTION ORAL ONCE
Status: COMPLETED | OUTPATIENT
Start: 2020-07-29 | End: 2020-07-29

## 2020-07-29 RX ORDER — DOXYCYCLINE 100 MG/1
100 CAPSULE ORAL ONCE
Status: COMPLETED | OUTPATIENT
Start: 2020-07-29 | End: 2020-07-29

## 2020-07-29 RX ORDER — DOXYCYCLINE 100 MG/1
100 CAPSULE ORAL 2 TIMES DAILY
Qty: 20 CAPSULE | Refills: 0 | Status: SHIPPED | OUTPATIENT
Start: 2020-07-29 | End: 2020-08-07 | Stop reason: HOSPADM

## 2020-07-29 RX ADMIN — DOXYCYCLINE 100 MG: 100 CAPSULE ORAL at 01:55

## 2020-07-29 RX ADMIN — GUAIFENESIN AND CODEINE PHOSPHATE 10 ML: 10; 100 LIQUID ORAL at 01:55

## 2020-07-30 ENCOUNTER — PATIENT OUTREACH (OUTPATIENT)
Dept: CASE MANAGEMENT | Facility: OTHER | Age: 75
End: 2020-07-30

## 2020-07-30 ENCOUNTER — EPISODE CHANGES (OUTPATIENT)
Dept: CASE MANAGEMENT | Facility: OTHER | Age: 75
End: 2020-07-30

## 2020-07-30 NOTE — OUTREACH NOTE
ED Potential Covid Discharge Follow-up    Call to pt to FU ED visit with COVID testing. Pt answered the phone but is limited in her English. She passed the phone to her nephew Pelon who lives with her. Pt continues to have a fever of 101. Pt is taking ibuprofen and that has been effective in lowering. Pt was notified in ED that she was negative. Pt given instructions, as per CDC guidelines,  that even if test negative they are recommending self isolation. Pt should be fever free for72 hours without the use of medication. Symptom free for 72 hours such as cough or shortness of breath. Even though test results negative pt should follow up with their PCP. Per Ed notes physician felt pt could have false negative results. This was explained to both pt and family. Encouraged pt to stay well hydrated with water, Pedialyte or sugarfree Gatorade. Advised pt to alternate Tylenol and ibuprofen to avoid stomach upset. Pt notified to return to ED for increased shortness of air or congestion. She has not called for PCP fu but will do so.   Pelon became very anxious as he lives with her and is afraid her may have COVID as well. He states he just took his temperature and it is 99. Encouraged Pelon to go to [email or web address] gov to check for testing sites and to get tested. He was also encouraged to quarantine for 14 days. He was sent home from work today due to fever and not feeling well. No other questions or concerns. Will route call to MD Sirisha Ling, RN  Ambulatory     7/30/2020, 13:29

## 2020-08-03 ENCOUNTER — HOSPITAL ENCOUNTER (INPATIENT)
Facility: HOSPITAL | Age: 75
LOS: 3 days | Discharge: HOME HEALTH | End: 2020-08-07
Attending: EMERGENCY MEDICINE | Admitting: INTERNAL MEDICINE

## 2020-08-03 ENCOUNTER — APPOINTMENT (OUTPATIENT)
Dept: GENERAL RADIOLOGY | Facility: HOSPITAL | Age: 75
End: 2020-08-03

## 2020-08-03 DIAGNOSIS — R09.02 HYPOXIA: ICD-10-CM

## 2020-08-03 DIAGNOSIS — J20.8 ACUTE BRONCHITIS DUE TO COVID-19 VIRUS: Primary | ICD-10-CM

## 2020-08-03 DIAGNOSIS — U07.1 ACUTE BRONCHITIS DUE TO COVID-19 VIRUS: Primary | ICD-10-CM

## 2020-08-03 LAB
ALBUMIN SERPL-MCNC: 3.6 G/DL (ref 3.5–5.2)
ALBUMIN/GLOB SERPL: 0.9 G/DL
ALP SERPL-CCNC: 137 U/L (ref 39–117)
ALT SERPL W P-5'-P-CCNC: 27 U/L (ref 1–33)
ANION GAP SERPL CALCULATED.3IONS-SCNC: 8.2 MMOL/L (ref 5–15)
AST SERPL-CCNC: 37 U/L (ref 1–32)
B PARAPERT DNA SPEC QL NAA+PROBE: NOT DETECTED
B PERT DNA SPEC QL NAA+PROBE: NOT DETECTED
BASOPHILS # BLD AUTO: 0.01 10*3/MM3 (ref 0–0.2)
BASOPHILS NFR BLD AUTO: 0.1 % (ref 0–1.5)
BILIRUB SERPL-MCNC: 0.4 MG/DL (ref 0–1.2)
BUN SERPL-MCNC: 14 MG/DL (ref 8–23)
BUN/CREAT SERPL: 15.6 (ref 7–25)
C PNEUM DNA NPH QL NAA+NON-PROBE: NOT DETECTED
CALCIUM SPEC-SCNC: 8.9 MG/DL (ref 8.6–10.5)
CHLORIDE SERPL-SCNC: 96 MMOL/L (ref 98–107)
CO2 SERPL-SCNC: 25.8 MMOL/L (ref 22–29)
CREAT SERPL-MCNC: 0.9 MG/DL (ref 0.57–1)
D-LACTATE SERPL-SCNC: 1.4 MMOL/L (ref 0.5–2)
DEPRECATED RDW RBC AUTO: 42.5 FL (ref 37–54)
EOSINOPHIL # BLD AUTO: 0 10*3/MM3 (ref 0–0.4)
EOSINOPHIL NFR BLD AUTO: 0 % (ref 0.3–6.2)
ERYTHROCYTE [DISTWIDTH] IN BLOOD BY AUTOMATED COUNT: 12.5 % (ref 12.3–15.4)
FLUAV H1 2009 PAND RNA NPH QL NAA+PROBE: NOT DETECTED
FLUAV H1 HA GENE NPH QL NAA+PROBE: NOT DETECTED
FLUAV H3 RNA NPH QL NAA+PROBE: NOT DETECTED
FLUAV SUBTYP SPEC NAA+PROBE: NOT DETECTED
FLUBV RNA ISLT QL NAA+PROBE: NOT DETECTED
GFR SERPL CREATININE-BSD FRML MDRD: 61 ML/MIN/1.73
GFR SERPL CREATININE-BSD FRML MDRD: 74 ML/MIN/1.73
GLOBULIN UR ELPH-MCNC: 3.8 GM/DL
GLUCOSE SERPL-MCNC: 247 MG/DL (ref 65–99)
HADV DNA SPEC NAA+PROBE: NOT DETECTED
HCOV 229E RNA SPEC QL NAA+PROBE: NOT DETECTED
HCOV HKU1 RNA SPEC QL NAA+PROBE: NOT DETECTED
HCOV NL63 RNA SPEC QL NAA+PROBE: NOT DETECTED
HCOV OC43 RNA SPEC QL NAA+PROBE: NOT DETECTED
HCT VFR BLD AUTO: 32.3 % (ref 34–46.6)
HGB BLD-MCNC: 11.1 G/DL (ref 12–15.9)
HMPV RNA NPH QL NAA+NON-PROBE: NOT DETECTED
HOLD SPECIMEN: NORMAL
HOLD SPECIMEN: NORMAL
HPIV1 RNA SPEC QL NAA+PROBE: NOT DETECTED
HPIV2 RNA SPEC QL NAA+PROBE: NOT DETECTED
HPIV3 RNA NPH QL NAA+PROBE: NOT DETECTED
HPIV4 P GENE NPH QL NAA+PROBE: NOT DETECTED
IMM GRANULOCYTES # BLD AUTO: 0.01 10*3/MM3 (ref 0–0.05)
IMM GRANULOCYTES NFR BLD AUTO: 0.1 % (ref 0–0.5)
LYMPHOCYTES # BLD AUTO: 0.87 10*3/MM3 (ref 0.7–3.1)
LYMPHOCYTES NFR BLD AUTO: 12.4 % (ref 19.6–45.3)
M PNEUMO IGG SER IA-ACNC: NOT DETECTED
MCH RBC QN AUTO: 31.8 PG (ref 26.6–33)
MCHC RBC AUTO-ENTMCNC: 34.4 G/DL (ref 31.5–35.7)
MCV RBC AUTO: 92.6 FL (ref 79–97)
MONOCYTES # BLD AUTO: 0.23 10*3/MM3 (ref 0.1–0.9)
MONOCYTES NFR BLD AUTO: 3.3 % (ref 5–12)
NEUTROPHILS NFR BLD AUTO: 5.89 10*3/MM3 (ref 1.7–7)
NEUTROPHILS NFR BLD AUTO: 84.1 % (ref 42.7–76)
NRBC BLD AUTO-RTO: 0 /100 WBC (ref 0–0.2)
PLATELET # BLD AUTO: 222 10*3/MM3 (ref 140–450)
PMV BLD AUTO: 8.9 FL (ref 6–12)
POTASSIUM SERPL-SCNC: 4.1 MMOL/L (ref 3.5–5.2)
PROCALCITONIN SERPL-MCNC: 0.22 NG/ML (ref 0–0.25)
PROT SERPL-MCNC: 7.4 G/DL (ref 6–8.5)
RBC # BLD AUTO: 3.49 10*6/MM3 (ref 3.77–5.28)
RHINOVIRUS RNA SPEC NAA+PROBE: NOT DETECTED
RSV RNA NPH QL NAA+NON-PROBE: NOT DETECTED
SARS-COV-2 RNA NPH QL NAA+NON-PROBE: DETECTED
SODIUM SERPL-SCNC: 130 MMOL/L (ref 136–145)
WBC # BLD AUTO: 7.01 10*3/MM3 (ref 3.4–10.8)
WHOLE BLOOD HOLD SPECIMEN: NORMAL
WHOLE BLOOD HOLD SPECIMEN: NORMAL

## 2020-08-03 PROCEDURE — 81001 URINALYSIS AUTO W/SCOPE: CPT | Performed by: PHYSICIAN ASSISTANT

## 2020-08-03 PROCEDURE — 83605 ASSAY OF LACTIC ACID: CPT | Performed by: PHYSICIAN ASSISTANT

## 2020-08-03 PROCEDURE — 0202U NFCT DS 22 TRGT SARS-COV-2: CPT | Performed by: PHYSICIAN ASSISTANT

## 2020-08-03 PROCEDURE — 80053 COMPREHEN METABOLIC PANEL: CPT | Performed by: PHYSICIAN ASSISTANT

## 2020-08-03 PROCEDURE — 85025 COMPLETE CBC W/AUTO DIFF WBC: CPT | Performed by: PHYSICIAN ASSISTANT

## 2020-08-03 PROCEDURE — 83615 LACTATE (LD) (LDH) ENZYME: CPT | Performed by: NURSE PRACTITIONER

## 2020-08-03 PROCEDURE — 84145 PROCALCITONIN (PCT): CPT | Performed by: PHYSICIAN ASSISTANT

## 2020-08-03 PROCEDURE — 99284 EMERGENCY DEPT VISIT MOD MDM: CPT

## 2020-08-03 PROCEDURE — 82728 ASSAY OF FERRITIN: CPT | Performed by: NURSE PRACTITIONER

## 2020-08-03 PROCEDURE — 71045 X-RAY EXAM CHEST 1 VIEW: CPT

## 2020-08-03 PROCEDURE — 83036 HEMOGLOBIN GLYCOSYLATED A1C: CPT | Performed by: NURSE PRACTITIONER

## 2020-08-04 PROBLEM — D84.9 IMMUNOCOMPROMISED: Status: ACTIVE | Noted: 2020-08-04

## 2020-08-04 PROBLEM — E78.5 HYPERLIPIDEMIA: Status: ACTIVE | Noted: 2020-08-04

## 2020-08-04 PROBLEM — J20.8 ACUTE BRONCHITIS DUE TO COVID-19 VIRUS: Status: ACTIVE | Noted: 2020-08-04

## 2020-08-04 PROBLEM — E11.9 DIABETES MELLITUS: Status: ACTIVE | Noted: 2020-08-04

## 2020-08-04 PROBLEM — J96.01 ACUTE RESPIRATORY FAILURE WITH HYPOXIA (HCC): Status: ACTIVE | Noted: 2020-08-04

## 2020-08-04 PROBLEM — U07.1 ACUTE BRONCHITIS DUE TO COVID-19 VIRUS: Status: ACTIVE | Noted: 2020-08-04

## 2020-08-04 PROBLEM — E87.1 HYPONATREMIA: Status: ACTIVE | Noted: 2020-08-04

## 2020-08-04 LAB
ALBUMIN SERPL-MCNC: 3.1 G/DL (ref 3.5–5.2)
ALBUMIN/GLOB SERPL: 0.8 G/DL
ALP SERPL-CCNC: 126 U/L (ref 39–117)
ALT SERPL W P-5'-P-CCNC: 23 U/L (ref 1–33)
ANION GAP SERPL CALCULATED.3IONS-SCNC: 8.2 MMOL/L (ref 5–15)
AST SERPL-CCNC: 35 U/L (ref 1–32)
BACTERIA UR QL AUTO: ABNORMAL /HPF
BASOPHILS # BLD AUTO: 0.01 10*3/MM3 (ref 0–0.2)
BASOPHILS NFR BLD AUTO: 0.1 % (ref 0–1.5)
BILIRUB SERPL-MCNC: 0.4 MG/DL (ref 0–1.2)
BILIRUB UR QL STRIP: NEGATIVE
BUN SERPL-MCNC: 15 MG/DL (ref 8–23)
BUN/CREAT SERPL: 17.2 (ref 7–25)
CALCIUM SPEC-SCNC: 8.7 MG/DL (ref 8.6–10.5)
CHLORIDE SERPL-SCNC: 99 MMOL/L (ref 98–107)
CLARITY UR: CLEAR
CO2 SERPL-SCNC: 22.8 MMOL/L (ref 22–29)
COLOR UR: YELLOW
CREAT SERPL-MCNC: 0.87 MG/DL (ref 0.57–1)
D DIMER PPP FEU-MCNC: 0.58 MCGFEU/ML (ref 0–0.49)
DEPRECATED RDW RBC AUTO: 40.4 FL (ref 37–54)
EOSINOPHIL # BLD AUTO: 0 10*3/MM3 (ref 0–0.4)
EOSINOPHIL NFR BLD AUTO: 0 % (ref 0.3–6.2)
ERYTHROCYTE [DISTWIDTH] IN BLOOD BY AUTOMATED COUNT: 12.2 % (ref 12.3–15.4)
FERRITIN SERPL-MCNC: 791 NG/ML (ref 13–150)
GFR SERPL CREATININE-BSD FRML MDRD: 64 ML/MIN/1.73
GFR SERPL CREATININE-BSD FRML MDRD: 77 ML/MIN/1.73
GLOBULIN UR ELPH-MCNC: 3.8 GM/DL
GLUCOSE BLDC GLUCOMTR-MCNC: 159 MG/DL (ref 70–130)
GLUCOSE BLDC GLUCOMTR-MCNC: 179 MG/DL (ref 70–130)
GLUCOSE BLDC GLUCOMTR-MCNC: 194 MG/DL (ref 70–130)
GLUCOSE BLDC GLUCOMTR-MCNC: 229 MG/DL (ref 70–130)
GLUCOSE SERPL-MCNC: 220 MG/DL (ref 65–99)
GLUCOSE UR STRIP-MCNC: ABNORMAL MG/DL
HBA1C MFR BLD: 9.1 % (ref 4.8–5.6)
HCT VFR BLD AUTO: 31 % (ref 34–46.6)
HGB BLD-MCNC: 10.5 G/DL (ref 12–15.9)
HGB UR QL STRIP.AUTO: NEGATIVE
HYALINE CASTS UR QL AUTO: ABNORMAL /LPF
IMM GRANULOCYTES # BLD AUTO: 0.03 10*3/MM3 (ref 0–0.05)
IMM GRANULOCYTES NFR BLD AUTO: 0.4 % (ref 0–0.5)
KETONES UR QL STRIP: ABNORMAL
LDH SERPL-CCNC: 329 U/L (ref 135–214)
LEUKOCYTE ESTERASE UR QL STRIP.AUTO: NEGATIVE
LYMPHOCYTES # BLD AUTO: 1.19 10*3/MM3 (ref 0.7–3.1)
LYMPHOCYTES NFR BLD AUTO: 15.9 % (ref 19.6–45.3)
MCH RBC QN AUTO: 30.9 PG (ref 26.6–33)
MCHC RBC AUTO-ENTMCNC: 33.9 G/DL (ref 31.5–35.7)
MCV RBC AUTO: 91.2 FL (ref 79–97)
MONOCYTES # BLD AUTO: 0.28 10*3/MM3 (ref 0.1–0.9)
MONOCYTES NFR BLD AUTO: 3.7 % (ref 5–12)
NEUTROPHILS NFR BLD AUTO: 5.99 10*3/MM3 (ref 1.7–7)
NEUTROPHILS NFR BLD AUTO: 79.9 % (ref 42.7–76)
NITRITE UR QL STRIP: NEGATIVE
NRBC BLD AUTO-RTO: 0 /100 WBC (ref 0–0.2)
PH UR STRIP.AUTO: 6 [PH] (ref 5–8)
PLATELET # BLD AUTO: 195 10*3/MM3 (ref 140–450)
PMV BLD AUTO: 8.7 FL (ref 6–12)
POTASSIUM SERPL-SCNC: 4 MMOL/L (ref 3.5–5.2)
PROT SERPL-MCNC: 6.9 G/DL (ref 6–8.5)
PROT UR QL STRIP: ABNORMAL
RBC # BLD AUTO: 3.4 10*6/MM3 (ref 3.77–5.28)
RBC # UR: ABNORMAL /HPF
REF LAB TEST METHOD: ABNORMAL
SODIUM SERPL-SCNC: 130 MMOL/L (ref 136–145)
SP GR UR STRIP: 1.02 (ref 1–1.03)
SQUAMOUS #/AREA URNS HPF: ABNORMAL /HPF
UROBILINOGEN UR QL STRIP: ABNORMAL
WBC # BLD AUTO: 7.5 10*3/MM3 (ref 3.4–10.8)
WBC UR QL AUTO: ABNORMAL /HPF

## 2020-08-04 PROCEDURE — 25010000002 DEXAMETHASONE PER 1 MG: Performed by: NURSE PRACTITIONER

## 2020-08-04 PROCEDURE — 82962 GLUCOSE BLOOD TEST: CPT

## 2020-08-04 PROCEDURE — 63710000001 INSULIN LISPRO (HUMAN) PER 5 UNITS: Performed by: NURSE PRACTITIONER

## 2020-08-04 PROCEDURE — 25010000002 ENOXAPARIN PER 10 MG: Performed by: NURSE PRACTITIONER

## 2020-08-04 PROCEDURE — 85379 FIBRIN DEGRADATION QUANT: CPT | Performed by: NURSE PRACTITIONER

## 2020-08-04 PROCEDURE — 80053 COMPREHEN METABOLIC PANEL: CPT | Performed by: NURSE PRACTITIONER

## 2020-08-04 PROCEDURE — 85025 COMPLETE CBC W/AUTO DIFF WBC: CPT | Performed by: NURSE PRACTITIONER

## 2020-08-04 RX ORDER — CALCIUM CARBONATE 500(1250)
500 TABLET ORAL DAILY
Status: DISCONTINUED | OUTPATIENT
Start: 2020-08-04 | End: 2020-08-07 | Stop reason: HOSPADM

## 2020-08-04 RX ORDER — BISACODYL 5 MG/1
5 TABLET, DELAYED RELEASE ORAL DAILY PRN
Status: DISCONTINUED | OUTPATIENT
Start: 2020-08-04 | End: 2020-08-07 | Stop reason: HOSPADM

## 2020-08-04 RX ORDER — LEVOTHYROXINE SODIUM 0.03 MG/1
25 TABLET ORAL DAILY
Status: DISCONTINUED | OUTPATIENT
Start: 2020-08-04 | End: 2020-08-07 | Stop reason: HOSPADM

## 2020-08-04 RX ORDER — NITROGLYCERIN 0.4 MG/1
0.4 TABLET SUBLINGUAL
Status: DISCONTINUED | OUTPATIENT
Start: 2020-08-04 | End: 2020-08-07 | Stop reason: HOSPADM

## 2020-08-04 RX ORDER — PANTOPRAZOLE SODIUM 40 MG/1
40 TABLET, DELAYED RELEASE ORAL EVERY MORNING
Status: DISCONTINUED | OUTPATIENT
Start: 2020-08-04 | End: 2020-08-07 | Stop reason: HOSPADM

## 2020-08-04 RX ORDER — ONDANSETRON 2 MG/ML
4 INJECTION INTRAMUSCULAR; INTRAVENOUS EVERY 6 HOURS PRN
Status: DISCONTINUED | OUTPATIENT
Start: 2020-08-04 | End: 2020-08-07 | Stop reason: HOSPADM

## 2020-08-04 RX ORDER — DEXAMETHASONE SODIUM PHOSPHATE 4 MG/ML
6 INJECTION, SOLUTION INTRA-ARTICULAR; INTRALESIONAL; INTRAMUSCULAR; INTRAVENOUS; SOFT TISSUE DAILY
Status: DISCONTINUED | OUTPATIENT
Start: 2020-08-04 | End: 2020-08-04

## 2020-08-04 RX ORDER — ONDANSETRON 4 MG/1
4 TABLET, FILM COATED ORAL EVERY 6 HOURS PRN
Status: DISCONTINUED | OUTPATIENT
Start: 2020-08-04 | End: 2020-08-07 | Stop reason: HOSPADM

## 2020-08-04 RX ORDER — MAGNESIUM OXIDE 400 MG/1
400 TABLET ORAL 2 TIMES DAILY
Status: DISCONTINUED | OUTPATIENT
Start: 2020-08-04 | End: 2020-08-07 | Stop reason: HOSPADM

## 2020-08-04 RX ORDER — SODIUM CHLORIDE 0.9 % (FLUSH) 0.9 %
10 SYRINGE (ML) INJECTION AS NEEDED
Status: DISCONTINUED | OUTPATIENT
Start: 2020-08-04 | End: 2020-08-07 | Stop reason: HOSPADM

## 2020-08-04 RX ORDER — NICOTINE POLACRILEX 4 MG
15 LOZENGE BUCCAL
Status: DISCONTINUED | OUTPATIENT
Start: 2020-08-04 | End: 2020-08-07 | Stop reason: HOSPADM

## 2020-08-04 RX ORDER — ATORVASTATIN CALCIUM 20 MG/1
10 TABLET, FILM COATED ORAL DAILY
Status: DISCONTINUED | OUTPATIENT
Start: 2020-08-04 | End: 2020-08-07 | Stop reason: HOSPADM

## 2020-08-04 RX ORDER — BISACODYL 10 MG
10 SUPPOSITORY, RECTAL RECTAL DAILY PRN
Status: DISCONTINUED | OUTPATIENT
Start: 2020-08-04 | End: 2020-08-07 | Stop reason: HOSPADM

## 2020-08-04 RX ORDER — ACETAMINOPHEN 325 MG/1
650 TABLET ORAL EVERY 4 HOURS PRN
Status: DISCONTINUED | OUTPATIENT
Start: 2020-08-04 | End: 2020-08-07 | Stop reason: HOSPADM

## 2020-08-04 RX ORDER — ALUMINA, MAGNESIA, AND SIMETHICONE 2400; 2400; 240 MG/30ML; MG/30ML; MG/30ML
15 SUSPENSION ORAL EVERY 6 HOURS PRN
Status: DISCONTINUED | OUTPATIENT
Start: 2020-08-04 | End: 2020-08-07 | Stop reason: HOSPADM

## 2020-08-04 RX ORDER — GUAIFENESIN AND DEXTROMETHORPHAN HYDROBROMIDE 600; 30 MG/1; MG/1
1 TABLET, EXTENDED RELEASE ORAL 2 TIMES DAILY PRN
Status: DISCONTINUED | OUTPATIENT
Start: 2020-08-04 | End: 2020-08-07 | Stop reason: HOSPADM

## 2020-08-04 RX ORDER — DEXTROSE MONOHYDRATE 25 G/50ML
25 INJECTION, SOLUTION INTRAVENOUS
Status: DISCONTINUED | OUTPATIENT
Start: 2020-08-04 | End: 2020-08-07 | Stop reason: HOSPADM

## 2020-08-04 RX ORDER — MULTIVIT-MIN/IRON/FOLIC ACID/K 18-600-40
2000 CAPSULE ORAL DAILY
Status: DISCONTINUED | OUTPATIENT
Start: 2020-08-04 | End: 2020-08-05 | Stop reason: CLARIF

## 2020-08-04 RX ORDER — FERROUS SULFATE 325(65) MG
325 TABLET ORAL
Status: DISCONTINUED | OUTPATIENT
Start: 2020-08-04 | End: 2020-08-07 | Stop reason: HOSPADM

## 2020-08-04 RX ADMIN — DEXAMETHASONE SODIUM PHOSPHATE 6 MG: 4 INJECTION, SOLUTION INTRAMUSCULAR; INTRAVENOUS at 14:48

## 2020-08-04 RX ADMIN — FERROUS SULFATE TAB 325 MG (65 MG ELEMENTAL FE) 325 MG: 325 (65 FE) TAB at 12:12

## 2020-08-04 RX ADMIN — Medication 400 MG: at 12:12

## 2020-08-04 RX ADMIN — Medication 400 MG: at 22:15

## 2020-08-04 RX ADMIN — ENOXAPARIN SODIUM 40 MG: 40 INJECTION SUBCUTANEOUS at 12:13

## 2020-08-04 RX ADMIN — LEVOTHYROXINE SODIUM 25 MCG: 25 TABLET ORAL at 12:13

## 2020-08-04 RX ADMIN — INSULIN LISPRO 2 UNITS: 100 INJECTION, SOLUTION INTRAVENOUS; SUBCUTANEOUS at 12:13

## 2020-08-04 RX ADMIN — ACETAMINOPHEN 650 MG: 325 TABLET, FILM COATED ORAL at 04:26

## 2020-08-04 RX ADMIN — PANTOPRAZOLE SODIUM 40 MG: 40 TABLET, DELAYED RELEASE ORAL at 12:12

## 2020-08-04 RX ADMIN — INSULIN LISPRO 2 UNITS: 100 INJECTION, SOLUTION INTRAVENOUS; SUBCUTANEOUS at 17:22

## 2020-08-04 RX ADMIN — ATORVASTATIN CALCIUM 10 MG: 20 TABLET, FILM COATED ORAL at 12:12

## 2020-08-04 RX ADMIN — INSULIN LISPRO 2 UNITS: 100 INJECTION, SOLUTION INTRAVENOUS; SUBCUTANEOUS at 09:22

## 2020-08-04 RX ADMIN — CALCIUM 500 MG: 500 TABLET ORAL at 12:12

## 2020-08-04 RX ADMIN — ACETAMINOPHEN 650 MG: 325 TABLET, FILM COATED ORAL at 12:25

## 2020-08-04 NOTE — ED TRIAGE NOTES
Pt to ED from home with c/o fever that started on Monday. Pt seen here and dx with bronchitis and suspected COVID but tested negative. Pt with hx of lung cx and is still on abx prescribed Monday.    Pt masked upon arrival. This nurse wearing mask and goggles during entire encounter.

## 2020-08-04 NOTE — ED NOTES
Pt states unable to provide urine at this time    Patient wearing mask during interaction. Universal precautions plus mask, goggles, face shield, gown and gloves used utilized by Virginia Santana RN  08/03/20 3345

## 2020-08-04 NOTE — PLAN OF CARE
Problem: Patient Care Overview  Goal: Plan of Care Review  Outcome: Ongoing (interventions implemented as appropriate)  Flowsheets (Taken 8/4/2020 4894)  Progress: no change  Plan of Care Reviewed With: patient  Outcome Summary: pt a&ox4, remains on 2L of O2, did spike a temp, treated with tylenol, no complaints at this time. Will CTM the rest of my shift.

## 2020-08-04 NOTE — ED PROVIDER NOTES
MD ATTESTATION NOTE    The RUDI and I have discussed this patient's history, physical exam, and treatment plan.  I have reviewed the documentation and personally had a face to face interaction with the patient. I affirm the documentation and agree with the treatment and plan.  The attached note describes my personal findings.      History  74-year-old female with fever and dry cough over approximately 1 week.    Physical Exam  Vital Signs reviewed  Alert, Well Appearing in NAD  Respirations unlabored-oxygen saturations fall to 82% on room air with any exertion.    Disposition  I discussed treatment evaluation of this patient with BENJAMÍN Disla.  CoVid testing is positive.  X-ray unremarkable but patient developed significant hypoxia on room air with any exertions.  Will admit for further evaluation treatment and oxygen as needed.    We tried to reach Dr. Martin over several hours but got no call back.  Since Dr. Crane did not call back we contacted Espinoza FULLER who will admit on behalf of of Dr. John Castro.     Juvenal Carrington MD  08/04/20 0133

## 2020-08-04 NOTE — PLAN OF CARE
Problem: Patient Care Overview  Goal: Plan of Care Review  Outcome: Ongoing (interventions implemented as appropriate)  Flowsheets (Taken 8/4/2020 5932)  Plan of Care Reviewed With: patient  Outcome Summary: pt new admission from ED, here with acute bronchitis. A/O x 4. SR on monitor. VSS. on 2L via NC with 02 sats in mid 90s. lungs diminished. pt is in isolation for positive COVID infection. Standby assist to the BR. will CTM.

## 2020-08-04 NOTE — ED PROVIDER NOTES
EMERGENCY DEPARTMENT ENCOUNTER    Room Number:  01/01  Date seen:  8/4/2020  Time seen: 9:36 PM  PCP: Sussy Payne MD  Historian: patient      HPI:  Chief Complaint: fever, cough    A complete HPI/ROS/PMH/PSH/SH/FH are unobtainable due to: none    Context: Brie Reese is a 74 y.o. female who presents to the ED for evaluation of 1 week history of fever and dry cough that onset gradually and is intermittent moderate and seems to get better with Tylenol and nothing makes it worse.  She is also had a moderate headache for greater than 1 week that comes and goes.  She states she was in the ER approximately 1 week ago with the symptoms and was prescribed antibiotics which she has been taking without improvement.  She denies any sore throat, chest pain, shortness of breath, abdominal pain, hematuria or dysuria or urinary frequency.  She has had some mild nausea and states that she vomited water 3 times over the last 2 days.  She denies any diarrhea.  She has a history of lung cancer, last chemo approximately 1 year ago.      PAST MEDICAL HISTORY  Active Ambulatory Problems     Diagnosis Date Noted   • Malignant neoplasm of female breast (CMS/HCC) 03/14/2016   • Anemia 04/12/2016   • Iron deficiency anemia 04/20/2016   • Malignant neoplasm metastatic to left lung (CMS/HCC) 02/06/2017   • Osteoporosis 02/06/2017   • Hypomagnesemia 01/08/2019     Resolved Ambulatory Problems     Diagnosis Date Noted   • No Resolved Ambulatory Problems     Past Medical History:   Diagnosis Date   • Breast cancer, Left    • Diabetes mellitus (CMS/HCC)    • Hyperlipidemia    • Hypothyroidism 01/2009   • Left upper pulmonary nodule 07/05/2012         PAST SURGICAL HISTORY  Past Surgical History:   Procedure Laterality Date   • BREAST BIOPSY Left 1995   • MASTECTOMY RADICAL Left 1995   • MOUTH SURGERY  08/2015    tooth extraction          FAMILY HISTORY  Family History   Problem Relation Age of Onset   • Tuberculosis Mother    • Diabetes  Mother    • Thyroid cancer Sister    • Lung cancer Sister    • Lung cancer Brother         In his 70s.   • Bone cancer Brother         in his 70s.   • Diabetes Maternal Grandmother    • Lung cancer Brother         In his 70s.    • Bone cancer Brother         in his 70s.   • Hypertension Neg Hx    • Heart disease Neg Hx          SOCIAL HISTORY  Social History     Socioeconomic History   • Marital status:      Spouse name: Not on file   • Number of children: Not on file   • Years of education: College   • Highest education level: Not on file   Occupational History     Employer: [employer]   Tobacco Use   • Smoking status: Never Smoker   • Smokeless tobacco: Never Used   Substance and Sexual Activity   • Alcohol use: No   • Drug use: No   • Sexual activity: Defer         ALLERGIES  Patient has no known allergies.        REVIEW OF SYSTEMS  Review of Systems     All systems reviewed and negative except for those discussed in HPI.       PHYSICAL EXAM  ED Triage Vitals [08/03/20 2120]   Temp Heart Rate Resp BP SpO2   100.5 °F (38.1 °C) 74 18 -- 95 %      Temp src Heart Rate Source Patient Position BP Location FiO2 (%)   Tympanic Monitor -- -- --         GENERAL: Well-appearing, not distressed  HENT: atraumatic  EYES: no scleral icterus  CV: regular rhythm, regular rate  RESPIRATORY: normal effort, Rales in the right base, no wheezes or rhonchi and otherwise clear  ABDOMEN: soft, nontender, nondistended  MUSCULOSKELETAL: no deformity, no edema  NEURO: alert, moves all extremities, follows commands, no meningismus  SKIN: warm, dry    Vital signs and nursing notes reviewed.          LAB RESULTS  Recent Results (from the past 24 hour(s))   Light Blue Top    Collection Time: 08/03/20  9:47 PM   Result Value Ref Range    Extra Tube hold for add-on    Green Top (Gel)    Collection Time: 08/03/20  9:47 PM   Result Value Ref Range    Extra Tube Hold for add-ons.    Lavender Top    Collection Time: 08/03/20  9:47  PM   Result Value Ref Range    Extra Tube hold for add-on    Gold Top - SST    Collection Time: 08/03/20  9:47 PM   Result Value Ref Range    Extra Tube Hold for add-ons.    Comprehensive Metabolic Panel    Collection Time: 08/03/20  9:47 PM   Result Value Ref Range    Glucose 247 (H) 65 - 99 mg/dL    BUN 14 8 - 23 mg/dL    Creatinine 0.90 0.57 - 1.00 mg/dL    Sodium 130 (L) 136 - 145 mmol/L    Potassium 4.1 3.5 - 5.2 mmol/L    Chloride 96 (L) 98 - 107 mmol/L    CO2 25.8 22.0 - 29.0 mmol/L    Calcium 8.9 8.6 - 10.5 mg/dL    Total Protein 7.4 6.0 - 8.5 g/dL    Albumin 3.60 3.50 - 5.20 g/dL    ALT (SGPT) 27 1 - 33 U/L    AST (SGOT) 37 (H) 1 - 32 U/L    Alkaline Phosphatase 137 (H) 39 - 117 U/L    Total Bilirubin 0.4 0.0 - 1.2 mg/dL    eGFR Non African Amer 61 >60 mL/min/1.73    eGFR  African Amer 74 >60 mL/min/1.73    Globulin 3.8 gm/dL    A/G Ratio 0.9 g/dL    BUN/Creatinine Ratio 15.6 7.0 - 25.0    Anion Gap 8.2 5.0 - 15.0 mmol/L   Procalcitonin    Collection Time: 08/03/20  9:47 PM   Result Value Ref Range    Procalcitonin 0.22 0.00 - 0.25 ng/mL   CBC Auto Differential    Collection Time: 08/03/20  9:47 PM   Result Value Ref Range    WBC 7.01 3.40 - 10.80 10*3/mm3    RBC 3.49 (L) 3.77 - 5.28 10*6/mm3    Hemoglobin 11.1 (L) 12.0 - 15.9 g/dL    Hematocrit 32.3 (L) 34.0 - 46.6 %    MCV 92.6 79.0 - 97.0 fL    MCH 31.8 26.6 - 33.0 pg    MCHC 34.4 31.5 - 35.7 g/dL    RDW 12.5 12.3 - 15.4 %    RDW-SD 42.5 37.0 - 54.0 fl    MPV 8.9 6.0 - 12.0 fL    Platelets 222 140 - 450 10*3/mm3    Neutrophil % 84.1 (H) 42.7 - 76.0 %    Lymphocyte % 12.4 (L) 19.6 - 45.3 %    Monocyte % 3.3 (L) 5.0 - 12.0 %    Eosinophil % 0.0 (L) 0.3 - 6.2 %    Basophil % 0.1 0.0 - 1.5 %    Immature Grans % 0.1 0.0 - 0.5 %    Neutrophils, Absolute 5.89 1.70 - 7.00 10*3/mm3    Lymphocytes, Absolute 0.87 0.70 - 3.10 10*3/mm3    Monocytes, Absolute 0.23 0.10 - 0.90 10*3/mm3    Eosinophils, Absolute 0.00 0.00 - 0.40 10*3/mm3    Basophils, Absolute 0.01 0.00  - 0.20 10*3/mm3    Immature Grans, Absolute 0.01 0.00 - 0.05 10*3/mm3    nRBC 0.0 0.0 - 0.2 /100 WBC   Lactic Acid, Plasma    Collection Time: 08/03/20 10:10 PM   Result Value Ref Range    Lactate 1.4 0.5 - 2.0 mmol/L   Respiratory Panel PCR w/COVID-19(SARS-CoV-2) SIRISHA/ANGELITA/GAMAL In-House, NP Swab in [facility], 8-12 Hr TAT - Swab, Nasopharynx    Collection Time: 08/03/20 10:10 PM   Result Value Ref Range    ADENOVIRUS, PCR Not Detected Not Detected    Coronavirus 229E Not Detected Not Detected    Coronavirus HKU1 Not Detected Not Detected    Coronavirus NL63 Not Detected Not Detected    Coronavirus OC43 Not Detected Not Detected    COVID19 Detected (C) Not Detected - Ref. Range    Human Metapneumovirus Not Detected Not Detected    Human Rhinovirus/Enterovirus Not Detected Not Detected    Influenza A PCR Not Detected Not Detected    Influenza A H1 Not Detected Not Detected    Influenza A H1 2009 PCR Not Detected Not Detected    Influenza A H3 Not Detected Not Detected    Influenza B PCR Not Detected Not Detected    Parainfluenza Virus 1 Not Detected Not Detected    Parainfluenza Virus 2 Not Detected Not Detected    Parainfluenza Virus 3 Not Detected Not Detected    Parainfluenza Virus 4 Not Detected Not Detected    RSV, PCR Not Detected Not Detected    Bordetella pertussis pcr Not Detected Not Detected    Bordetella parapertussis PCR Not Detected Not Detected    Chlamydophila pneumoniae PCR Not Detected Not Detected    Mycoplasma pneumo by PCR Not Detected Not Detected   Urinalysis With Microscopic If Indicated (No Culture) - Urine, Clean Catch    Collection Time: 08/03/20 11:40 PM   Result Value Ref Range    Color, UA Yellow Yellow, Straw    Appearance, UA Clear Clear    pH, UA 6.0 5.0 - 8.0    Specific Gravity, UA 1.022 1.005 - 1.030    Glucose,  mg/dL (2+) (A) Negative    Ketones, UA 15 mg/dL (1+) (A) Negative    Bilirubin, UA Negative Negative    Blood, UA Negative Negative    Protein, UA 30 mg/dL (1+) (A)  Negative    Leuk Esterase, UA Negative Negative    Nitrite, UA Negative Negative    Urobilinogen, UA 0.2 E.U./dL 0.2 - 1.0 E.U./dL   Urinalysis, Microscopic Only - Urine, Clean Catch    Collection Time: 08/03/20 11:40 PM   Result Value Ref Range    RBC, UA 3-5 (A) None Seen, 0-2 /HPF    WBC, UA 0-2 None Seen, 0-2 /HPF    Bacteria, UA None Seen None Seen /HPF    Squamous Epithelial Cells, UA 0-2 None Seen, 0-2 /HPF    Hyaline Casts, UA 0-2 None Seen /LPF    Methodology Automated Microscopy        Ordered the above labs and independently reviewed the results.        RADIOLOGY  XR Chest 1 View   Final Result      PORTABLE CHEST 08/03/2020 AT 10:08 PM     CLINICAL HISTORY: Cough. Fever. Evaluate for COVID 19.     Compared to the previous chest dated 07/28/2020.     The lungs are fairly well-expanded and appear free of acute infiltrates.  There are no pleural effusions. The cardiomediastinal silhouette is  unremarkable.     IMPRESSIONS: No evidence of acute disease within the chest.    I ordered the above noted radiological studies. Reviewed by me and discussed with radiologist.  See dictation for official radiology interpretation.    PROCEDURES  Procedures        MEDICATIONS GIVEN IN ER  Medications - No data to display          PROGRESS AND CONSULTS    DDX includes but not limited to pneumonia, urinary tract infection, viral illness, COVID-19    ED Course as of Aug 04 0027   Mon Aug 03, 2020   2140 Medical Chart Reviewed. ER visit on 7/28/2020 for fever and cough.  He is known to have a chronic cough that was not significantly worse than usual, not get currently getting chemo.  White blood cell count was 4.8, lactic was 1.9 and pro calcitonin 0.03.  Chest x-ray read as hazy infiltrate at the right lung base, pneumonia not excluded.  She was diagnosed with bronchitis, prescribed doxycycline.  Respiratory viral panel was negative.    [KA]   2235 Glucose(!): 247 [KA]   2235 WBC: 7.01 [KA]   2236 BUN: 14 [KA]   2236  Creatinine: 0.90 [KA]   2236 Hemoglobin(!): 11.1 [KA]   2246 Lactate: 1.4 [KA]   2246 Procalcitonin: 0.22 [KA]   2352 Patient is positive for COVID 19.  Count is normal, lactic and procalcitonin are within normal range and chest x-ray is clear.  However, the patient got up to use the bedside commode and desaturated to 84% became very short of breath.  In light of this, admission likely indicated for further evaluation and treatment.  Call to the patient's PCP.    [KA]      ED Course User Index  [KA] Briana Disla PA        Reviewed pt's history and workup with Dr. Carrington.  After a bedside evaluation; they agree with the plan of care      Patient was placed in face mask in first look. Patient was wearing facemask each time I entered the room and throughout our encounter. I wore protective equipment throughout this patient encounter including a face mask, eye shield, gown and gloves. Hand hygiene was performed before donning protective equipment and after removal when leaving the room.        DIAGNOSIS  Final diagnoses:   Acute bronchitis due to COVID-19 virus   Hypoxia               Latest Documented Vital Signs:  As of 00:27  BP- 143/57 HR- 56 Temp- 100.5 °F (38.1 °C) (Tympanic) O2 sat- (!) 89%       Briana Disla PA  08/04/20 3302

## 2020-08-04 NOTE — ED NOTES
Pt asked for urine sample, given water, this ERT was wearing a mask, goggles, shield and gown, Patient wearing mask      Tracy Verde MA/LMMARIANA  08/03/20 5654

## 2020-08-04 NOTE — ED NOTES
Transport informed this RN that they left the floor with pt at approx 0350      Attempted to call transport for update on ETA, no response.     Gladys Angeles RN  08/04/20 0357         Gladys Angeles RN  08/04/20 0401       Gladys Angeles RN  08/04/20 0407

## 2020-08-04 NOTE — H&P
Patient Name:  Brie Reese  YOB: 1945  MRN:  [id number]  Admit Date:  8/3/2020  Patient Care Team:  Sussy Payne MD as PCP - General (Internal Medicine)  Carlos Castillo MD PhD as Consulting Physician (Hematology and Oncology)  Sussy Payne MD as Referring Physician (Internal Medicine)  Doug Castro MD as Consulting Physician (Radiation Oncology)      Subjective   History Present Illness     Chief Complaint   Patient presents with   • Fever   • Headache   • Neck Pain     R       Ms. Reese is a 74 y.o. with a history of metastatic breast cancer with lung nodule, last chemotherapy 1 year ago, DM 2, HTN, HLD, iron deficiency anemia, osteoporosis that presents with a fever (tmax 99) and dry cough onset approximately 1 week ago.  Associated symptoms include a headache and decreased appetite.  She has had mild nausea but denies emesis although she reported emesis to the ER physician.  She has lost her sense of taste.  Patient has taken Tylenol with no improvement in her symptoms.  Patient was seen in the ER approximately 1 week ago and prescribed antibiotics without improvement in symptoms.  She denies chest pain, lightheadedness, diarrhea, abdominal pain, shortness of breath. Patient last received chemotherapy for her lung cancer approximately 1 year ago and is now on Aromasin.   In the ER, she has been found to be COVID19+ with a cough but CXR without PNA.  Oxygen saturations in the ER were 84% while ambulating on room air.  She is now on 2 L nasal cannula at 96 to 100%..     History of Present Illness  Review of Systems   Constitutional: Positive for activity change, appetite change, fatigue and fever. Negative for unexpected weight change.   HENT: Negative for congestion and trouble swallowing.    Respiratory: Positive for cough. Negative for choking and chest tightness.    Cardiovascular: Negative for chest pain, palpitations and leg swelling.   Gastrointestinal: Positive for nausea.  Negative for abdominal distention, abdominal pain, blood in stool, constipation, diarrhea and vomiting.   Genitourinary: Negative for dysuria.   Musculoskeletal: Negative for back pain.   Skin: Negative for color change.   Neurological: Negative for dizziness, weakness and light-headedness.   Hematological: Does not bruise/bleed easily.   Psychiatric/Behavioral: Negative.         Personal History     Past Medical History:   Diagnosis Date   • Breast cancer, Left     1998, 2009 metastisized to lungs   • Diabetes mellitus (CMS/HCC)     type 2    • Hyperlipidemia    • Hypothyroidism 01/2009   • Left upper pulmonary nodule 07/05/2012   • Osteoporosis 10/17/2011     Past Surgical History:   Procedure Laterality Date   • BREAST BIOPSY Left 1995   • MASTECTOMY RADICAL Left 1995   • MOUTH SURGERY  08/2015    tooth extraction      Family History   Problem Relation Age of Onset   • Tuberculosis Mother    • Diabetes Mother    • Thyroid cancer Sister    • Lung cancer Sister    • Lung cancer Brother         In his 70s.   • Bone cancer Brother         in his 70s.   • Diabetes Maternal Grandmother    • Lung cancer Brother         In his 70s.    • Bone cancer Brother         in his 70s.   • Hypertension Neg Hx    • Heart disease Neg Hx      Social History     Tobacco Use   • Smoking status: Never Smoker   • Smokeless tobacco: Never Used   Substance Use Topics   • Alcohol use: No   • Drug use: No     No current facility-administered medications on file prior to encounter.      Current Outpatient Medications on File Prior to Encounter   Medication Sig Dispense Refill   • acetaminophen (ACETAMINOPHEN 8 HOUR) 650 MG 8 hr tablet Take 1 tablet by mouth Every 8 (Eight) Hours As Needed for Mild Pain . 180 tablet 1   • atorvastatin (LIPITOR) 10 MG tablet TAKE 1 TABLET BY MOUTH AT BEDTIME  0   • calcium carbonate (OS-JEWEL) 600 MG tablet Take 600 mg by mouth Daily.     • [employer] VITAMIN D 2000 UNITS capsule TAKE 1 CAPSULE EVERY DAY  6   •  dextromethorphan-guaifenesin (ROBITUSSIN-DM)  MG/5ML liquid Take 5 mL by mouth Every 12 (Twelve) Hours. 180 mL 0   • doxycycline (MONODOX) 100 MG capsule Take 1 capsule by mouth 2 (Two) Times a Day. 20 capsule 0   • ferrous sulfate 325 (65 FE) MG tablet TAKE 1 TABLET BY MOUTH DAILY WITH BREAKFAST. 90 tablet 2   • GLIMEPIRIDE PO Take 4 mg by mouth 2 (two) times a day.     • guaiFENesin-codeine (GUAIFENESIN AC) 100-10 MG/5ML liquid Take 5 mL by mouth 4 (Four) Times a Day As Needed for Cough. 180 mL 0   • levothyroxine (SYNTHROID, LEVOTHROID) 25 MCG tablet TAKE 1 TABLET BY MOUTH EVERY DAY  2   • magnesium oxide (MAG-OX) 400 MG tablet Take 1 tablet by mouth 2 (Two) Times a Day.  3   • metFORMIN (GLUCOPHAGE) 500 MG tablet TAKE 3 TABLETS BY MOUTH EVERY MORNING, AND 2 TABLETS EVERY EVENING WITH FOOD FOR DIABETES  3   • omeprazole (priLOSEC) 20 MG capsule TAKE ONE CAPSULE BY MOUTH EVERY DAY FOR 10 DAYS AND THEN AS NEEDED DAILY  2   • ACCU-CHEK DMITRIY PLUS test strip USE TO TEST BLOOD SUGAR DAILY AS DIRECTED  2   • Denosumab (PROLIA SC) Inject  under the skin into the appropriate area as directed Every 6 (Six) Months.     • exemestane (AROMASIN) 25 MG chemo tablet TAKE 1 TABLET BY MOUTH DAILY FOR 90 DAYS. 90 tablet 1     No Known Allergies    Objective    Objective     Vital Signs  Temp:  [99.3 °F (37.4 °C)-102.2 °F (39 °C)] 99.3 °F (37.4 °C)  Heart Rate:  [50-85] 54  Resp:  [18] 18  BP: (126-166)/() 126/62  SpO2:  [84 %-100 %] 100 %  on  Flow (L/min):  [2] 2;   Device (Oxygen Therapy): nasal cannula  Body mass index is 21.76 kg/m².    Physical Exam   General: Alert and oriented x4, well-developed well-nourished, no acute distress  Head: Normocephalic, atraumatic  Eyes: PERRLA, conjunctiva pink without inflammation  ENT: Extremities moist, throat pink without inflammation  Heart: Regular rate and rhythm without murmur rub or thrill  Lungs: Clear to auscultation bilaterally without use of accessory muscles  respiration  Abdomen: Soft/nontender/nondistended.  No hepatosplenomegaly is noted.  MSK: 5/5 strength in upper/lower extremities bilaterally  Neuro: Cranial nerves II through XII grossly intact.  DTRs are 2/4 in upper/lower extremities bilaterally.  Psych: Short-term and long-term memory appear to be intact.  Does not appear anxious or depressed.      Results Review:  I reviewed the patient's new clinical results.  I reviewed the patient's new imaging results and agree with the interpretation.  I reviewed the patient's other test results and agree with the interpretation  I personally viewed and interpreted the patient's EKG/Telemetry data  Discussed with ED provider.    Lab Results (last 24 hours)     Procedure Component Value Units Date/Time    CBC & Differential [[id number]] Collected:  08/03/20 2147    Specimen:  Blood Updated:  08/03/20 2221    Narrative:       The following orders were created for panel order CBC & Differential.  Procedure                               Abnormality         Status                     ---------                               -----------         ------                     CBC Auto Differential[[id number]]        Abnormal            Final result                 Please view results for these tests on the individual orders.    Comprehensive Metabolic Panel [[id number]]  (Abnormal) Collected:  08/03/20 2147    Specimen:  Blood Updated:  08/03/20 2229     Glucose 247 mg/dL      BUN 14 mg/dL      Creatinine 0.90 mg/dL      Sodium 130 mmol/L      Potassium 4.1 mmol/L      Chloride 96 mmol/L      CO2 25.8 mmol/L      Calcium 8.9 mg/dL      Total Protein 7.4 g/dL      Albumin 3.60 g/dL      ALT (SGPT) 27 U/L      AST (SGOT) 37 U/L      Alkaline Phosphatase 137 U/L      Total Bilirubin 0.4 mg/dL      eGFR Non African Amer 61 mL/min/1.73      eGFR  African Amer 74 mL/min/1.73      Globulin 3.8 gm/dL      A/G Ratio 0.9 g/dL      BUN/Creatinine Ratio 15.6     Anion Gap 8.2 mmol/L     Narrative:    "    GFR Normal >60  Chronic Kidney Disease <60  Kidney Failure <15      Procalcitonin [[id number]]  (Normal) Collected:  08/03/20 2147    Specimen:  Blood Updated:  08/03/20 2235     Procalcitonin 0.22 ng/mL     Narrative:       As a Marker for Sepsis (Non-Neonates):   1. <0.5 ng/mL represents a low risk of severe sepsis and/or septic shock.  1. >2 ng/mL represents a high risk of severe sepsis and/or septic shock.    As a Marker for Lower Respiratory Tract Infections that require antibiotic therapy:  PCT on Admission     Antibiotic Therapy             6-12 Hrs later  > 0.5                Strongly Recommended            >0.25 - <0.5         Recommended  0.1 - 0.25           Discouraged                   Remeasure/reassess PCT  <0.1                 Strongly Discouraged          Remeasure/reassess PCT      As 28 day mortality risk marker: \"Change in Procalcitonin Result\" (> 80 % or <=80 %) if Day 0 (or Day 1) and Day 4 values are available. Refer to http://www.[facility]-pct-calculator.com/   Change in PCT <=80 %   A decrease of PCT levels below or equal to 80 % defines a positive change in PCT test result representing a higher risk for 28-day all-cause mortality of patients diagnosed with severe sepsis or septic shock.  Change in PCT > 80 %   A decrease of PCT levels of more than 80 % defines a negative change in PCT result representing a lower risk for 28-day all-cause mortality of patients diagnosed with severe sepsis or septic shock.                Results may be falsely decreased if patient taking Biotin.     CBC Auto Differential [[id number]]  (Abnormal) Collected:  08/03/20 2147    Specimen:  Blood Updated:  08/03/20 2221     WBC 7.01 10*3/mm3      RBC 3.49 10*6/mm3      Hemoglobin 11.1 g/dL      Hematocrit 32.3 %      MCV 92.6 fL      MCH 31.8 pg      MCHC 34.4 g/dL      RDW 12.5 %      RDW-SD 42.5 fl      MPV 8.9 fL      Platelets 222 10*3/mm3      Neutrophil % 84.1 %      Lymphocyte % 12.4 %      Monocyte % 3.3 %   "     Eosinophil % 0.0 %      Basophil % 0.1 %      Immature Grans % 0.1 %      Neutrophils, Absolute 5.89 10*3/mm3      Lymphocytes, Absolute 0.87 10*3/mm3      Monocytes, Absolute 0.23 10*3/mm3      Eosinophils, Absolute 0.00 10*3/mm3      Basophils, Absolute 0.01 10*3/mm3      Immature Grans, Absolute 0.01 10*3/mm3      nRBC 0.0 /100 WBC     Hemoglobin A1c [[id number]]  (Abnormal) Collected:  08/03/20 2147    Specimen:  Blood Updated:  08/04/20 0425     Hemoglobin A1C 9.10 %     Narrative:       Hemoglobin A1C Ranges:    Increased Risk for Diabetes  5.7% to 6.4%  Diabetes                     >= 6.5%  Diabetic Goal                < 7.0%    Lactic Acid, Plasma [[id number]]  (Normal) Collected:  08/03/20 2210    Specimen:  Blood Updated:  08/03/20 2242     Lactate 1.4 mmol/L     Respiratory Panel PCR w/COVID-19(SARS-CoV-2) SIRISHA/ANGELITA/GAMAL In-House, NP Swab in [facility], 8-12 Hr TAT - Swab, Nasopharynx [[id number]]  (Abnormal) Collected:  08/03/20 2210    Specimen:  Swab from Nasopharynx Updated:  08/03/20 2316     ADENOVIRUS, PCR Not Detected     Coronavirus 229E Not Detected     Coronavirus HKU1 Not Detected     Coronavirus NL63 Not Detected     Coronavirus OC43 Not Detected     COVID19 Detected     Human Metapneumovirus Not Detected     Human Rhinovirus/Enterovirus Not Detected     Influenza A PCR Not Detected     Influenza A H1 Not Detected     Influenza A H1 2009 PCR Not Detected     Influenza A H3 Not Detected     Influenza B PCR Not Detected     Parainfluenza Virus 1 Not Detected     Parainfluenza Virus 2 Not Detected     Parainfluenza Virus 3 Not Detected     Parainfluenza Virus 4 Not Detected     RSV, PCR Not Detected     Bordetella pertussis pcr Not Detected     Bordetella parapertussis PCR Not Detected     Chlamydophila pneumoniae PCR Not Detected     Mycoplasma pneumo by PCR Not Detected    Narrative:       Fact sheet for providers:  https://docs.[employer]/wp-content/uploads/ILP4083-5905-SX6.1-EUA-Provider-Fact-Sheet-3.pdf    Fact sheet for patients: https://docs.[employer]/wp-content/uploads/PUI1439-0247-HZ7.1-EUA-Patient-Fact-Sheet-1.pdf    Urinalysis With Microscopic If Indicated (No Culture) - Urine, Clean Catch [[id number]]  (Abnormal) Collected:  08/03/20 2340    Specimen:  Urine, Clean Catch Updated:  08/04/20 0008     Color, UA Yellow     Appearance, UA Clear     pH, UA 6.0     Specific Gravity, UA 1.022     Glucose,  mg/dL (2+)     Ketones, UA 15 mg/dL (1+)     Bilirubin, UA Negative     Blood, UA Negative     Protein, UA 30 mg/dL (1+)     Leuk Esterase, UA Negative     Nitrite, UA Negative     Urobilinogen, UA 0.2 E.U./dL    Urinalysis, Microscopic Only - Urine, Clean Catch [[id number]]  (Abnormal) Collected:  08/03/20 2340    Specimen:  Urine, Clean Catch Updated:  08/04/20 0008     RBC, UA 3-5 /HPF      WBC, UA 0-2 /HPF      Bacteria, UA None Seen /HPF      Squamous Epithelial Cells, UA 0-2 /HPF      Hyaline Casts, UA 0-2 /LPF      Methodology Automated Microscopy    POC Glucose Once [[id number]]  (Abnormal) Collected:  08/04/20 0626    Specimen:  Blood Updated:  08/04/20 0628     Glucose 229 mg/dL       Reviewed lab work.    Imaging Results (Last 24 Hours)     Procedure Component Value Units Date/Time    XR Chest 1 View [[id number]] Collected:  08/03/20 2310     Updated:  08/03/20 2314    Narrative:       PORTABLE CHEST 08/03/2020 AT 10:08 PM     CLINICAL HISTORY: Cough. Fever. Evaluate for COVID 19.     Compared to the previous chest dated 07/28/2020.     The lungs are fairly well-expanded and appear free of acute infiltrates.  There are no pleural effusions. The cardiomediastinal silhouette is  unremarkable.     IMPRESSIONS: No evidence of acute disease within the chest.     This report was finalized on 8/3/2020 11:11 PM by Dr. Juvenal Otto M.D.           Reviewed chest x-ray myself, agree with assessment.       No  orders to display        Assessment/Plan     Active Hospital Problems    Diagnosis  POA   • **Acute bronchitis due to COVID-19 virus [U07.1, J20.8]  Yes   • Diabetes mellitus (CMS/HCC) [E11.9]  Yes   • Hyperlipidemia [E78.5]  Yes   • Hyponatremia [E87.1]  Yes   • Immunocompromised (CMS/HCC) [D89.9]  Yes   • Acute respiratory failure with hypoxia (CMS/HCC) [J96.01]  Unknown   • Malignant neoplasm metastatic to left lung (CMS/HCC) [C78.02]  Yes   • Iron deficiency anemia [D50.9]  Yes   • Malignant neoplasm of female breast (CMS/HCC) [C50.919]  Yes   • Hypothyroidism [E03.9]  Yes      Resolved Hospital Problems   No resolved problems to display.       Ms. Reese is a 74 y.o. non-smoker with a history of breast cancer with metastasis to the left lung that presents with COVID-19 bronchitis.  Chest x-ray is without acute infiltrates.    COVID-19 bronchitis  · Supportive care with antiemetics and antipyretics  · She is hypoxic on admission and at high risk for decompensation.  Start dexamethasone 6 mg p.o. daily x10 days.  If supplemental oxygen needs increase further we will consider ID consult for assistance  · Check inflammatory markers and trend COVID-19 progression labs  · Monitor patient closely given she is high risk with history of metastatic breast cancer and lung nodule    Hyponatremia, mild   ·  yesterday evening (corrected for elevated glucose) .  Repeat CMP today is pending.  If low suspect secondary to dehydration/ anorexia requiring supportive IVF but cautious to avoid volume overload     Metastatic breast cancer  · Treatment with Aromasin.  Followed by CBC group    Iron deficiency anemia, mild  · Continue ferrous sulfate  · hemoglobin stable    DM2 with hyperglycemia/hypothyroidism  · Hold oral agents.  Correctional Humalog.  · Hgb A1c 9%  · Continue thyroid replacement       · I discussed the patient's findings and my recommendations with patient and nursing staff.    VTE Prophylaxis - Lovenox 40 mg  SC daily.  Code Status - Full code.    We will continue with dexamethasone.  If patient is stable and off oxygen by tomorrow, and her inflammatory markers are stable, she could possibly be discharged home.   If her markers worsen, would consult infectious disease.    KENDELL Neely  [facility]    Addendum: I participated in care of this patient and actually wrote this note with contributions from KENDELL Neely.  I developed the assessment and plan and wrote the subjective and reviewed the past medical, surgical, social, family, medications, allergies.  Also reviewed review of systems.  I wrote the physical exam and performed myself.  We will continue to follow.    Shar Richardson DO

## 2020-08-05 ENCOUNTER — TELEPHONE (OUTPATIENT)
Dept: ONCOLOGY | Facility: CLINIC | Age: 75
End: 2020-08-05

## 2020-08-05 LAB
ALBUMIN SERPL-MCNC: 3.4 G/DL (ref 3.5–5.2)
ALBUMIN/GLOB SERPL: 0.9 G/DL
ALP SERPL-CCNC: 150 U/L (ref 39–117)
ALT SERPL W P-5'-P-CCNC: 34 U/L (ref 1–33)
ANION GAP SERPL CALCULATED.3IONS-SCNC: 11.1 MMOL/L (ref 5–15)
AST SERPL-CCNC: 50 U/L (ref 1–32)
BASOPHILS # BLD AUTO: 0.01 10*3/MM3 (ref 0–0.2)
BASOPHILS NFR BLD AUTO: 0.1 % (ref 0–1.5)
BILIRUB SERPL-MCNC: 0.5 MG/DL (ref 0–1.2)
BUN SERPL-MCNC: 18 MG/DL (ref 8–23)
BUN/CREAT SERPL: 23.4 (ref 7–25)
CALCIUM SPEC-SCNC: 9 MG/DL (ref 8.6–10.5)
CHLORIDE SERPL-SCNC: 100 MMOL/L (ref 98–107)
CK SERPL-CCNC: 115 U/L (ref 20–180)
CO2 SERPL-SCNC: 21.9 MMOL/L (ref 22–29)
CREAT SERPL-MCNC: 0.77 MG/DL (ref 0.57–1)
CRP SERPL-MCNC: 13.51 MG/DL (ref 0–0.5)
D DIMER PPP FEU-MCNC: 0.33 MCGFEU/ML (ref 0–0.49)
DEPRECATED RDW RBC AUTO: 40.3 FL (ref 37–54)
EOSINOPHIL # BLD AUTO: 0 10*3/MM3 (ref 0–0.4)
EOSINOPHIL NFR BLD AUTO: 0 % (ref 0.3–6.2)
ERYTHROCYTE [DISTWIDTH] IN BLOOD BY AUTOMATED COUNT: 12.1 % (ref 12.3–15.4)
FERRITIN SERPL-MCNC: 1069 NG/ML (ref 13–150)
FIBRINOGEN PPP-MCNC: 671 MG/DL (ref 219–464)
GFR SERPL CREATININE-BSD FRML MDRD: 73 ML/MIN/1.73
GFR SERPL CREATININE-BSD FRML MDRD: 89 ML/MIN/1.73
GLOBULIN UR ELPH-MCNC: 3.9 GM/DL
GLUCOSE BLDC GLUCOMTR-MCNC: 246 MG/DL (ref 70–130)
GLUCOSE BLDC GLUCOMTR-MCNC: 256 MG/DL (ref 70–130)
GLUCOSE BLDC GLUCOMTR-MCNC: 339 MG/DL (ref 70–130)
GLUCOSE BLDC GLUCOMTR-MCNC: 409 MG/DL (ref 70–130)
GLUCOSE BLDC GLUCOMTR-MCNC: 457 MG/DL (ref 70–130)
GLUCOSE SERPL-MCNC: 284 MG/DL (ref 65–99)
HCT VFR BLD AUTO: 33.4 % (ref 34–46.6)
HGB BLD-MCNC: 11.6 G/DL (ref 12–15.9)
IMM GRANULOCYTES # BLD AUTO: 0.06 10*3/MM3 (ref 0–0.05)
IMM GRANULOCYTES NFR BLD AUTO: 0.7 % (ref 0–0.5)
LDH SERPL-CCNC: 341 U/L (ref 135–214)
LYMPHOCYTES # BLD AUTO: 0.74 10*3/MM3 (ref 0.7–3.1)
LYMPHOCYTES NFR BLD AUTO: 8.7 % (ref 19.6–45.3)
MCH RBC QN AUTO: 31.7 PG (ref 26.6–33)
MCHC RBC AUTO-ENTMCNC: 34.7 G/DL (ref 31.5–35.7)
MCV RBC AUTO: 91.3 FL (ref 79–97)
MONOCYTES # BLD AUTO: 0.24 10*3/MM3 (ref 0.1–0.9)
MONOCYTES NFR BLD AUTO: 2.8 % (ref 5–12)
NEUTROPHILS NFR BLD AUTO: 7.44 10*3/MM3 (ref 1.7–7)
NEUTROPHILS NFR BLD AUTO: 87.7 % (ref 42.7–76)
NRBC BLD AUTO-RTO: 0 /100 WBC (ref 0–0.2)
PLATELET # BLD AUTO: 224 10*3/MM3 (ref 140–450)
PMV BLD AUTO: 8.9 FL (ref 6–12)
POTASSIUM SERPL-SCNC: 4.4 MMOL/L (ref 3.5–5.2)
PROT SERPL-MCNC: 7.3 G/DL (ref 6–8.5)
RBC # BLD AUTO: 3.66 10*6/MM3 (ref 3.77–5.28)
SODIUM SERPL-SCNC: 133 MMOL/L (ref 136–145)
WBC # BLD AUTO: 8.49 10*3/MM3 (ref 3.4–10.8)

## 2020-08-05 PROCEDURE — 82728 ASSAY OF FERRITIN: CPT | Performed by: NURSE PRACTITIONER

## 2020-08-05 PROCEDURE — 63710000001 INSULIN LISPRO (HUMAN) PER 5 UNITS: Performed by: NURSE PRACTITIONER

## 2020-08-05 PROCEDURE — 85379 FIBRIN DEGRADATION QUANT: CPT | Performed by: NURSE PRACTITIONER

## 2020-08-05 PROCEDURE — 63710000001 INSULIN GLARGINE PER 5 UNITS: Performed by: NURSE PRACTITIONER

## 2020-08-05 PROCEDURE — 82962 GLUCOSE BLOOD TEST: CPT

## 2020-08-05 PROCEDURE — 85025 COMPLETE CBC W/AUTO DIFF WBC: CPT | Performed by: NURSE PRACTITIONER

## 2020-08-05 PROCEDURE — 80053 COMPREHEN METABOLIC PANEL: CPT | Performed by: NURSE PRACTITIONER

## 2020-08-05 PROCEDURE — 82550 ASSAY OF CK (CPK): CPT | Performed by: NURSE PRACTITIONER

## 2020-08-05 PROCEDURE — 83615 LACTATE (LD) (LDH) ENZYME: CPT | Performed by: NURSE PRACTITIONER

## 2020-08-05 PROCEDURE — 99223 1ST HOSP IP/OBS HIGH 75: CPT | Performed by: INTERNAL MEDICINE

## 2020-08-05 PROCEDURE — 63710000001 DEXAMETHASONE PER 0.25 MG: Performed by: INTERNAL MEDICINE

## 2020-08-05 PROCEDURE — 85384 FIBRINOGEN ACTIVITY: CPT | Performed by: NURSE PRACTITIONER

## 2020-08-05 PROCEDURE — 25010000002 ENOXAPARIN PER 10 MG: Performed by: NURSE PRACTITIONER

## 2020-08-05 PROCEDURE — 86140 C-REACTIVE PROTEIN: CPT | Performed by: NURSE PRACTITIONER

## 2020-08-05 RX ORDER — INSULIN GLARGINE 100 [IU]/ML
10 INJECTION, SOLUTION SUBCUTANEOUS NIGHTLY
Status: DISCONTINUED | OUTPATIENT
Start: 2020-08-05 | End: 2020-08-06

## 2020-08-05 RX ORDER — MELATONIN
2000 DAILY
Status: DISCONTINUED | OUTPATIENT
Start: 2020-08-05 | End: 2020-08-07 | Stop reason: HOSPADM

## 2020-08-05 RX ADMIN — DEXAMETHASONE 6 MG: 4 TABLET ORAL at 10:09

## 2020-08-05 RX ADMIN — INSULIN GLARGINE 10 UNITS: 100 INJECTION, SOLUTION SUBCUTANEOUS at 21:10

## 2020-08-05 RX ADMIN — ATORVASTATIN CALCIUM 10 MG: 20 TABLET, FILM COATED ORAL at 10:10

## 2020-08-05 RX ADMIN — LEVOTHYROXINE SODIUM 25 MCG: 25 TABLET ORAL at 10:10

## 2020-08-05 RX ADMIN — Medication 400 MG: at 20:46

## 2020-08-05 RX ADMIN — PANTOPRAZOLE SODIUM 40 MG: 40 TABLET, DELAYED RELEASE ORAL at 10:09

## 2020-08-05 RX ADMIN — INSULIN LISPRO 9 UNITS: 100 INJECTION, SOLUTION INTRAVENOUS; SUBCUTANEOUS at 13:12

## 2020-08-05 RX ADMIN — FERROUS SULFATE TAB 325 MG (65 MG ELEMENTAL FE) 325 MG: 325 (65 FE) TAB at 10:09

## 2020-08-05 RX ADMIN — Medication 400 MG: at 10:10

## 2020-08-05 RX ADMIN — INSULIN LISPRO 12 UNITS: 100 INJECTION, SOLUTION INTRAVENOUS; SUBCUTANEOUS at 17:30

## 2020-08-05 RX ADMIN — CALCIUM 500 MG: 500 TABLET ORAL at 10:09

## 2020-08-05 RX ADMIN — Medication 2000 UNITS: at 13:12

## 2020-08-05 RX ADMIN — ENOXAPARIN SODIUM 40 MG: 40 INJECTION SUBCUTANEOUS at 10:10

## 2020-08-05 RX ADMIN — INSULIN LISPRO 4 UNITS: 100 INJECTION, SOLUTION INTRAVENOUS; SUBCUTANEOUS at 10:09

## 2020-08-05 NOTE — PROGRESS NOTES
Discharge Planning Assessment  [facility]     Patient Name: Brie Reese  MRN: [id number]  Today's Date: 8/5/2020    Admit Date: 8/3/2020    Discharge Needs Assessment     Row Name 08/05/20 1004       Living Environment    Lives With  alone    Current Living Arrangements  home/apartment/condo    Primary Care Provided by  self    Provides Primary Care For  no one    Family Caregiver if Needed  other relative(s)    Family Caregiver Names  Nephew, Pelon Stanton, [phone]    Quality of Family Relationships  helpful;involved;supportive    Able to Return to Prior Arrangements  yes       Resource/Environmental Concerns    Resource/Environmental Concerns  none       Transition Planning    Patient/Family Anticipates Transition to  home    Patient/Family Anticipated Services at Transition  none    Transportation Anticipated  family or friend will provide       Discharge Needs Assessment    Concerns to be Addressed  no discharge needs identified;denies needs/concerns at this time    Equipment Currently Used at Home  none    Discharge Coordination/Progress  Home        Discharge Plan     Row Name 08/05/20 1005       Plan    Plan  Plans home, follow for needs    Patient/Family in Agreement with Plan  yes    Plan Comments  IMM letter checked. CCP spoke with pt via [facility] room phone due to isolation precautions. Pt resides alone in a single level home with three exterior steps, uses no DME, and has no h/o home health or sub-acute rehab. Pt is IADLs and plans to return home at d/c with her nephew transporting/assisting if needed. Pt uses [facility] pharmacy [place] but is agreeable to use [facility] pharmacy for any d/c prescriptions (updated in EPIC). Pt denies additional known needs at this time. CCP to follow. Nancy Arndt LCSW        Destination      Coordination has not been started for this encounter.      Durable Medical Equipment      Coordination has not been started for this encounter.      Dialysis/Infusion       Coordination has not been started for this encounter.      Home Medical Care      Coordination has not been started for this encounter.      Therapy      Coordination has not been started for this encounter.      Community Resources      Coordination has not been started for this encounter.          Demographic Summary     Row Name 08/05/20 1003       General Information    Admission Type  inpatient    Arrived From  home    Required Notices Provided  Important Message from Medicare    Referral Source  admission list    Reason for Consult  discharge planning    Preferred Language  English        Functional Status     Row Name 08/05/20 1004       Functional Status    Usual Activity Tolerance  good    Current Activity Tolerance  good       Functional Status, IADL    Medications  independent    Meal Preparation  independent    Housekeeping  independent    Laundry  independent    Shopping  independent       Mental Status Summary    Recent Changes in Mental Status/Cognitive Functioning  no changes        Psychosocial    No documentation.       Abuse/Neglect    No documentation.       Legal    No documentation.       Substance Abuse    No documentation.       Patient Forms    No documentation.           Ronel Arndt LCSW

## 2020-08-05 NOTE — PLAN OF CARE
Problem: Patient Care Overview  Goal: Plan of Care Review  Outcome: Ongoing (interventions implemented as appropriate)  Flowsheets  Taken 8/4/2020 1742 by Shireen Varghese RN  Progress: no change  Taken 8/5/2020 0202 by Divya James RN  Plan of Care Reviewed With: patient  Taken 8/5/2020 0457 by Divya James RN  Outcome Summary: pt alert and oriented, pt is pleasent, up ad maribell and steady gait, pt denies pain and no complaints of SOA. Pt stable on 2LNC and no fever noted at this time. Will cont to monitor

## 2020-08-05 NOTE — TELEPHONE ENCOUNTER
Pt's nephew called today to let us know that pt is admitted at [facility] with COVID-19. Her nephew was wondering if anyone from our office would see her while she is there. I advised him that he would have to speak with the attending MD about this. He asked if Dr. Castillo would look at her recent chest x-rays to see if you have anything to add. Advised him I would send him a message to see if he can look at them and if he has anything to add. He v/u.

## 2020-08-05 NOTE — DISCHARGE PLACEMENT REQUEST
"Miguel Reese (74 y.o. Female)     Date of Birth Social Security Number Address Home Phone MRN    1945  [address] [address] [phone] [id number]    Orthodoxy Marital Status          [place]ai        Admission Date Admission Type Admitting Provider Attending Provider Department, Room/Bed    8/3/20 Emergency Shar Richardson DO Hinsberg, Francis J, DO [address], [address]    Discharge Date Discharge Disposition Discharge Destination                       Attending Provider:  Shar Richardson DO    Allergies:  No Known Allergies    Isolation:  Enh Drop/Con   Infection:  COVID (confirmed) (08/03/20)   Code Status:  CPR    Ht:  152.4 cm (60\")   Wt:  50.5 kg (111 lb 6.4 oz)    Admission Cmt:  None   Principal Problem:  Acute bronchitis due to COVID-19 virus [U07.1,J20.8]                 Active Insurance as of 8/3/2020     Primary Coverage     Payor Plan Insurance Group Employer/Plan Group    MEDICARE MEDICARE A & B      Payor Plan Address Payor Plan Phone Number Payor Plan Fax Number Effective Dates    [address] [phone]  11/1/2011 - None Entered    [facility] [address]       Subscriber Name Subscriber Birth Date Member ID       MIGUEL REESE 1945 [id number]           Secondary Coverage     Payor Plan Insurance Group Employer/Plan Group    [employer]ET[employer] COMMERCIAL AETNA [id number]     Payor Plan Address Payor Plan Phone Number Payor Plan Fax Number Effective Dates    [address] [phone]  1/1/2014 - None Entered    [facility] [address]       Subscriber Name Subscriber Birth Date Member ID       MIGUEL REESE 1945 [id number]                 Emergency Contacts      (Rel.) Home Phone Work Phone Mobile Phone    Pelon Stanton (Relative) [phone] -- [phone]              "

## 2020-08-05 NOTE — TELEPHONE ENCOUNTER
Pelon Stanton, patient's nephew, calling.    He would like to speak to Dr. Castillo about patient.  She is positive for COVID-19.  She is admitted to [facility]. There are more spots in lungs.  Hospital wanted Dr. Castillo to see xray.    He would like to speak to Dr. Castillo.    He said he is her POA. This is filed in the patient chart. There is no verbal release on file for the office.    [phone]

## 2020-08-05 NOTE — CONSULTS
Referring Provider: Juanito Castro MD  [address] [address]  Reason for Consultation: COVID infection    Subjective   History of present illness: This is a 74-year-old female with type 2 diabetes, hypothyroidism, and history of breast cancer who was admitted on August 3 with worsening fever and cough.  Patient presented to the emergency room on July 28 with complaints of fever, cough, shortness of breath, and myalgias.  COVID was suspected and testing was obtained but came back negative.  She was discharged from the emergency room and given a prescription for doxycycline 100 mg p.o. twice daily.  She continued to have fevers and a dry cough.  She states she has mostly dyspnea on exertion.  She also reports a headache.  Denies abdominal pain nausea vomiting or diarrhea.  No sore throat.  Repeat COVID testing came back positive.  Chest x-ray was negative for pneumonia.  Patient's T-max during the hospitalization has been 102.2 her T-max in the last 24 hours was 100.1.  She has remained on 2 L of oxygen via nasal cannula.  She was started on dexamethasone.  Currently the patient is on 2 L oxygen via nasal cannula but is satting 94% on room air for me.  She states she feels a lot better with resolving fatigue.  She reports decrease in her dry cough.  Denies a sore throat.  Reports dyspnea on exertion when she gets up to go to the bathroom.  Denies any diarrhea nausea vomiting.    Past Medical History:   Diagnosis Date   • Breast cancer, Left     1998, 2009 metastisized to lungs   • Diabetes mellitus (CMS/HCC)     type 2    • Hyperlipidemia    • Hypothyroidism 01/2009   • Osteoporosis 10/17/2011       Past Surgical History:   Procedure Laterality Date   • BREAST BIOPSY Left 1995   • MASTECTOMY RADICAL Left 1995   • MOUTH SURGERY  08/2015    tooth extraction         reports that she has never smoked. She has never used smokeless tobacco. She reports that she does not drink alcohol or use  drugs.    family history includes Bone cancer in her brother and brother; Diabetes in her maternal grandmother and mother; Lung cancer in her brother, brother, and sister; Thyroid cancer in her sister; Tuberculosis in her mother.    No Known Allergies    Medication:  Antibiotics:  None    Please refer to the medical record for a full medication list    Review of Systems  Pertinent items are noted in HPI, all other systems reviewed and negative    Objective   Vital Signs   Temp:  [97.6 °F (36.4 °C)-99.6 °F (37.6 °C)] 98.7 °F (37.1 °C)  Heart Rate:  [51-66] 55  Resp:  [16-18] 16  BP: (136-162)/(55-75) 147/60  98% on 2L NC    Physical Exam:   General: In no acute distress  HEENT: Normocephalic, atraumatic, PERRL, EOMI, no scleral icterus. Oropharynx is clear and moist  Neck: Supple, trachea is midline  Cardiovascular: Normal rate, regular rhythm, jeronimo S1 and S2, no murmurs, rubs, or gallops    Respiratory: Lungs are cleat to ascultation bilaterally, no wheezing   GI: Abdomen is soft, non-tender, non-distended, positive bowel sounds bilaterally, no masses  Musculoskeletal: Normal range of motion, no edema, tenderness or deformity  Skin: No rashes or lesions  Extremities: no E/C/C  Neurological: Alert and oriented, cranial nerves 2-12 intact, motor strength 5/5 in all four extremities  Psychiatric: Normal mood and affect     Results Review:   I reviewed the patient's new clinical results.  I reviewed the patient's new imaging results and agree with the interpretation.    Lab Results   Component Value Date    WBC 8.49 08/05/2020    HGB 11.6 (L) 08/05/2020    HCT 33.4 (L) 08/05/2020    MCV 91.3 08/05/2020     08/05/2020       Lab Results   Component Value Date    GLUCOSE 284 (H) 08/05/2020    BUN 18 08/05/2020    CREATININE 0.77 08/05/2020    EGFRIFNONA 73 08/05/2020    EGFRIFAFRI 89 08/05/2020    BCR 23.4 08/05/2020    CO2 21.9 (L) 08/05/2020    CALCIUM 9.0 08/05/2020    ALBUMIN 3.40 (L) 08/05/2020    AST 50 (H)  08/05/2020    ALT 34 (H) 08/05/2020     Ferritin 791 -> 1069  CRP 13.51  Procalcitonin 0.22    Microbiology:  8/3 RVP/COVID positive    Radiology:  Admission chest x-ray personally reviewed by me shows no evidence of infiltrate pleural effusion pneumothorax.    Assessment/Plan   COVID infection  Type 2 diabetes complicating above  History of breast cancer    Dexamethasone has been initiated and I would continue dexamethasone 6 mg p.o. daily x10 days.  The patient states she feels better and is on minimal oxygen and satting 94% on room air.  Therefore I do not think she is a candidate for additional experimental therapy at this time.  We will continue to monitor.    She does not have any signs or symptoms of a bacterial infection and there is no indication for antibiotics.    I discussed the patients findings and my recommendations with patient and nursing staff

## 2020-08-05 NOTE — PROGRESS NOTES
Patient seen and H&P completed by myself followed by Dr. Richardson on 8/4/2020 at 9am.     LHA  Electronically signed by KENDELL Shipman

## 2020-08-05 NOTE — PROGRESS NOTES
Name: Brie Reese ADMIT: 8/3/2020   : 1945  PCP: Sussy Payne MD    MRN: [id number] LOS: 1 days   AGE/SEX: 74 y.o. female  ROOM: [facility]     Subjective   Subjective   She had a mild upset stomach this morning after breakfast.  Mild shortness of breath and mild cough with deep inspiration. Overall tired/ weak but ok.  Denies fever, chills, nausea, vomiting, diarrhea, chest pain    Review of Systems     Objective   Objective   Vital Signs  Temp:  [97.6 °F (36.4 °C)-99.6 °F (37.6 °C)] 98.7 °F (37.1 °C)  Heart Rate:  [51-66] 55  Resp:  [16-18] 16  BP: (136-162)/(55-75) 147/60  SpO2:  [93 %-100 %] 98 %  on  Flow (L/min):  [2] 2;   Device (Oxygen Therapy): nasal cannula  Body mass index is 21.76 kg/m².  Physical Exam   Constitutional: She is oriented to person, place, and time. She appears well-developed and well-nourished. No distress.   HENT:   Head: Normocephalic and atraumatic.   Eyes: Conjunctivae and EOM are normal.   Neck: Normal range of motion.   Cardiovascular: Normal rate, regular rhythm and normal heart sounds.   Pulmonary/Chest: Effort normal. No respiratory distress.   Decreased breath sounds and cough illicited with deep breathe   Abdominal: Soft. Bowel sounds are normal. She exhibits no distension. There is no tenderness.   Musculoskeletal: She exhibits no edema.   Neurological: She is alert and oriented to person, place, and time. No cranial nerve deficit.   Skin: Skin is warm and dry.   Psychiatric: She has a normal mood and affect. Her behavior is normal. Judgment and thought content normal.   Nursing note and vitals reviewed.      Results Review:       I reviewed the patient's new clinical results.  Results from last 7 days   Lab Units 20  0620  0928 20  2147   WBC 10*3/mm3 8.49 7.50 7.01   HEMOGLOBIN g/dL 11.6* 10.5* 11.1*   PLATELETS 10*3/mm3 224 195 222     Results from last 7 days   Lab Units 20  0605 20  0928 20  2147   SODIUM mmol/L 133* 130*  130*   POTASSIUM mmol/L 4.4 4.0 4.1   CHLORIDE mmol/L 100 99 96*   CO2 mmol/L 21.9* 22.8 25.8   BUN mg/dL 18 15 14   CREATININE mg/dL 0.77 0.87 0.90   GLUCOSE mg/dL 284* 220* 247*   Estimated Creatinine Clearance: 49.9 mL/min (by C-G formula based on SCr of 0.77 mg/dL).  Results from last 7 days   Lab Units 08/05/20  0605 08/04/20  0928 08/03/20  2147   ALBUMIN g/dL 3.40* 3.10* 3.60   BILIRUBIN mg/dL 0.5 0.4 0.4   ALK PHOS U/L 150* 126* 137*   AST (SGOT) U/L 50* 35* 37*   ALT (SGPT) U/L 34* 23 27     Results from last 7 days   Lab Units 08/05/20  0605 08/04/20  0928 08/03/20  2147   CALCIUM mg/dL 9.0 8.7 8.9   ALBUMIN g/dL 3.40* 3.10* 3.60     Results from last 7 days   Lab Units 08/03/20  2210 08/03/20  2147   PROCALCITONIN ng/mL  --  0.22   LACTATE mmol/L 1.4  --      Results from last 7 days   Lab Units 08/03/20  2210   COVID19  Detected*       Hemoglobin A1C   Date/Time Value Ref Range Status   08/03/2020 2147 9.10 (H) 4.80 - 5.60 % Final     Glucose   Date/Time Value Ref Range Status   08/05/2020 1133 409 (C) 70 - 130 mg/dL Final   08/05/2020 1131 457 (C) 70 - 130 mg/dL Final   08/05/2020 0614 256 (H) 70 - 130 mg/dL Final   08/04/2020 2035 194 (H) 70 - 130 mg/dL Final   08/04/2020 1719 159 (H) 70 - 130 mg/dL Final   08/04/2020 1117 179 (H) 70 - 130 mg/dL Final   08/04/2020 0626 229 (H) 70 - 130 mg/dL Final       XR Chest 1 View  PORTABLE CHEST 08/03/2020 AT 10:08 PM     CLINICAL HISTORY: Cough. Fever. Evaluate for COVID 19.     Compared to the previous chest dated 07/28/2020.     The lungs are fairly well-expanded and appear free of acute infiltrates.  There are no pleural effusions. The cardiomediastinal silhouette is  unremarkable.     IMPRESSIONS: No evidence of acute disease within the chest.     This report was finalized on 8/3/2020 11:11 PM by Dr. Juvenal Otto M.D.           atorvastatin 10 mg Oral Daily   calcium carbonate (oyster shell) 500 mg Oral Daily   cholecalciferol 2,000 Units Oral Daily      dexamethasone 6 mg Oral Daily With Breakfast   enoxaparin 40 mg Subcutaneous Q24H   ferrous sulfate 325 mg Oral Daily With Breakfast   insulin lispro 0-9 Units Subcutaneous TID AC   levothyroxine 25 mcg Oral Daily   magnesium oxide 400 mg Oral BID   pantoprazole 40 mg Oral QAM      Diet Regular; Consistent Carbohydrate       Assessment/Plan     Active Hospital Problems    Diagnosis  POA   • **Acute bronchitis due to COVID-19 virus [U07.1, J20.8]  Yes   • Diabetes mellitus (CMS/HCC) [E11.9]  Yes   • Hyperlipidemia [E78.5]  Yes   • Hyponatremia [E87.1]  Yes   • Immunocompromised (CMS/HCC) [D89.9]  Yes   • Acute respiratory failure with hypoxia (CMS/HCC) [J96.01]  Unknown   • Malignant neoplasm metastatic to left lung (CMS/HCC) [C78.02]  Yes   • Iron deficiency anemia [D50.9]  Yes   • Malignant neoplasm of female breast (CMS/HCC) [C50.919]  Yes   • Hypothyroidism [E03.9]  Yes      Resolved Hospital Problems   No resolved problems to display.       74 y.o. female admitted with Acute bronchitis due to COVID-19 virus.    COVID-19 bronchitis  · Supportive care with antiemetics and antipyretics  · On dexamethasone 6 mg p.o. daily x10 days.   · Inflammatory markers elevated. D/w Dr. Richardson and recommends ID consult.   · Trend COVID-19 progression labs  · Monitor patient closely given she is high risk with history of metastatic breast cancer and lung nodule    DM2 with hyperglycemia/hypothyroidism  · Glucose elevated with steroids.  Correctional Humalog but change to moderate high dose and add lantus nightly  · Hgb A1c 9%  · Continue thyroid replacement      Hyponatremia, mild- resolved. NA improved.        Metastatic breast cancer  · Treatment with Aromasin.  Followed by [facility] group     Iron deficiency anemia, mild  · Continue ferrous sulfate  · hemoglobin stable  ·     · Lovenox 40 mg SC daily for DVT prophylaxis.  · Full code.  · Discussed with patient, family and nursing staff. Dr. Richardson   · Anticipate discharge  TBD    Greater than 50% of time spent in counseling and/or coordination of care. Total face/floor time 35 minutes.  '    KENDELL Shipman  [facility]  08/05/20  14:09    Patient was placed in face mask on first look.  I wore protective equipment throughout this patient encounter including a face mask, gloves and protective eyewear.  Hand hygiene was performed before donning protective equipment and after removal when leaving the room.

## 2020-08-05 NOTE — PLAN OF CARE
Patient relaxing and doing well. ID consulted. No falls. A/ox4 though confused at times. DC tmrw possibly. BM and urinating well. Oral intake moderate. Steroids PO given daily/ Will CTM

## 2020-08-06 ENCOUNTER — TELEPHONE (OUTPATIENT)
Dept: ONCOLOGY | Facility: HOSPITAL | Age: 75
End: 2020-08-06

## 2020-08-06 LAB
ALBUMIN SERPL-MCNC: 3.2 G/DL (ref 3.5–5.2)
ALBUMIN/GLOB SERPL: 0.8 G/DL
ALP SERPL-CCNC: 141 U/L (ref 39–117)
ALT SERPL W P-5'-P-CCNC: 37 U/L (ref 1–33)
ANION GAP SERPL CALCULATED.3IONS-SCNC: 12.1 MMOL/L (ref 5–15)
AST SERPL-CCNC: 38 U/L (ref 1–32)
BASOPHILS # BLD AUTO: 0 10*3/MM3 (ref 0–0.2)
BASOPHILS NFR BLD AUTO: 0 % (ref 0–1.5)
BILIRUB SERPL-MCNC: 0.4 MG/DL (ref 0–1.2)
BUN SERPL-MCNC: 22 MG/DL (ref 8–23)
BUN/CREAT SERPL: 28.6 (ref 7–25)
CALCIUM SPEC-SCNC: 9.3 MG/DL (ref 8.6–10.5)
CHLORIDE SERPL-SCNC: 99 MMOL/L (ref 98–107)
CK SERPL-CCNC: 104 U/L (ref 20–180)
CO2 SERPL-SCNC: 21.9 MMOL/L (ref 22–29)
CREAT SERPL-MCNC: 0.77 MG/DL (ref 0.57–1)
CRP SERPL-MCNC: 7.73 MG/DL (ref 0–0.5)
DEPRECATED RDW RBC AUTO: 42.1 FL (ref 37–54)
EOSINOPHIL # BLD AUTO: 0 10*3/MM3 (ref 0–0.4)
EOSINOPHIL NFR BLD AUTO: 0 % (ref 0.3–6.2)
ERYTHROCYTE [DISTWIDTH] IN BLOOD BY AUTOMATED COUNT: 12.1 % (ref 12.3–15.4)
FERRITIN SERPL-MCNC: 1124 NG/ML (ref 13–150)
GFR SERPL CREATININE-BSD FRML MDRD: 73 ML/MIN/1.73
GFR SERPL CREATININE-BSD FRML MDRD: 89 ML/MIN/1.73
GLOBULIN UR ELPH-MCNC: 4.1 GM/DL
GLUCOSE BLDC GLUCOMTR-MCNC: 188 MG/DL (ref 70–130)
GLUCOSE BLDC GLUCOMTR-MCNC: 299 MG/DL (ref 70–130)
GLUCOSE BLDC GLUCOMTR-MCNC: 306 MG/DL (ref 70–130)
GLUCOSE BLDC GLUCOMTR-MCNC: 349 MG/DL (ref 70–130)
GLUCOSE SERPL-MCNC: 295 MG/DL (ref 65–99)
HCT VFR BLD AUTO: 35.3 % (ref 34–46.6)
HGB BLD-MCNC: 11.6 G/DL (ref 12–15.9)
IMM GRANULOCYTES # BLD AUTO: 0.06 10*3/MM3 (ref 0–0.05)
IMM GRANULOCYTES NFR BLD AUTO: 0.5 % (ref 0–0.5)
LYMPHOCYTES # BLD AUTO: 0.84 10*3/MM3 (ref 0.7–3.1)
LYMPHOCYTES NFR BLD AUTO: 7.2 % (ref 19.6–45.3)
MCH RBC QN AUTO: 31 PG (ref 26.6–33)
MCHC RBC AUTO-ENTMCNC: 32.9 G/DL (ref 31.5–35.7)
MCV RBC AUTO: 94.4 FL (ref 79–97)
MONOCYTES # BLD AUTO: 0.44 10*3/MM3 (ref 0.1–0.9)
MONOCYTES NFR BLD AUTO: 3.8 % (ref 5–12)
NEUTROPHILS NFR BLD AUTO: 10.39 10*3/MM3 (ref 1.7–7)
NEUTROPHILS NFR BLD AUTO: 88.5 % (ref 42.7–76)
NRBC BLD AUTO-RTO: 0.1 /100 WBC (ref 0–0.2)
PLATELET # BLD AUTO: 259 10*3/MM3 (ref 140–450)
PMV BLD AUTO: 9.3 FL (ref 6–12)
POTASSIUM SERPL-SCNC: 4.5 MMOL/L (ref 3.5–5.2)
PROT SERPL-MCNC: 7.3 G/DL (ref 6–8.5)
RBC # BLD AUTO: 3.74 10*6/MM3 (ref 3.77–5.28)
SODIUM SERPL-SCNC: 133 MMOL/L (ref 136–145)
WBC # BLD AUTO: 11.73 10*3/MM3 (ref 3.4–10.8)

## 2020-08-06 PROCEDURE — 25010000002 ENOXAPARIN PER 10 MG: Performed by: NURSE PRACTITIONER

## 2020-08-06 PROCEDURE — 82962 GLUCOSE BLOOD TEST: CPT

## 2020-08-06 PROCEDURE — 63710000001 DEXAMETHASONE PER 0.25 MG: Performed by: INTERNAL MEDICINE

## 2020-08-06 PROCEDURE — 63710000001 INSULIN LISPRO (HUMAN) PER 5 UNITS: Performed by: NURSE PRACTITIONER

## 2020-08-06 PROCEDURE — 63710000001 INSULIN GLARGINE PER 5 UNITS: Performed by: NURSE PRACTITIONER

## 2020-08-06 PROCEDURE — 85025 COMPLETE CBC W/AUTO DIFF WBC: CPT | Performed by: NURSE PRACTITIONER

## 2020-08-06 PROCEDURE — 82728 ASSAY OF FERRITIN: CPT | Performed by: NURSE PRACTITIONER

## 2020-08-06 PROCEDURE — 86140 C-REACTIVE PROTEIN: CPT | Performed by: NURSE PRACTITIONER

## 2020-08-06 PROCEDURE — 82550 ASSAY OF CK (CPK): CPT | Performed by: NURSE PRACTITIONER

## 2020-08-06 PROCEDURE — 80053 COMPREHEN METABOLIC PANEL: CPT | Performed by: NURSE PRACTITIONER

## 2020-08-06 RX ORDER — INSULIN GLARGINE 100 [IU]/ML
20 INJECTION, SOLUTION SUBCUTANEOUS NIGHTLY
Status: DISCONTINUED | OUTPATIENT
Start: 2020-08-06 | End: 2020-08-07 | Stop reason: HOSPADM

## 2020-08-06 RX ADMIN — ATORVASTATIN CALCIUM 10 MG: 20 TABLET, FILM COATED ORAL at 08:01

## 2020-08-06 RX ADMIN — LEVOTHYROXINE SODIUM 25 MCG: 25 TABLET ORAL at 08:00

## 2020-08-06 RX ADMIN — PANTOPRAZOLE SODIUM 40 MG: 40 TABLET, DELAYED RELEASE ORAL at 06:50

## 2020-08-06 RX ADMIN — Medication 400 MG: at 20:58

## 2020-08-06 RX ADMIN — ENOXAPARIN SODIUM 40 MG: 40 INJECTION SUBCUTANEOUS at 11:51

## 2020-08-06 RX ADMIN — Medication 2000 UNITS: at 08:00

## 2020-08-06 RX ADMIN — INSULIN LISPRO 10 UNITS: 100 INJECTION, SOLUTION INTRAVENOUS; SUBCUTANEOUS at 11:53

## 2020-08-06 RX ADMIN — DEXAMETHASONE 6 MG: 4 TABLET ORAL at 08:01

## 2020-08-06 RX ADMIN — INSULIN GLARGINE 20 UNITS: 100 INJECTION, SOLUTION SUBCUTANEOUS at 20:59

## 2020-08-06 RX ADMIN — FERROUS SULFATE TAB 325 MG (65 MG ELEMENTAL FE) 325 MG: 325 (65 FE) TAB at 08:01

## 2020-08-06 RX ADMIN — INSULIN LISPRO 8 UNITS: 100 INJECTION, SOLUTION INTRAVENOUS; SUBCUTANEOUS at 07:59

## 2020-08-06 RX ADMIN — INSULIN LISPRO 16 UNITS: 100 INJECTION, SOLUTION INTRAVENOUS; SUBCUTANEOUS at 17:06

## 2020-08-06 RX ADMIN — Medication 400 MG: at 08:00

## 2020-08-06 RX ADMIN — CALCIUM 500 MG: 500 TABLET ORAL at 08:00

## 2020-08-06 NOTE — PROGRESS NOTES
Name: Brie Reese ADMIT: 8/3/2020   : 1945  PCP: Sussy Payne MD    MRN: [id number] LOS: 2 days   AGE/SEX: 74 y.o. female  ROOM: [facility]     Subjective   Subjective   Feels okay but weak today and short of breath with exertion more so than yesterday.  Supplemental oxygen needs remain after ambulating around the room she was 88% on room air with increased work of breathing.  Appetite a little better today. denies fever, chills, nausea, vomiting, diarrhea, chest pain    Review of Systems     Objective   Objective   Vital Signs  Temp:  [97.7 °F (36.5 °C)-98.7 °F (37.1 °C)] 98.4 °F (36.9 °C)  Heart Rate:  [52-60] 52  Resp:  [16-18] 16  BP: (121-173)/(59-78) 121/59  SpO2:  [95 %-98 %] 95 %  on  Flow (L/min):  [1-2] 1;   Device (Oxygen Therapy): room air  Body mass index is 21.76 kg/m².  Physical Exam   Constitutional: She is oriented to person, place, and time. She appears well-developed and well-nourished. No distress.   HENT:   Head: Normocephalic and atraumatic.   Eyes: Conjunctivae and EOM are normal.   Neck: Normal range of motion.   Cardiovascular: Normal rate, regular rhythm and normal heart sounds.   Pulmonary/Chest: Effort normal. No respiratory distress.   Decreased breath sounds and cough illicited with deep breathe.  88% on room air with increased work of breathing after ambulating slowly around room   Abdominal: Soft. Bowel sounds are normal. She exhibits no distension. There is no tenderness.   Musculoskeletal: She exhibits no edema.   Neurological: She is alert and oriented to person, place, and time. No cranial nerve deficit.   Skin: Skin is warm and dry.   Psychiatric: She has a normal mood and affect. Her behavior is normal.   Nursing note and vitals reviewed.      Results Review:       I reviewed the patient's new clinical results.  Results from last 7 days   Lab Units 20  0552 20  0605 20  0928 20  2147   WBC 10*3/mm3 11.73* 8.49 7.50 7.01   HEMOGLOBIN g/dL 11.6*  11.6* 10.5* 11.1*   PLATELETS 10*3/mm3 259 224 195 222     Results from last 7 days   Lab Units 08/06/20 0552 08/05/20 0605 08/04/20 0928 08/03/20  2147   SODIUM mmol/L 133* 133* 130* 130*   POTASSIUM mmol/L 4.5 4.4 4.0 4.1   CHLORIDE mmol/L 99 100 99 96*   CO2 mmol/L 21.9* 21.9* 22.8 25.8   BUN mg/dL 22 18 15 14   CREATININE mg/dL 0.77 0.77 0.87 0.90   GLUCOSE mg/dL 295* 284* 220* 247*   Estimated Creatinine Clearance: 49.9 mL/min (by C-G formula based on SCr of 0.77 mg/dL).  Results from last 7 days   Lab Units 08/06/20 0552 08/05/20 0605 08/04/20 0928 08/03/20  2147   ALBUMIN g/dL 3.20* 3.40* 3.10* 3.60   BILIRUBIN mg/dL 0.4 0.5 0.4 0.4   ALK PHOS U/L 141* 150* 126* 137*   AST (SGOT) U/L 38* 50* 35* 37*   ALT (SGPT) U/L 37* 34* 23 27     Results from last 7 days   Lab Units 08/06/20 0552 08/05/20 0605 08/04/20 0928 08/03/20 2147   CALCIUM mg/dL 9.3 9.0 8.7 8.9   ALBUMIN g/dL 3.20* 3.40* 3.10* 3.60     Results from last 7 days   Lab Units 08/03/20 2210 08/03/20 2147   PROCALCITONIN ng/mL  --  0.22   LACTATE mmol/L 1.4  --      Results from last 7 days   Lab Units 08/03/20 2210   COVID19  Detected*       Hemoglobin A1C   Date/Time Value Ref Range Status   08/03/2020 2147 9.10 (H) 4.80 - 5.60 % Final     Glucose   Date/Time Value Ref Range Status   08/06/2020 1127 349 (H) 70 - 130 mg/dL Final   08/06/2020 0649 299 (H) 70 - 130 mg/dL Final   08/05/2020 2119 246 (H) 70 - 130 mg/dL Final   08/05/2020 1640 339 (H) 70 - 130 mg/dL Final   08/05/2020 1133 409 (C) 70 - 130 mg/dL Final   08/05/2020 1131 457 (C) 70 - 130 mg/dL Final   08/05/2020 0614 256 (H) 70 - 130 mg/dL Final       XR Chest 1 View  PORTABLE CHEST 08/03/2020 AT 10:08 PM     CLINICAL HISTORY: Cough. Fever. Evaluate for COVID 19.     Compared to the previous chest dated 07/28/2020.     The lungs are fairly well-expanded and appear free of acute infiltrates.  There are no pleural effusions. The cardiomediastinal silhouette is  unremarkable.        IMPRESSIONS: No evidence of acute disease within the chest.     This report was finalized on 8/3/2020 11:11 PM by Dr. Juvenal Otto M.D.           atorvastatin 10 mg Oral Daily   calcium carbonate (oyster shell) 500 mg Oral Daily   cholecalciferol 2,000 Units Oral Daily   dexamethasone 6 mg Oral Daily With Breakfast   enoxaparin 40 mg Subcutaneous Q24H   ferrous sulfate 325 mg Oral Daily With Breakfast   insulin glargine 10 Units Subcutaneous Nightly   insulin lispro 0-24 Units Subcutaneous TID AC   levothyroxine 25 mcg Oral Daily   magnesium oxide 400 mg Oral BID   pantoprazole 40 mg Oral QAM      Diet Regular; Consistent Carbohydrate       Assessment/Plan     Active Hospital Problems    Diagnosis  POA   • **Acute bronchitis due to COVID-19 virus [U07.1, J20.8]  Yes   • Diabetes mellitus (CMS/HCC) [E11.9]  Yes   • Hyperlipidemia [E78.5]  Yes   • Hyponatremia [E87.1]  Yes   • Immunocompromised (CMS/HCC) [D89.9]  Yes   • Acute respiratory failure with hypoxia (CMS/HCC) [J96.01]  Unknown   • Malignant neoplasm metastatic to left lung (CMS/HCC) [C78.02]  Yes   • Iron deficiency anemia [D50.9]  Yes   • Malignant neoplasm of female breast (CMS/HCC) [C50.919]  Yes   • Hypothyroidism [E03.9]  Yes      Resolved Hospital Problems   No resolved problems to display.       74 y.o. female admitted with Acute bronchitis due to COVID-19 virus.    COVID-19 bronchitis  · Supportive care with antiemetics and antipyretics.  Currently needing 1-2 L of low oxygen.  On dexamethasone 6 mg p.o. daily x10 days.   · Inflammatory markers elevated.  Appreciate ID assistance no further experimental therapy recommended.  Trend COVID-19 progression labs  · Monitor patient closely given she is high risk with history of metastatic breast cancer and lung nodule  · Suspect will be able to discharge tomorrow possibly with home O2    DM2 with hyperglycemia/hypothyroidism  · Glucose elevated with steroids.  Correctional Humalog but change to high  dose and increase Lantus to 20 units nightly   · Hgb A1c 9%  · Continue thyroid replacement      Leukocytosis secondary to steroids    Metastatic breast cancer  · Treatment with Aromasin.  Followed by CBC group     Iron deficiency anemia, mild  · Continue ferrous sulfate  · hemoglobin stable  ·     · Lovenox 40 mg SC daily for DVT prophylaxis.  · Full code.  · Discussed with patient, family and nursing staff. Dr. Richardson   · Anticipate discharge tomorrow based on supplemental oxygen needs    Greater than 50% of time spent in counseling and/or coordination of care. Total face/floor time 35 minutes.  '    KENDELL Shipman  [facility]  08/06/20  13:56    Patient was placed in face mask on first look.  I wore protective equipment throughout this patient encounter including a face mask, gloves and protective eyewear.  Hand hygiene was performed before donning protective equipment and after removal when leaving the room.

## 2020-08-06 NOTE — TELEPHONE ENCOUNTER
Called pt's nephew and informed him of Dr. Castillo's message. He v/u.     ----- Message from Carlos Castillo MD PhD sent at 8/5/2020  6:14 PM EDT -----  We typically has not too much to do, not really need to see her.     Unless the admitting MD wants us to see.       ----- Message -----  From: Nenita Marquez RN  Sent: 8/5/2020   2:00 PM EDT  To: Carlos Castillo MD PhD    Dr. Castillo,   Pt's nephew called today to let us know that pt is admitted at [facility] with COVID-19. Her nephew was wondering if our office would round on her. I advised him that he would have to speak with the attending MD about this. He asked if you would look at her recent chest x-rays to see if you have anything to add. Pt is a yearly f/u in our office. Please advise if any new orders.

## 2020-08-06 NOTE — PLAN OF CARE
Problem: Patient Care Overview  Goal: Plan of Care Review  Outcome: Ongoing (interventions implemented as appropriate)  Flowsheets (Taken 8/6/2020 9482)  Outcome Summary: pt A/O x 4. SR on monitor. VSS. up ad maribell. no c/o pain. on 1LNC, no c/o SOB. possible DC. will CTM

## 2020-08-06 NOTE — PLAN OF CARE
Patient has been resting throughout day.  Has been on room air with sats above 93%.  Per NP Debby Freedman, patient was ambulated around room with said provider and oxygen did drop to 89-88 on room air.  Dr. Richardson was aware and plan is to keep overnight and possible discharge tomorrow.  VSS.  ABC's intact.  NSR.  Alert and oriented x4.  NAD noted.  Will continue to monitor.   Problem: Patient Care Overview  Goal: Plan of Care Review  Outcome: Ongoing (interventions implemented as appropriate)  Flowsheets (Taken 8/6/2020 4662)  Plan of Care Reviewed With: patient  Goal: Individualization and Mutuality  Outcome: Ongoing (interventions implemented as appropriate)  Goal: Discharge Needs Assessment  Outcome: Ongoing (interventions implemented as appropriate)  Goal: Interprofessional Rounds/Family Conf  Outcome: Ongoing (interventions implemented as appropriate)     Problem: Pain, Chronic (Adult)  Goal: Acceptable Pain/Comfort Level and Functional Ability  Outcome: Ongoing (interventions implemented as appropriate)     Problem: Fall Risk (Adult)  Goal: Absence of Fall  Outcome: Ongoing (interventions implemented as appropriate)

## 2020-08-07 ENCOUNTER — READMISSION MANAGEMENT (OUTPATIENT)
Dept: CALL CENTER | Facility: HOSPITAL | Age: 75
End: 2020-08-07

## 2020-08-07 VITALS
DIASTOLIC BLOOD PRESSURE: 66 MMHG | OXYGEN SATURATION: 95 % | HEIGHT: 60 IN | RESPIRATION RATE: 16 BRPM | BODY MASS INDEX: 21.87 KG/M2 | TEMPERATURE: 98 F | SYSTOLIC BLOOD PRESSURE: 137 MMHG | HEART RATE: 49 BPM | WEIGHT: 111.4 LBS

## 2020-08-07 LAB
ALBUMIN SERPL-MCNC: 3.1 G/DL (ref 3.5–5.2)
ALBUMIN/GLOB SERPL: 0.8 G/DL
ALP SERPL-CCNC: 126 U/L (ref 39–117)
ALT SERPL W P-5'-P-CCNC: 32 U/L (ref 1–33)
ANION GAP SERPL CALCULATED.3IONS-SCNC: 8.9 MMOL/L (ref 5–15)
AST SERPL-CCNC: 29 U/L (ref 1–32)
BASOPHILS # BLD AUTO: 0.01 10*3/MM3 (ref 0–0.2)
BASOPHILS NFR BLD AUTO: 0.1 % (ref 0–1.5)
BILIRUB SERPL-MCNC: 0.4 MG/DL (ref 0–1.2)
BUN SERPL-MCNC: 26 MG/DL (ref 8–23)
BUN/CREAT SERPL: 33.8 (ref 7–25)
CALCIUM SPEC-SCNC: 9 MG/DL (ref 8.6–10.5)
CHLORIDE SERPL-SCNC: 99 MMOL/L (ref 98–107)
CK SERPL-CCNC: 65 U/L (ref 20–180)
CO2 SERPL-SCNC: 25.1 MMOL/L (ref 22–29)
CREAT SERPL-MCNC: 0.77 MG/DL (ref 0.57–1)
CRP SERPL-MCNC: 3.44 MG/DL (ref 0–0.5)
D DIMER PPP FEU-MCNC: 0.28 MCGFEU/ML (ref 0–0.49)
DEPRECATED RDW RBC AUTO: 41.6 FL (ref 37–54)
EOSINOPHIL # BLD AUTO: 0 10*3/MM3 (ref 0–0.4)
EOSINOPHIL NFR BLD AUTO: 0 % (ref 0.3–6.2)
ERYTHROCYTE [DISTWIDTH] IN BLOOD BY AUTOMATED COUNT: 12.2 % (ref 12.3–15.4)
FERRITIN SERPL-MCNC: 939 NG/ML (ref 13–150)
FIBRINOGEN PPP-MCNC: 567 MG/DL (ref 219–464)
GFR SERPL CREATININE-BSD FRML MDRD: 73 ML/MIN/1.73
GFR SERPL CREATININE-BSD FRML MDRD: 89 ML/MIN/1.73
GLOBULIN UR ELPH-MCNC: 3.7 GM/DL
GLUCOSE BLDC GLUCOMTR-MCNC: 269 MG/DL (ref 70–130)
GLUCOSE BLDC GLUCOMTR-MCNC: 311 MG/DL (ref 70–130)
GLUCOSE SERPL-MCNC: 284 MG/DL (ref 65–99)
HCT VFR BLD AUTO: 32.6 % (ref 34–46.6)
HGB BLD-MCNC: 10.9 G/DL (ref 12–15.9)
IMM GRANULOCYTES # BLD AUTO: 0.06 10*3/MM3 (ref 0–0.05)
IMM GRANULOCYTES NFR BLD AUTO: 0.5 % (ref 0–0.5)
LDH SERPL-CCNC: 282 U/L (ref 135–214)
LYMPHOCYTES # BLD AUTO: 0.64 10*3/MM3 (ref 0.7–3.1)
LYMPHOCYTES NFR BLD AUTO: 5.7 % (ref 19.6–45.3)
MCH RBC QN AUTO: 31.2 PG (ref 26.6–33)
MCHC RBC AUTO-ENTMCNC: 33.4 G/DL (ref 31.5–35.7)
MCV RBC AUTO: 93.4 FL (ref 79–97)
MONOCYTES # BLD AUTO: 0.77 10*3/MM3 (ref 0.1–0.9)
MONOCYTES NFR BLD AUTO: 6.8 % (ref 5–12)
NEUTROPHILS NFR BLD AUTO: 86.9 % (ref 42.7–76)
NEUTROPHILS NFR BLD AUTO: 9.8 10*3/MM3 (ref 1.7–7)
NRBC BLD AUTO-RTO: 0 /100 WBC (ref 0–0.2)
PLATELET # BLD AUTO: 268 10*3/MM3 (ref 140–450)
PMV BLD AUTO: 9.2 FL (ref 6–12)
POTASSIUM SERPL-SCNC: 4.5 MMOL/L (ref 3.5–5.2)
PROT SERPL-MCNC: 6.8 G/DL (ref 6–8.5)
RBC # BLD AUTO: 3.49 10*6/MM3 (ref 3.77–5.28)
SODIUM SERPL-SCNC: 133 MMOL/L (ref 136–145)
WBC # BLD AUTO: 11.28 10*3/MM3 (ref 3.4–10.8)

## 2020-08-07 PROCEDURE — 85025 COMPLETE CBC W/AUTO DIFF WBC: CPT | Performed by: NURSE PRACTITIONER

## 2020-08-07 PROCEDURE — 63710000001 DEXAMETHASONE PER 0.25 MG: Performed by: INTERNAL MEDICINE

## 2020-08-07 PROCEDURE — 99232 SBSQ HOSP IP/OBS MODERATE 35: CPT | Performed by: INTERNAL MEDICINE

## 2020-08-07 PROCEDURE — 63710000001 INSULIN LISPRO (HUMAN) PER 5 UNITS: Performed by: NURSE PRACTITIONER

## 2020-08-07 PROCEDURE — 82728 ASSAY OF FERRITIN: CPT | Performed by: NURSE PRACTITIONER

## 2020-08-07 PROCEDURE — 85379 FIBRIN DEGRADATION QUANT: CPT | Performed by: NURSE PRACTITIONER

## 2020-08-07 PROCEDURE — 86140 C-REACTIVE PROTEIN: CPT | Performed by: NURSE PRACTITIONER

## 2020-08-07 PROCEDURE — 83615 LACTATE (LD) (LDH) ENZYME: CPT | Performed by: NURSE PRACTITIONER

## 2020-08-07 PROCEDURE — 82962 GLUCOSE BLOOD TEST: CPT

## 2020-08-07 PROCEDURE — 25010000002 ENOXAPARIN PER 10 MG: Performed by: NURSE PRACTITIONER

## 2020-08-07 PROCEDURE — 80053 COMPREHEN METABOLIC PANEL: CPT | Performed by: NURSE PRACTITIONER

## 2020-08-07 PROCEDURE — 85384 FIBRINOGEN ACTIVITY: CPT | Performed by: NURSE PRACTITIONER

## 2020-08-07 PROCEDURE — 82550 ASSAY OF CK (CPK): CPT | Performed by: NURSE PRACTITIONER

## 2020-08-07 RX ORDER — DEXAMETHASONE 6 MG/1
6 TABLET ORAL
Qty: 7 TABLET | Refills: 0 | Status: SHIPPED | OUTPATIENT
Start: 2020-08-08 | End: 2020-08-15

## 2020-08-07 RX ADMIN — DEXAMETHASONE 6 MG: 4 TABLET ORAL at 08:14

## 2020-08-07 RX ADMIN — Medication 2000 UNITS: at 08:14

## 2020-08-07 RX ADMIN — INSULIN LISPRO 16 UNITS: 100 INJECTION, SOLUTION INTRAVENOUS; SUBCUTANEOUS at 17:09

## 2020-08-07 RX ADMIN — PANTOPRAZOLE SODIUM 40 MG: 40 TABLET, DELAYED RELEASE ORAL at 08:15

## 2020-08-07 RX ADMIN — LEVOTHYROXINE SODIUM 25 MCG: 25 TABLET ORAL at 08:14

## 2020-08-07 RX ADMIN — ENOXAPARIN SODIUM 40 MG: 40 INJECTION SUBCUTANEOUS at 10:48

## 2020-08-07 RX ADMIN — FERROUS SULFATE TAB 325 MG (65 MG ELEMENTAL FE) 325 MG: 325 (65 FE) TAB at 08:15

## 2020-08-07 RX ADMIN — Medication 400 MG: at 08:14

## 2020-08-07 RX ADMIN — INSULIN LISPRO 4 UNITS: 100 INJECTION, SOLUTION INTRAVENOUS; SUBCUTANEOUS at 08:14

## 2020-08-07 RX ADMIN — INSULIN LISPRO 16 UNITS: 100 INJECTION, SOLUTION INTRAVENOUS; SUBCUTANEOUS at 11:11

## 2020-08-07 RX ADMIN — CALCIUM 500 MG: 500 TABLET ORAL at 08:15

## 2020-08-07 RX ADMIN — ATORVASTATIN CALCIUM 10 MG: 20 TABLET, FILM COATED ORAL at 08:15

## 2020-08-07 NOTE — NURSING NOTE
This nurse did a walking oximetry on patient.  Patient able to walk around room twice.  Approximately 100 feet, whilst walking around room.  Oxygen would range from 90-93% on room air.  Patient denies any feelings of shortness of breath.  Breathing remained even and unlabored.

## 2020-08-07 NOTE — NURSING NOTE
After walking oximetry, patient oxygen saturation was 89% with a good pleth.  This nurse placed patient on 1L nasal canula.  Patient tolerated well, oxygen saturation of 95% recorded.

## 2020-08-07 NOTE — PROGRESS NOTES
LOS: 3 days     Chief Complaint:  COVID     Interval History:  Afebrile, is mostly on room air occasionally requiring 1 L.  States she feels better.  Denies any abdominal pain nausea vomiting or diarrhea.  Denies any shortness of breath.  Mild cough persists    Vital Signs  Temp:  [97.3 °F (36.3 °C)-98.4 °F (36.9 °C)] 98.2 °F (36.8 °C)  Heart Rate:  [49-56] 49  Resp:  [16] 16  BP: (121-164)/(56-72) 146/69  93% on RA    Physical Exam:  General: In no acute distress  Cardiovascular: RRR, no LE edema   Respiratory: Breathing comfortably on room air  GI: Soft, NT/ND, + bowel sounds bilaterally  Skin: No rashes     Antibiotics:  None     Results Review:    Lab Results   Component Value Date    WBC 11.28 (H) 08/07/2020    HGB 10.9 (L) 08/07/2020    HCT 32.6 (L) 08/07/2020    MCV 93.4 08/07/2020     08/07/2020     Lab Results   Component Value Date    GLUCOSE 284 (H) 08/07/2020    BUN 26 (H) 08/07/2020    CREATININE 0.77 08/07/2020    EGFRIFNONA 73 08/07/2020    EGFRIFAFRI 89 08/07/2020    BCR 33.8 (H) 08/07/2020    CO2 25.1 08/07/2020    CALCIUM 9.0 08/07/2020    ALBUMIN 3.10 (L) 08/07/2020    AST 29 08/07/2020    ALT 32 08/07/2020     Ferritin 791 -> 1069 -> 939  CRP 13.51 -> 3.44  Procalcitonin 0.22    Microbiology:  8/3 RVP/COVID positive    Assessment/Plan   COVID infection  Type 2 diabetes complicating above  History of breast cancer    Inflammatory markers are decreasing. Pt remains on room air. I recommend continuing dexamethasone 6mg PO qday x 10 days. No indication for additional experimental therapy at this time.     ID will sign off. Please do not hesitate to call us if the pt worsens or with further questions or concerns.

## 2020-08-07 NOTE — PROGRESS NOTES
Continued Stay Note  [facility]     Patient Name: Brie Reese  MRN: [id number]  Today's Date: 8/7/2020    Admit Date: 8/3/2020    Discharge Plan     Row Name 08/07/20 1233       Plan    Plan  Home with nephew and Oxygen thru [facility] and [facility]  referral-    Provided Post Acute Provider List?  Yes    Post Acute Provider List  Home Health;DME Supplier    Provided Post Acute Provider Quality & Resource List?  Refused    Delivered To  Patient    Method of Delivery  Telephone    Patient/Family in Agreement with Plan  yes    Plan Comments  Pt to be discharged later today and will need home o2 at dc. CCP spoke with Nathen, she will need DME order to arrange for home delivery prior to pts dc. Spoke with pt via telephone in room and discussed  service. Provided  providers and pt chose [facility] . [facility] provided COVID19 Resources to janie Martinez. Referral to [facility]. tish lima/ccp        Discharge Codes    No documentation.       Expected Discharge Date and Time     Expected Discharge Date Expected Discharge Time    Aug 7, 2020             Jacquie Estrada RN

## 2020-08-07 NOTE — DISCHARGE SUMMARY
[facility] HOSPITALIST   DISCHARGE SUMMARY      Name:  Brie CAMACHO Elder   Age:  74 y.o.  Sex:  female  :  1945  MRN:  [id number]   Visit Number:  [id number]  Primary Care Physician:  Sussy Payne MD  Date of Discharge:  2020  Admission Date:  8/3/2020      Discharge Diagnosis:     1.  COVID-19 bronchitis, treated with dexamethasone  2.  Mild hyponatremia, improved.  3.  Metastatic breast cancer.  4.  Mild iron deficiency  5.  Diabetes mellitus type 2 with hyperglycemia, hemoglobin A1c 9.  Elevated blood sugars due to dexamethasone  6.  Hypoglycemia  7.  Acute respiratory failure with hypoxia  Active Hospital Problems    Diagnosis  POA   • **Acute bronchitis due to COVID-19 virus [U07.1, J20.8]  Yes   • Diabetes mellitus (CMS/HCC) [E11.9]  Yes   • Hyperlipidemia [E78.5]  Yes   • Hyponatremia [E87.1]  Yes   • Immunocompromised (CMS/HCC) [D89.9]  Yes   • Acute respiratory failure with hypoxia (CMS/HCC) [J96.01]  Unknown   • Malignant neoplasm metastatic to left lung (CMS/HCC) [C78.02]  Yes   • Iron deficiency anemia [D50.9]  Yes   • Malignant neoplasm of female breast (CMS/HCC) [C50.919]  Yes   • Hypothyroidism [E03.9]  Yes      Resolved Hospital Problems   No resolved problems to display.         Presenting Problem/History of Present Illness:    Hypoxia [R09.02]  Acute bronchitis due to COVID-19 virus [U07.1, J20.8]         Hospital Course:     Patient comes in with COVID-19 infection with some mild hypoxia.  She is noted to have O2 saturations 84% while ambulating on room air.  She was placed on dexamethasone.  Infectious disease was consulted.  No need for antibiotics was noted. Patient has elevated blood sugars as well as she has diagnosis diabetes type 2.  She is on glimepiride as well as metformin at home.  She was placed on Lantus while here due to elevated blood sugars.  Suspect this is due to dexamethasone being used to treat the COVID-19.     She currently denies any chest  pressure, shortness of breath, nausea, vomiting, or pain.  Room air at rest, 1 L of oxygen with ambulation.  She denies any other problems.   Her inflammatory markers have been followed and have improved. She is stable for discharge home at this time.    Recommend that patient have home health, and home oxygen.  Patient should also have an appointment with her PCP via telehealth.  Patient can go back on glimepiride and metformin, monitor blood sugar closely.  She is to return to the emergency room if she has worsening of her breathing, although clinically she has improved to the point where she can go home.  She will be discharged home today.    Procedures Performed:           Consults:     Consults     Date and Time Order Name Status Description    8/5/2020 1203 Inpatient Infectious Diseases Consult Completed     8/3/2020 0728 Family Medicine Consult Completed           Pertinent Test Results:     Lab Results (all)     Procedure Component Value Units Date/Time    POC Glucose Once [[id number]]  (Abnormal) Collected:  08/07/20 1047    Specimen:  Blood Updated:  08/07/20 1057     Glucose 311 mg/dL     Ferritin [[id number]]  (Abnormal) Collected:  08/07/20 0717    Specimen:  Blood Updated:  08/07/20 0822     Ferritin 939.00 ng/mL     Narrative:       Results may be falsely decreased if patient taking Biotin.      Lactate Dehydrogenase [[id number]]  (Abnormal) Collected:  08/07/20 0717    Specimen:  Blood Updated:  08/07/20 0815      U/L     Comprehensive Metabolic Panel [[id number]]  (Abnormal) Collected:  08/07/20 0717    Specimen:  Blood Updated:  08/07/20 0815     Glucose 284 mg/dL      BUN 26 mg/dL      Creatinine 0.77 mg/dL      Sodium 133 mmol/L      Potassium 4.5 mmol/L      Chloride 99 mmol/L      CO2 25.1 mmol/L      Calcium 9.0 mg/dL      Total Protein 6.8 g/dL      Albumin 3.10 g/dL      ALT (SGPT) 32 U/L      AST (SGOT) 29 U/L      Alkaline Phosphatase 126 U/L      Total Bilirubin 0.4 mg/dL      eGFR Non   Amer 73 mL/min/1.73      eGFR  African Amer 89 mL/min/1.73      Globulin 3.7 gm/dL      A/G Ratio 0.8 g/dL      BUN/Creatinine Ratio 33.8     Anion Gap 8.9 mmol/L     Narrative:       GFR Normal >60  Chronic Kidney Disease <60  Kidney Failure <15      CK [[id number]]  (Normal) Collected:  08/07/20 0717    Specimen:  Blood Updated:  08/07/20 0815     Creatine Kinase 65 U/L     C-reactive Protein [[id number]]  (Abnormal) Collected:  08/07/20 0717    Specimen:  Blood Updated:  08/07/20 0815     C-Reactive Protein 3.44 mg/dL     Fibrinogen [[id number]]  (Abnormal) Collected:  08/07/20 0717    Specimen:  Blood Updated:  08/07/20 0813     Fibrinogen 567 mg/dL     D-dimer, Quantitative [[id number]]  (Normal) Collected:  08/07/20 0717    Specimen:  Blood Updated:  08/07/20 0813     D-Dimer, Quantitative 0.28 MCGFEU/mL     Narrative:       The Stago D-Dimer test used in conjunction with a clinical pretest probability (PTP) assessment model, has been approved by the FDA to rule out the presence of venous thromboembolism (VTE) in outpatients suspected of deep venous thrombosis (DVT) or pulmonary embolism (PE). The cut-off for negative predictive value is <0.50 MCGFEU/mL.    CBC & Differential [[id number]] Collected:  08/07/20 0717    Specimen:  Blood Updated:  08/07/20 0753    Narrative:       The following orders were created for panel order CBC & Differential.  Procedure                               Abnormality         Status                     ---------                               -----------         ------                     CBC Auto Differential[[id number]]        Abnormal            Final result                 Please view results for these tests on the individual orders.    CBC Auto Differential [[id number]]  (Abnormal) Collected:  08/07/20 0717    Specimen:  Blood Updated:  08/07/20 0753     WBC 11.28 10*3/mm3      RBC 3.49 10*6/mm3      Hemoglobin 10.9 g/dL      Hematocrit 32.6 %      MCV 93.4 fL      MCH 31.2  pg      MCHC 33.4 g/dL      RDW 12.2 %      RDW-SD 41.6 fl      MPV 9.2 fL      Platelets 268 10*3/mm3      Neutrophil % 86.9 %      Lymphocyte % 5.7 %      Monocyte % 6.8 %      Eosinophil % 0.0 %      Basophil % 0.1 %      Immature Grans % 0.5 %      Neutrophils, Absolute 9.80 10*3/mm3      Lymphocytes, Absolute 0.64 10*3/mm3      Monocytes, Absolute 0.77 10*3/mm3      Eosinophils, Absolute 0.00 10*3/mm3      Basophils, Absolute 0.01 10*3/mm3      Immature Grans, Absolute 0.06 10*3/mm3      nRBC 0.0 /100 WBC     POC Glucose Once [[id number]]  (Abnormal) Collected:  08/06/20 2057    Specimen:  Blood Updated:  08/06/20 2059     Glucose 188 mg/dL     POC Glucose Once [[id number]]  (Abnormal) Collected:  08/06/20 1644    Specimen:  Blood Updated:  08/06/20 1653     Glucose 306 mg/dL     POC Glucose Once [[id number]]  (Abnormal) Collected:  08/06/20 1127    Specimen:  Blood Updated:  08/06/20 1131     Glucose 349 mg/dL     Ferritin [[id number]]  (Abnormal) Collected:  08/06/20 0552    Specimen:  Blood Updated:  08/06/20 0808     Ferritin 1,124.00 ng/mL     Narrative:       Results may be falsely decreased if patient taking Biotin.      CK [[id number]]  (Normal) Collected:  08/06/20 0552    Specimen:  Blood Updated:  08/06/20 0804     Creatine Kinase 104 U/L     C-reactive Protein [[id number]]  (Abnormal) Collected:  08/06/20 0552    Specimen:  Blood Updated:  08/06/20 0804     C-Reactive Protein 7.73 mg/dL     Comprehensive Metabolic Panel [[id number]]  (Abnormal) Collected:  08/06/20 0552    Specimen:  Blood Updated:  08/06/20 0804     Glucose 295 mg/dL      BUN 22 mg/dL      Creatinine 0.77 mg/dL      Sodium 133 mmol/L      Potassium 4.5 mmol/L      Chloride 99 mmol/L      CO2 21.9 mmol/L      Calcium 9.3 mg/dL      Total Protein 7.3 g/dL      Albumin 3.20 g/dL      ALT (SGPT) 37 U/L      AST (SGOT) 38 U/L      Alkaline Phosphatase 141 U/L      Total Bilirubin 0.4 mg/dL      eGFR Non African Amer 73 mL/min/1.73      eGFR    Amer 89 mL/min/1.73      Globulin 4.1 gm/dL      A/G Ratio 0.8 g/dL      BUN/Creatinine Ratio 28.6     Anion Gap 12.1 mmol/L     Narrative:       GFR Normal >60  Chronic Kidney Disease <60  Kidney Failure <15      CBC & Differential [[id number]] Collected:  08/06/20 0552    Specimen:  Blood Updated:  08/06/20 0728    Narrative:       The following orders were created for panel order CBC & Differential.  Procedure                               Abnormality         Status                     ---------                               -----------         ------                     CBC Auto Differential[[id number]]        Abnormal            Final result                 Please view results for these tests on the individual orders.    CBC Auto Differential [[id number]]  (Abnormal) Collected:  08/06/20 0552    Specimen:  Blood Updated:  08/06/20 0728     WBC 11.73 10*3/mm3      RBC 3.74 10*6/mm3      Hemoglobin 11.6 g/dL      Hematocrit 35.3 %      MCV 94.4 fL      MCH 31.0 pg      MCHC 32.9 g/dL      RDW 12.1 %      RDW-SD 42.1 fl      MPV 9.3 fL      Platelets 259 10*3/mm3      Neutrophil % 88.5 %      Lymphocyte % 7.2 %      Monocyte % 3.8 %      Eosinophil % 0.0 %      Basophil % 0.0 %      Immature Grans % 0.5 %      Neutrophils, Absolute 10.39 10*3/mm3      Lymphocytes, Absolute 0.84 10*3/mm3      Monocytes, Absolute 0.44 10*3/mm3      Eosinophils, Absolute 0.00 10*3/mm3      Basophils, Absolute 0.00 10*3/mm3      Immature Grans, Absolute 0.06 10*3/mm3      nRBC 0.1 /100 WBC     POC Glucose Once [[id number]]  (Abnormal) Collected:  08/06/20 0649    Specimen:  Blood Updated:  08/06/20 0650     Glucose 299 mg/dL     POC Glucose Once [[id number]]  (Abnormal) Collected:  08/05/20 2119    Specimen:  Blood Updated:  08/05/20 2121     Glucose 246 mg/dL     POC Glucose Once [[id number]]  (Abnormal) Collected:  08/05/20 1640    Specimen:  Blood Updated:  08/05/20 1652     Glucose 339 mg/dL     POC Glucose Once [[id number]]   (Abnormal) Collected:  08/05/20 1133    Specimen:  Blood Updated:  08/05/20 1139     Glucose 409 mg/dL     POC Glucose Once [[id number]]  (Abnormal) Collected:  08/05/20 1131    Specimen:  Blood Updated:  08/05/20 1139     Glucose 457 mg/dL     Ferritin [[id number]]  (Abnormal) Collected:  08/05/20 0605    Specimen:  Blood Updated:  08/05/20 0701     Ferritin 1,069.00 ng/mL     Narrative:       Results may be falsely decreased if patient taking Biotin.      Lactate Dehydrogenase [[id number]]  (Abnormal) Collected:  08/05/20 0605    Specimen:  Blood Updated:  08/05/20 0658      U/L     Comprehensive Metabolic Panel [[id number]]  (Abnormal) Collected:  08/05/20 0605    Specimen:  Blood Updated:  08/05/20 0658     Glucose 284 mg/dL      BUN 18 mg/dL      Creatinine 0.77 mg/dL      Sodium 133 mmol/L      Potassium 4.4 mmol/L      Chloride 100 mmol/L      CO2 21.9 mmol/L      Calcium 9.0 mg/dL      Total Protein 7.3 g/dL      Albumin 3.40 g/dL      ALT (SGPT) 34 U/L      AST (SGOT) 50 U/L      Alkaline Phosphatase 150 U/L      Total Bilirubin 0.5 mg/dL      eGFR Non African Amer 73 mL/min/1.73      eGFR  African Amer 89 mL/min/1.73      Globulin 3.9 gm/dL      A/G Ratio 0.9 g/dL      BUN/Creatinine Ratio 23.4     Anion Gap 11.1 mmol/L     Narrative:       GFR Normal >60  Chronic Kidney Disease <60  Kidney Failure <15      CK [[id number]]  (Normal) Collected:  08/05/20 0605    Specimen:  Blood Updated:  08/05/20 0658     Creatine Kinase 115 U/L     C-reactive Protein [[id number]]  (Abnormal) Collected:  08/05/20 0605    Specimen:  Blood Updated:  08/05/20 0658     C-Reactive Protein 13.51 mg/dL     D-dimer, Quantitative [[id number]]  (Normal) Collected:  08/05/20 0605    Specimen:  Blood Updated:  08/05/20 0636     D-Dimer, Quantitative 0.33 MCGFEU/mL     Narrative:       The Stago D-Dimer test used in conjunction with a clinical pretest probability (PTP) assessment model, has been approved by the FDA to rule out the  presence of venous thromboembolism (VTE) in outpatients suspected of deep venous thrombosis (DVT) or pulmonary embolism (PE). The cut-off for negative predictive value is <0.50 MCGFEU/mL.    Fibrinogen [[id number]]  (Abnormal) Collected:  08/05/20 0605    Specimen:  Blood Updated:  08/05/20 0636     Fibrinogen 671 mg/dL     CBC & Differential [[id number]] Collected:  08/05/20 0605    Specimen:  Blood Updated:  08/05/20 0634    Narrative:       The following orders were created for panel order CBC & Differential.  Procedure                               Abnormality         Status                     ---------                               -----------         ------                     CBC Auto Differential[[id number]]        Abnormal            Final result                 Please view results for these tests on the individual orders.    CBC Auto Differential [[id number]]  (Abnormal) Collected:  08/05/20 0605    Specimen:  Blood Updated:  08/05/20 0634     WBC 8.49 10*3/mm3      RBC 3.66 10*6/mm3      Hemoglobin 11.6 g/dL      Hematocrit 33.4 %      MCV 91.3 fL      MCH 31.7 pg      MCHC 34.7 g/dL      RDW 12.1 %      RDW-SD 40.3 fl      MPV 8.9 fL      Platelets 224 10*3/mm3      Neutrophil % 87.7 %      Lymphocyte % 8.7 %      Monocyte % 2.8 %      Eosinophil % 0.0 %      Basophil % 0.1 %      Immature Grans % 0.7 %      Neutrophils, Absolute 7.44 10*3/mm3      Lymphocytes, Absolute 0.74 10*3/mm3      Monocytes, Absolute 0.24 10*3/mm3      Eosinophils, Absolute 0.00 10*3/mm3      Basophils, Absolute 0.01 10*3/mm3      Immature Grans, Absolute 0.06 10*3/mm3      nRBC 0.0 /100 WBC     POC Glucose Once [[id number]]  (Abnormal) Collected:  08/05/20 0614    Specimen:  Blood Updated:  08/05/20 0616     Glucose 256 mg/dL     POC Glucose Once [[id number]]  (Abnormal) Collected:  08/04/20 2035    Specimen:  Blood Updated:  08/04/20 2038     Glucose 194 mg/dL     POC Glucose Once [[id number]]  (Abnormal) Collected:  08/04/20 1178     Specimen:  Blood Updated:  08/04/20 1725     Glucose 159 mg/dL     POC Glucose Once [[id number]]  (Abnormal) Collected:  08/04/20 1117    Specimen:  Blood Updated:  08/04/20 1122     Glucose 179 mg/dL     D-dimer, Quantitative [[id number]]  (Abnormal) Collected:  08/04/20 0928    Specimen:  Blood from Arm, Right Updated:  08/04/20 1021     D-Dimer, Quantitative 0.58 MCGFEU/mL     Narrative:       The Stago D-Dimer test used in conjunction with a clinical pretest probability (PTP) assessment model, has been approved by the FDA to rule out the presence of venous thromboembolism (VTE) in outpatients suspected of deep venous thrombosis (DVT) or pulmonary embolism (PE). The cut-off for negative predictive value is <0.50 MCGFEU/mL.    Comprehensive Metabolic Panel [[id number]]  (Abnormal) Collected:  08/04/20 0928    Specimen:  Blood Updated:  08/04/20 1013     Glucose 220 mg/dL      BUN 15 mg/dL      Creatinine 0.87 mg/dL      Sodium 130 mmol/L      Potassium 4.0 mmol/L      Chloride 99 mmol/L      CO2 22.8 mmol/L      Calcium 8.7 mg/dL      Total Protein 6.9 g/dL      Albumin 3.10 g/dL      ALT (SGPT) 23 U/L      AST (SGOT) 35 U/L      Alkaline Phosphatase 126 U/L      Total Bilirubin 0.4 mg/dL      eGFR Non African Amer 64 mL/min/1.73      eGFR  African Amer 77 mL/min/1.73      Globulin 3.8 gm/dL      A/G Ratio 0.8 g/dL      BUN/Creatinine Ratio 17.2     Anion Gap 8.2 mmol/L     Narrative:       GFR Normal >60  Chronic Kidney Disease <60  Kidney Failure <15      Ferritin [[id number]]  (Abnormal) Collected:  08/03/20 2147    Specimen:  Blood Updated:  08/04/20 0958     Ferritin 791.00 ng/mL     Narrative:       Results may be falsely decreased if patient taking Biotin.      Lactate Dehydrogenase [[id number]]  (Abnormal) Collected:  08/03/20 2147    Specimen:  Blood Updated:  08/04/20 0952      U/L     CBC & Differential [[id number]] Collected:  08/04/20 0928    Specimen:  Blood Updated:  08/04/20 0944    Narrative:         The following orders were created for panel order CBC & Differential.  Procedure                               Abnormality         Status                     ---------                               -----------         ------                     CBC Auto Differential[[id number]]        Abnormal            Final result                 Please view results for these tests on the individual orders.    CBC Auto Differential [[id number]]  (Abnormal) Collected:  08/04/20 0928    Specimen:  Blood Updated:  08/04/20 0944     WBC 7.50 10*3/mm3      RBC 3.40 10*6/mm3      Hemoglobin 10.5 g/dL      Hematocrit 31.0 %      MCV 91.2 fL      MCH 30.9 pg      MCHC 33.9 g/dL      RDW 12.2 %      RDW-SD 40.4 fl      MPV 8.7 fL      Platelets 195 10*3/mm3      Neutrophil % 79.9 %      Lymphocyte % 15.9 %      Monocyte % 3.7 %      Eosinophil % 0.0 %      Basophil % 0.1 %      Immature Grans % 0.4 %      Neutrophils, Absolute 5.99 10*3/mm3      Lymphocytes, Absolute 1.19 10*3/mm3      Monocytes, Absolute 0.28 10*3/mm3      Eosinophils, Absolute 0.00 10*3/mm3      Basophils, Absolute 0.01 10*3/mm3      Immature Grans, Absolute 0.03 10*3/mm3      nRBC 0.0 /100 WBC     POC Glucose Once [[id number]]  (Abnormal) Collected:  08/04/20 0626    Specimen:  Blood Updated:  08/04/20 0628     Glucose 229 mg/dL     Hemoglobin A1c [[id number]]  (Abnormal) Collected:  08/03/20 2147    Specimen:  Blood Updated:  08/04/20 0425     Hemoglobin A1C 9.10 %     Narrative:       Hemoglobin A1C Ranges:    Increased Risk for Diabetes  5.7% to 6.4%  Diabetes                     >= 6.5%  Diabetic Goal                < 7.0%    Urinalysis With Microscopic If Indicated (No Culture) - Urine, Clean Catch [[id number]]  (Abnormal) Collected:  08/03/20 2340    Specimen:  Urine, Clean Catch Updated:  08/04/20 0008     Color, UA Yellow     Appearance, UA Clear     pH, UA 6.0     Specific Gravity, UA 1.022     Glucose,  mg/dL (2+)     Ketones, UA 15 mg/dL (1+)      Bilirubin, UA Negative     Blood, UA Negative     Protein, UA 30 mg/dL (1+)     Leuk Esterase, UA Negative     Nitrite, UA Negative     Urobilinogen, UA 0.2 E.U./dL    Urinalysis, Microscopic Only - Urine, Clean Catch [[id number]]  (Abnormal) Collected:  08/03/20 2340    Specimen:  Urine, Clean Catch Updated:  08/04/20 0008     RBC, UA 3-5 /HPF      WBC, UA 0-2 /HPF      Bacteria, UA None Seen /HPF      Squamous Epithelial Cells, UA 0-2 /HPF      Hyaline Casts, UA 0-2 /LPF      Methodology Automated Microscopy    Respiratory Panel PCR w/COVID-19(SARS-CoV-2) SIRISHA/ANGELITA/GAMAL In-House, NP Swab in UTM/VTP, 8-12 Hr TAT - Swab, Nasopharynx [[id number]]  (Abnormal) Collected:  08/03/20 2210    Specimen:  Swab from Nasopharynx Updated:  08/03/20 2316     ADENOVIRUS, PCR Not Detected     Coronavirus 229E Not Detected     Coronavirus HKU1 Not Detected     Coronavirus NL63 Not Detected     Coronavirus OC43 Not Detected     COVID19 Detected     Human Metapneumovirus Not Detected     Human Rhinovirus/Enterovirus Not Detected     Influenza A PCR Not Detected     Influenza A H1 Not Detected     Influenza A H1 2009 PCR Not Detected     Influenza A H3 Not Detected     Influenza B PCR Not Detected     Parainfluenza Virus 1 Not Detected     Parainfluenza Virus 2 Not Detected     Parainfluenza Virus 3 Not Detected     Parainfluenza Virus 4 Not Detected     RSV, PCR Not Detected     Bordetella pertussis pcr Not Detected     Bordetella parapertussis PCR Not Detected     Chlamydophila pneumoniae PCR Not Detected     Mycoplasma pneumo by PCR Not Detected    Narrative:       Fact sheet for providers: https://docs.[employer]/wp-content/uploads/NDD5926-2292-ET6.1-EUA-Provider-Fact-Sheet-3.pdf    Fact sheet for patients: https://docs.[employer]/wp-content/uploads/YVA6802-2434-DE4.1-EUA-Patient-Fact-Sheet-1.pdf    [place] Draw [[id number]] Collected:  08/03/20 2147    Specimen:  Blood Updated:  08/03/20 2300    Narrative:       The following  orders were created for panel order [place] Draw.  Procedure                               Abnormality         Status                     ---------                               -----------         ------                     Light Blue Top[[id number]]                                   Final result               Green Top (Gel)[[id number]]                                  Final result               Lavender Top[[id number]]                                     Final result               Gold Top - SST[[id number]]                                   Final result                 Please view results for these tests on the individual orders.    Gold Top - SST [[id number]] Collected:  08/03/20 2147    Specimen:  Blood Updated:  08/03/20 2300     Extra Tube Hold for add-ons.     Comment: Auto resulted.       Light Blue Top [[id number]] Collected:  08/03/20 2147    Specimen:  Blood Updated:  08/03/20 2300     Extra Tube hold for add-on     Comment: Auto resulted       Green Top (Gel) [[id number]] Collected:  08/03/20 2147    Specimen:  Blood Updated:  08/03/20 2300     Extra Tube Hold for add-ons.     Comment: Auto resulted.       Lavender Top [[id number]] Collected:  08/03/20 2147    Specimen:  Blood Updated:  08/03/20 2300     Extra Tube hold for add-on     Comment: Auto resulted       Lactic Acid, Plasma [[id number]]  (Normal) Collected:  08/03/20 2210    Specimen:  Blood Updated:  08/03/20 2242     Lactate 1.4 mmol/L     Procalcitonin [[id number]]  (Normal) Collected:  08/03/20 2147    Specimen:  Blood Updated:  08/03/20 2235     Procalcitonin 0.22 ng/mL     Narrative:       As a Marker for Sepsis (Non-Neonates):   1. <0.5 ng/mL represents a low risk of severe sepsis and/or septic shock.  1. >2 ng/mL represents a high risk of severe sepsis and/or septic shock.    As a Marker for Lower Respiratory Tract Infections that require antibiotic therapy:  PCT on Admission     Antibiotic Therapy             6-12 Hrs later  > 0.5                 "Strongly Recommended            >0.25 - <0.5         Recommended  0.1 - 0.25           Discouraged                   Remeasure/reassess PCT  <0.1                 Strongly Discouraged          Remeasure/reassess PCT      As 28 day mortality risk marker: \"Change in Procalcitonin Result\" (> 80 % or <=80 %) if Day 0 (or Day 1) and Day 4 values are available. Refer to http://www.Techozpct-calculator.com/   Change in PCT <=80 %   A decrease of PCT levels below or equal to 80 % defines a positive change in PCT test result representing a higher risk for 28-day all-cause mortality of patients diagnosed with severe sepsis or septic shock.  Change in PCT > 80 %   A decrease of PCT levels of more than 80 % defines a negative change in PCT result representing a lower risk for 28-day all-cause mortality of patients diagnosed with severe sepsis or septic shock.                Results may be falsely decreased if patient taking Biotin.     Comprehensive Metabolic Panel [[id number]]  (Abnormal) Collected:  08/03/20 2147    Specimen:  Blood Updated:  08/03/20 2229     Glucose 247 mg/dL      BUN 14 mg/dL      Creatinine 0.90 mg/dL      Sodium 130 mmol/L      Potassium 4.1 mmol/L      Chloride 96 mmol/L      CO2 25.8 mmol/L      Calcium 8.9 mg/dL      Total Protein 7.4 g/dL      Albumin 3.60 g/dL      ALT (SGPT) 27 U/L      AST (SGOT) 37 U/L      Alkaline Phosphatase 137 U/L      Total Bilirubin 0.4 mg/dL      eGFR Non African Amer 61 mL/min/1.73      eGFR  African Amer 74 mL/min/1.73      Globulin 3.8 gm/dL      A/G Ratio 0.9 g/dL      BUN/Creatinine Ratio 15.6     Anion Gap 8.2 mmol/L     Narrative:       GFR Normal >60  Chronic Kidney Disease <60  Kidney Failure <15      CBC & Differential [[id number]] Collected:  08/03/20 2147    Specimen:  Blood Updated:  08/03/20 2221    Narrative:       The following orders were created for panel order CBC & Differential.  Procedure                               Abnormality         Status          " "           ---------                               -----------         ------                     CBC Auto Differential[[id number]]        Abnormal            Final result                 Please view results for these tests on the individual orders.    CBC Auto Differential [[id number]]  (Abnormal) Collected:  08/03/20 2147    Specimen:  Blood Updated:  08/03/20 2221     WBC 7.01 10*3/mm3      RBC 3.49 10*6/mm3      Hemoglobin 11.1 g/dL      Hematocrit 32.3 %      MCV 92.6 fL      MCH 31.8 pg      MCHC 34.4 g/dL      RDW 12.5 %      RDW-SD 42.5 fl      MPV 8.9 fL      Platelets 222 10*3/mm3      Neutrophil % 84.1 %      Lymphocyte % 12.4 %      Monocyte % 3.3 %      Eosinophil % 0.0 %      Basophil % 0.1 %      Immature Grans % 0.1 %      Neutrophils, Absolute 5.89 10*3/mm3      Lymphocytes, Absolute 0.87 10*3/mm3      Monocytes, Absolute 0.23 10*3/mm3      Eosinophils, Absolute 0.00 10*3/mm3      Basophils, Absolute 0.01 10*3/mm3      Immature Grans, Absolute 0.01 10*3/mm3      nRBC 0.0 /100 WBC           Imaging Results (All)     Procedure Component Value Units Date/Time    XR Chest 1 View [[id number]] Collected:  08/03/20 2310     Updated:  08/03/20 2314    Narrative:       PORTABLE CHEST 08/03/2020 AT 10:08 PM     CLINICAL HISTORY: Cough. Fever. Evaluate for COVID 19.     Compared to the previous chest dated 07/28/2020.     The lungs are fairly well-expanded and appear free of acute infiltrates.  There are no pleural effusions. The cardiomediastinal silhouette is  unremarkable.     IMPRESSIONS: No evidence of acute disease within the chest.     This report was finalized on 8/3/2020 11:11 PM by Dr. Juvenal Otto M.D.             Condition on Discharge:      Stable    Vital Signs:    /69 (BP Location: Right arm, Patient Position: Lying)   Pulse (!) 49   Temp 98.2 °F (36.8 °C) (Oral)   Resp 16   Ht 152.4 cm (60\")   Wt 50.5 kg (111 lb 6.4 oz)   SpO2 90% Comment: Walking oximetry obtained. sats ranged from " 90-93% on rm air  BMI 21.76 kg/m²     Physical Exam:    General: Alert and oriented x4, well-developed well-nourished, no acute distress  Heart: Regular rate and rhythm without murmur rub or thrill  Lungs: Clear to auscultation bilaterally without use of accessory muscles or respiration  Abdomen: Soft/nontender/nondistended.  No hepatosplenomegaly is noted.  MSK: 5/5 strength in upper/lower extremities bilaterally    Discharge Disposition:    [place] [facility] [place]    Discharge Medication:       Discharge Medications      New Medications      Instructions Start Date   dexamethasone 6 MG tablet  Commonly known as:  DECADRON   6 mg, Oral, Daily With Breakfast   Start Date:  August 8, 2020        Continue These Medications      Instructions Start Date   Accu-Chek Pooja Plus test strip  Generic drug:  glucose blood   USE TO TEST BLOOD SUGAR DAILY AS DIRECTED      acetaminophen 650 MG 8 hr tablet  Commonly known as:  Acetaminophen 8 Hour   650 mg, Oral, Every 8 Hours PRN      atorvastatin 10 MG tablet  Commonly known as:  LIPITOR   TAKE 1 TABLET BY MOUTH AT BEDTIME      calcium carbonate 600 MG tablet  Commonly known as:  OS-JEWEL   600 mg, Oral, Daily      [facility] Vitamin D 50 MCG (2000 UT) capsule  Generic drug:  Cholecalciferol   TAKE 1 CAPSULE EVERY DAY      dextromethorphan-guaifenesin  MG/5ML liquid  Commonly known as:  ROBITUSSIN-DM   5 mL, Oral, Every 12 Hours      exemestane 25 MG chemo tablet  Commonly known as:  AROMASIN   TAKE 1 TABLET BY MOUTH DAILY FOR 90 DAYS.      ferrous sulfate 325 (65 FE) MG tablet   325 mg, Oral, Daily With Breakfast      GLIMEPIRIDE PO   4 mg, Oral, 2 Times Daily      guaiFENesin-codeine 100-10 MG/5ML liquid  Commonly known as:  GUAIFENESIN AC   5 mL, Oral, 4 Times Daily PRN      levothyroxine 25 MCG tablet  Commonly known as:  SYNTHROID, LEVOTHROID   TAKE 1 TABLET BY MOUTH EVERY DAY      magnesium oxide 400 MG tablet  Commonly known as:  MAG-OX   1 tablet, Oral, 2 Times Daily         metFORMIN 500 MG tablet  Commonly known as:  GLUCOPHAGE   TAKE 3 TABLETS BY MOUTH EVERY MORNING, AND 2 TABLETS EVERY EVENING WITH FOOD FOR DIABETES      omeprazole 20 MG capsule  Commonly known as:  priLOSEC   TAKE ONE CAPSULE BY MOUTH EVERY DAY FOR 10 DAYS AND THEN AS NEEDED DAILY      PROLIA SC   Subcutaneous, Every 6 Months         Stop These Medications    doxycycline 100 MG capsule  Commonly known as:  MONODOX            Discharge Diet:     Diet Instructions     Diet: Consistent Carbohydrate      Discharge Diet:  Consistent Carbohydrate          Activity at Discharge:     Activity Instructions     Activity as Tolerated            Follow-up Appointments:    Future Appointments   Date Time Provider Department Center   12/8/2020  9:30 AM LAB CHAIR 3 [facility]   12/8/2020 10:00 AM Carlos Castillo MD PhD [facility]     Additional Instructions for the Follow-ups that You Need to Schedule     Discharge Follow-up with PCP   As directed       Currently Documented PCP:    Sussy Ramirez MD    PCP Phone Number:    [phone]     Follow Up Details:  telehealth         Discharge Follow-up with PCP   As directed       Currently Documented PCP:    Sussy Ramirez MD    PCP Phone Number:    [phone]     Follow Up Details:  1 week via telehealth         Referral to Home Health   As directed      Face to Face Visit Date:  8/7/2020    Follow-up provider for Plan of Care?:  I treated the patient in an acute care facility and will not continue treatment after discharge.    Follow-up provider:  SUSSY RAMIREZ [[id number]]    Reason/Clinical Findings:  covid    Describe mobility limitations that make leaving home difficult:  covid    Nursing/Therapeutic Services Requested:  Skilled Nursing    Skilled nursing orders:  Medication education Cardiopulmonary assessments O2 instruction    Frequency:  1 Week 1               Test Results Pending at Discharge:           Shar Richardson DO  08/07/20  12:41

## 2020-08-07 NOTE — PLAN OF CARE
Patient gave verbalized understanding of discharge education, medications and follow up.  On 1 L of O2 AAT.  VSS.  SB on monitor.  Nephew to call when oxygen concentrator is delivered and set up.  NAD noted.

## 2020-08-07 NOTE — PLAN OF CARE
Problem: Pain, Chronic (Adult)  Goal: Identify Related Risk Factors and Signs and Symptoms  Outcome: Ongoing (interventions implemented as appropriate)  Goal: Acceptable Pain/Comfort Level and Functional Ability  8/7/2020 1607 by Parker Garcia RN  Outcome: Ongoing (interventions implemented as appropriate)  8/7/2020 1605 by Parker Garcia RN  Outcome: Outcome(s) achieved     Problem: Fall Risk (Adult)  Goal: Identify Related Risk Factors and Signs and Symptoms  Outcome: Ongoing (interventions implemented as appropriate)  Goal: Absence of Fall  8/7/2020 1607 by Parker Garcia RN  Outcome: Ongoing (interventions implemented as appropriate)  8/7/2020 1605 by Parker Garcia RN  Outcome: Outcome(s) achieved

## 2020-08-08 ENCOUNTER — READMISSION MANAGEMENT (OUTPATIENT)
Dept: CALL CENTER | Facility: HOSPITAL | Age: 75
End: 2020-08-08

## 2020-08-08 NOTE — OUTREACH NOTE
Prep Survey      Responses   [facility] patient discharged from?  [place]   Is LACE score < 7 ?  No   Eligibility  Readm Mgmt   Discharge diagnosis  hypoxia, COVID - 19 bronchitis, metastatic breast CA,    COVID-19 Test Status  Confirmed   Does the patient have one of the following disease processes/diagnoses(primary or secondary)?  Other   Does the patient have Home health ordered?  Yes   What is the Home health agency?   [facility]   Is there a DME ordered?  Yes   What DME was ordered?  O2 from [place]   Prep survey completed?  Yes          Maria M Douglas RN

## 2020-08-08 NOTE — NURSING NOTE
Called pt's nephew Momo to ask estimated time of arrival. Bill states he will be here around 2030.

## 2020-08-08 NOTE — NURSING NOTE
Pt's nephew Momo called this RN and stated he was 5 minutes away from the hospital. This RN put in for transport. Momo then states he still needs to make the pt's bed, and he is still at the house. This RN explained to Momo that the pt is discharged and needs to be picked up as soon as he can. Transport cancelled at this time.

## 2020-08-09 ENCOUNTER — READMISSION MANAGEMENT (OUTPATIENT)
Dept: CALL CENTER | Facility: HOSPITAL | Age: 75
End: 2020-08-09

## 2020-08-09 NOTE — OUTREACH NOTE
Medical Week 1 Survey      Responses   [facility] patient discharged from?  [place]   COVID-19 Test Status  Confirmed   Does the patient have one of the following disease processes/diagnoses(primary or secondary)?  Other   Is there a successful TCM telephone encounter documented?  No   Week 1 attempt successful?  Yes   Call start time  1014   Call end time  1024   Discharge diagnosis  hypoxia, COVID - 19 bronchitis, metastatic breast CA,    Person spoke with today (if not patient) and relationship  Pt relative, Pelon Geiger reviewed with patient/caregiver?  Yes   Is the patient having any side effects they believe may be caused by any medication additions or changes?  No   Does the patient have all medications ordered at discharge?  Yes   Is the patient taking all medications as directed (includes completed medication regime)?  Yes   Does the patient have a primary care provider?   Yes   Does the patient have an appointment with their PCP within 7 days of discharge?  No   What is preventing the patient from scheduling follow up appointments within 7 days of discharge?  Haven't had time   Nursing Interventions  Educated patient on importance of making appointment, Advised patient to make appointment   Has the patient kept scheduled appointments due by today?  N/A   What is the Home health agency?   [facility]   Has home health visited the patient within 72 hours of discharge?  No   What DME was ordered?  O2 from [place]   Has all DME been delivered?  Yes   Pulse Ox monitoring  None   Psychosocial issues?  No   Did the patient receive a copy of their discharge instructions?  Yes   Nursing interventions  Reviewed instructions with patient   What is the patient's perception of their health status since discharge?  Improving   Is the patient/caregiver able to teach back signs and symptoms related to disease process for when to call PCP?  Yes   Is the patient/caregiver able to teach back signs and symptoms related to  disease process for when to call 911?  Yes   Is the patient/caregiver able to teach back the hierarchy of who to call/visit for symptoms/problems? PCP, Specialist, Home health nurse, Urgent Care, ED, 911  Yes   Additional teach back comments  Lengthy discussion about sanitizing household surfaces.   Week 1 call completed?  Yes          Jamie Ramirez RN

## 2020-08-10 ENCOUNTER — READMISSION MANAGEMENT (OUTPATIENT)
Dept: CALL CENTER | Facility: HOSPITAL | Age: 75
End: 2020-08-10

## 2020-08-10 NOTE — PROGRESS NOTES
Case Management Discharge Note      Final Note: Home with [facility] and oxygen from [place] with nephew. tish rn/ccp    Provided Post Acute Provider List?: Yes  Post Acute Provider List: Home Health, DME Supplier  Provided Post Acute Provider Quality & Resource List?: Refused  Delivered To: Patient  Method of Delivery: Telephone    Destination      No service has been selected for the patient.      Durable Medical Equipment      Service Provider Request Status Selected Services Address Phone Number Fax Number    [facility] Selected Durable Medical Equipment [address] [id number] [phone]      Dialysis/Infusion      No service has been selected for the patient.      Home Medical Care      Service Provider Request Status Selected Services Address Phone Number Fax Number    [facility] [place] Selected Home Health Services [address] [place] [address] [address] [phone]      Therapy      No service has been selected for the patient.      Community Resources      No service has been selected for the patient.        Transportation Services  Private: Car    Final Discharge Disposition Code: 06 - home with home health care

## 2020-08-10 NOTE — OUTREACH NOTE
Case Management Call Center Follow-up      Responses   Located within [facility] Call Center Tracking Reason?  HH Delay: [facility]   Has the Call Center Case Management Follow-up issue been resolved?  Yes   Follow-up Comments  Call placed to pt to discuss.  She states that [facility] has now been out to start services.  She has no further concerns.          Nancy Young RN    8/10/2020, 11:06

## 2020-08-10 NOTE — OUTREACH NOTE
Medical Week 1 Survey      Responses   [facility]   COVID-19 Test Status  Confirmed   Does the patient have one of the following disease processes/diagnoses(primary or secondary)?  Other   Is there a successful TCM telephone encounter documented?  No   Week 1 attempt successful?  Yes   Call start time  0821   Call end time  0834   Is patient permission given to speak with other caregiver?  Yes   Person spoke with today (if not patient) and relationship  Patient relative-Pelon   Meds reviewed with patient/caregiver?  Yes   Is the patient having any side effects they believe may be caused by any medication additions or changes?  No   Does the patient have all medications ordered at discharge?  Yes   Is the patient taking all medications as directed (includes completed medication regime)?  Yes   Does the patient have a primary care provider?   Yes   Does the patient have an appointment with their PCP within 7 days of discharge?  No   What is preventing the patient from scheduling follow up appointments within 7 days of discharge?  Haven't had time   Nursing Interventions  Educated patient on importance of making appointment, Advised patient to make appointment   Has the patient kept scheduled appointments due by today?  N/A   Comments  Nephew states will call PCP office for appt.   What is the Home health agency?   [facility]   Has home health visited the patient within 72 hours of discharge?  Yes   Has all DME been delivered?  Yes   DME comments  States [facility] bringing pulse oximeter.   Pulse Ox monitoring  None   Psychosocial issues?  No   Did the patient receive a copy of their discharge instructions?  Yes   Nursing interventions  Reviewed instructions with patient   What is the patient's perception of their health status since discharge?  Improving   Is the patient/caregiver able to teach back signs and symptoms related to disease process for when to call PCP?  Yes   Is the  patient/caregiver able to teach back signs and symptoms related to disease process for when to call 911?  Yes   Is the patient/caregiver able to teach back the hierarchy of who to call/visit for symptoms/problems? PCP, Specialist, Home health nurse, Urgent Care, ED, 911  Yes   Week 1 call completed?  Yes   Wrap up additional comments  Nephew states is slightly improved-denies any fever, chills, cough, or worsening SOA. Patient states is feeling better. Continues with quarantine.          Devika Oglesby, RN

## 2020-08-13 ENCOUNTER — READMISSION MANAGEMENT (OUTPATIENT)
Dept: CALL CENTER | Facility: HOSPITAL | Age: 75
End: 2020-08-13

## 2020-08-13 NOTE — OUTREACH NOTE
Medical Week 1 Survey      Responses   [facility] patient discharged from?  [place]   COVID-19 Test Status  Confirmed   Does the patient have one of the following disease processes/diagnoses(primary or secondary)?  Other   Is there a successful TCM telephone encounter documented?  No   Week 1 attempt successful?  No   Unsuccessful attempts  Attempt 3          Vilma Nicolas RN

## 2020-08-16 ENCOUNTER — READMISSION MANAGEMENT (OUTPATIENT)
Dept: CALL CENTER | Facility: HOSPITAL | Age: 75
End: 2020-08-16

## 2020-08-16 NOTE — OUTREACH NOTE
Medical Week 2 Survey      Responses   [facility] patient discharged from?  [place]   COVID-19 Test Status  Confirmed   Does the patient have one of the following disease processes/diagnoses(primary or secondary)?  Other   Week 2 attempt successful?  No   Unsuccessful attempts  Attempt 1          Shelby Farmer RN

## 2020-08-17 ENCOUNTER — TELEPHONE (OUTPATIENT)
Dept: ONCOLOGY | Facility: CLINIC | Age: 75
End: 2020-08-17

## 2020-08-17 NOTE — TELEPHONE ENCOUNTER
Pt c/o mouth pain preventing her from eating.  Spoke with nephew who believes it's due to lack of wearing/cleaning her dentures.  Pt instructed to rinse with warm salt water 4 times daily.  Reviewed with Dr. Castillo with v/o to call in Beat[employer] Magic mouthwash.  Rx called to [facility] and instructions given to nephew and he v/u.

## 2020-08-19 ENCOUNTER — READMISSION MANAGEMENT (OUTPATIENT)
Dept: CALL CENTER | Facility: HOSPITAL | Age: 75
End: 2020-08-19

## 2020-08-19 NOTE — OUTREACH NOTE
Medical Week 2 Survey      Responses   [facility]   COVID-19 Test Status  Confirmed   Does the patient have one of the following disease processes/diagnoses(primary or secondary)?  Other   Week 2 attempt successful?  Yes   Call start time  1156   Discharge diagnosis  hypoxia, COVID - 19 bronchitis, metastatic breast CA,    Call end time  1159   Meds reviewed with patient/caregiver?  Yes   Is the patient having any side effects they believe may be caused by any medication additions or changes?  No   Does the patient have all medications ordered at discharge?  Yes   Is the patient taking all medications as directed (includes completed medication regime)?  Yes   Does the patient have a primary care provider?   Yes   Has the patient kept scheduled appointments due by today?  N/A   Comments  24th  is her appt   What is the Home health agency?   [facility]   Has home health visited the patient within 72 hours of discharge?  Yes   What DME was ordered?  O2 from [place]   Has all DME been delivered?  Yes   DME comments  States [facility] bringing pulse oximeter.   Pulse Ox monitoring  Intermittent   Pulse Ox device source  Patient   O2 Sat comments  O2 @ 3L and 98 % Sat   O2 Sat: education provided  Sat levels, Monitoring frequency, When to seek care   Psychosocial issues?  No   Did the patient receive a copy of their discharge instructions?  Yes   Nursing interventions  Reviewed instructions with patient   What is the patient's perception of their health status since discharge?  Improving   Is the patient/caregiver able to teach back signs and symptoms related to disease process for when to call PCP?  Yes   Is the patient/caregiver able to teach back signs and symptoms related to disease process for when to call 911?  Yes   Is the patient/caregiver able to teach back the hierarchy of who to call/visit for symptoms/problems? PCP, Specialist, Home health nurse, Urgent Care, ED, 911  Yes    Additional teach back comments  Energy level is back to normal she says, she changed her toothbrush.  Denies cough, fever and SOA.   Week 2 Call Completed?  Yes   Wrap up additional comments  Improved.          Sehlby Farmer RN

## 2020-08-25 ENCOUNTER — READMISSION MANAGEMENT (OUTPATIENT)
Dept: CALL CENTER | Facility: HOSPITAL | Age: 75
End: 2020-08-25

## 2020-08-25 NOTE — OUTREACH NOTE
Medical Week 3 Survey      Responses   [facility] patient discharged from?  [place]   COVID-19 Test Status  Confirmed   Does the patient have one of the following disease processes/diagnoses(primary or secondary)?  Other   Week 3 attempt successful?  No          Faustina Soria LPN

## 2020-08-28 ENCOUNTER — LAB REQUISITION (OUTPATIENT)
Dept: LAB | Facility: HOSPITAL | Age: 75
End: 2020-08-28

## 2020-08-28 DIAGNOSIS — D50.9 IRON DEFICIENCY ANEMIA, UNSPECIFIED: ICD-10-CM

## 2020-08-28 DIAGNOSIS — E11.65 TYPE 2 DIABETES MELLITUS WITH HYPERGLYCEMIA (HCC): ICD-10-CM

## 2020-08-28 DIAGNOSIS — E03.9 HYPOTHYROIDISM, UNSPECIFIED: ICD-10-CM

## 2020-08-28 LAB
ALBUMIN SERPL-MCNC: 3.5 G/DL (ref 3.5–5.2)
ALBUMIN/GLOB SERPL: 0.9 G/DL
ALP SERPL-CCNC: 137 U/L (ref 39–117)
ALT SERPL W P-5'-P-CCNC: 15 U/L (ref 1–33)
ANION GAP SERPL CALCULATED.3IONS-SCNC: 14.7 MMOL/L (ref 5–15)
AST SERPL-CCNC: 18 U/L (ref 1–32)
BASOPHILS # BLD AUTO: 0.03 10*3/MM3 (ref 0–0.2)
BASOPHILS NFR BLD AUTO: 0.7 % (ref 0–1.5)
BILIRUB SERPL-MCNC: 0.4 MG/DL (ref 0–1.2)
BUN SERPL-MCNC: 13 MG/DL (ref 8–23)
BUN/CREAT SERPL: 15.3 (ref 7–25)
CALCIUM SPEC-SCNC: 9.3 MG/DL (ref 8.6–10.5)
CHLORIDE SERPL-SCNC: 96 MMOL/L (ref 98–107)
CO2 SERPL-SCNC: 26.3 MMOL/L (ref 22–29)
CREAT SERPL-MCNC: 0.85 MG/DL (ref 0.57–1)
DEPRECATED RDW RBC AUTO: 48.4 FL (ref 37–54)
EOSINOPHIL # BLD AUTO: 0.23 10*3/MM3 (ref 0–0.4)
EOSINOPHIL NFR BLD AUTO: 5.6 % (ref 0.3–6.2)
ERYTHROCYTE [DISTWIDTH] IN BLOOD BY AUTOMATED COUNT: 13.4 % (ref 12.3–15.4)
GFR SERPL CREATININE-BSD FRML MDRD: 66 ML/MIN/1.73
GFR SERPL CREATININE-BSD FRML MDRD: 79 ML/MIN/1.73
GLOBULIN UR ELPH-MCNC: 4.1 GM/DL
GLUCOSE SERPL-MCNC: 284 MG/DL (ref 65–99)
HCT VFR BLD AUTO: 36.9 % (ref 34–46.6)
HGB BLD-MCNC: 11.3 G/DL (ref 12–15.9)
IMM GRANULOCYTES # BLD AUTO: 0.02 10*3/MM3 (ref 0–0.05)
IMM GRANULOCYTES NFR BLD AUTO: 0.5 % (ref 0–0.5)
LYMPHOCYTES # BLD AUTO: 0.75 10*3/MM3 (ref 0.7–3.1)
LYMPHOCYTES NFR BLD AUTO: 18.3 % (ref 19.6–45.3)
MCH RBC QN AUTO: 30.5 PG (ref 26.6–33)
MCHC RBC AUTO-ENTMCNC: 30.6 G/DL (ref 31.5–35.7)
MCV RBC AUTO: 99.5 FL (ref 79–97)
MONOCYTES # BLD AUTO: 0.4 10*3/MM3 (ref 0.1–0.9)
MONOCYTES NFR BLD AUTO: 9.8 % (ref 5–12)
NEUTROPHILS NFR BLD AUTO: 2.66 10*3/MM3 (ref 1.7–7)
NEUTROPHILS NFR BLD AUTO: 65.1 % (ref 42.7–76)
NRBC BLD AUTO-RTO: 0 /100 WBC (ref 0–0.2)
PLATELET # BLD AUTO: 270 10*3/MM3 (ref 140–450)
PMV BLD AUTO: 9 FL (ref 6–12)
POTASSIUM SERPL-SCNC: 4.4 MMOL/L (ref 3.5–5.2)
PROT SERPL-MCNC: 7.6 G/DL (ref 6–8.5)
RBC # BLD AUTO: 3.71 10*6/MM3 (ref 3.77–5.28)
SODIUM SERPL-SCNC: 137 MMOL/L (ref 136–145)
TSH SERPL DL<=0.05 MIU/L-ACNC: 2.18 UIU/ML (ref 0.27–4.2)
WBC # BLD AUTO: 4.09 10*3/MM3 (ref 3.4–10.8)

## 2020-08-28 PROCEDURE — 85025 COMPLETE CBC W/AUTO DIFF WBC: CPT | Performed by: INTERNAL MEDICINE

## 2020-08-28 PROCEDURE — 80053 COMPREHEN METABOLIC PANEL: CPT | Performed by: INTERNAL MEDICINE

## 2020-08-28 PROCEDURE — 84443 ASSAY THYROID STIM HORMONE: CPT | Performed by: INTERNAL MEDICINE

## 2020-08-31 ENCOUNTER — LAB REQUISITION (OUTPATIENT)
Dept: LAB | Facility: HOSPITAL | Age: 75
End: 2020-08-31

## 2020-08-31 DIAGNOSIS — N39.0 URINARY TRACT INFECTION, SITE NOT SPECIFIED: ICD-10-CM

## 2020-08-31 LAB
BACTERIA UR QL AUTO: ABNORMAL /HPF
BILIRUB UR QL STRIP: NEGATIVE
CLARITY UR: CLEAR
COLOR UR: YELLOW
GLUCOSE UR STRIP-MCNC: NEGATIVE MG/DL
HGB UR QL STRIP.AUTO: NEGATIVE
HYALINE CASTS UR QL AUTO: ABNORMAL /LPF
KETONES UR QL STRIP: NEGATIVE
LEUKOCYTE ESTERASE UR QL STRIP.AUTO: ABNORMAL
NITRITE UR QL STRIP: POSITIVE
PH UR STRIP.AUTO: 6.5 [PH] (ref 4.5–8)
PROT UR QL STRIP: NEGATIVE
RBC # UR: ABNORMAL /HPF
REF LAB TEST METHOD: ABNORMAL
SP GR UR STRIP: 1.01 (ref 1–1.03)
SQUAMOUS #/AREA URNS HPF: ABNORMAL /HPF
UROBILINOGEN UR QL STRIP: ABNORMAL
WBC UR QL AUTO: ABNORMAL /HPF

## 2020-08-31 PROCEDURE — 81001 URINALYSIS AUTO W/SCOPE: CPT | Performed by: INTERNAL MEDICINE

## 2020-08-31 PROCEDURE — 87186 SC STD MICRODIL/AGAR DIL: CPT | Performed by: INTERNAL MEDICINE

## 2020-08-31 PROCEDURE — 87077 CULTURE AEROBIC IDENTIFY: CPT | Performed by: INTERNAL MEDICINE

## 2020-08-31 PROCEDURE — 87086 URINE CULTURE/COLONY COUNT: CPT | Performed by: INTERNAL MEDICINE

## 2020-09-01 ENCOUNTER — READMISSION MANAGEMENT (OUTPATIENT)
Dept: CALL CENTER | Facility: HOSPITAL | Age: 75
End: 2020-09-01

## 2020-09-01 NOTE — OUTREACH NOTE
Medical Week 3 Survey      Responses   [facility] patient discharged from?  [place]   COVID-19 Test Status  Confirmed   Does the patient have one of the following disease processes/diagnoses(primary or secondary)?  Other   Week 3 attempt successful?  No   Unsuccessful attempts  Attempt 1          Jennifer Oshea LPN

## 2020-09-02 LAB — BACTERIA SPEC AEROBE CULT: ABNORMAL

## 2020-09-08 ENCOUNTER — READMISSION MANAGEMENT (OUTPATIENT)
Dept: CALL CENTER | Facility: HOSPITAL | Age: 75
End: 2020-09-08

## 2020-09-08 NOTE — OUTREACH NOTE
Medical Week 4 Survey      Responses   [facility] patient discharged from?  [place]   COVID-19 Test Status  Confirmed   Does the patient have one of the following disease processes/diagnoses(primary or secondary)?  Other   Week 4 attempt successful?  Yes   Call start time  1022   Call end time  1029   Discharge diagnosis  hypoxia, COVID - 19 bronchitis, metastatic breast CA,    Is patient permission given to speak with other caregiver?  Yes   List who call center can speak with  nephew, Pelon   Person spoke with today (if not patient) and relationship  Patient   Meds reviewed with patient/caregiver?  Yes   Is the patient having any side effects they believe may be caused by any medication additions or changes?  No   Is the patient taking all medications as directed (includes completed medication regime)?  Yes   Has the patient kept scheduled appointments due by today?  Yes   Is the patient still receiving Home Health Services?  Yes   Pulse Ox monitoring  Intermittent   Pulse Ox device source  Patient   O2 Sat comments  Patient reports O2 at 1.5 L. Reports sats 96-99%.    O2 Sat: education provided  Sat levels, Monitoring frequency, When to seek care   Psychosocial issues?  No   Comments  Patient denies fever, increased shortness of breath.    What is the patient's perception of their health status since discharge?  Improving   Is the patient/caregiver able to teach back signs and symptoms related to disease process for when to call PCP?  Yes   Is the patient/caregiver able to teach back signs and symptoms related to disease process for when to call 911?  Yes   Is the patient/caregiver able to teach back the hierarchy of who to call/visit for symptoms/problems? PCP, Specialist, Home health nurse, Urgent Care, ED, 911  Yes   Week 4 Call Completed?  Yes   Would the patient like one additional call?  No   Graduated  Yes   Did the patient feel the follow up calls were helpful during their recovery period?  Yes    Was the number of calls appropriate?  Yes          Beatriz Moise RN

## 2020-09-12 ENCOUNTER — LAB REQUISITION (OUTPATIENT)
Dept: LAB | Facility: HOSPITAL | Age: 75
End: 2020-09-12

## 2020-09-12 DIAGNOSIS — N39.0 URINARY TRACT INFECTION, SITE NOT SPECIFIED: ICD-10-CM

## 2020-09-12 LAB
BILIRUB UR QL STRIP: NEGATIVE
CLARITY UR: CLEAR
COLOR UR: YELLOW
GLUCOSE UR STRIP-MCNC: ABNORMAL MG/DL
HGB UR QL STRIP.AUTO: NEGATIVE
KETONES UR QL STRIP: NEGATIVE
LEUKOCYTE ESTERASE UR QL STRIP.AUTO: NEGATIVE
NITRITE UR QL STRIP: NEGATIVE
PH UR STRIP.AUTO: 5.5 [PH] (ref 4.5–8)
PROT UR QL STRIP: NEGATIVE
SP GR UR STRIP: 1.02 (ref 1–1.03)
UROBILINOGEN UR QL STRIP: ABNORMAL

## 2020-09-12 PROCEDURE — 81003 URINALYSIS AUTO W/O SCOPE: CPT | Performed by: INTERNAL MEDICINE

## 2020-10-12 ENCOUNTER — TRANSCRIBE ORDERS (OUTPATIENT)
Dept: ADMINISTRATIVE | Facility: HOSPITAL | Age: 75
End: 2020-10-12

## 2020-10-12 DIAGNOSIS — R26.81 UNSTEADY GAIT: ICD-10-CM

## 2020-10-12 DIAGNOSIS — R51.9 INTRACTABLE HEADACHE, UNSPECIFIED CHRONICITY PATTERN, UNSPECIFIED HEADACHE TYPE: Primary | ICD-10-CM

## 2020-10-13 ENCOUNTER — TRANSCRIBE ORDERS (OUTPATIENT)
Dept: ADMINISTRATIVE | Facility: HOSPITAL | Age: 75
End: 2020-10-13

## 2020-10-13 ENCOUNTER — LAB (OUTPATIENT)
Dept: LAB | Facility: HOSPITAL | Age: 75
End: 2020-10-13

## 2020-10-13 ENCOUNTER — HOSPITAL ENCOUNTER (OUTPATIENT)
Dept: GENERAL RADIOLOGY | Facility: HOSPITAL | Age: 75
Discharge: HOME | End: 2020-10-13

## 2020-10-13 ENCOUNTER — HOSPITAL ENCOUNTER (OUTPATIENT)
Dept: CT IMAGING | Facility: HOSPITAL | Age: 75
Discharge: HOME | End: 2020-10-13

## 2020-10-13 DIAGNOSIS — R60.0 FACIAL EDEMA: ICD-10-CM

## 2020-10-13 DIAGNOSIS — R60.0 FACIAL EDEMA: Primary | ICD-10-CM

## 2020-10-13 DIAGNOSIS — R26.81 UNSTEADY GAIT: ICD-10-CM

## 2020-10-13 DIAGNOSIS — R51.9 NONINTRACTABLE HEADACHE, UNSPECIFIED CHRONICITY PATTERN, UNSPECIFIED HEADACHE TYPE: ICD-10-CM

## 2020-10-13 DIAGNOSIS — M54.2 NECK PAIN: ICD-10-CM

## 2020-10-13 DIAGNOSIS — R51.9 INTRACTABLE HEADACHE, UNSPECIFIED CHRONICITY PATTERN, UNSPECIFIED HEADACHE TYPE: ICD-10-CM

## 2020-10-13 LAB
ALBUMIN SERPL-MCNC: 4.2 G/DL (ref 3.5–5.2)
ALBUMIN/GLOB SERPL: 1.3 G/DL
ALP SERPL-CCNC: 89 U/L (ref 39–117)
ALT SERPL W P-5'-P-CCNC: 13 U/L (ref 1–33)
ANION GAP SERPL CALCULATED.3IONS-SCNC: 13.1 MMOL/L (ref 5–15)
AST SERPL-CCNC: 14 U/L (ref 1–32)
BILIRUB SERPL-MCNC: 0.3 MG/DL (ref 0–1.2)
BILIRUB UR QL STRIP: NEGATIVE
BUN SERPL-MCNC: 20 MG/DL (ref 8–23)
BUN/CREAT SERPL: 21.3 (ref 7–25)
CALCIUM SPEC-SCNC: 10.1 MG/DL (ref 8.6–10.5)
CHLORIDE SERPL-SCNC: 99 MMOL/L (ref 98–107)
CLARITY UR: CLEAR
CO2 SERPL-SCNC: 23.9 MMOL/L (ref 22–29)
COLOR UR: YELLOW
CREAT SERPL-MCNC: 0.94 MG/DL (ref 0.57–1)
DEPRECATED RDW RBC AUTO: 45.8 FL (ref 37–54)
ERYTHROCYTE [DISTWIDTH] IN BLOOD BY AUTOMATED COUNT: 13.1 % (ref 12.3–15.4)
GFR SERPL CREATININE-BSD FRML MDRD: 58 ML/MIN/1.73
GFR SERPL CREATININE-BSD FRML MDRD: 71 ML/MIN/1.73
GLOBULIN UR ELPH-MCNC: 3.3 GM/DL
GLUCOSE SERPL-MCNC: 266 MG/DL (ref 65–99)
GLUCOSE UR STRIP-MCNC: NEGATIVE MG/DL
HCT VFR BLD AUTO: 38.7 % (ref 34–46.6)
HGB BLD-MCNC: 12.6 G/DL (ref 12–15.9)
HGB UR QL STRIP.AUTO: NEGATIVE
KETONES UR QL STRIP: NEGATIVE
LEUKOCYTE ESTERASE UR QL STRIP.AUTO: NEGATIVE
MCH RBC QN AUTO: 30.7 PG (ref 26.6–33)
MCHC RBC AUTO-ENTMCNC: 32.6 G/DL (ref 31.5–35.7)
MCV RBC AUTO: 94.4 FL (ref 79–97)
NITRITE UR QL STRIP: NEGATIVE
PH UR STRIP.AUTO: <=5 [PH] (ref 5–8)
PLATELET # BLD AUTO: 274 10*3/MM3 (ref 140–450)
PMV BLD AUTO: 8.5 FL (ref 6–12)
POTASSIUM SERPL-SCNC: 4.2 MMOL/L (ref 3.5–5.2)
PROT SERPL-MCNC: 7.5 G/DL (ref 6–8.5)
PROT UR QL STRIP: NEGATIVE
RBC # BLD AUTO: 4.1 10*6/MM3 (ref 3.77–5.28)
SODIUM SERPL-SCNC: 136 MMOL/L (ref 136–145)
SP GR UR STRIP: 1.02 (ref 1–1.03)
UROBILINOGEN UR QL STRIP: NORMAL
WBC # BLD AUTO: 7.06 10*3/MM3 (ref 3.4–10.8)

## 2020-10-13 PROCEDURE — 36415 COLL VENOUS BLD VENIPUNCTURE: CPT

## 2020-10-13 PROCEDURE — 81003 URINALYSIS AUTO W/O SCOPE: CPT

## 2020-10-13 PROCEDURE — 80053 COMPREHEN METABOLIC PANEL: CPT

## 2020-10-13 PROCEDURE — 72040 X-RAY EXAM NECK SPINE 2-3 VW: CPT

## 2020-10-13 PROCEDURE — 70450 CT HEAD/BRAIN W/O DYE: CPT

## 2020-10-13 PROCEDURE — 85027 COMPLETE CBC AUTOMATED: CPT

## 2020-10-16 ENCOUNTER — TRANSCRIBE ORDERS (OUTPATIENT)
Dept: ADMINISTRATIVE | Facility: HOSPITAL | Age: 75
End: 2020-10-16

## 2020-10-16 DIAGNOSIS — R42 DIZZINESS: Primary | ICD-10-CM

## 2020-10-16 DIAGNOSIS — R26.81 UNSTEADY GAIT: ICD-10-CM

## 2020-11-09 ENCOUNTER — APPOINTMENT (OUTPATIENT)
Dept: MRI IMAGING | Facility: HOSPITAL | Age: 75
End: 2020-11-09

## 2020-12-08 ENCOUNTER — OFFICE VISIT (OUTPATIENT)
Dept: ONCOLOGY | Facility: CLINIC | Age: 75
End: 2020-12-08

## 2020-12-08 ENCOUNTER — LAB (OUTPATIENT)
Dept: LAB | Facility: HOSPITAL | Age: 75
End: 2020-12-08

## 2020-12-08 VITALS
OXYGEN SATURATION: 98 % | WEIGHT: 120 LBS | HEART RATE: 88 BPM | HEIGHT: 61 IN | BODY MASS INDEX: 22.66 KG/M2 | TEMPERATURE: 98.4 F | RESPIRATION RATE: 14 BRPM

## 2020-12-08 DIAGNOSIS — C50.112 MALIGNANT NEOPLASM OF CENTRAL PORTION OF LEFT BREAST IN FEMALE, ESTROGEN RECEPTOR POSITIVE (HCC): Primary | ICD-10-CM

## 2020-12-08 DIAGNOSIS — C50.112 MALIGNANT NEOPLASM OF CENTRAL PORTION OF LEFT BREAST IN FEMALE, ESTROGEN RECEPTOR POSITIVE (HCC): ICD-10-CM

## 2020-12-08 DIAGNOSIS — E83.42 HYPOMAGNESEMIA: ICD-10-CM

## 2020-12-08 DIAGNOSIS — D50.9 IRON DEFICIENCY ANEMIA, UNSPECIFIED IRON DEFICIENCY ANEMIA TYPE: ICD-10-CM

## 2020-12-08 DIAGNOSIS — C78.02 MALIGNANT NEOPLASM METASTATIC TO LEFT LUNG (HCC): ICD-10-CM

## 2020-12-08 DIAGNOSIS — Z17.0 MALIGNANT NEOPLASM OF CENTRAL PORTION OF LEFT BREAST IN FEMALE, ESTROGEN RECEPTOR POSITIVE (HCC): ICD-10-CM

## 2020-12-08 DIAGNOSIS — Z17.0 MALIGNANT NEOPLASM OF CENTRAL PORTION OF LEFT BREAST IN FEMALE, ESTROGEN RECEPTOR POSITIVE (HCC): Primary | ICD-10-CM

## 2020-12-08 LAB
ALBUMIN SERPL-MCNC: 4.3 G/DL (ref 3.5–5.2)
ALBUMIN/GLOB SERPL: 1.2 G/DL (ref 1.1–2.4)
ALP SERPL-CCNC: 94 U/L (ref 38–116)
ALT SERPL W P-5'-P-CCNC: 11 U/L (ref 0–33)
ANION GAP SERPL CALCULATED.3IONS-SCNC: 11.5 MMOL/L (ref 5–15)
AST SERPL-CCNC: 15 U/L (ref 0–32)
BASOPHILS # BLD AUTO: 0.07 10*3/MM3 (ref 0–0.2)
BASOPHILS NFR BLD AUTO: 1.4 % (ref 0–1.5)
BILIRUB SERPL-MCNC: 0.3 MG/DL (ref 0.2–1.2)
BUN SERPL-MCNC: 15 MG/DL (ref 6–20)
BUN/CREAT SERPL: 17 (ref 7.3–30)
CALCIUM SPEC-SCNC: 10.2 MG/DL (ref 8.5–10.2)
CHLORIDE SERPL-SCNC: 102 MMOL/L (ref 98–107)
CO2 SERPL-SCNC: 27.5 MMOL/L (ref 22–29)
CREAT SERPL-MCNC: 0.88 MG/DL (ref 0.6–1.1)
DEPRECATED RDW RBC AUTO: 40.9 FL (ref 37–54)
EOSINOPHIL # BLD AUTO: 0.31 10*3/MM3 (ref 0–0.4)
EOSINOPHIL NFR BLD AUTO: 6 % (ref 0.3–6.2)
ERYTHROCYTE [DISTWIDTH] IN BLOOD BY AUTOMATED COUNT: 11.9 % (ref 12.3–15.4)
FERRITIN SERPL-MCNC: 135.6 NG/ML (ref 13–150)
GFR SERPL CREATININE-BSD FRML MDRD: 63 ML/MIN/1.73
GFR SERPL CREATININE-BSD FRML MDRD: 76 ML/MIN/1.73
GLOBULIN UR ELPH-MCNC: 3.6 GM/DL (ref 1.8–3.5)
GLUCOSE SERPL-MCNC: 185 MG/DL (ref 74–124)
HCT VFR BLD AUTO: 37.8 % (ref 34–46.6)
HGB BLD-MCNC: 12.6 G/DL (ref 12–15.9)
IMM GRANULOCYTES # BLD AUTO: 0.01 10*3/MM3 (ref 0–0.05)
IMM GRANULOCYTES NFR BLD AUTO: 0.2 % (ref 0–0.5)
IRON 24H UR-MRATE: 98 MCG/DL (ref 37–145)
IRON SATN MFR SERPL: 24 % (ref 14–48)
LYMPHOCYTES # BLD AUTO: 1.51 10*3/MM3 (ref 0.7–3.1)
LYMPHOCYTES NFR BLD AUTO: 29.2 % (ref 19.6–45.3)
MAGNESIUM SERPL-MCNC: 1.7 MG/DL (ref 1.8–2.5)
MCH RBC QN AUTO: 31.3 PG (ref 26.6–33)
MCHC RBC AUTO-ENTMCNC: 33.3 G/DL (ref 31.5–35.7)
MCV RBC AUTO: 93.8 FL (ref 79–97)
MONOCYTES # BLD AUTO: 0.49 10*3/MM3 (ref 0.1–0.9)
MONOCYTES NFR BLD AUTO: 9.5 % (ref 5–12)
NEUTROPHILS NFR BLD AUTO: 2.79 10*3/MM3 (ref 1.7–7)
NEUTROPHILS NFR BLD AUTO: 53.7 % (ref 42.7–76)
NRBC BLD AUTO-RTO: 0 /100 WBC (ref 0–0.2)
PLATELET # BLD AUTO: 274 10*3/MM3 (ref 140–450)
PMV BLD AUTO: 8.1 FL (ref 6–12)
POTASSIUM SERPL-SCNC: 4.6 MMOL/L (ref 3.5–4.7)
PROT SERPL-MCNC: 7.9 G/DL (ref 6.3–8)
RBC # BLD AUTO: 4.03 10*6/MM3 (ref 3.77–5.28)
SODIUM SERPL-SCNC: 141 MMOL/L (ref 134–145)
TIBC SERPL-MCNC: 405 MCG/DL (ref 249–505)
TRANSFERRIN SERPL-MCNC: 289 MG/DL (ref 200–360)
WBC # BLD AUTO: 5.18 10*3/MM3 (ref 3.4–10.8)

## 2020-12-08 PROCEDURE — 83735 ASSAY OF MAGNESIUM: CPT

## 2020-12-08 PROCEDURE — 82728 ASSAY OF FERRITIN: CPT

## 2020-12-08 PROCEDURE — 85025 COMPLETE CBC W/AUTO DIFF WBC: CPT

## 2020-12-08 PROCEDURE — 83540 ASSAY OF IRON: CPT

## 2020-12-08 PROCEDURE — 80053 COMPREHEN METABOLIC PANEL: CPT

## 2020-12-08 PROCEDURE — 99214 OFFICE O/P EST MOD 30 MIN: CPT | Performed by: INTERNAL MEDICINE

## 2020-12-08 PROCEDURE — 84466 ASSAY OF TRANSFERRIN: CPT

## 2020-12-08 PROCEDURE — 36415 COLL VENOUS BLD VENIPUNCTURE: CPT

## 2020-12-08 RX ORDER — EXEMESTANE 25 MG/1
25 TABLET ORAL DAILY
Qty: 90 TABLET | Refills: 3 | Status: SHIPPED | OUTPATIENT
Start: 2020-12-08 | End: 2021-12-23 | Stop reason: SDUPTHER

## 2020-12-08 NOTE — PROGRESS NOTES
REASON FOR FOLLOW-UP:    1. Metastatic breast cancer to the lungs, ER/ND positive, HER2/berkley positive, undergoing hormonal therapy with Arimidex since the middle of March 2009.   2. Response to Arimidex as evidenced by serial CT exams, including on 01/10/2012, showing no significant disease.   3. New left upper lobe pulmonary nodule noted, measuring 1.1 cm by CT scan on 07/05/2012. No other evidence of metastatic disease. Hormonal therapy switched to Faslodex 07/12/2012.   4. Osteoporosis documented by bone density scan from 10/17/2011 and also 02/21/2014. Patient was started on Prolia therapy every 6 months. Patient had tooth extraction in August 2015. Prolia has been on hold.   5. CT scans of chest, abdomen and pelvis on 04/07/2016 showed stable disease. Faslodex will be continued.   6. CT scan examination reported disease progress on 01/30/2017, patient was switched to Aromasin in early February 2017. However she was unable to start Afinitor due to cost.   7. Repeated bone density scan on 2/27/2017 reported worsening osteoporosis. Prolia was restarted back on 03/02/2017. Plan for Q6 month treatment.   8.  Repeated chest CT on 5/15/2017 showed progressive left upper lobe pulmonary nodule.  Patient had stereotactic radiation therapy in early June 2017.  Aromasin was continued.   9.  CT scan of the chest on 9/25/2017 reported post treatment changes in the left upper lobe.   10.  CT scan examination for chest abdomen and pelvis with IV contrast on 6/21/2018 reported no evidence of disease progression.   11.  CT abdomen/chest/pelvis from 6/3/2020 showed no evidence of metastatic disease has developed.      HISTORY OF PRESENT ILLNESS: Ms. Reese is a 75 y.o.  female who has a history of type 2 diabetes, hypothyroidism, hyperlipidemia presenting today for 6-month followup for her metastatic breast cancer.     Patient had a COVID-19 infection required a brief hospitalization in early August 2020.  She reports largely  recovered, however with residual fatigue.  She denies chest pain no dyspnea.  No cough no hemoptysis.  No fever sweating.      She is taking oral iron once a day.    Patient takes Exemestane as prescribed, as well as magnesium and Vitamin D.    Patient denies abdominal pain, bone pain, edema in the legs.  She has excellent performance that is ECOG 0.      Laboratory studies today 12/18/2020 reported normal CBC including Hb 12.6, WBC 5180, ANC 2790, lymphocytes 1500, platelets 274,000, magnesium 1.7, electrolytes normal otherwise,, elevated glucose 185, unremarkable CMP.  Ferritin 135, and iron saturation 24%.    Laboratory study on 8/6/2020 reported a peak ferritin 1124 when she was hospitalized for COVID-19 infection.  She has had a significant elevated CRP 7.73 mg/dL.  Her hemoglobin was 11.6, platelets 259,000 and a WBC 11,733 ANC 10,390.    PAST MEDICAL HISTORY:    1.  Left breast cancer, stage I, ER and AK positive, status post mastectomy, chemotherapy, and tamoxifen for five years--finished in 2001.    2.  Hypothyroidism diagnosed in 01/2009.    3.  Hyperlipidemia.    4.  Diabetes, type 2.    5.  Osteoporosis, evidenced on bone density scans in October 2011 and February 2014. The patient stopped Fosamax because of dental problem suspected to be related to bisphosphonate.   6.  Teeth extraction in February 2018.      SURGICAL HISTORY: Modified left radical mastectomy and previous breast biopsy in 1995.       ONCOLOGIC/HEMATOLOGIC HISTORY: History from previous dates can be found in the separate document.       CT scan on 04/07/2016 showed stable disease with the small left upper lobe pulmonary nodule, no change compared to previous imaging studies, no evidence of metastatic disease in the abdomen or pelvis or bony structure.       Unfortunately her CT scan on 01/30/2017 showed slight disease progress in the left upper lobe of the lung, by 3 mm, up to 1.4 centimeters. No evidence of new metastatic disease or  bone metastases.       Patient was seen on February 7, 2017. We'll propose switch therapy to Aromasin plus Afinitor.  however she could not afford Afinitor because the cost. Our pharmacy staff tried to apply for premedication, but was not successful. So she is only on Aromasin with good tolerance.      Repeated a CT of the chest on 5/15/2017 showed progressive left upper lobe pulmonary nodule.  Patient had stereotactic radiation therapy in early June 2017.  Aromasin was continued.     CT scan examination reported no active disease on 6/10/2019.  Had a reasonable iron studies including ferritin 188 ng/mL, free iron 75 TIBC 410 and iron saturation 18%.  And also improve the magnesium level 1.8.  Mild anemic with hemoglobin 11.5 but normal platelets 253,000 and WBC 4020.  Chemistry lab reported normal CMP except elevated glucose which was at 201.     Patient presented on 12/3/2019 for 6-month followup.  She reports at baseline condition, she retired.  She denies weight loss, no fever, no sweating, no headaches.  Her performance status is ECOG 0.  Patient had teeth extraction in February 2018.  She now has full-mouth denture.    Patient otherwise reports no dyspnea, no cough, hemoptysis.  She reports no fever or sweating.   She is on Aromasin with good tolerance. She denies arthralgia, hot flashes or sweating.  She denies bone pains.       Laboratory study on 12/3/2019 reported marginal anemia hemoglobin 11.7, otherwise normal CBC.  Chemistry lab reported magnesium 1.5, glucose 156 otherwise unremarkable.       Her CT scan for chest abdomen and pelvis with IV contrast on 6/3/2020 reported no evidence of worsening metastatic disease.      Laboratory studies on 6/3/2020 reported stable mild anemia with Hb 11.7, with MCV 97.8, normal platelets 260,000 and a WBC 4680.  Iron study reported ferritin 210, saturation 24%.  Unremarkable CMP with normal glucose, electrolytes and liver function panel and renal function.   "      MEDICATIONS: The current medication list was reviewed with the patient and updated in the EMR this date per the medical assistant. Medication dosages and frequencies were confirmed to be accurate.       ALLERGIES: LETROZOLE (questionably causing skin rash).       SOCIAL HISTORY: The patient is . She finished college education. 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 She has never smoked. No alcohol use. No illegal drug use. No risks for HIV.       FAMILY HISTORY: Her mother  of tuberculosis at the age of 48. A sister had thyroid cancer/lung cancer - no details are available. Two brothers had lung cancer and bone cancers in their 70s - no details available. Her maternal grandmother and her mother both had diabetes. No family history of hypertension or heart disease.       REVIEW OF SYSTEMS:   GENERAL: No change in appetite or weight, no fevers, chills or sweats.  Has persistent fatigue since COVID-19 in 2020.  SKIN: No skin rashes.    HEME/LYMPH: No anemia, easy bruising, bleeding or swollen nodes.   EYES: No vision changes or diplopia.   ENT: No hearing loss, gum bleeding, epistaxis , hoarseness or dysphagia.   RESPIRATORY: No cough, shortness of breath, hemoptysis or wheezing.   CVS: No chest pain, palpitations, orthopnea, dyspnea on exertion.   GI: No abdominal pain, nausea, vomiting, constipation, diarrhea, melena or hematochezia.   : No dysuria or hematuria.    MUSCULOSKELETAL: no joint pain or bone pain at this time.   NEUROLOGICAL: No dizziness, global weakness, loss of consciousness or seizures.   PSYCHIATRIC: No increased nervousness, mood changes or depression.       VITAL SIGNS:   Vitals:    20 0942   Pulse: 88   Resp: 14   Temp: 98.4 °F (36.9 °C)   SpO2: 98%   Weight: 54.4 kg (120 lb)   Height: 154.9 cm (60.98\")   PainSc: 0-No pain     ECO      PHYSICAL EXAMINATION:     GENERAL:  Well-developed, well-nourished  female in no acute distress.   SKIN:  Warm, " dry without rashes, purpura or petechiae.  HEAD:  Normocephalic.  EYES:  Pupils equal, round.  Conjunctivae normal.  EARS:  Hearing intact.  NOSE: Patient wears mask due to the pandemic coronavirus infection.   MOUTH: Same as above.  THROAT: Same as above.  NECK:  Supple; no thyromegaly or masses.  LYMPHATICS:  No cervical, supraclavicular adenopathy.  CHEST: Normal respiratory effort. Lungs clear to auscultation. Good airflow.  CARDIAC:  Regular rate and rhythm without murmurs. Normal S1,S2.  ABDOMEN:  Soft, nontender with no organomegaly or masses.  Bowel sounds normal.  EXTREMITIES:  No clubbing, cyanosis or edema.  NEUROLOGICAL:  Cranial Nerves II-XII grossly intact.  No focal neurological deficits.  PSYCHIATRIC:  Normal affect and mood.        LABORATORY DATA:    Lab Results   Component Value Date    WBC 5.18 12/08/2020    HGB 12.6 12/08/2020    HCT 37.8 12/08/2020    MCV 93.8 12/08/2020     12/08/2020     Lab Results   Component Value Date    NEUTROABS 2.79 12/08/2020     Lab Results   Component Value Date    GLUCOSE 185 (H) 12/08/2020    BUN 15 12/08/2020    CREATININE 0.88 12/08/2020    EGFRIFNONA 63 12/08/2020    EGFRIFAFRI 76 12/08/2020    BCR 17.0 12/08/2020    K 4.6 12/08/2020    CO2 27.5 12/08/2020    CALCIUM 10.2 12/08/2020    ALBUMIN 4.30 12/08/2020    AST 15 12/08/2020    ALT 11 12/08/2020       ASSESSMENT:   1. Metastatic breast cancer with metastasis in the lungs, biopsy confirmed. ER/UT positive. Her2 positive.    · She had good response to Arimidex for many years.  However in early 2017, CT scan showed disease progression with solitary lung metastases.  We switched her to Aromasin in early February 2017, however she could not obtain Afinitor, because of the cost issue.     · We obtained chest CT scan on 5/15/2017 which showed further disease progression.  Patient was treated with stereotactic radiation therapy in early June 2017.    · We obtained CT scan twice on 9/25/2017 and on 6/21/2018  which showed post radiation therapy changes.    · We obtained CT scan examination again on 6/10/2019 which showed no evidence of active disease.    · Patient reports she is at baseline condition.  Physical examination is no evidence for palpable disease.  She continues to tolerate endocrine therapy with Aromasin as of 12/3/2020.    · CT scan examination on 6/3/2020 showed no evidence of disease progression.  Aromasin will be continued.  · On 12/8/2020, patient reports she has been tolerating Aromasin.  We recommended to obtain CT scan in 6 months for reevaluation.     2. Osteoporosis. She had bone density scan on 2/27/2017 which showed statistically significant worsening osteoporosis in the left hip.  We restarted her back on Prolia on 03/02/17 and repeat every 6 months.   · Her last dose of Prolia was in June 2018.  Patient reported that she had pain involving the left lower jaw after her teeth extraction in February 2018.  We concerned about possibility of necrosis of the jaw bone, so I recommended to discontinue prolia.    · The patient will continue oral calcium and vitamin D supplementation.   · I discussed again with the patient on 12/8/2020, recommended patient to have dental assessment before we restart his on Prolia.  Due to the current pandemic COVID-19 infection, many dental clinics are not in business.  So we may have to wait a little while.  Patient voiced understanding.    3. Mild anemia,with mild iron deficiency.    · Patient has been taking oral iron supplementation with good results, laboratory study showed improved with ferritin and iron saturation, and normalized hemoglobin.    · Her iron study on 6/10/2019 reported excellent ferritin 188, and the marginal iron saturation 18%.   · Iron study on 6/3/2020 reported ferritin 210 and iron saturation 24%.  Patient was instructed to continue oral iron supplementation and oral vitamin B12 supplementation.   · Laboratory study 12/8/2020 reported normal  ferritin 135 and iron saturation 24%, together with normal hemoglobin 12.6.  Patient will continue oral iron treatment..      4.  Hypomagnesemia.    · She was started on oral magnesium oxide.  Repeat labs showed improved and marginally normal magnesium level 1.8 on 6/10/2019.    · Recurrent hypomagnesemia 1.5 on 12/3/2019.   · Magnesium 1.7 on 12/8/2020.  I asked patient to continue oral magnesium oxide.    PLAN:   1. Continue Aromasin 25 mg daily.  I renewed her prescription for 1 year.  2. Continue oral ferrous sulfate 325 mg once a day.     3.  Continue oral magnesium oxide 400 mg twice a day.    4.  Obtain CT scan for chest abdomen pelvis with IV contrast in 6 months for reassessment of metastatic breast cancer.  5.  Follow-up with me in 6 months with laboratory study including CBC CMP and magnesium check.        LETA MAY M.D., Ph.D.    12/8/2020        CC: Sussy Payne M.D.

## 2020-12-21 ENCOUNTER — TELEPHONE (OUTPATIENT)
Dept: PHARMACY | Facility: HOSPITAL | Age: 75
End: 2020-12-21

## 2020-12-21 NOTE — TELEPHONE ENCOUNTER
----- Message from Carlos Castillo MD PhD sent at 12/21/2020  1:23 PM EST -----  She should recheck with her primary care physician for these things    Thank you Erica!    ----- Message -----  From: Erica Sandoval  Sent: 12/21/2020  11:07 AM EST  To: Carlos Castillo MD PhD    Pt is requesting a refill on Metformin. We never filled it. In fact, it hasn't been filled for 3 years according to [facility] records. Just making sure that you didn't say that we would fill it for her during last office visit.  Thanks  Erica.

## 2021-02-24 ENCOUNTER — LAB (OUTPATIENT)
Dept: LAB | Facility: HOSPITAL | Age: 76
End: 2021-02-24

## 2021-02-24 DIAGNOSIS — E83.42 HYPOMAGNESEMIA: ICD-10-CM

## 2021-02-24 DIAGNOSIS — Z17.0 MALIGNANT NEOPLASM OF CENTRAL PORTION OF LEFT BREAST IN FEMALE, ESTROGEN RECEPTOR POSITIVE (HCC): ICD-10-CM

## 2021-02-24 DIAGNOSIS — C50.112 MALIGNANT NEOPLASM OF CENTRAL PORTION OF LEFT BREAST IN FEMALE, ESTROGEN RECEPTOR POSITIVE (HCC): ICD-10-CM

## 2021-02-24 LAB
ALBUMIN SERPL-MCNC: 4.1 G/DL (ref 3.5–5.2)
ALBUMIN/GLOB SERPL: 1.3 G/DL
ALP SERPL-CCNC: 106 U/L (ref 39–117)
ALT SERPL W P-5'-P-CCNC: 16 U/L (ref 1–33)
ANION GAP SERPL CALCULATED.3IONS-SCNC: 11.3 MMOL/L (ref 5–15)
AST SERPL-CCNC: 22 U/L (ref 1–32)
BASOPHILS # BLD AUTO: 0.04 10*3/MM3 (ref 0–0.2)
BASOPHILS NFR BLD AUTO: 0.8 % (ref 0–1.5)
BILIRUB SERPL-MCNC: 0.3 MG/DL (ref 0–1.2)
BUN SERPL-MCNC: 14 MG/DL (ref 8–23)
BUN/CREAT SERPL: 15.2 (ref 7–25)
CALCIUM SPEC-SCNC: 9.3 MG/DL (ref 8.6–10.5)
CHLORIDE SERPL-SCNC: 105 MMOL/L (ref 98–107)
CO2 SERPL-SCNC: 23.7 MMOL/L (ref 22–29)
CREAT SERPL-MCNC: 0.92 MG/DL (ref 0.57–1)
DEPRECATED RDW RBC AUTO: 42.7 FL (ref 37–54)
EOSINOPHIL # BLD AUTO: 0.1 10*3/MM3 (ref 0–0.4)
EOSINOPHIL NFR BLD AUTO: 1.9 % (ref 0.3–6.2)
ERYTHROCYTE [DISTWIDTH] IN BLOOD BY AUTOMATED COUNT: 12.9 % (ref 12.3–15.4)
GFR SERPL CREATININE-BSD FRML MDRD: 60 ML/MIN/1.73
GFR SERPL CREATININE-BSD FRML MDRD: 72 ML/MIN/1.73
GLOBULIN UR ELPH-MCNC: 3.1 GM/DL
GLUCOSE SERPL-MCNC: 159 MG/DL (ref 65–99)
HCT VFR BLD AUTO: 35.7 % (ref 34–46.6)
HGB BLD-MCNC: 11.8 G/DL (ref 12–15.9)
IMM GRANULOCYTES # BLD AUTO: 0.02 10*3/MM3 (ref 0–0.05)
IMM GRANULOCYTES NFR BLD AUTO: 0.4 % (ref 0–0.5)
LYMPHOCYTES # BLD AUTO: 1.67 10*3/MM3 (ref 0.7–3.1)
LYMPHOCYTES NFR BLD AUTO: 32.1 % (ref 19.6–45.3)
MAGNESIUM SERPL-MCNC: 1.9 MG/DL (ref 1.6–2.4)
MCH RBC QN AUTO: 30.5 PG (ref 26.6–33)
MCHC RBC AUTO-ENTMCNC: 33.1 G/DL (ref 31.5–35.7)
MCV RBC AUTO: 92.2 FL (ref 79–97)
MONOCYTES # BLD AUTO: 0.42 10*3/MM3 (ref 0.1–0.9)
MONOCYTES NFR BLD AUTO: 8.1 % (ref 5–12)
NEUTROPHILS NFR BLD AUTO: 2.95 10*3/MM3 (ref 1.7–7)
NEUTROPHILS NFR BLD AUTO: 56.7 % (ref 42.7–76)
NRBC BLD AUTO-RTO: 0 /100 WBC (ref 0–0.2)
PLATELET # BLD AUTO: 270 10*3/MM3 (ref 140–450)
PMV BLD AUTO: 8.6 FL (ref 6–12)
POTASSIUM SERPL-SCNC: 4.3 MMOL/L (ref 3.5–5.2)
PROT SERPL-MCNC: 7.2 G/DL (ref 6–8.5)
RBC # BLD AUTO: 3.87 10*6/MM3 (ref 3.77–5.28)
SODIUM SERPL-SCNC: 140 MMOL/L (ref 136–145)
WBC # BLD AUTO: 5.2 10*3/MM3 (ref 3.4–10.8)

## 2021-02-24 PROCEDURE — 36415 COLL VENOUS BLD VENIPUNCTURE: CPT

## 2021-02-24 PROCEDURE — 85025 COMPLETE CBC W/AUTO DIFF WBC: CPT

## 2021-02-24 PROCEDURE — 80053 COMPREHEN METABOLIC PANEL: CPT

## 2021-02-24 PROCEDURE — 83735 ASSAY OF MAGNESIUM: CPT

## 2021-03-12 ENCOUNTER — LAB (OUTPATIENT)
Dept: LAB | Facility: HOSPITAL | Age: 76
End: 2021-03-12

## 2021-03-12 ENCOUNTER — TRANSCRIBE ORDERS (OUTPATIENT)
Dept: LAB | Facility: HOSPITAL | Age: 76
End: 2021-03-12

## 2021-03-12 DIAGNOSIS — E11.9 TYPE 2 DIABETES MELLITUS WITHOUT COMPLICATION, UNSPECIFIED WHETHER LONG TERM INSULIN USE (HCC): ICD-10-CM

## 2021-03-12 DIAGNOSIS — I10 HYPERTENSION, UNSPECIFIED TYPE: ICD-10-CM

## 2021-03-12 DIAGNOSIS — Z00.01 ABNORMAL PHYSICAL EVALUATION: Primary | ICD-10-CM

## 2021-03-12 DIAGNOSIS — Z00.01 ABNORMAL PHYSICAL EVALUATION: ICD-10-CM

## 2021-03-12 LAB
ALBUMIN SERPL-MCNC: 4.3 G/DL (ref 3.5–5.2)
ALBUMIN/GLOB SERPL: 1.3 G/DL
ALP SERPL-CCNC: 111 U/L (ref 39–117)
ALT SERPL W P-5'-P-CCNC: 22 U/L (ref 1–33)
ANION GAP SERPL CALCULATED.3IONS-SCNC: 10.1 MMOL/L (ref 5–15)
AST SERPL-CCNC: 21 U/L (ref 1–32)
BILIRUB SERPL-MCNC: 0.4 MG/DL (ref 0–1.2)
BILIRUB UR QL STRIP: NEGATIVE
BUN SERPL-MCNC: 14 MG/DL (ref 8–23)
BUN/CREAT SERPL: 16.7 (ref 7–25)
CALCIUM SPEC-SCNC: 9.3 MG/DL (ref 8.6–10.5)
CHLORIDE SERPL-SCNC: 103 MMOL/L (ref 98–107)
CHOLEST SERPL-MCNC: 162 MG/DL (ref 0–200)
CLARITY UR: CLEAR
CO2 SERPL-SCNC: 27.9 MMOL/L (ref 22–29)
COLOR UR: YELLOW
CREAT SERPL-MCNC: 0.84 MG/DL (ref 0.57–1)
CREAT UR-MCNC: 50.4 MG/DL
DEPRECATED RDW RBC AUTO: 44.2 FL (ref 37–54)
ERYTHROCYTE [DISTWIDTH] IN BLOOD BY AUTOMATED COUNT: 12.8 % (ref 12.3–15.4)
GFR SERPL CREATININE-BSD FRML MDRD: 66 ML/MIN/1.73
GFR SERPL CREATININE-BSD FRML MDRD: 80 ML/MIN/1.73
GLOBULIN UR ELPH-MCNC: 3.4 GM/DL
GLUCOSE SERPL-MCNC: 118 MG/DL (ref 65–99)
GLUCOSE UR STRIP-MCNC: NEGATIVE MG/DL
HBA1C MFR BLD: 8.5 % (ref 4.8–5.6)
HCT VFR BLD AUTO: 38.6 % (ref 34–46.6)
HDLC SERPL-MCNC: 47 MG/DL (ref 40–60)
HGB BLD-MCNC: 12.9 G/DL (ref 12–15.9)
HGB UR QL STRIP.AUTO: NEGATIVE
KETONES UR QL STRIP: NEGATIVE
LDLC SERPL CALC-MCNC: 94 MG/DL (ref 0–100)
LDLC/HDLC SERPL: 1.95 {RATIO}
LEUKOCYTE ESTERASE UR QL STRIP.AUTO: NEGATIVE
MCH RBC QN AUTO: 31.6 PG (ref 26.6–33)
MCHC RBC AUTO-ENTMCNC: 33.4 G/DL (ref 31.5–35.7)
MCV RBC AUTO: 94.6 FL (ref 79–97)
NITRITE UR QL STRIP: NEGATIVE
PH UR STRIP.AUTO: 7 [PH] (ref 5–8)
PLATELET # BLD AUTO: 270 10*3/MM3 (ref 140–450)
PMV BLD AUTO: 8.4 FL (ref 6–12)
POTASSIUM SERPL-SCNC: 4.4 MMOL/L (ref 3.5–5.2)
PROT SERPL-MCNC: 7.7 G/DL (ref 6–8.5)
PROT UR QL STRIP: NEGATIVE
PROT UR-MCNC: 5 MG/DL
PROT/CREAT UR: 99.2 MG/G CREA (ref 0–200)
RBC # BLD AUTO: 4.08 10*6/MM3 (ref 3.77–5.28)
SODIUM SERPL-SCNC: 141 MMOL/L (ref 136–145)
SP GR UR STRIP: 1.01 (ref 1–1.03)
TRIGL SERPL-MCNC: 117 MG/DL (ref 0–150)
TSH SERPL DL<=0.05 MIU/L-ACNC: 5.52 UIU/ML (ref 0.27–4.2)
UROBILINOGEN UR QL STRIP: NORMAL
VLDLC SERPL-MCNC: 21 MG/DL (ref 5–40)
WBC # BLD AUTO: 4.83 10*3/MM3 (ref 3.4–10.8)

## 2021-03-12 PROCEDURE — 36415 COLL VENOUS BLD VENIPUNCTURE: CPT

## 2021-03-12 PROCEDURE — 82570 ASSAY OF URINE CREATININE: CPT

## 2021-03-12 PROCEDURE — 80053 COMPREHEN METABOLIC PANEL: CPT

## 2021-03-12 PROCEDURE — 81003 URINALYSIS AUTO W/O SCOPE: CPT

## 2021-03-12 PROCEDURE — 84681 ASSAY OF C-PEPTIDE: CPT

## 2021-03-12 PROCEDURE — 80061 LIPID PANEL: CPT

## 2021-03-12 PROCEDURE — 85027 COMPLETE CBC AUTOMATED: CPT

## 2021-03-12 PROCEDURE — 83036 HEMOGLOBIN GLYCOSYLATED A1C: CPT

## 2021-03-12 PROCEDURE — 84443 ASSAY THYROID STIM HORMONE: CPT

## 2021-03-12 PROCEDURE — 84156 ASSAY OF PROTEIN URINE: CPT

## 2021-03-13 LAB — C PEPTIDE SERPL-MCNC: 2.3 NG/ML (ref 1.1–4.4)

## 2021-03-29 RX ORDER — OMEPRAZOLE 20 MG/1
CAPSULE, DELAYED RELEASE ORAL
Qty: 90 CAPSULE | Refills: 1 | Status: SHIPPED | OUTPATIENT
Start: 2021-03-29 | End: 2022-05-17

## 2021-05-25 ENCOUNTER — TELEPHONE (OUTPATIENT)
Dept: ONCOLOGY | Facility: CLINIC | Age: 76
End: 2021-05-25

## 2021-05-25 NOTE — TELEPHONE ENCOUNTER
Caller: Brie Reese    Relationship to patient: Self    Best call back number: [phone]    Chief complaint: PATIENT HAD CT THAT WE RESCHEDULED UNTIL 6/21/21 AND IS WANTING TO RESCHEDULE HIS FOLLOW-UP UNTIL AFTER THAT DATE      Type of visit: VITALS/FU    Requested date: PATIENT PREFERS EARLY APPTS ON MONDAYS     If rescheduling, when is the original appointment: 5/28/21     Additional notes:PLEASE CONTACT PATIENT TO RESCHEDULE. HE IS AVAILABLE ON HIS MOBILE NUMBER ALL DAY TODAY.

## 2021-05-27 ENCOUNTER — TRANSCRIBE ORDERS (OUTPATIENT)
Dept: ADMINISTRATIVE | Facility: HOSPITAL | Age: 76
End: 2021-05-27

## 2021-05-27 ENCOUNTER — LAB (OUTPATIENT)
Dept: LAB | Facility: HOSPITAL | Age: 76
End: 2021-05-27

## 2021-05-27 ENCOUNTER — APPOINTMENT (OUTPATIENT)
Dept: CT IMAGING | Facility: HOSPITAL | Age: 76
End: 2021-05-27

## 2021-05-27 DIAGNOSIS — R94.6 ABNORMAL FINDING ON THYROID FUNCTION TEST: Primary | ICD-10-CM

## 2021-05-27 DIAGNOSIS — R94.6 ABNORMAL FINDING ON THYROID FUNCTION TEST: ICD-10-CM

## 2021-05-27 LAB
T4 FREE SERPL-MCNC: 1.05 NG/DL (ref 0.93–1.7)
TSH SERPL DL<=0.05 MIU/L-ACNC: 2.48 UIU/ML (ref 0.27–4.2)

## 2021-05-27 PROCEDURE — 84439 ASSAY OF FREE THYROXINE: CPT

## 2021-05-27 PROCEDURE — 36415 COLL VENOUS BLD VENIPUNCTURE: CPT

## 2021-05-27 PROCEDURE — 84443 ASSAY THYROID STIM HORMONE: CPT

## 2021-06-01 ENCOUNTER — HOSPITAL ENCOUNTER (OUTPATIENT)
Dept: PET IMAGING | Facility: HOSPITAL | Age: 76
Discharge: HOME | End: 2021-06-01

## 2021-06-07 ENCOUNTER — TRANSCRIBE ORDERS (OUTPATIENT)
Dept: ADMINISTRATIVE | Facility: HOSPITAL | Age: 76
End: 2021-06-07

## 2021-06-07 DIAGNOSIS — M99.79 DISC NARROWING: ICD-10-CM

## 2021-06-07 DIAGNOSIS — R20.0 NUMBNESS OF FINGERS OF BOTH HANDS: Primary | ICD-10-CM

## 2021-06-08 ENCOUNTER — APPOINTMENT (OUTPATIENT)
Dept: LAB | Facility: HOSPITAL | Age: 76
End: 2021-06-08

## 2021-06-08 ENCOUNTER — TELEPHONE (OUTPATIENT)
Dept: ONCOLOGY | Facility: CLINIC | Age: 76
End: 2021-06-08

## 2021-06-08 NOTE — TELEPHONE ENCOUNTER
Caller: Brie Reese    Relationship to patient: Self    Best call back number: [phone]    Chief complaint: CANC./ALFREDO.     Type of visit: VITALS/FOLLOW UP    Requested date: 6/28/2021 EARLY IN THE MORNING    If rescheduling, when is the original appointment: 6/29/2021

## 2021-06-21 ENCOUNTER — HOSPITAL ENCOUNTER (OUTPATIENT)
Dept: PET IMAGING | Facility: HOSPITAL | Age: 76
Discharge: HOME | End: 2021-06-21
Admitting: INTERNAL MEDICINE

## 2021-06-21 ENCOUNTER — APPOINTMENT (OUTPATIENT)
Dept: PET IMAGING | Facility: HOSPITAL | Age: 76
End: 2021-06-21

## 2021-06-21 DIAGNOSIS — C50.112 MALIGNANT NEOPLASM OF CENTRAL PORTION OF LEFT BREAST IN FEMALE, ESTROGEN RECEPTOR POSITIVE (HCC): ICD-10-CM

## 2021-06-21 DIAGNOSIS — Z17.0 MALIGNANT NEOPLASM OF CENTRAL PORTION OF LEFT BREAST IN FEMALE, ESTROGEN RECEPTOR POSITIVE (HCC): ICD-10-CM

## 2021-06-21 LAB — CREAT BLDA-MCNC: 0.9 MG/DL (ref 0.6–1.3)

## 2021-06-21 PROCEDURE — 0 DIATRIZOATE MEGLUMINE & SODIUM PER 1 ML: Performed by: INTERNAL MEDICINE

## 2021-06-21 PROCEDURE — 82565 ASSAY OF CREATININE: CPT

## 2021-06-21 PROCEDURE — 25010000002 IOPAMIDOL 61 % SOLUTION: Performed by: INTERNAL MEDICINE

## 2021-06-21 PROCEDURE — 71260 CT THORAX DX C+: CPT

## 2021-06-21 PROCEDURE — 74177 CT ABD & PELVIS W/CONTRAST: CPT

## 2021-06-21 RX ADMIN — IOPAMIDOL 85 ML: 612 INJECTION, SOLUTION INTRAVENOUS at 09:26

## 2021-06-21 RX ADMIN — DIATRIZOATE MEGLUMINE AND DIATRIZOATE SODIUM 30 ML: 660; 100 LIQUID ORAL; RECTAL at 09:26

## 2021-06-30 ENCOUNTER — OFFICE VISIT (OUTPATIENT)
Dept: ONCOLOGY | Facility: CLINIC | Age: 76
End: 2021-06-30

## 2021-06-30 ENCOUNTER — HOSPITAL ENCOUNTER (OUTPATIENT)
Dept: MRI IMAGING | Facility: HOSPITAL | Age: 76
Discharge: HOME | End: 2021-06-30
Admitting: INTERNAL MEDICINE

## 2021-06-30 ENCOUNTER — APPOINTMENT (OUTPATIENT)
Dept: LAB | Facility: HOSPITAL | Age: 76
End: 2021-06-30

## 2021-06-30 VITALS
OXYGEN SATURATION: 95 % | RESPIRATION RATE: 16 BRPM | TEMPERATURE: 98.2 F | HEIGHT: 61 IN | WEIGHT: 113 LBS | HEART RATE: 89 BPM | SYSTOLIC BLOOD PRESSURE: 160 MMHG | BODY MASS INDEX: 21.34 KG/M2 | DIASTOLIC BLOOD PRESSURE: 69 MMHG

## 2021-06-30 DIAGNOSIS — C50.112 MALIGNANT NEOPLASM OF CENTRAL PORTION OF LEFT BREAST IN FEMALE, ESTROGEN RECEPTOR POSITIVE (HCC): ICD-10-CM

## 2021-06-30 DIAGNOSIS — D50.9 IRON DEFICIENCY ANEMIA, UNSPECIFIED IRON DEFICIENCY ANEMIA TYPE: ICD-10-CM

## 2021-06-30 DIAGNOSIS — E83.42 HYPOMAGNESEMIA: ICD-10-CM

## 2021-06-30 DIAGNOSIS — M99.79 DISC NARROWING: ICD-10-CM

## 2021-06-30 DIAGNOSIS — R20.0 NUMBNESS OF FINGERS OF BOTH HANDS: ICD-10-CM

## 2021-06-30 DIAGNOSIS — Z17.0 MALIGNANT NEOPLASM OF CENTRAL PORTION OF LEFT BREAST IN FEMALE, ESTROGEN RECEPTOR POSITIVE (HCC): ICD-10-CM

## 2021-06-30 DIAGNOSIS — M81.6 LOCALIZED OSTEOPOROSIS WITHOUT CURRENT PATHOLOGICAL FRACTURE: Primary | ICD-10-CM

## 2021-06-30 DIAGNOSIS — C78.02 MALIGNANT NEOPLASM METASTATIC TO LEFT LUNG (HCC): ICD-10-CM

## 2021-06-30 PROCEDURE — 82565 ASSAY OF CREATININE: CPT

## 2021-06-30 PROCEDURE — 99214 OFFICE O/P EST MOD 30 MIN: CPT | Performed by: INTERNAL MEDICINE

## 2021-06-30 PROCEDURE — 0 GADOBENATE DIMEGLUMINE 529 MG/ML SOLUTION: Performed by: INTERNAL MEDICINE

## 2021-06-30 PROCEDURE — A9577 INJ MULTIHANCE: HCPCS | Performed by: INTERNAL MEDICINE

## 2021-06-30 PROCEDURE — 72156 MRI NECK SPINE W/O & W/DYE: CPT

## 2021-06-30 RX ADMIN — GADOBENATE DIMEGLUMINE 10 ML: 529 INJECTION, SOLUTION INTRAVENOUS at 22:27

## 2021-07-01 LAB — CREAT BLDA-MCNC: 0.8 MG/DL (ref 0.6–1.3)

## 2021-07-06 ENCOUNTER — APPOINTMENT (OUTPATIENT)
Dept: BONE DENSITY | Facility: HOSPITAL | Age: 76
End: 2021-07-06

## 2021-07-07 ENCOUNTER — TELEPHONE (OUTPATIENT)
Dept: ONCOLOGY | Facility: CLINIC | Age: 76
End: 2021-07-07

## 2021-07-07 NOTE — TELEPHONE ENCOUNTER
Caller: Pelon Buckner    Relationship to patient: Emergency Contact    Best call back number: [phone]    Chief complaint: MR. THOMPSON CALLED STATING THAT PATIENT HAD MISSED APPT. FOR BONE DENSITY ON 7/6/21 DUE TO BEING OUT OF TOWN.  HE WOULD LIKE TO RESCHEDULE AND HAS REQUESTED AN APPT. AS EARLY AS POSSIBLE AS HE WILL NEED TO DRIVE HER TO APPT. (AROUND 8:30 IF POSSIBLE)    Type of visit: BONE DENSITY    PLEASE CONTACT MR. BUCKNER TO ADVISE    THANKS

## 2021-07-13 DIAGNOSIS — C78.02 MALIGNANT NEOPLASM METASTATIC TO LEFT LUNG (HCC): Primary | ICD-10-CM

## 2021-07-13 DIAGNOSIS — M81.6 LOCALIZED OSTEOPOROSIS WITHOUT CURRENT PATHOLOGICAL FRACTURE: ICD-10-CM

## 2021-07-14 ENCOUNTER — APPOINTMENT (OUTPATIENT)
Dept: LAB | Facility: HOSPITAL | Age: 76
End: 2021-07-14

## 2021-08-06 ENCOUNTER — TRANSCRIBE ORDERS (OUTPATIENT)
Dept: ADMINISTRATIVE | Facility: HOSPITAL | Age: 76
End: 2021-08-06

## 2021-08-06 ENCOUNTER — LAB (OUTPATIENT)
Dept: LAB | Facility: HOSPITAL | Age: 76
End: 2021-08-06

## 2021-08-06 DIAGNOSIS — C50.919 MALIGNANT NEOPLASM OF FEMALE BREAST, UNSPECIFIED ESTROGEN RECEPTOR STATUS, UNSPECIFIED LATERALITY, UNSPECIFIED SITE OF BREAST (HCC): ICD-10-CM

## 2021-08-06 DIAGNOSIS — R79.89 OTHER SPECIFIED ABNORMAL FINDINGS OF BLOOD CHEMISTRY: ICD-10-CM

## 2021-08-06 DIAGNOSIS — E03.9 HYPOTHYROIDISM, UNSPECIFIED TYPE: ICD-10-CM

## 2021-08-06 DIAGNOSIS — I15.9 SECONDARY HYPERTENSION: Primary | ICD-10-CM

## 2021-08-06 DIAGNOSIS — I15.9 SECONDARY HYPERTENSION: ICD-10-CM

## 2021-08-06 LAB
ALBUMIN SERPL-MCNC: 4 G/DL (ref 3.5–5.2)
ALBUMIN/GLOB SERPL: 1.2 G/DL
ALP SERPL-CCNC: 108 U/L (ref 39–117)
ALT SERPL W P-5'-P-CCNC: 21 U/L (ref 1–33)
ANION GAP SERPL CALCULATED.3IONS-SCNC: 11.6 MMOL/L (ref 5–15)
AST SERPL-CCNC: 22 U/L (ref 1–32)
BILIRUB SERPL-MCNC: 0.2 MG/DL (ref 0–1.2)
BUN SERPL-MCNC: 17 MG/DL (ref 8–23)
BUN/CREAT SERPL: 19.8 (ref 7–25)
CALCIUM SPEC-SCNC: 9.6 MG/DL (ref 8.6–10.5)
CHLORIDE SERPL-SCNC: 105 MMOL/L (ref 98–107)
CO2 SERPL-SCNC: 24.4 MMOL/L (ref 22–29)
CREAT SERPL-MCNC: 0.86 MG/DL (ref 0.57–1)
DEPRECATED RDW RBC AUTO: 44.9 FL (ref 37–54)
ERYTHROCYTE [DISTWIDTH] IN BLOOD BY AUTOMATED COUNT: 12.7 % (ref 12.3–15.4)
GFR SERPL CREATININE-BSD FRML MDRD: 65 ML/MIN/1.73
GFR SERPL CREATININE-BSD FRML MDRD: 78 ML/MIN/1.73
GLOBULIN UR ELPH-MCNC: 3.4 GM/DL
GLUCOSE SERPL-MCNC: 158 MG/DL (ref 65–99)
HBA1C MFR BLD: 8.35 % (ref 4.8–5.6)
HCT VFR BLD AUTO: 37.8 % (ref 34–46.6)
HGB BLD-MCNC: 12.3 G/DL (ref 12–15.9)
MCH RBC QN AUTO: 31.2 PG (ref 26.6–33)
MCHC RBC AUTO-ENTMCNC: 32.5 G/DL (ref 31.5–35.7)
MCV RBC AUTO: 95.9 FL (ref 79–97)
PLATELET # BLD AUTO: 222 10*3/MM3 (ref 140–450)
PMV BLD AUTO: 8.6 FL (ref 6–12)
POTASSIUM SERPL-SCNC: 4.6 MMOL/L (ref 3.5–5.2)
PROT SERPL-MCNC: 7.4 G/DL (ref 6–8.5)
RBC # BLD AUTO: 3.94 10*6/MM3 (ref 3.77–5.28)
SODIUM SERPL-SCNC: 141 MMOL/L (ref 136–145)
T4 FREE SERPL-MCNC: 1.31 NG/DL (ref 0.93–1.7)
TSH SERPL DL<=0.05 MIU/L-ACNC: 0.53 UIU/ML (ref 0.27–4.2)
WBC # BLD AUTO: 5.36 10*3/MM3 (ref 3.4–10.8)

## 2021-08-06 PROCEDURE — 84443 ASSAY THYROID STIM HORMONE: CPT

## 2021-08-06 PROCEDURE — 80053 COMPREHEN METABOLIC PANEL: CPT

## 2021-08-06 PROCEDURE — 83036 HEMOGLOBIN GLYCOSYLATED A1C: CPT

## 2021-08-06 PROCEDURE — 85027 COMPLETE CBC AUTOMATED: CPT

## 2021-08-06 PROCEDURE — 84439 ASSAY OF FREE THYROXINE: CPT

## 2021-08-06 PROCEDURE — 36415 COLL VENOUS BLD VENIPUNCTURE: CPT

## 2021-08-11 ENCOUNTER — HOSPITAL ENCOUNTER (OUTPATIENT)
Dept: BONE DENSITY | Facility: HOSPITAL | Age: 76
Discharge: HOME | End: 2021-08-11
Admitting: INTERNAL MEDICINE

## 2021-08-11 DIAGNOSIS — C50.112 MALIGNANT NEOPLASM OF CENTRAL PORTION OF LEFT BREAST IN FEMALE, ESTROGEN RECEPTOR POSITIVE (HCC): ICD-10-CM

## 2021-08-11 DIAGNOSIS — Z17.0 MALIGNANT NEOPLASM OF CENTRAL PORTION OF LEFT BREAST IN FEMALE, ESTROGEN RECEPTOR POSITIVE (HCC): ICD-10-CM

## 2021-08-11 DIAGNOSIS — M81.6 LOCALIZED OSTEOPOROSIS WITHOUT CURRENT PATHOLOGICAL FRACTURE: ICD-10-CM

## 2021-08-11 PROCEDURE — 77080 DXA BONE DENSITY AXIAL: CPT

## 2021-12-17 RX ORDER — EXEMESTANE 25 MG/1
25 TABLET ORAL DAILY
Qty: 90 TABLET | Refills: 3 | OUTPATIENT
Start: 2021-12-17

## 2021-12-17 NOTE — TELEPHONE ENCOUNTER
Caller: Bijal Buckner    Relationship: Emergency Contact    Best call back number: [phone]    Requested Prescriptions:   Requested Prescriptions     Pending Prescriptions Disp Refills   • exemestane (AROMASIN) 25 MG chemo tablet 90 tablet 3     Sig: Take 1 tablet by mouth Daily.        Pharmacy where request should be sent: [facility]/PHARMACY #[id number] [place], [place] - [address] AT [facility] [phone] [facility] [phone]      Additional details provided by patient: PT IS OUT OF MEDICATION.    ALSO, PTS NEPHEW BIJAL BUCKNER IS THE ONE WHO BRINGS HER TO ALL OF HER APPTS. HE SAID THEY FILLED OUT A BH VERBAL BUT IT IS NOT LISTED IN THE PTS CHART. HE WANTED TO CONFIRM HER APPT/DATE TIME FOR JAN BUT I DID NOT GIVE HIM THE INFO SINCE I DO NOT SEE A VERBAL IN HIS CHART. PLEASE CALL HIM TO DISCUSS PTS APPT.     Does the patient have less than a 3 day supply:  [x] Yes  [] No

## 2021-12-23 RX ORDER — EXEMESTANE 25 MG/1
25 TABLET ORAL DAILY
Qty: 90 TABLET | Refills: 3 | Status: SHIPPED | OUTPATIENT
Start: 2021-12-23 | End: 2023-01-30

## 2022-01-11 ENCOUNTER — HOSPITAL ENCOUNTER (EMERGENCY)
Facility: HOSPITAL | Age: 77
Discharge: HOME | End: 2022-01-11
Attending: EMERGENCY MEDICINE | Admitting: EMERGENCY MEDICINE

## 2022-01-11 VITALS
HEART RATE: 80 BPM | TEMPERATURE: 98.7 F | SYSTOLIC BLOOD PRESSURE: 124 MMHG | DIASTOLIC BLOOD PRESSURE: 72 MMHG | OXYGEN SATURATION: 100 % | RESPIRATION RATE: 18 BRPM

## 2022-01-11 DIAGNOSIS — E86.9 VOLUME DEPLETION, GASTROINTESTINAL LOSS: ICD-10-CM

## 2022-01-11 DIAGNOSIS — R11.2 NON-INTRACTABLE VOMITING WITH NAUSEA, UNSPECIFIED VOMITING TYPE: Primary | ICD-10-CM

## 2022-01-11 DIAGNOSIS — N39.0 ACUTE UTI: ICD-10-CM

## 2022-01-11 DIAGNOSIS — R42 DIZZINESS: ICD-10-CM

## 2022-01-11 LAB
ALBUMIN SERPL-MCNC: 4.4 G/DL (ref 3.5–5.2)
ALBUMIN/GLOB SERPL: 1.3 G/DL
ALP SERPL-CCNC: 113 U/L (ref 39–117)
ALT SERPL W P-5'-P-CCNC: 19 U/L (ref 1–33)
ANION GAP SERPL CALCULATED.3IONS-SCNC: 12.6 MMOL/L (ref 5–15)
AST SERPL-CCNC: 19 U/L (ref 1–32)
BACTERIA UR QL AUTO: ABNORMAL /HPF
BASOPHILS # BLD AUTO: 0.05 10*3/MM3 (ref 0–0.2)
BASOPHILS NFR BLD AUTO: 0.5 % (ref 0–1.5)
BILIRUB SERPL-MCNC: 0.4 MG/DL (ref 0–1.2)
BILIRUB UR QL STRIP: NEGATIVE
BUN SERPL-MCNC: 19 MG/DL (ref 8–23)
BUN/CREAT SERPL: 23.5 (ref 7–25)
CALCIUM SPEC-SCNC: 9.6 MG/DL (ref 8.6–10.5)
CHLORIDE SERPL-SCNC: 101 MMOL/L (ref 98–107)
CLARITY UR: CLEAR
CO2 SERPL-SCNC: 24.4 MMOL/L (ref 22–29)
COLOR UR: YELLOW
CREAT SERPL-MCNC: 0.81 MG/DL (ref 0.57–1)
DEPRECATED RDW RBC AUTO: 41.6 FL (ref 37–54)
EOSINOPHIL # BLD AUTO: 0.01 10*3/MM3 (ref 0–0.4)
EOSINOPHIL NFR BLD AUTO: 0.1 % (ref 0.3–6.2)
ERYTHROCYTE [DISTWIDTH] IN BLOOD BY AUTOMATED COUNT: 12.1 % (ref 12.3–15.4)
GFR SERPL CREATININE-BSD FRML MDRD: 69 ML/MIN/1.73
GFR SERPL CREATININE-BSD FRML MDRD: 84 ML/MIN/1.73
GLOBULIN UR ELPH-MCNC: 3.3 GM/DL
GLUCOSE SERPL-MCNC: 207 MG/DL (ref 65–99)
GLUCOSE UR STRIP-MCNC: NEGATIVE MG/DL
HCT VFR BLD AUTO: 37.9 % (ref 34–46.6)
HGB BLD-MCNC: 12.7 G/DL (ref 12–15.9)
HGB UR QL STRIP.AUTO: NEGATIVE
HOLD SPECIMEN: NORMAL
HOLD SPECIMEN: NORMAL
HYALINE CASTS UR QL AUTO: ABNORMAL /LPF
IMM GRANULOCYTES # BLD AUTO: 0.02 10*3/MM3 (ref 0–0.05)
IMM GRANULOCYTES NFR BLD AUTO: 0.2 % (ref 0–0.5)
KETONES UR QL STRIP: ABNORMAL
LEUKOCYTE ESTERASE UR QL STRIP.AUTO: ABNORMAL
LIPASE SERPL-CCNC: 42 U/L (ref 13–60)
LYMPHOCYTES # BLD AUTO: 1.45 10*3/MM3 (ref 0.7–3.1)
LYMPHOCYTES NFR BLD AUTO: 14.9 % (ref 19.6–45.3)
MCH RBC QN AUTO: 31.1 PG (ref 26.6–33)
MCHC RBC AUTO-ENTMCNC: 33.5 G/DL (ref 31.5–35.7)
MCV RBC AUTO: 92.9 FL (ref 79–97)
MONOCYTES # BLD AUTO: 0.26 10*3/MM3 (ref 0.1–0.9)
MONOCYTES NFR BLD AUTO: 2.7 % (ref 5–12)
NEUTROPHILS NFR BLD AUTO: 7.96 10*3/MM3 (ref 1.7–7)
NEUTROPHILS NFR BLD AUTO: 81.6 % (ref 42.7–76)
NITRITE UR QL STRIP: POSITIVE
NRBC BLD AUTO-RTO: 0 /100 WBC (ref 0–0.2)
PH UR STRIP.AUTO: 6.5 [PH] (ref 5–8)
PLATELET # BLD AUTO: 250 10*3/MM3 (ref 140–450)
PMV BLD AUTO: 8.5 FL (ref 6–12)
POTASSIUM SERPL-SCNC: 4.5 MMOL/L (ref 3.5–5.2)
PROT SERPL-MCNC: 7.7 G/DL (ref 6–8.5)
PROT UR QL STRIP: NEGATIVE
RBC # BLD AUTO: 4.08 10*6/MM3 (ref 3.77–5.28)
RBC # UR STRIP: ABNORMAL /HPF
REF LAB TEST METHOD: ABNORMAL
SODIUM SERPL-SCNC: 138 MMOL/L (ref 136–145)
SP GR UR STRIP: 1.01 (ref 1–1.03)
SQUAMOUS #/AREA URNS HPF: ABNORMAL /HPF
UROBILINOGEN UR QL STRIP: ABNORMAL
WBC # UR STRIP: ABNORMAL /HPF
WBC NRBC COR # BLD: 9.75 10*3/MM3 (ref 3.4–10.8)
WHOLE BLOOD HOLD SPECIMEN: NORMAL
WHOLE BLOOD HOLD SPECIMEN: NORMAL

## 2022-01-11 PROCEDURE — 81001 URINALYSIS AUTO W/SCOPE: CPT | Performed by: EMERGENCY MEDICINE

## 2022-01-11 PROCEDURE — 85025 COMPLETE CBC W/AUTO DIFF WBC: CPT | Performed by: EMERGENCY MEDICINE

## 2022-01-11 PROCEDURE — 96374 THER/PROPH/DIAG INJ IV PUSH: CPT

## 2022-01-11 PROCEDURE — 83690 ASSAY OF LIPASE: CPT | Performed by: EMERGENCY MEDICINE

## 2022-01-11 PROCEDURE — 36415 COLL VENOUS BLD VENIPUNCTURE: CPT

## 2022-01-11 PROCEDURE — 99283 EMERGENCY DEPT VISIT LOW MDM: CPT

## 2022-01-11 PROCEDURE — 80053 COMPREHEN METABOLIC PANEL: CPT | Performed by: EMERGENCY MEDICINE

## 2022-01-11 PROCEDURE — 25010000002 ONDANSETRON PER 1 MG: Performed by: EMERGENCY MEDICINE

## 2022-01-11 RX ORDER — CEFUROXIME AXETIL 500 MG/1
500 TABLET ORAL 2 TIMES DAILY
Qty: 14 TABLET | Refills: 0 | Status: SHIPPED | OUTPATIENT
Start: 2022-01-11 | End: 2022-08-22

## 2022-01-11 RX ORDER — ONDANSETRON 2 MG/ML
4 INJECTION INTRAMUSCULAR; INTRAVENOUS ONCE
Status: COMPLETED | OUTPATIENT
Start: 2022-01-11 | End: 2022-01-11

## 2022-01-11 RX ORDER — SODIUM CHLORIDE 0.9 % (FLUSH) 0.9 %
10 SYRINGE (ML) INJECTION AS NEEDED
Status: DISCONTINUED | OUTPATIENT
Start: 2022-01-11 | End: 2022-01-11 | Stop reason: HOSPADM

## 2022-01-11 RX ORDER — MECLIZINE HYDROCHLORIDE 25 MG/1
25 TABLET ORAL ONCE
Status: COMPLETED | OUTPATIENT
Start: 2022-01-11 | End: 2022-01-11

## 2022-01-11 RX ORDER — ONDANSETRON 4 MG/1
TABLET, ORALLY DISINTEGRATING ORAL
Qty: 20 TABLET | Refills: 0 | Status: SHIPPED | OUTPATIENT
Start: 2022-01-11 | End: 2022-08-22

## 2022-01-11 RX ADMIN — ONDANSETRON 4 MG: 2 INJECTION INTRAMUSCULAR; INTRAVENOUS at 15:25

## 2022-01-11 RX ADMIN — SODIUM CHLORIDE 500 ML: 9 INJECTION, SOLUTION INTRAVENOUS at 15:24

## 2022-01-11 RX ADMIN — MECLIZINE 25 MG: 25 TABLET ORAL at 15:25

## 2022-01-11 NOTE — ED PROVIDER NOTES
EMERGENCY DEPARTMENT ENCOUNTER    Room Number:  B08/08  Date of encounter:  1/11/2022  PCP: Sussy Payne MD  Historian: Pt    Patient was placed in face mask during triage process. Patient was wearing facemask when I entered the room and throughout our encounter. I wore full protective equipment throughout this patient encounter including a face mask, eye protection, and gloves. Hand hygiene was performed before donning protective equipment and again following doffing of PPE after leaving the room.    HPI:  Chief Complaint: Nausea and vomiting  A complete HPI/ROS/PMH/PSH/SH/FH are unobtainable due to: N/A   Context: Brie Reese is a 76 y.o. female who presents to the ED c/o onset nausea and vomiting when getting out of bed this morning.  She reports a bit of mild dizziness with the event.  She does not exactly describe rolled spinning but notes that she felt a little unsteady on her feet with the event.  Symptoms are vastly improved with sitting still.  She vomited a total of 4 times.  She felt well yesterday.  She has had no other focal numbness or weakness, vision change, altered mental status, chest pain, lightheadedness, abdominal pain, cough or fevers.  No history of similar.  No recent head injuries.      MEDICAL HISTORY REVIEW  History of diabetes and breast cancer    PAST MEDICAL HISTORY  Active Ambulatory Problems     Diagnosis Date Noted   • Malignant neoplasm of female breast (HCC) 03/14/2016   • Anemia 04/12/2016   • Iron deficiency anemia 04/20/2016   • Malignant neoplasm metastatic to left lung (HCC) 02/06/2017   • Osteoporosis 02/06/2017   • Hypomagnesemia 01/08/2019   • Acute bronchitis due to COVID-19 virus 08/04/2020   • Hypothyroidism 01/01/2009   • Diabetes mellitus (HCC) 08/04/2020   • Hyperlipidemia 08/04/2020   • Hyponatremia 08/04/2020   • Immunocompromised (HCC) 08/04/2020   • Acute respiratory failure with hypoxia ([facility]) 08/04/2020     Resolved Ambulatory Problems     Diagnosis Date  Noted   • No Resolved Ambulatory Problems     Past Medical History:   Diagnosis Date   • Breast cancer, Left    • Left upper pulmonary nodule 07/05/2012         PAST SURGICAL HISTORY  Past Surgical History:   Procedure Laterality Date   • BREAST BIOPSY Left 1995   • MASTECTOMY RADICAL Left 1995   • MOUTH SURGERY  08/2015    tooth extraction          FAMILY HISTORY  Family History   Problem Relation Age of Onset   • Tuberculosis Mother    • Diabetes Mother    • Thyroid cancer Sister    • Lung cancer Sister    • Lung cancer Brother         In his 70s.   • Bone cancer Brother         in his 70s.   • Diabetes Maternal Grandmother    • Lung cancer Brother         In his 70s.    • Bone cancer Brother         in his 70s.   • Hypertension Neg Hx    • Heart disease Neg Hx          SOCIAL HISTORY  Social History     Socioeconomic History   • Marital status:    • Years of education: College   Tobacco Use   • Smoking status: Never Smoker   • Smokeless tobacco: Never Used   Substance and Sexual Activity   • Alcohol use: No   • Drug use: No   • Sexual activity: Defer         ALLERGIES  Patient has no known allergies.        REVIEW OF SYSTEMS  Review of Systems     All systems reviewed and negative except for those discussed in HPI.       PHYSICAL EXAM    I have reviewed the triage vital signs and nursing notes.    ED Triage Vitals [01/11/22 1107]   Temp Heart Rate Resp BP SpO2   98.7 °F (37.1 °C) 81 18 142/78 100 %      Temp src Heart Rate Source Patient Position BP Location FiO2 (%)   Tympanic Monitor -- -- --       Physical Exam    Physical Exam   Constitutional: No distress.   HENT:  Head: Normocephalic and atraumatic.   Oropharynx: Mucous membranes are moist.   Eyes: No scleral icterus. No conjunctival pallor.  PERRL, EOMI.  No nystagmus  Neck: Painless range of motion noted. Neck supple..  No meningismus or rigidity  Cardiovascular: Normal rate, regular rhythm and intact distal pulses.  Pulmonary/Chest: No respiratory  distress. There are no wheezes, no rhonchi, and no rales.   Abdominal: Soft. There is no tenderness. There is no rebound and no guarding.   Musculoskeletal: Moves all extremities equally. There is no pedal edema or calf tenderness.   Neurological: Alert.  Baseline strength and sensation noted.  NIH stroke scale 0  Skin: Skin is pink, warm, and dry. No pallor.   Psychiatric: Mood and affect normal.   Nursing note and vitals reviewed.    LAB RESULTS  Recent Results (from the past 24 hour(s))   Comprehensive Metabolic Panel    Collection Time: 01/11/22  2:05 PM    Specimen: Arm, Right; Blood   Result Value Ref Range    Glucose 207 (H) 65 - 99 mg/dL    BUN 19 8 - 23 mg/dL    Creatinine 0.81 0.57 - 1.00 mg/dL    Sodium 138 136 - 145 mmol/L    Potassium 4.5 3.5 - 5.2 mmol/L    Chloride 101 98 - 107 mmol/L    CO2 24.4 22.0 - 29.0 mmol/L    Calcium 9.6 8.6 - 10.5 mg/dL    Total Protein 7.7 6.0 - 8.5 g/dL    Albumin 4.40 3.50 - 5.20 g/dL    ALT (SGPT) 19 1 - 33 U/L    AST (SGOT) 19 1 - 32 U/L    Alkaline Phosphatase 113 39 - 117 U/L    Total Bilirubin 0.4 0.0 - 1.2 mg/dL    eGFR Non African Amer 69 >60 mL/min/1.73    eGFR  African Amer 84 >60 mL/min/1.73    Globulin 3.3 gm/dL    A/G Ratio 1.3 g/dL    BUN/Creatinine Ratio 23.5 7.0 - 25.0    Anion Gap 12.6 5.0 - 15.0 mmol/L   Lipase    Collection Time: 01/11/22  2:05 PM    Specimen: Arm, Right; Blood   Result Value Ref Range    Lipase 42 13 - 60 U/L   Green Top (Gel)    Collection Time: 01/11/22  2:05 PM   Result Value Ref Range    Extra Tube Hold for add-ons.    Lavender Top    Collection Time: 01/11/22  2:05 PM   Result Value Ref Range    Extra Tube hold for add-on    Gold Top - SST    Collection Time: 01/11/22  2:05 PM   Result Value Ref Range    Extra Tube Hold for add-ons.    Light Blue Top    Collection Time: 01/11/22  2:05 PM   Result Value Ref Range    Extra Tube hold for add-on    CBC Auto Differential    Collection Time: 01/11/22  2:05 PM    Specimen: Arm, Right;  Blood   Result Value Ref Range    WBC 9.75 3.40 - 10.80 10*3/mm3    RBC 4.08 3.77 - 5.28 10*6/mm3    Hemoglobin 12.7 12.0 - 15.9 g/dL    Hematocrit 37.9 34.0 - 46.6 %    MCV 92.9 79.0 - 97.0 fL    MCH 31.1 26.6 - 33.0 pg    MCHC 33.5 31.5 - 35.7 g/dL    RDW 12.1 (L) 12.3 - 15.4 %    RDW-SD 41.6 37.0 - 54.0 fl    MPV 8.5 6.0 - 12.0 fL    Platelets 250 140 - 450 10*3/mm3    Neutrophil % 81.6 (H) 42.7 - 76.0 %    Lymphocyte % 14.9 (L) 19.6 - 45.3 %    Monocyte % 2.7 (L) 5.0 - 12.0 %    Eosinophil % 0.1 (L) 0.3 - 6.2 %    Basophil % 0.5 0.0 - 1.5 %    Immature Grans % 0.2 0.0 - 0.5 %    Neutrophils, Absolute 7.96 (H) 1.70 - 7.00 10*3/mm3    Lymphocytes, Absolute 1.45 0.70 - 3.10 10*3/mm3    Monocytes, Absolute 0.26 0.10 - 0.90 10*3/mm3    Eosinophils, Absolute 0.01 0.00 - 0.40 10*3/mm3    Basophils, Absolute 0.05 0.00 - 0.20 10*3/mm3    Immature Grans, Absolute 0.02 0.00 - 0.05 10*3/mm3    nRBC 0.0 0.0 - 0.2 /100 WBC   Urinalysis With Microscopic If Indicated (No Culture) - Urine, Clean Catch    Collection Time: 01/11/22  4:56 PM    Specimen: Urine, Clean Catch   Result Value Ref Range    Color, UA Yellow Yellow, Straw    Appearance, UA Clear Clear    pH, UA 6.5 5.0 - 8.0    Specific Gravity, UA 1.015 1.005 - 1.030    Glucose, UA Negative Negative    Ketones, UA Trace (A) Negative    Bilirubin, UA Negative Negative    Blood, UA Negative Negative    Protein, UA Negative Negative    Leuk Esterase, UA Moderate (2+) (A) Negative    Nitrite, UA Positive (A) Negative    Urobilinogen, UA 0.2 E.U./dL 0.2 - 1.0 E.U./dL   Urinalysis, Microscopic Only - Urine, Clean Catch    Collection Time: 01/11/22  4:56 PM    Specimen: Urine, Clean Catch   Result Value Ref Range    RBC, UA 0-2 None Seen, 0-2 /HPF    WBC, UA Too Numerous to Count (A) None Seen, 0-2 /HPF    Bacteria, UA 4+ (A) None Seen /HPF    Squamous Epithelial Cells, UA None Seen None Seen, 0-2 /HPF    Hyaline Casts, UA 0-2 None Seen /LPF    Methodology Automated Microscopy         Ordered the above labs and independently reviewed the results.        RADIOLOGY  No Radiology Exams Resulted Within Past 24 Hours    I ordered the above noted radiological studies. Reviewed by me and discussed with radiologist.  See dictation for official radiology interpretation.      PROCEDURES    Procedures        MEDICATIONS GIVEN IN ER    Medications   sodium chloride 0.9 % flush 10 mL (has no administration in time range)   sodium chloride 0.9 % flush 10 mL (has no administration in time range)   sodium chloride 0.9 % bolus 500 mL (0 mL Intravenous Stopped 1/11/22 1704)   ondansetron (ZOFRAN) injection 4 mg (4 mg Intravenous Given 1/11/22 1525)   meclizine (ANTIVERT) tablet 25 mg (25 mg Oral Given 1/11/22 1525)         PROGRESS, DATA ANALYSIS, CONSULTS, AND MEDICAL DECISION MAKING        All labs have been independently reviewed by me.  All radiology studies have been reviewed by me and discussed with radiologist dictating the report.   EKG's independently viewed and interpreted by me.  Discussion below represents my analysis of pertinent findings related to patient's condition, differential diagnosis, treatment plan and final disposition.      ED Course as of 01/11/22 1721 Tue Jan 11, 2022   1414 Lipase: 42 [RS]   1509 WBC: 9.75 [RS]   1509 Hemoglobin: 12.7 [RS]   1509 BUN: 19 [RS]   1509 Creatinine: 0.81 [RS]   1509 Glucose(!): 207 [RS]   1530 BP: 122/71  Negative orthostatics [RS]   1719 WBC, UA(!): Too Numerous to Count [RS]   1719 Bacteria, UA(!): 4+ [RS]   1719 Leukocytes, UA(!): Moderate (2+) [RS]   1719 Nitrite, UA(!): Positive [RS]   1719 Patient reports all symptoms are resolved.  She has been up walking around with stable gait.  Urine is certainly impressive for acute UTI.  Plan nausea medicine and antibiotic for UTI. [RS]      ED Course User Index  [RS] Trung Douglas MD       AS OF 17:21 EST VITALS:    BP - 122/71  HR - 84  TEMP - 98.7 °F (37.1 °C) (Tympanic)  O2 SATS -  100%        DIAGNOSIS  Final diagnoses:   Non-intractable vomiting with nausea, unspecified vomiting type   Volume depletion, gastrointestinal loss   Acute UTI   Dizziness resolved         DISPOSITION  DISCHARGE    Patient discharged in stable condition.    Reviewed implications of results, diagnosis, meds, responsibility to follow up, warning signs and symptoms of possible worsening, potential complications and reasons to return to ER.    Patient/Family voiced understanding of above instructions.    Discussed plan for discharge, as there is no emergent indication for admission. Patient referred to primary care provider for regular health maintenance. Pt/family is agreeable and understands need for follow up and possible repeat testing.  Pt is aware that discharge does not mean that nothing is wrong but it indicates no emergency is present that requires admission and they must continue care with follow-up as given below or physician of their choice.     FOLLOW-UP  Sussy Payne MD  [address] [id number] [phone]    Schedule an appointment as soon as possible for a visit in 1 week           Medication List      New Prescriptions    cefuroxime 500 MG tablet  Commonly known as: CEFTIN  Take 1 tablet by mouth 2 (Two) Times a Day.     ondansetron ODT 4 MG disintegrating tablet  Commonly known as: Zofran ODT  Take one tablet by mouth every 6 hours as needed for nausea and vomiting           Where to Get Your Medications      These medications were sent to [facility]/pharmacy #[id number] - [facility], [place] - [address] AT [facility] [phone] [facility] [phone]   [address], [facility] [address]    Phone: [phone]   · cefuroxime 500 MG tablet  · ondansetron ODT 4 MG disintegrating tablet            Trung Douglas MD  01/11/22 [id number]

## 2022-01-11 NOTE — ED TRIAGE NOTES
All triage performed with this RN wearing appropriate PPE.  Pt placed in mask upon arrival to ED.  Patient c/o N/V x4 today. She denies pain or any other symptoms at this time.

## 2022-01-11 NOTE — ED NOTES
Patient wearing mask, nurse wearing mask, n95 and protective eyewear during care and assessment.  Hand hygiene performed prior to and post care.      Parker Garcia RN  01/11/22 2405

## 2022-01-11 NOTE — ED NOTES
Patient ambulated with this RN up and down hallway. Patient denies complaints at this time. Asking for food. Given boxed lunch and drink.      Parker Garcia RN  01/11/22 4026

## 2022-01-19 ENCOUNTER — APPOINTMENT (OUTPATIENT)
Dept: LAB | Facility: HOSPITAL | Age: 77
End: 2022-01-19

## 2022-01-19 ENCOUNTER — APPOINTMENT (OUTPATIENT)
Dept: ONCOLOGY | Facility: HOSPITAL | Age: 77
End: 2022-01-19

## 2022-01-24 ENCOUNTER — TELEPHONE (OUTPATIENT)
Dept: ONCOLOGY | Facility: CLINIC | Age: 77
End: 2022-01-24

## 2022-01-24 NOTE — TELEPHONE ENCOUNTER
Caller: BIJAL     Relationship to patient: NINA    Best call back number: [phone]    Patient is needing: TO REPORT THEY NEED TO R/S 1- APPTS AND CHECK ON IF SHE NEEDS THE PROLIA INJECTION. ALSO, THEY NEED TO KNOW WHEN SHE NEEDS CT SCAN.

## 2022-01-25 ENCOUNTER — LAB (OUTPATIENT)
Dept: LAB | Facility: HOSPITAL | Age: 77
End: 2022-01-25

## 2022-01-25 ENCOUNTER — TRANSCRIBE ORDERS (OUTPATIENT)
Dept: ADMINISTRATIVE | Facility: HOSPITAL | Age: 77
End: 2022-01-25

## 2022-01-25 DIAGNOSIS — D64.9 ANEMIA, UNSPECIFIED TYPE: ICD-10-CM

## 2022-01-25 DIAGNOSIS — D64.9 ANEMIA, UNSPECIFIED TYPE: Primary | ICD-10-CM

## 2022-01-25 DIAGNOSIS — I10 HTN (HYPERTENSION), MALIGNANT: ICD-10-CM

## 2022-01-25 DIAGNOSIS — E11.69 TYPE 2 DIABETES MELLITUS WITH OTHER SPECIFIED COMPLICATION, UNSPECIFIED WHETHER LONG TERM INSULIN USE: ICD-10-CM

## 2022-01-25 LAB
ANION GAP SERPL CALCULATED.3IONS-SCNC: 8.1 MMOL/L (ref 5–15)
BUN SERPL-MCNC: 18 MG/DL (ref 8–23)
BUN/CREAT SERPL: 20.7 (ref 7–25)
CALCIUM SPEC-SCNC: 9.5 MG/DL (ref 8.6–10.5)
CHLORIDE SERPL-SCNC: 103 MMOL/L (ref 98–107)
CO2 SERPL-SCNC: 25.9 MMOL/L (ref 22–29)
CREAT SERPL-MCNC: 0.87 MG/DL (ref 0.57–1)
DEPRECATED RDW RBC AUTO: 44.1 FL (ref 37–54)
ERYTHROCYTE [DISTWIDTH] IN BLOOD BY AUTOMATED COUNT: 12.5 % (ref 12.3–15.4)
GFR SERPL CREATININE-BSD FRML MDRD: 63 ML/MIN/1.73
GFR SERPL CREATININE-BSD FRML MDRD: 77 ML/MIN/1.73
GLUCOSE SERPL-MCNC: 120 MG/DL (ref 65–99)
HBA1C MFR BLD: 9.52 % (ref 4.8–5.6)
HCT VFR BLD AUTO: 38.1 % (ref 34–46.6)
HGB BLD-MCNC: 12.5 G/DL (ref 12–15.9)
MCH RBC QN AUTO: 31.3 PG (ref 26.6–33)
MCHC RBC AUTO-ENTMCNC: 32.8 G/DL (ref 31.5–35.7)
MCV RBC AUTO: 95.5 FL (ref 79–97)
PLATELET # BLD AUTO: 250 10*3/MM3 (ref 140–450)
PMV BLD AUTO: 8.5 FL (ref 6–12)
POTASSIUM SERPL-SCNC: 4.4 MMOL/L (ref 3.5–5.2)
RBC # BLD AUTO: 3.99 10*6/MM3 (ref 3.77–5.28)
SODIUM SERPL-SCNC: 137 MMOL/L (ref 136–145)
WBC NRBC COR # BLD: 5 10*3/MM3 (ref 3.4–10.8)

## 2022-01-25 PROCEDURE — 36415 COLL VENOUS BLD VENIPUNCTURE: CPT

## 2022-01-25 PROCEDURE — 83036 HEMOGLOBIN GLYCOSYLATED A1C: CPT

## 2022-01-25 PROCEDURE — 80048 BASIC METABOLIC PNL TOTAL CA: CPT

## 2022-01-25 PROCEDURE — 85027 COMPLETE CBC AUTOMATED: CPT

## 2022-01-31 ENCOUNTER — INFUSION (OUTPATIENT)
Dept: ONCOLOGY | Facility: HOSPITAL | Age: 77
End: 2022-01-31

## 2022-01-31 ENCOUNTER — OFFICE VISIT (OUTPATIENT)
Dept: ONCOLOGY | Facility: CLINIC | Age: 77
End: 2022-01-31

## 2022-01-31 ENCOUNTER — LAB (OUTPATIENT)
Dept: LAB | Facility: HOSPITAL | Age: 77
End: 2022-01-31

## 2022-01-31 ENCOUNTER — APPOINTMENT (OUTPATIENT)
Dept: ONCOLOGY | Facility: HOSPITAL | Age: 77
End: 2022-01-31

## 2022-01-31 VITALS
HEART RATE: 87 BPM | BODY MASS INDEX: 21.73 KG/M2 | HEIGHT: 61 IN | OXYGEN SATURATION: 99 % | RESPIRATION RATE: 14 BRPM | DIASTOLIC BLOOD PRESSURE: 74 MMHG | SYSTOLIC BLOOD PRESSURE: 121 MMHG | TEMPERATURE: 98.6 F | WEIGHT: 115.1 LBS

## 2022-01-31 DIAGNOSIS — C50.112 MALIGNANT NEOPLASM OF CENTRAL PORTION OF LEFT BREAST IN FEMALE, ESTROGEN RECEPTOR POSITIVE: Primary | ICD-10-CM

## 2022-01-31 DIAGNOSIS — Z17.0 MALIGNANT NEOPLASM OF CENTRAL PORTION OF LEFT BREAST IN FEMALE, ESTROGEN RECEPTOR POSITIVE: Primary | ICD-10-CM

## 2022-01-31 DIAGNOSIS — M81.6 LOCALIZED OSTEOPOROSIS WITHOUT CURRENT PATHOLOGICAL FRACTURE: ICD-10-CM

## 2022-01-31 DIAGNOSIS — C78.02 MALIGNANT NEOPLASM METASTATIC TO LEFT LUNG: ICD-10-CM

## 2022-01-31 DIAGNOSIS — D64.9 ANEMIA, UNSPECIFIED TYPE: Primary | ICD-10-CM

## 2022-01-31 LAB
ALBUMIN SERPL-MCNC: 4.2 G/DL (ref 3.5–5.2)
ALBUMIN/GLOB SERPL: 1.2 G/DL (ref 1.1–2.4)
ALP SERPL-CCNC: 131 U/L (ref 38–116)
ALT SERPL W P-5'-P-CCNC: 20 U/L (ref 0–33)
ANION GAP SERPL CALCULATED.3IONS-SCNC: 10.6 MMOL/L (ref 5–15)
AST SERPL-CCNC: 22 U/L (ref 0–32)
BASOPHILS # BLD AUTO: 0.04 10*3/MM3 (ref 0–0.2)
BASOPHILS NFR BLD AUTO: 0.9 % (ref 0–1.5)
BILIRUB SERPL-MCNC: 0.3 MG/DL (ref 0.2–1.2)
BUN SERPL-MCNC: 15 MG/DL (ref 6–20)
BUN/CREAT SERPL: 16.7 (ref 7.3–30)
CALCIUM SPEC-SCNC: 9.4 MG/DL (ref 8.5–10.2)
CHLORIDE SERPL-SCNC: 101 MMOL/L (ref 98–107)
CO2 SERPL-SCNC: 26.4 MMOL/L (ref 22–29)
CREAT SERPL-MCNC: 0.9 MG/DL (ref 0.6–1.1)
DEPRECATED RDW RBC AUTO: 41.7 FL (ref 37–54)
EOSINOPHIL # BLD AUTO: 0.1 10*3/MM3 (ref 0–0.4)
EOSINOPHIL NFR BLD AUTO: 2.3 % (ref 0.3–6.2)
ERYTHROCYTE [DISTWIDTH] IN BLOOD BY AUTOMATED COUNT: 12 % (ref 12.3–15.4)
GFR SERPL CREATININE-BSD FRML MDRD: 61 ML/MIN/1.73
GFR SERPL CREATININE-BSD FRML MDRD: 74 ML/MIN/1.73
GLOBULIN UR ELPH-MCNC: 3.5 GM/DL (ref 1.8–3.5)
GLUCOSE SERPL-MCNC: 170 MG/DL (ref 74–124)
HCT VFR BLD AUTO: 37.8 % (ref 34–46.6)
HGB BLD-MCNC: 12.6 G/DL (ref 12–15.9)
IMM GRANULOCYTES # BLD AUTO: 0.01 10*3/MM3 (ref 0–0.05)
IMM GRANULOCYTES NFR BLD AUTO: 0.2 % (ref 0–0.5)
LYMPHOCYTES # BLD AUTO: 1.03 10*3/MM3 (ref 0.7–3.1)
LYMPHOCYTES NFR BLD AUTO: 23.6 % (ref 19.6–45.3)
MAGNESIUM SERPL-MCNC: 1.9 MG/DL (ref 1.8–2.5)
MCH RBC QN AUTO: 31.5 PG (ref 26.6–33)
MCHC RBC AUTO-ENTMCNC: 33.3 G/DL (ref 31.5–35.7)
MCV RBC AUTO: 94.5 FL (ref 79–97)
MONOCYTES # BLD AUTO: 0.67 10*3/MM3 (ref 0.1–0.9)
MONOCYTES NFR BLD AUTO: 15.3 % (ref 5–12)
NEUTROPHILS NFR BLD AUTO: 2.52 10*3/MM3 (ref 1.7–7)
NEUTROPHILS NFR BLD AUTO: 57.7 % (ref 42.7–76)
NRBC BLD AUTO-RTO: 0 /100 WBC (ref 0–0.2)
PHOSPHATE SERPL-MCNC: 3.8 MG/DL (ref 2.5–4.5)
PLATELET # BLD AUTO: 232 10*3/MM3 (ref 140–450)
PMV BLD AUTO: 8.4 FL (ref 6–12)
POTASSIUM SERPL-SCNC: 4.2 MMOL/L (ref 3.5–4.7)
PROT SERPL-MCNC: 7.7 G/DL (ref 6.3–8)
RBC # BLD AUTO: 4 10*6/MM3 (ref 3.77–5.28)
SODIUM SERPL-SCNC: 138 MMOL/L (ref 134–145)
WBC NRBC COR # BLD: 4.37 10*3/MM3 (ref 3.4–10.8)

## 2022-01-31 PROCEDURE — 99214 OFFICE O/P EST MOD 30 MIN: CPT | Performed by: NURSE PRACTITIONER

## 2022-01-31 PROCEDURE — 96372 THER/PROPH/DIAG INJ SC/IM: CPT

## 2022-01-31 PROCEDURE — 85025 COMPLETE CBC W/AUTO DIFF WBC: CPT

## 2022-01-31 PROCEDURE — 84100 ASSAY OF PHOSPHORUS: CPT

## 2022-01-31 PROCEDURE — 36415 COLL VENOUS BLD VENIPUNCTURE: CPT

## 2022-01-31 PROCEDURE — 25010000002 DENOSUMAB 60 MG/ML SOLUTION PREFILLED SYRINGE: Performed by: INTERNAL MEDICINE

## 2022-01-31 PROCEDURE — 83735 ASSAY OF MAGNESIUM: CPT

## 2022-01-31 PROCEDURE — 80053 COMPREHEN METABOLIC PANEL: CPT

## 2022-01-31 RX ADMIN — DENOSUMAB 60 MG: 60 INJECTION SUBCUTANEOUS at 10:13

## 2022-01-31 NOTE — PROGRESS NOTES
REASON FOR FOLLOW-UP:    1. Metastatic breast cancer to the lungs, ER/CA positive, HER2/berkley positive, undergoing hormonal therapy with Arimidex since the middle of March 2009.   2. Response to Arimidex as evidenced by serial CT exams, including on 01/10/2012, showing no significant disease.   3. New left upper lobe pulmonary nodule noted, measuring 1.1 cm by CT scan on 07/05/2012. No other evidence of metastatic disease. Hormonal therapy switched to Faslodex 07/12/2012.   4. Osteoporosis documented by bone density scan from 10/17/2011 and also 02/21/2014. Patient was started on Prolia therapy every 6 months. Patient had tooth extraction in August 2015. Prolia has been on hold.   5. CT scans of chest, abdomen and pelvis on 04/07/2016 showed stable disease. Faslodex will be continued.   6. CT scan examination reported disease progress on 01/30/2017, patient was switched to Aromasin in early February 2017. However she was unable to start Afinitor due to cost.   7. Repeated bone density scan on 2/27/2017 reported worsening osteoporosis. Prolia was restarted back on 03/02/2017. Plan for Q6 month treatment.   8.  Repeated chest CT on 5/15/2017 showed progressive left upper lobe pulmonary nodule.  Patient had stereotactic radiation therapy in early June 2017.  Aromasin was continued.   9.  CT scan of the chest on 9/25/2017 reported post treatment changes in the left upper lobe.   10.  CT scan examination for chest abdomen and pelvis with IV contrast on 6/21/2018 reported no evidence of disease progression.   11.  CT chest/abdomen/pelvis from 6/3/2020 showed no evidence of metastatic disease has developed.  12.  CT for chest abdomen pelvis on 6/21/2021 reported stable condition.      HISTORY OF PRESENT ILLNESS: Ms. Reese is a 76 y.o.  female who has history of type 2 diabetes, hypothyroidism, hyperlipidemia presenting today for 6-month followup for her metastatic breast cancer.    She continues on Aromasin which she  tolerates very well.  She denies arthralgias, hot flashes or new pain.  She denies shortness of breath or chest pain.  She does have postsurgical pain from her bilateral mastectomy though this is overall unchanged.  She has previously struggled with neck pain with movement though and a MRI of the cervical spine was negative for metastatic disease.  She is eating and drinking adequately.  She denies any new symptoms.    She did miss her Prolia dosing this past summer.  She underwent DEXA scan 8/11/2021 which continued to document osteoporosis.  She has had all of her teeth extracted.    Past Medical History:   Diagnosis Date   • Breast cancer, Left     1998, 2009 metastisized to lungs   • Diabetes mellitus (HCC)     type 2    • Hyperlipidemia    • Hypothyroidism 01/2009   • Left upper pulmonary nodule 07/05/2012   • Osteoporosis 10/17/2011     Past Surgical History:   Procedure Laterality Date   • BREAST BIOPSY Left 1995   • MASTECTOMY RADICAL Left 1995   • MOUTH SURGERY  08/2015    tooth extraction            ONCOLOGIC/HEMATOLOGIC HISTORY: History from previous dates can be found in the separate document.       CT scan on 04/07/2016 showed stable disease with the small left upper lobe pulmonary nodule, no change compared to previous imaging studies, no evidence of metastatic disease in the abdomen or pelvis or bony structure.       Unfortunately her CT scan on 01/30/2017 showed slight disease progress in the left upper lobe of the lung, by 3 mm, up to 1.4 centimeters. No evidence of new metastatic disease or bone metastases.       Patient was seen on February 7, 2017. We'll propose switch therapy to Aromasin plus Afinitor.  however she could not afford Afinitor because the cost. Our pharmacy staff tried to apply for premedication, but was not successful. So she is only on Aromasin with good tolerance.      Repeated a CT of the chest on 5/15/2017 showed progressive left upper lobe pulmonary nodule.  Patient had  stereotactic radiation therapy in early June 2017.  Aromasin was continued.     CT scan examination reported no active disease on 6/10/2019.  Had a reasonable iron studies including ferritin 188 ng/mL, free iron 75 TIBC 410 and iron saturation 18%.  And also improve the magnesium level 1.8.  Mild anemic with hemoglobin 11.5 but normal platelets 253,000 and WBC 4020.  Chemistry lab reported normal CMP except elevated glucose which was at 201.     Patient presented on 12/3/2019 for 6-month followup.  She reports at baseline condition, she retired.  She denies weight loss, no fever, no sweating, no headaches.  Her performance status is ECOG 0.  Patient had teeth extraction in February 2018.  She now has full-mouth denture.    Patient otherwise reports no dyspnea, no cough, hemoptysis.  She reports no fever or sweating.   She is on Aromasin with good tolerance. She denies arthralgia, hot flashes or sweating.  She denies bone pains.       Laboratory study on 12/3/2019 reported marginal anemia hemoglobin 11.7, otherwise normal CBC.  Chemistry lab reported magnesium 1.5, glucose 156 otherwise unremarkable.       Her CT scan for chest abdomen and pelvis with IV contrast on 6/3/2020 reported no evidence of worsening metastatic disease.      Laboratory studies on 6/3/2020 reported stable mild anemia with Hb 11.7, with MCV 97.8, normal platelets 260,000 and a WBC 4680.  Iron study reported ferritin 210, saturation 24%.  Unremarkable CMP with normal glucose, electrolytes and liver function panel and renal function.      Laboratory study on 8/6/2020 reported a peak ferritin 1124 when she was hospitalized for COVID-19 infection.  She had significant elevated CRP 7.73 mg/dL.  Her hemoglobin was 11.6, platelets 259,000 and WBC 11,733 ANC 10,390.     Laboratory studies 12/18/2020 reported normal CBC including Hb 12.6, WBC 5180, ANC 2790, lymphocytes 1500, platelets 274,000, magnesium 1.7, electrolytes normal otherwise,, elevated  "glucose 185, unremarkable CMP.  Ferritin 135, and iron saturation 24%.      MEDICATIONS: The current medication list was reviewed with the patient and updated in the EMR this date per the medical assistant. Medication dosages and frequencies were confirmed to be accurate.       ALLERGIES: LETROZOLE (questionably causing skin rash).       SOCIAL HISTORY: The patient is . She finished college education. 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 She has never smoked. No alcohol use. No illegal drug use. No risks for HIV.        FAMILY HISTORY: Her mother  of tuberculosis at the age of 48. A sister had thyroid cancer/lung cancer - no details are available. Two brothers had lung cancer and bone cancers in their 70s - no details available. Her maternal grandmother and her mother both had diabetes. No family history of hypertension or heart disease.       REVIEW OF SYSTEMS:   As per HPI      VITAL SIGNS:   Vitals:    22 0921   BP: 121/74   Pulse: 87   Resp: 14   Temp: 98.6 °F (37 °C)   TempSrc: Infrared   SpO2: 99%   Weight: 52.2 kg (115 lb 1.6 oz)   Height: 155 cm (61.02\")  Comment: new    PainSc: 0-No pain     ECO      PHYSICAL EXAMINATION:   GENERAL:  Well-developed, well-nourished female in no acute distress.  Orientated to time place and the people.  SKIN:  Warm, dry without rashes, purpura or petechiae.  HEENT:  Normocephalic.  Wearing mask.   LYMPHATICS:  No cervical, supraclavicular adenopathy.  CHEST: Normal respiratory effort.  Lungs clear to auscultation. Good airflow.  CARDIAC:  Regular rate and rhythm without murmurs. Normal S1,S2.  ABDOMEN:  Soft, nontender with no organomegaly or masses.  Bowel sounds normal.  EXTREMITIES:  No clubbing, cyanosis or edema.  NEUROLOGICAL:  Grossly intact.    PSYCHIATRIC:  Normal affect and mood.     I have reexamined the patient and the results are consistent with the previously documented exam. KENDELL Brooke     LABORATORY " DATA:  Results from last 7 days   Lab Units 01/31/22  0859 01/25/22  0859   WBC 10*3/mm3 4.37 5.00   NEUTROS ABS 10*3/mm3 2.52  --    HEMOGLOBIN g/dL 12.6 12.5   HEMATOCRIT % 37.8 38.1   PLATELETS 10*3/mm3 232 250     Results from last 7 days   Lab Units 01/31/22  0859 01/25/22  0859   SODIUM mmol/L 138 137   POTASSIUM mmol/L 4.2 4.4   CHLORIDE mmol/L 101 103   CO2 mmol/L 26.4 25.9   BUN mg/dL 15 18   CREATININE mg/dL 0.90 0.87   CALCIUM mg/dL 9.4 9.5   ALBUMIN g/dL 4.20  --    BILIRUBIN mg/dL 0.3  --    ALK PHOS U/L 131*  --    ALT (SGPT) U/L 20  --    AST (SGOT) U/L 22  --    GLUCOSE mg/dL 170* 120*   MAGNESIUM mg/dL 1.9  --          DEXA Bone Density Axial (08/11/2021 10:34)         ASSESSMENT:   1. Metastatic breast cancer with metastasis in the lungs, biopsy confirmed. ER/CT positive. Her2 positive.    · She had good response to Arimidex for many years.  However in early 2017, CT scan showed disease progression with solitary lung metastases.  We switched her to Aromasin in early February 2017, however she could not obtain Afinitor, because of the cost issue.     · We obtained chest CT scan on 5/15/2017 which showed further disease progression.  Patient was treated with stereotactic radiation therapy in early June 2017.    · We obtained CT scan twice on 9/25/2017 and on 6/21/2018 which showed post radiation therapy changes.    · We obtained CT scan examination again on 6/10/2019 which showed no evidence of active disease.    · Patient reports she is at baseline condition.  Physical examination is no evidence for palpable disease.  She continues to tolerate endocrine therapy with Aromasin as of 12/3/2020.    · CT scan examination on 6/3/2020 showed no evidence of disease progression.  Aromasin will be continued.  · On 12/8/2020, patient reports she has been tolerating Aromasin.    · On 6/21/2021, patient had a CT scan for chest abdomen pelvis, which reported stable small pulmonary nodules and no evidence for  metastatic disease.  Discussed with patient 6/30/2021, will continue current treatment with exemestane.  · She returns for 6-month evaluation 1/31/2022 without evidence of recurrent disease.  She continues to tolerate exemestane very well.  We will repeat imaging in 6 months     2. Osteoporosis. She had bone density scan on 2/27/2017 which showed statistically significant worsening osteoporosis in the left hip.  We restarted her back on Prolia on 03/02/17 and repeat every 6 months.   · Her last dose of Prolia was in June 2018.  Patient reported that she had pain involving the left lower jaw after her teeth extraction in February 2018.  We concerned about possibility of necrosis of the jaw bone, so I recommended to discontinue prolia.    · The patient will continue oral calcium and vitamin D supplementation.   · Discussed again with the patient on 12/8/2020, recommended patient to have dental assessment before we restart her on Prolia.    · 6/30/2021, patient reports she was cleared by her dentist for resuming Prolia.   · Repeat DEXA scan 8/11/2021 with ongoing osteoporosis  · The patient has received clearance from her dentist and will therefore proceed with resumption of Prolia today    3. Mild anemia with mild iron deficiency.    · Patient has been taking oral iron supplementation with good results, laboratory study showed improved with ferritin and iron saturation, and normalized hemoglobin.   · Her iron study on 6/10/2019 reported excellent ferritin 188, and the marginal iron saturation 18%.   · Iron study on 6/3/2020 reported ferritin 210 and iron saturation 24%.  Patient was instructed to continue oral iron supplementation and oral vitamin B12 supplementation.   · Laboratory study 12/8/2020 reported normal ferritin 135 and iron saturation 24%, together with normal hemoglobin 12.6.   · Hemoglobin is within normal limits today at 12.6      4.  Hypomagnesemia.    · She was started on oral magnesium oxide.  Repeat  labs showed improved and marginally normal magnesium level 1.8 on 6/10/2019.    · Recurrent hypomagnesemia 1.5 on 12/3/2019.   · Magnesium 1.7 on 12/8/2020.  I asked patient to continue oral magnesium oxide.  · Improved magnesium 1.9 on 2/24/2021.   · Magnesium is normal today at 1.9    PLAN:   1. Proceed today with Prolia  2. DEXA scan reviewed with the patient today which does document ongoing osteoporosis  3. Niccoli, the patient has no evidence of recurrent or progressive metastatic disease at this time  4. Continue Aromasin 25 mg daily.   5. Continue oral ferrous sulfate 325 mg once a day.     6. Continue oral magnesium oxide 400 mg twice a day.    7. Follow-up with Dr. Castillo in 6 months with CBC, CMP, ferritin, iron profile, magnesium, phosphorus, Prolia  8. 1 week prior to MD follow-up, the patient will undergo CT of the chest, abdomen and pelvis to evaluate the state of her metastatic breast cancer    Kusum Brooks, APRN  01/31/2022      CC: Sussy Payne M.D.

## 2022-05-17 RX ORDER — OMEPRAZOLE 20 MG/1
CAPSULE, DELAYED RELEASE ORAL
Qty: 90 CAPSULE | Refills: 1 | Status: SHIPPED | OUTPATIENT
Start: 2022-05-17

## 2022-06-24 ENCOUNTER — LAB (OUTPATIENT)
Dept: LAB | Facility: HOSPITAL | Age: 77
End: 2022-06-24

## 2022-06-24 ENCOUNTER — TRANSCRIBE ORDERS (OUTPATIENT)
Dept: ADMINISTRATIVE | Facility: HOSPITAL | Age: 77
End: 2022-06-24

## 2022-06-24 DIAGNOSIS — E78.00 HYPERCHOLESTEREMIA: ICD-10-CM

## 2022-06-24 DIAGNOSIS — I10 HTN (HYPERTENSION) WITH GOAL TO BE DETERMINED: ICD-10-CM

## 2022-06-24 DIAGNOSIS — E11.9 DM TYPE 2, GOAL A1C TO BE DETERMINED: ICD-10-CM

## 2022-06-24 DIAGNOSIS — E11.9 DM TYPE 2, GOAL A1C TO BE DETERMINED: Primary | ICD-10-CM

## 2022-06-24 LAB
ANION GAP SERPL CALCULATED.3IONS-SCNC: 9 MMOL/L (ref 5–15)
BUN SERPL-MCNC: 19 MG/DL (ref 8–23)
BUN/CREAT SERPL: 26 (ref 7–25)
CALCIUM SPEC-SCNC: 9.3 MG/DL (ref 8.6–10.5)
CHLORIDE SERPL-SCNC: 103 MMOL/L (ref 98–107)
CHOLEST SERPL-MCNC: 164 MG/DL (ref 0–200)
CO2 SERPL-SCNC: 26 MMOL/L (ref 22–29)
CREAT SERPL-MCNC: 0.73 MG/DL (ref 0.57–1)
EGFRCR SERPLBLD CKD-EPI 2021: 85.9 ML/MIN/1.73
GLUCOSE SERPL-MCNC: 170 MG/DL (ref 65–99)
HBA1C MFR BLD: 9.4 % (ref 4.8–5.6)
HDLC SERPL-MCNC: 55 MG/DL (ref 40–60)
LDLC SERPL CALC-MCNC: 95 MG/DL (ref 0–100)
LDLC/HDLC SERPL: 1.72 {RATIO}
POTASSIUM SERPL-SCNC: 4.3 MMOL/L (ref 3.5–5.2)
SODIUM SERPL-SCNC: 138 MMOL/L (ref 136–145)
TRIGL SERPL-MCNC: 71 MG/DL (ref 0–150)
VLDLC SERPL-MCNC: 14 MG/DL (ref 5–40)

## 2022-06-24 PROCEDURE — 80048 BASIC METABOLIC PNL TOTAL CA: CPT

## 2022-06-24 PROCEDURE — 36415 COLL VENOUS BLD VENIPUNCTURE: CPT

## 2022-06-24 PROCEDURE — 80061 LIPID PANEL: CPT

## 2022-06-24 PROCEDURE — 83036 HEMOGLOBIN GLYCOSYLATED A1C: CPT

## 2022-06-27 PROCEDURE — 99283 EMERGENCY DEPT VISIT LOW MDM: CPT

## 2022-06-28 ENCOUNTER — HOSPITAL ENCOUNTER (EMERGENCY)
Facility: HOSPITAL | Age: 77
Discharge: HOME | End: 2022-06-28
Attending: EMERGENCY MEDICINE | Admitting: EMERGENCY MEDICINE

## 2022-06-28 VITALS
TEMPERATURE: 98.1 F | BODY MASS INDEX: 23.09 KG/M2 | DIASTOLIC BLOOD PRESSURE: 66 MMHG | HEIGHT: 58 IN | HEART RATE: 78 BPM | WEIGHT: 110 LBS | RESPIRATION RATE: 18 BRPM | SYSTOLIC BLOOD PRESSURE: 118 MMHG | OXYGEN SATURATION: 99 %

## 2022-06-28 DIAGNOSIS — S80.10XA CONTUSION OF LOWER LEG, UNSPECIFIED LATERALITY, INITIAL ENCOUNTER: Primary | ICD-10-CM

## 2022-06-28 DIAGNOSIS — R73.9 HYPERGLYCEMIA: ICD-10-CM

## 2022-06-28 LAB
ALBUMIN SERPL-MCNC: 3.9 G/DL (ref 3.5–5.2)
ALBUMIN/GLOB SERPL: 1.4 G/DL
ALP SERPL-CCNC: 108 U/L (ref 39–117)
ALT SERPL W P-5'-P-CCNC: 29 U/L (ref 1–33)
ANION GAP SERPL CALCULATED.3IONS-SCNC: 11 MMOL/L (ref 5–15)
APTT PPP: 31.9 SECONDS (ref 22.7–35.4)
AST SERPL-CCNC: 31 U/L (ref 1–32)
BASOPHILS # BLD AUTO: 0.03 10*3/MM3 (ref 0–0.2)
BASOPHILS NFR BLD AUTO: 0.7 % (ref 0–1.5)
BILIRUB SERPL-MCNC: 0.2 MG/DL (ref 0–1.2)
BUN SERPL-MCNC: 21 MG/DL (ref 8–23)
BUN/CREAT SERPL: 19.8 (ref 7–25)
CALCIUM SPEC-SCNC: 9.1 MG/DL (ref 8.6–10.5)
CHLORIDE SERPL-SCNC: 103 MMOL/L (ref 98–107)
CO2 SERPL-SCNC: 26 MMOL/L (ref 22–29)
CREAT SERPL-MCNC: 1.06 MG/DL (ref 0.57–1)
DEPRECATED RDW RBC AUTO: 41.5 FL (ref 37–54)
EGFRCR SERPLBLD CKD-EPI 2021: 54.9 ML/MIN/1.73
EOSINOPHIL # BLD AUTO: 0.16 10*3/MM3 (ref 0–0.4)
EOSINOPHIL NFR BLD AUTO: 3.7 % (ref 0.3–6.2)
ERYTHROCYTE [DISTWIDTH] IN BLOOD BY AUTOMATED COUNT: 12 % (ref 12.3–15.4)
GLOBULIN UR ELPH-MCNC: 2.8 GM/DL
GLUCOSE SERPL-MCNC: 337 MG/DL (ref 65–99)
HCT VFR BLD AUTO: 34.4 % (ref 34–46.6)
HGB BLD-MCNC: 11.4 G/DL (ref 12–15.9)
IMM GRANULOCYTES # BLD AUTO: 0.01 10*3/MM3 (ref 0–0.05)
IMM GRANULOCYTES NFR BLD AUTO: 0.2 % (ref 0–0.5)
INR PPP: 1.06 (ref 0.9–1.1)
LYMPHOCYTES # BLD AUTO: 0.88 10*3/MM3 (ref 0.7–3.1)
LYMPHOCYTES NFR BLD AUTO: 20.4 % (ref 19.6–45.3)
MCH RBC QN AUTO: 31.7 PG (ref 26.6–33)
MCHC RBC AUTO-ENTMCNC: 33.1 G/DL (ref 31.5–35.7)
MCV RBC AUTO: 95.6 FL (ref 79–97)
MONOCYTES # BLD AUTO: 0.43 10*3/MM3 (ref 0.1–0.9)
MONOCYTES NFR BLD AUTO: 10 % (ref 5–12)
NEUTROPHILS NFR BLD AUTO: 2.81 10*3/MM3 (ref 1.7–7)
NEUTROPHILS NFR BLD AUTO: 65 % (ref 42.7–76)
NRBC BLD AUTO-RTO: 0 /100 WBC (ref 0–0.2)
PLATELET # BLD AUTO: 209 10*3/MM3 (ref 140–450)
PMV BLD AUTO: 8.5 FL (ref 6–12)
POTASSIUM SERPL-SCNC: 4.4 MMOL/L (ref 3.5–5.2)
PROT SERPL-MCNC: 6.7 G/DL (ref 6–8.5)
PROTHROMBIN TIME: 13.7 SECONDS (ref 11.7–14.2)
RBC # BLD AUTO: 3.6 10*6/MM3 (ref 3.77–5.28)
SODIUM SERPL-SCNC: 140 MMOL/L (ref 136–145)
WBC NRBC COR # BLD: 4.32 10*3/MM3 (ref 3.4–10.8)

## 2022-06-28 PROCEDURE — 85025 COMPLETE CBC W/AUTO DIFF WBC: CPT | Performed by: EMERGENCY MEDICINE

## 2022-06-28 PROCEDURE — 85610 PROTHROMBIN TIME: CPT | Performed by: EMERGENCY MEDICINE

## 2022-06-28 PROCEDURE — 85730 THROMBOPLASTIN TIME PARTIAL: CPT | Performed by: EMERGENCY MEDICINE

## 2022-06-28 PROCEDURE — 80053 COMPREHEN METABOLIC PANEL: CPT | Performed by: EMERGENCY MEDICINE

## 2022-08-08 ENCOUNTER — APPOINTMENT (OUTPATIENT)
Dept: PET IMAGING | Facility: HOSPITAL | Age: 77
End: 2022-08-08

## 2022-08-16 ENCOUNTER — HOSPITAL ENCOUNTER (OUTPATIENT)
Dept: CT IMAGING | Facility: HOSPITAL | Age: 77
Discharge: HOME | End: 2022-08-16
Admitting: NURSE PRACTITIONER

## 2022-08-16 DIAGNOSIS — C78.02 MALIGNANT NEOPLASM METASTATIC TO LEFT LUNG: ICD-10-CM

## 2022-08-16 DIAGNOSIS — C50.112 MALIGNANT NEOPLASM OF CENTRAL PORTION OF LEFT BREAST IN FEMALE, ESTROGEN RECEPTOR POSITIVE: ICD-10-CM

## 2022-08-16 DIAGNOSIS — Z17.0 MALIGNANT NEOPLASM OF CENTRAL PORTION OF LEFT BREAST IN FEMALE, ESTROGEN RECEPTOR POSITIVE: ICD-10-CM

## 2022-08-16 PROCEDURE — 82565 ASSAY OF CREATININE: CPT

## 2022-08-16 PROCEDURE — 25010000002 IOPAMIDOL 61 % SOLUTION: Performed by: NURSE PRACTITIONER

## 2022-08-16 PROCEDURE — 0 DIATRIZOATE MEGLUMINE & SODIUM PER 1 ML: Performed by: NURSE PRACTITIONER

## 2022-08-16 PROCEDURE — 71260 CT THORAX DX C+: CPT

## 2022-08-16 PROCEDURE — 74177 CT ABD & PELVIS W/CONTRAST: CPT

## 2022-08-16 RX ADMIN — DIATRIZOATE MEGLUMINE AND DIATRIZOATE SODIUM 30 ML: 660; 100 LIQUID ORAL; RECTAL at 13:55

## 2022-08-16 RX ADMIN — IOPAMIDOL 85 ML: 612 INJECTION, SOLUTION INTRAVENOUS at 13:55

## 2022-08-22 ENCOUNTER — OFFICE VISIT (OUTPATIENT)
Dept: ONCOLOGY | Facility: CLINIC | Age: 77
End: 2022-08-22

## 2022-08-22 ENCOUNTER — INFUSION (OUTPATIENT)
Dept: ONCOLOGY | Facility: HOSPITAL | Age: 77
End: 2022-08-22

## 2022-08-22 ENCOUNTER — LAB (OUTPATIENT)
Dept: LAB | Facility: HOSPITAL | Age: 77
End: 2022-08-22

## 2022-08-22 VITALS
HEIGHT: 58 IN | TEMPERATURE: 97.3 F | SYSTOLIC BLOOD PRESSURE: 120 MMHG | WEIGHT: 115.2 LBS | HEART RATE: 71 BPM | DIASTOLIC BLOOD PRESSURE: 75 MMHG | RESPIRATION RATE: 16 BRPM | BODY MASS INDEX: 24.18 KG/M2 | OXYGEN SATURATION: 98 %

## 2022-08-22 DIAGNOSIS — D50.9 IRON DEFICIENCY ANEMIA, UNSPECIFIED IRON DEFICIENCY ANEMIA TYPE: ICD-10-CM

## 2022-08-22 DIAGNOSIS — C50.112 MALIGNANT NEOPLASM OF CENTRAL PORTION OF LEFT BREAST IN FEMALE, ESTROGEN RECEPTOR POSITIVE: Primary | ICD-10-CM

## 2022-08-22 DIAGNOSIS — Z17.0 MALIGNANT NEOPLASM OF CENTRAL PORTION OF LEFT BREAST IN FEMALE, ESTROGEN RECEPTOR POSITIVE: Primary | ICD-10-CM

## 2022-08-22 DIAGNOSIS — C78.02 MALIGNANT NEOPLASM METASTATIC TO LEFT LUNG: ICD-10-CM

## 2022-08-22 DIAGNOSIS — M81.6 LOCALIZED OSTEOPOROSIS WITHOUT CURRENT PATHOLOGICAL FRACTURE: ICD-10-CM

## 2022-08-22 DIAGNOSIS — C78.02 MALIGNANT NEOPLASM METASTATIC TO LEFT LUNG: Primary | ICD-10-CM

## 2022-08-22 DIAGNOSIS — R07.89 INTERMITTENT LEFT-SIDED CHEST PAIN: ICD-10-CM

## 2022-08-22 DIAGNOSIS — E83.42 HYPOMAGNESEMIA: ICD-10-CM

## 2022-08-22 LAB
ALBUMIN SERPL-MCNC: 4.2 G/DL (ref 3.5–5.2)
ALBUMIN/GLOB SERPL: 1.1 G/DL (ref 1.1–2.4)
ALP SERPL-CCNC: 127 U/L (ref 38–116)
ALT SERPL W P-5'-P-CCNC: 16 U/L (ref 0–33)
ANION GAP SERPL CALCULATED.3IONS-SCNC: 10.8 MMOL/L (ref 5–15)
AST SERPL-CCNC: 18 U/L (ref 0–32)
BASOPHILS # BLD AUTO: 0.05 10*3/MM3 (ref 0–0.2)
BASOPHILS NFR BLD AUTO: 1 % (ref 0–1.5)
BILIRUB SERPL-MCNC: 0.3 MG/DL (ref 0.2–1.2)
BUN SERPL-MCNC: 16 MG/DL (ref 6–20)
BUN/CREAT SERPL: 19.3 (ref 7.3–30)
CALCIUM SPEC-SCNC: 9.7 MG/DL (ref 8.5–10.2)
CHLORIDE SERPL-SCNC: 102 MMOL/L (ref 98–107)
CO2 SERPL-SCNC: 25.2 MMOL/L (ref 22–29)
CREAT SERPL-MCNC: 0.83 MG/DL (ref 0.6–1.1)
DEPRECATED RDW RBC AUTO: 40.1 FL (ref 37–54)
EGFRCR SERPLBLD CKD-EPI 2021: 73.6 ML/MIN/1.73
EOSINOPHIL # BLD AUTO: 0.22 10*3/MM3 (ref 0–0.4)
EOSINOPHIL NFR BLD AUTO: 4.3 % (ref 0.3–6.2)
ERYTHROCYTE [DISTWIDTH] IN BLOOD BY AUTOMATED COUNT: 11.8 % (ref 12.3–15.4)
FERRITIN SERPL-MCNC: 251.2 NG/ML (ref 13–150)
GLOBULIN UR ELPH-MCNC: 3.7 GM/DL (ref 1.8–3.5)
GLUCOSE SERPL-MCNC: 244 MG/DL (ref 74–124)
HCT VFR BLD AUTO: 35.9 % (ref 34–46.6)
HGB BLD-MCNC: 12 G/DL (ref 12–15.9)
IMM GRANULOCYTES # BLD AUTO: 0.01 10*3/MM3 (ref 0–0.05)
IMM GRANULOCYTES NFR BLD AUTO: 0.2 % (ref 0–0.5)
IRON 24H UR-MRATE: 63 MCG/DL (ref 37–145)
IRON SATN MFR SERPL: 17 % (ref 14–48)
LYMPHOCYTES # BLD AUTO: 1.34 10*3/MM3 (ref 0.7–3.1)
LYMPHOCYTES NFR BLD AUTO: 26.2 % (ref 19.6–45.3)
MAGNESIUM SERPL-MCNC: 1.6 MG/DL (ref 1.8–2.5)
MCH RBC QN AUTO: 31.3 PG (ref 26.6–33)
MCHC RBC AUTO-ENTMCNC: 33.4 G/DL (ref 31.5–35.7)
MCV RBC AUTO: 93.5 FL (ref 79–97)
MONOCYTES # BLD AUTO: 0.41 10*3/MM3 (ref 0.1–0.9)
MONOCYTES NFR BLD AUTO: 8 % (ref 5–12)
NEUTROPHILS NFR BLD AUTO: 3.09 10*3/MM3 (ref 1.7–7)
NEUTROPHILS NFR BLD AUTO: 60.3 % (ref 42.7–76)
NRBC BLD AUTO-RTO: 0 /100 WBC (ref 0–0.2)
PHOSPHATE SERPL-MCNC: 2.7 MG/DL (ref 2.5–4.5)
PLATELET # BLD AUTO: 231 10*3/MM3 (ref 140–450)
PMV BLD AUTO: 8 FL (ref 6–12)
POTASSIUM SERPL-SCNC: 4.4 MMOL/L (ref 3.5–4.7)
PROT SERPL-MCNC: 7.9 G/DL (ref 6.3–8)
RBC # BLD AUTO: 3.84 10*6/MM3 (ref 3.77–5.28)
SODIUM SERPL-SCNC: 138 MMOL/L (ref 134–145)
TIBC SERPL-MCNC: 372 MCG/DL (ref 249–505)
TRANSFERRIN SERPL-MCNC: 266 MG/DL (ref 200–360)
WBC NRBC COR # BLD: 5.12 10*3/MM3 (ref 3.4–10.8)

## 2022-08-22 PROCEDURE — 80053 COMPREHEN METABOLIC PANEL: CPT

## 2022-08-22 PROCEDURE — 25010000002 DENOSUMAB 60 MG/ML SOLUTION PREFILLED SYRINGE: Performed by: INTERNAL MEDICINE

## 2022-08-22 PROCEDURE — 83735 ASSAY OF MAGNESIUM: CPT

## 2022-08-22 PROCEDURE — 84100 ASSAY OF PHOSPHORUS: CPT

## 2022-08-22 PROCEDURE — 99215 OFFICE O/P EST HI 40 MIN: CPT | Performed by: INTERNAL MEDICINE

## 2022-08-22 PROCEDURE — 36415 COLL VENOUS BLD VENIPUNCTURE: CPT

## 2022-08-22 PROCEDURE — 82728 ASSAY OF FERRITIN: CPT | Performed by: INTERNAL MEDICINE

## 2022-08-22 PROCEDURE — 83540 ASSAY OF IRON: CPT | Performed by: INTERNAL MEDICINE

## 2022-08-22 PROCEDURE — 96372 THER/PROPH/DIAG INJ SC/IM: CPT

## 2022-08-22 PROCEDURE — 85025 COMPLETE CBC W/AUTO DIFF WBC: CPT

## 2022-08-22 PROCEDURE — 84466 ASSAY OF TRANSFERRIN: CPT | Performed by: INTERNAL MEDICINE

## 2022-08-22 RX ORDER — MAGNESIUM OXIDE 400 MG/1
2 TABLET ORAL 2 TIMES DAILY
Qty: 360 TABLET | Refills: 1 | Status: SHIPPED | OUTPATIENT
Start: 2022-08-22

## 2022-08-22 RX ORDER — PHENOL 1.4 %
600 AEROSOL, SPRAY (ML) MUCOUS MEMBRANE 2 TIMES DAILY WITH MEALS
Qty: 180 TABLET | Refills: 1 | Status: SHIPPED | OUTPATIENT
Start: 2022-08-22

## 2022-08-22 RX ADMIN — DENOSUMAB 60 MG: 60 INJECTION SUBCUTANEOUS at 11:56

## 2022-08-24 ENCOUNTER — OFFICE VISIT (OUTPATIENT)
Dept: CARDIOLOGY | Age: 77
End: 2022-08-24

## 2022-08-24 VITALS
SYSTOLIC BLOOD PRESSURE: 131 MMHG | DIASTOLIC BLOOD PRESSURE: 74 MMHG | HEIGHT: 58 IN | BODY MASS INDEX: 23.72 KG/M2 | WEIGHT: 113 LBS | HEART RATE: 79 BPM

## 2022-08-24 DIAGNOSIS — R94.31 ABNORMAL ELECTROCARDIOGRAM: Primary | ICD-10-CM

## 2022-08-24 DIAGNOSIS — R07.89 OTHER CHEST PAIN: ICD-10-CM

## 2022-08-24 DIAGNOSIS — E78.00 PURE HYPERCHOLESTEROLEMIA: ICD-10-CM

## 2022-08-24 DIAGNOSIS — R93.1 ABNORMAL CT SCAN OF HEART: ICD-10-CM

## 2022-08-24 PROCEDURE — 93000 ELECTROCARDIOGRAM COMPLETE: CPT | Performed by: INTERNAL MEDICINE

## 2022-08-24 PROCEDURE — 99203 OFFICE O/P NEW LOW 30 MIN: CPT | Performed by: INTERNAL MEDICINE

## 2022-09-06 PROBLEM — R94.31 ABNORMAL ELECTROCARDIOGRAM: Status: ACTIVE | Noted: 2022-09-06

## 2022-09-06 PROBLEM — R93.1 ABNORMAL CT SCAN OF HEART: Status: ACTIVE | Noted: 2022-09-06

## 2022-09-07 ENCOUNTER — TELEPHONE (OUTPATIENT)
Dept: CARDIOLOGY | Facility: CLINIC | Age: 77
End: 2022-09-07

## 2022-09-07 ENCOUNTER — HOSPITAL ENCOUNTER (OUTPATIENT)
Dept: CARDIOLOGY | Facility: HOSPITAL | Age: 77
Discharge: HOME | End: 2022-09-07
Admitting: INTERNAL MEDICINE

## 2022-09-07 ENCOUNTER — HOSPITAL ENCOUNTER (OUTPATIENT)
Dept: CARDIOLOGY | Facility: HOSPITAL | Age: 77
Discharge: HOME | End: 2022-09-07

## 2022-09-07 VITALS
HEIGHT: 60 IN | BODY MASS INDEX: 22.19 KG/M2 | OXYGEN SATURATION: 99 % | DIASTOLIC BLOOD PRESSURE: 83 MMHG | WEIGHT: 113 LBS | SYSTOLIC BLOOD PRESSURE: 135 MMHG | HEART RATE: 64 BPM

## 2022-09-07 VITALS
DIASTOLIC BLOOD PRESSURE: 78 MMHG | HEIGHT: 57 IN | HEART RATE: 72 BPM | WEIGHT: 113 LBS | SYSTOLIC BLOOD PRESSURE: 156 MMHG | BODY MASS INDEX: 24.38 KG/M2

## 2022-09-07 LAB
BH CV IMMEDIATE POST TECH DATA BLOOD PRESSURE: NORMAL MMHG
BH CV IMMEDIATE POST TECH DATA HEART RATE: 96 BPM
BH CV IMMEDIATE POST TECH DATA OXYGEN SATS: 99 %
BH CV REST NUCLEAR ISOTOPE DOSE: 10.9 MCI
BH CV STRESS BP STAGE 1: NORMAL
BH CV STRESS COMMENTS STAGE 1: NORMAL
BH CV STRESS DOSE REGADENOSON STAGE 1: 0.4
BH CV STRESS DURATION MIN STAGE 1: 1
BH CV STRESS DURATION SEC STAGE 1: 0
BH CV STRESS HR STAGE 1: 96
BH CV STRESS NUCLEAR ISOTOPE DOSE: 30.9 MCI
BH CV STRESS O2 STAGE 1: 99
BH CV STRESS PROTOCOL 1: NORMAL
BH CV STRESS RECOVERY BP: NORMAL MMHG
BH CV STRESS RECOVERY HR: 96 BPM
BH CV STRESS RECOVERY O2: 99 %
BH CV STRESS STAGE 1: 1
BH CV THREE MINUTE POST TECH DATA BLOOD PRESSURE: NORMAL MMHG
BH CV THREE MINUTE POST TECH DATA HEART RATE: 94 BPM
BH CV THREE MINUTE POST TECH DATA OXYGEN SATURATION: 99 %
LV EF NUC BP: 60 %
MAXIMAL PREDICTED HEART RATE: 145 BPM
PERCENT MAX PREDICTED HR: 66.21 %
STRESS BASELINE BP: NORMAL MMHG
STRESS BASELINE HR: 62 BPM
STRESS O2 SAT REST: 99 %
STRESS PERCENT HR: 78 %
STRESS POST ESTIMATED WORKLOAD: 1 METS
STRESS POST EXERCISE DUR MIN: 1 MIN
STRESS POST EXERCISE DUR SEC: 0 SEC
STRESS POST O2 SAT PEAK: 99 %
STRESS POST PEAK BP: NORMAL MMHG
STRESS POST PEAK HR: 96 BPM
STRESS TARGET HR: 123 BPM

## 2022-09-07 PROCEDURE — 78452 HT MUSCLE IMAGE SPECT MULT: CPT

## 2022-09-07 PROCEDURE — 0 TECHNETIUM SESTAMIBI: Performed by: INTERNAL MEDICINE

## 2022-09-07 PROCEDURE — 93306 TTE W/DOPPLER COMPLETE: CPT | Performed by: INTERNAL MEDICINE

## 2022-09-07 PROCEDURE — A9500 TC99M SESTAMIBI: HCPCS | Performed by: INTERNAL MEDICINE

## 2022-09-07 PROCEDURE — 93306 TTE W/DOPPLER COMPLETE: CPT

## 2022-09-07 PROCEDURE — 78452 HT MUSCLE IMAGE SPECT MULT: CPT | Performed by: INTERNAL MEDICINE

## 2022-09-07 PROCEDURE — 25010000002 REGADENOSON 0.4 MG/5ML SOLUTION: Performed by: INTERNAL MEDICINE

## 2022-09-07 PROCEDURE — 93017 CV STRESS TEST TRACING ONLY: CPT

## 2022-09-07 PROCEDURE — 93018 CV STRESS TEST I&R ONLY: CPT | Performed by: INTERNAL MEDICINE

## 2022-09-07 RX ORDER — LANOLIN ALCOHOL/MO/W.PET/CERES
2 CREAM (GRAM) TOPICAL 2 TIMES DAILY
COMMUNITY
Start: 2022-08-22 | End: 2022-09-07 | Stop reason: DRUGHIGH

## 2022-09-07 RX ADMIN — TECHNETIUM TC 99M SESTAMIBI 1 DOSE: 1 INJECTION INTRAVENOUS at 10:34

## 2022-09-07 RX ADMIN — TECHNETIUM TC 99M SESTAMIBI 1 DOSE: 1 INJECTION INTRAVENOUS at 08:20

## 2022-09-07 RX ADMIN — REGADENOSON 0.4 MG: 0.08 INJECTION, SOLUTION INTRAVENOUS at 10:34

## 2022-09-07 NOTE — TELEPHONE ENCOUNTER
Patient called around 345 pm to report her IV site from this morning was itching and warm  I asked her to come to office, as she was only 10 minutes away to assess site.  The site is warm, dry and not red but a small nickel size bruise/soft hematoma.  Pressure dressing with Coban applied.   Instructed to leave it on in place today and remove later this afternoon. Instructed of the Coban got tight she should remove it sooner.  She reports she went out to eat after leaving here and that's when it got bruised and raised.  There is no redness, blanching, or streaking.  Her skin is very thin.  Her IV this am was a 24 g angiocath and it was removed by the nuclear tech after she completed part 3 of her stress test at approximately 1040 am and site was clean and dry at that time.

## 2022-09-08 ENCOUNTER — APPOINTMENT (OUTPATIENT)
Dept: CARDIOLOGY | Facility: HOSPITAL | Age: 77
End: 2022-09-08

## 2022-09-08 LAB
BH CV ECHO MEAS - ACS: 1.87 CM
BH CV ECHO MEAS - AO MAX PG: 5.7 MMHG
BH CV ECHO MEAS - AO MEAN PG: 3.5 MMHG
BH CV ECHO MEAS - AO ROOT DIAM: 2.7 CM
BH CV ECHO MEAS - AO V2 MAX: 119.4 CM/SEC
BH CV ECHO MEAS - AO V2 VTI: 28 CM
BH CV ECHO MEAS - AVA(I,D): 2.35 CM2
BH CV ECHO MEAS - EDV(CUBED): 33.3 ML
BH CV ECHO MEAS - EDV(MOD-SP2): 26 ML
BH CV ECHO MEAS - EDV(MOD-SP4): 32 ML
BH CV ECHO MEAS - EF(MOD-BP): 61 %
BH CV ECHO MEAS - EF(MOD-SP2): 61.5 %
BH CV ECHO MEAS - EF(MOD-SP4): 59.4 %
BH CV ECHO MEAS - ESV(CUBED): 11.1 ML
BH CV ECHO MEAS - ESV(MOD-SP2): 10 ML
BH CV ECHO MEAS - ESV(MOD-SP4): 13 ML
BH CV ECHO MEAS - FS: 30.7 %
BH CV ECHO MEAS - IVS/LVPW: 1 CM
BH CV ECHO MEAS - IVSD: 1.03 CM
BH CV ECHO MEAS - LA DIMENSION: 3.4 CM
BH CV ECHO MEAS - LAT PEAK E' VEL: 7.3 CM/SEC
BH CV ECHO MEAS - LV DIASTOLIC VOL/BSA (35-75): 22.7 CM2
BH CV ECHO MEAS - LV MASS(C)D: 95.4 GRAMS
BH CV ECHO MEAS - LV MAX PG: 3.4 MMHG
BH CV ECHO MEAS - LV MEAN PG: 1.7 MMHG
BH CV ECHO MEAS - LV SYSTOLIC VOL/BSA (12-30): 9.2 CM2
BH CV ECHO MEAS - LV V1 MAX: 92.2 CM/SEC
BH CV ECHO MEAS - LV V1 VTI: 19 CM
BH CV ECHO MEAS - LVIDD: 3.2 CM
BH CV ECHO MEAS - LVIDS: 2.23 CM
BH CV ECHO MEAS - LVOT AREA: 3.5 CM2
BH CV ECHO MEAS - LVOT DIAM: 2.1 CM
BH CV ECHO MEAS - LVPWD: 1.03 CM
BH CV ECHO MEAS - MED PEAK E' VEL: 4.7 CM/SEC
BH CV ECHO MEAS - MV A MAX VEL: 112.5 CM/SEC
BH CV ECHO MEAS - MV DEC SLOPE: 290.1 CM/SEC2
BH CV ECHO MEAS - MV DEC TIME: 280 MSEC
BH CV ECHO MEAS - MV E MAX VEL: 58.7 CM/SEC
BH CV ECHO MEAS - MV E/A: 0.52
BH CV ECHO MEAS - MV MAX PG: 5.3 MMHG
BH CV ECHO MEAS - MV MEAN PG: 2.16 MMHG
BH CV ECHO MEAS - MV P1/2T: 76.8 MSEC
BH CV ECHO MEAS - MV V2 VTI: 32.1 CM
BH CV ECHO MEAS - MVA(P1/2T): 2.9 CM2
BH CV ECHO MEAS - MVA(VTI): 2.05 CM2
BH CV ECHO MEAS - PA ACC TIME: 0.12 SEC
BH CV ECHO MEAS - PA PR(ACCEL): 26.7 MMHG
BH CV ECHO MEAS - PA V2 MAX: 93.7 CM/SEC
BH CV ECHO MEAS - PULM A REVS DUR: 0.15 SEC
BH CV ECHO MEAS - PULM A REVS VEL: 30.6 CM/SEC
BH CV ECHO MEAS - PULM DIAS VEL: 26.2 CM/SEC
BH CV ECHO MEAS - PULM S/D: 1.93
BH CV ECHO MEAS - PULM SYS VEL: 50.4 CM/SEC
BH CV ECHO MEAS - QP/QS: 0.76
BH CV ECHO MEAS - RAP SYSTOLE: 3 MMHG
BH CV ECHO MEAS - RV MAX PG: 1.84 MMHG
BH CV ECHO MEAS - RV V1 MAX: 67.9 CM/SEC
BH CV ECHO MEAS - RV V1 VTI: 14.3 CM
BH CV ECHO MEAS - RVDD: 3.2 CM
BH CV ECHO MEAS - RVOT DIAM: 2.11 CM
BH CV ECHO MEAS - RVSP: 34.3 MMHG
BH CV ECHO MEAS - SI(MOD-SP2): 11.3 ML/M2
BH CV ECHO MEAS - SI(MOD-SP4): 13.5 ML/M2
BH CV ECHO MEAS - SV(LVOT): 65.9 ML
BH CV ECHO MEAS - SV(MOD-SP2): 16 ML
BH CV ECHO MEAS - SV(MOD-SP4): 19 ML
BH CV ECHO MEAS - SV(RVOT): 50.1 ML
BH CV ECHO MEAS - TAPSE (>1.6): 1.67 CM
BH CV ECHO MEAS - TR MAX PG: 31.3 MMHG
BH CV ECHO MEAS - TR MAX VEL: 279.6 CM/SEC
BH CV ECHO MEASUREMENTS AVERAGE E/E' RATIO: 9.78
BH CV XLRA - RV BASE: 2.3 CM
BH CV XLRA - RV LENGTH: 6 CM
BH CV XLRA - RV MID: 2.21 CM
BH CV XLRA - TDI S': 8.6 CM/SEC
LEFT ATRIUM VOLUME INDEX: 21.5 ML/M2
MAXIMAL PREDICTED HEART RATE: 145 BPM
SINUS: 2.35 CM
STJ: 2.7 CM
STRESS TARGET HR: 123 BPM

## 2022-09-12 ENCOUNTER — TELEPHONE (OUTPATIENT)
Dept: ONCOLOGY | Facility: CLINIC | Age: 77
End: 2022-09-12

## 2022-09-12 NOTE — TELEPHONE ENCOUNTER
Attempted to call no answer left message to return call.  Per Dr Castillo patient to stop oral Iron and will recheck in Feb 2023 [facility] patient follows up with Dr Castillo

## 2022-09-12 NOTE — TELEPHONE ENCOUNTER
----- Message from Carlos Castillo MD PhD sent at 9/11/2022  1:14 PM EDT -----  Regarding: Discontinue oral iron  Stacy,    please call patient to discontinue oral iron for now.  Recheck in 6 months for ferritin and iron profile.    Thank you!    Z

## 2022-09-13 VITALS
RESPIRATION RATE: 18 BRPM | OXYGEN SATURATION: 98 % | HEART RATE: 99 BPM | DIASTOLIC BLOOD PRESSURE: 85 MMHG | SYSTOLIC BLOOD PRESSURE: 142 MMHG | TEMPERATURE: 97.8 F

## 2022-09-13 PROCEDURE — 99283 EMERGENCY DEPT VISIT LOW MDM: CPT

## 2022-09-14 ENCOUNTER — APPOINTMENT (OUTPATIENT)
Dept: GENERAL RADIOLOGY | Facility: HOSPITAL | Age: 77
End: 2022-09-14

## 2022-09-14 ENCOUNTER — HOSPITAL ENCOUNTER (EMERGENCY)
Facility: HOSPITAL | Age: 77
Discharge: HOME | End: 2022-09-14
Attending: EMERGENCY MEDICINE | Admitting: EMERGENCY MEDICINE

## 2022-09-14 DIAGNOSIS — E11.9 TYPE 2 DIABETES MELLITUS WITHOUT COMPLICATION, UNSPECIFIED WHETHER LONG TERM INSULIN USE: ICD-10-CM

## 2022-09-14 DIAGNOSIS — R07.9 CHRONIC CHEST PAIN: ICD-10-CM

## 2022-09-14 DIAGNOSIS — G89.29 CHRONIC CHEST PAIN: ICD-10-CM

## 2022-09-14 DIAGNOSIS — E03.9 HYPOTHYROIDISM, UNSPECIFIED TYPE: ICD-10-CM

## 2022-09-14 DIAGNOSIS — E78.5 HYPERLIPIDEMIA, UNSPECIFIED HYPERLIPIDEMIA TYPE: ICD-10-CM

## 2022-09-14 DIAGNOSIS — Z85.3 HISTORY OF BREAST CANCER: ICD-10-CM

## 2022-09-14 DIAGNOSIS — M25.512 ACUTE PAIN OF LEFT SHOULDER: Primary | ICD-10-CM

## 2022-09-14 DIAGNOSIS — K21.9 GASTROESOPHAGEAL REFLUX DISEASE WITHOUT ESOPHAGITIS: ICD-10-CM

## 2022-09-14 PROCEDURE — 73030 X-RAY EXAM OF SHOULDER: CPT

## 2022-09-14 RX ORDER — HYDROCODONE BITARTRATE AND ACETAMINOPHEN 5; 325 MG/1; MG/1
1 TABLET ORAL ONCE
Status: COMPLETED | OUTPATIENT
Start: 2022-09-14 | End: 2022-09-14

## 2022-09-14 RX ORDER — LIDOCAINE 50 MG/G
1 PATCH TOPICAL EVERY 24 HOURS
Qty: 20 EACH | Refills: 0 | Status: SHIPPED | OUTPATIENT
Start: 2022-09-14

## 2022-09-14 RX ADMIN — HYDROCODONE BITARTRATE AND ACETAMINOPHEN 1 TABLET: 5; 325 TABLET ORAL at 05:07

## 2022-09-14 NOTE — ED TRIAGE NOTES
.Pt masked on arrival, staff masked    Pt reports left shoulder pain that started while lifting a towel up onto a shelf while at work, reports does repetitive lifting, pain with rom

## 2022-09-14 NOTE — ED PROVIDER NOTES
MD ATTESTATION NOTE    The RUDI and I have discussed this patient's history, physical exam, and treatment plan.    I provided a substantive portion of the care of this patient. I personally performed the physical exam, in its entirety. The attached note describes my personal findings.      Brie Reese is a 76 y.o. female who presents to the ED c/o left shoulder pain.  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  Pain is achy and worse with movement.  No chest pain no shortness of breath no fever chills no abdominal pain.      On exam:  GENERAL: not distressed  HENT: nares patent  EYES: no scleral icterus  CV: regular rhythm, regular rate  RESPIRATORY: normal effort  ABDOMEN: soft  MUSCULOSKELETAL: no deformity, range of motion intact, pain over right lateral and anterior aspect of left shoulder neurovascular intact distally  NEURO: alert, moves all extremities, follows commands  SKIN: warm, dry    Labs  No results found for this or any previous visit (from the past 24 hour(s)).    Radiology  XR Shoulder 2+ View Left    Result Date: 9/14/2022  2 VIEWS LEFT SHOULDER  HISTORY: Injury shoulder at work.  COMPARISON: None available.  FINDINGS: No acute fracture or subluxation of the left shoulder is seen. The patient does have some calcifications which are seen adjacent to the lateral aspect of the left humeral head. Appearance can be associated with chondrocalcinosis. There are some degenerative changes involving the left AC joint. The patient has scarring within the left upper lobe. This was better imaged on prior CT of the chest from August 2022.      Punctate calcification seen adjacent to the lateral aspect of the left humeral head. Appearance is nonspecific, although chondrocalcinosis would be a consideration.  This report was finalized on 9/14/2022 1:34 AM by Dr. Edna Pretty M.D.        Medical Decision Making:           PPE: Both the patient and I wore a surgical mask  throughout the entire patient encounter. I wore protective goggles.     Diagnosis  Final diagnoses:   Acute pain of left shoulder   Chronic chest pain   History of breast cancer   Hyperlipidemia, unspecified hyperlipidemia type   Type 2 diabetes mellitus without complication, unspecified whether long term insulin use (HCC)   Hypothyroidism, unspecified type   Gastroesophageal reflux disease without esophagitis        Denys New MD  09/14/22 0651

## 2022-09-14 NOTE — DISCHARGE INSTRUCTIONS
Home, rest, medicine as directed, apply ice to affected area, home medicine as prescribed, follow up with PCP for recheck. Return to care with further concerns.

## 2022-09-14 NOTE — ED PROVIDER NOTES
EMERGENCY DEPARTMENT ENCOUNTER    Room Number:  B10/10  Date of encounter:  9/14/2022  PCP: Sussy Payne MD  Historian: Patient and family      HPI:  Chief Complaint: Left shoulder pain  A complete HPI/ROS/PMH/PSH/SH/FH are unobtainable due to: N/A    Context: Brie Reese is a 76 y.o. LHD female with past medical history of breast cancer, T2DM, HLD, hypothyroidism, and chronic chest pain who presents to the ED c/o left shoulder pain.  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  Patient describes it as an ache in her left shoulder that is worse with movement, better when holding still.  Denies any fall or trauma.  No chest pain, shortness of breath, nausea, vomiting, or dizziness.  Patient was seen approximately 1 week by cardiology and evaluated for her chest pain.      PAST MEDICAL HISTORY  Active Ambulatory Problems     Diagnosis Date Noted   • Malignant neoplasm of female breast (HCC) 03/14/2016   • Anemia 04/12/2016   • Iron deficiency anemia 04/20/2016   • Malignant neoplasm metastatic to left lung (HCC) 02/06/2017   • Osteoporosis 02/06/2017   • Hypomagnesemia 01/08/2019   • Acute bronchitis due to COVID-19 virus 08/04/2020   • Hypothyroidism 01/01/2009   • Diabetes mellitus (HCC) 08/04/2020   • Hyperlipidemia 08/04/2020   • Hyponatremia 08/04/2020   • Immunocompromised (HCC) 08/04/2020   • Acute respiratory failure with hypoxia (HCC) 08/04/2020   • Other chest pain 08/22/2022   • Abnormal electrocardiogram 09/06/2022   • Abnormal CT scan of heart 09/06/2022     Resolved Ambulatory Problems     Diagnosis Date Noted   • No Resolved Ambulatory Problems     Past Medical History:   Diagnosis Date   • Breast cancer, Left    • Left upper pulmonary nodule 07/05/2012         PAST SURGICAL HISTORY  Past Surgical History:   Procedure Laterality Date   • BREAST BIOPSY Left 1995   • MASTECTOMY RADICAL Left 1995   • MOUTH SURGERY  08/2015     tooth extraction          FAMILY HISTORY  Family History   Problem Relation Age of Onset   • Tuberculosis Mother    • Diabetes Mother    • Thyroid cancer Sister    • Lung cancer Sister    • Lung cancer Brother         In his 70s.   • Bone cancer Brother         in his 70s.   • Diabetes Maternal Grandmother    • Lung cancer Brother         In his 70s.    • Bone cancer Brother         in his 70s.   • Hypertension Neg Hx    • Heart disease Neg Hx          SOCIAL HISTORY  Social History     Socioeconomic History   • Marital status:    • Years of education: College   Tobacco Use   • Smoking status: Never Smoker   • Smokeless tobacco: Never Used   Vaping Use   • Vaping Use: Never used   Substance and Sexual Activity   • Alcohol use: No   • Drug use: No   • Sexual activity: Defer     Birth control/protection: Post-menopausal         ALLERGIES  Patient has no known allergies.        REVIEW OF SYSTEMS  Review of Systems     All systems reviewed and negative except for those discussed in HPI.       PHYSICAL EXAM    I have reviewed the triage vital signs and nursing notes.    ED Triage Vitals [09/13/22 2301]   Temp Heart Rate Resp BP SpO2   97.8 °F (36.6 °C) 99 18 142/85 98 %      Temp src Heart Rate Source Patient Position BP Location FiO2 (%)   -- -- -- -- --       Physical Exam  GENERAL: Alert and orient x3, not distressed  HENT: nares patent  EYES: no scleral icterus  CV: regular rhythm, regular rate, intact radial pulse  RESPIRATORY: normal effort  MUSCULOSKELETAL: no deformity, decreased ROM secondary to pain, no crepitus, diffuse tenderness of the left shoulder  NEURO: alert, moves all extremities, follows commands  SKIN: warm, dry, no rashes      LAB RESULTS  No results found for this or any previous visit (from the past 24 hour(s)).    Ordered the above labs and independently reviewed the results.        RADIOLOGY  XR Shoulder 2+ View Left    Result Date: 9/14/2022  2 VIEWS LEFT SHOULDER  HISTORY: Injury  shoulder at work.  COMPARISON: None available.  FINDINGS: No acute fracture or subluxation of the left shoulder is seen. The patient does have some calcifications which are seen adjacent to the lateral aspect of the left humeral head. Appearance can be associated with chondrocalcinosis. There are some degenerative changes involving the left AC joint. The patient has scarring within the left upper lobe. This was better imaged on prior CT of the chest from August 2022.      Punctate calcification seen adjacent to the lateral aspect of the left humeral head. Appearance is nonspecific, although chondrocalcinosis would be a consideration.  This report was finalized on 9/14/2022 1:34 AM by Dr. Edna Pretty M.D.        I ordered the above noted radiological studies. Reviewed by me and discussed with radiologist.  See dictation for official radiology interpretation.      PROCEDURES    Procedures      MEDICATIONS GIVEN IN ER    Medications   HYDROcodone-acetaminophen (NORCO) 5-325 MG per tablet 1 tablet (1 tablet Oral Given 9/14/22 0507)         PROGRESS, DATA ANALYSIS, CONSULTS, AND MEDICAL DECISION MAKING    All labs have been independently reviewed by me.  All radiology studies have been reviewed by me and discussed with radiologist dictating the report.   EKG's independently viewed and interpreted by me.  Discussion below represents my analysis of pertinent findings related to patient's condition, differential diagnosis, treatment plan and final disposition.    DDx: Includes but not limited to humerus fracture, shoulder dislocation, arthritis, sprain, repetitive use injury         MDM: Patient's x-rays show some degenerative changes, but no dislocation or fracture.  Will give sling, pain medicine, and orthopedic follow-up.  Vital signs are stable, patient is safe for discharge home.    PPE: The patient wore a surgical mask throughout the entire patient encounter. I wore an N95.    AS OF 06:11 EDT VITALS:    BP -  142/85  HR - 99  TEMP - 97.8 °F (36.6 °C)  O2 SATS - 98%        DIAGNOSIS  Final diagnoses:   Acute pain of left shoulder   Chronic chest pain   History of breast cancer   Hyperlipidemia, unspecified hyperlipidemia type   Type 2 diabetes mellitus without complication, unspecified whether long term insulin use (HCC)   Hypothyroidism, unspecified type   Gastroesophageal reflux disease without esophagitis         DISPOSITION  DISCHARGE    Patient discharged in stable condition.    Reviewed implications of results, diagnosis, meds, responsibility to follow up, warning signs and symptoms of possible worsening, potential complications and reasons to return to ER.    Patient/Family voiced understanding of above instructions.    Discussed plan for discharge, as there is no emergent indication for admission. Patient referred to primary care provider for BP management due to today's BP. Pt/family is agreeable and understands need for follow up and repeat testing.  Pt is aware that discharge does not mean that nothing is wrong but it indicates no emergency is present that requires admission and they must continue care with follow-up as given below or physician of their choice.     FOLLOW-UP  Sussy Payne MD  [address] [place], [place] [id number] [phone]    Schedule an appointment as soon as possible for a visit in 1 week           Medication List      New Prescriptions    diclofenac 50 MG EC tablet  Commonly known as: VOLTAREN  Take 1 tablet by mouth 2 (Two) Times a Day.     lidocaine 5 %  Commonly known as: LIDODERM  Place 1 patch on the skin as directed by provider Daily. Remove & Discard patch within 12 hours or as directed by MD           Where to Get Your Medications      These medications were sent to [facility]/pharmacy #[id number] - [place], [place] - [address] AT [facility] [phone] [facility] [phone]   [address], [facility] [address]    Phone: [phone]    · diclofenac 50 MG EC tablet  · lidocaine 5 %           Note Disclaimer: At [facility], we believe that sharing information builds trust and better relationships. You are receiving this note because you recently visited [facility]. It is possible you will see health information before a provider has talked with you about it. This kind of information can be easy to misunderstand. To help you fully understand what it means for your health, we urge you to discuss this note with your provider.       Bj Farrell PA  09/14/22 0611

## 2022-09-26 ENCOUNTER — APPOINTMENT (OUTPATIENT)
Dept: CARDIOLOGY | Facility: HOSPITAL | Age: 77
End: 2022-09-26

## 2022-09-26 ENCOUNTER — OFFICE VISIT (OUTPATIENT)
Dept: CARDIOLOGY | Facility: CLINIC | Age: 77
End: 2022-09-26

## 2022-09-26 VITALS
BODY MASS INDEX: 21.9 KG/M2 | SYSTOLIC BLOOD PRESSURE: 145 MMHG | WEIGHT: 116 LBS | HEIGHT: 61 IN | HEART RATE: 71 BPM | DIASTOLIC BLOOD PRESSURE: 78 MMHG

## 2022-09-26 DIAGNOSIS — R94.31 ABNORMAL ELECTROCARDIOGRAM (ECG) (EKG): ICD-10-CM

## 2022-09-26 DIAGNOSIS — R07.89 OTHER CHEST PAIN: ICD-10-CM

## 2022-09-26 DIAGNOSIS — E78.00 PURE HYPERCHOLESTEROLEMIA: Primary | ICD-10-CM

## 2022-09-26 DIAGNOSIS — R94.31 ABNORMAL ELECTROCARDIOGRAM: ICD-10-CM

## 2022-09-26 DIAGNOSIS — R93.1 ABNORMAL CT SCAN OF HEART: ICD-10-CM

## 2022-09-26 PROCEDURE — 99213 OFFICE O/P EST LOW 20 MIN: CPT | Performed by: INTERNAL MEDICINE

## 2022-09-26 NOTE — PROGRESS NOTES
TEST RESULTS   Subjective:        Brie CAMACHO Elder is a 76 y.o. female who here for follow up    CC  Follow-up chest pain  HPI  76-year-old female with hyperlipidemia chest pain abnormal EKG abnormal CT scan with history of the cancer work-up is negative     Problems Addressed this Visit        Cardiac and Vasculature    Hyperlipidemia - Primary    Relevant Orders    Adult Transthoracic Echo Complete W/ Cont if Necessary Per Protocol    Other chest pain    Abnormal electrocardiogram    Abnormal CT scan of heart    Relevant Orders    Adult Transthoracic Echo Complete W/ Cont if Necessary Per Protocol      Other Visit Diagnoses     Abnormal electrocardiogram (ECG) (EKG)         Relevant Orders    Adult Transthoracic Echo Complete W/ Cont if Necessary Per Protocol      Diagnoses       Codes Comments    Pure hypercholesterolemia    -  Primary ICD-10-CM: E78.00  ICD-9-CM: 272.0     Abnormal CT scan of heart     ICD-10-CM: R93.1  ICD-9-CM: 793.2     Abnormal electrocardiogram (ECG) (EKG)      ICD-10-CM: R94.31  ICD-9-CM: 794.31     Other chest pain     ICD-10-CM: R07.89  ICD-9-CM: 786.59     Abnormal electrocardiogram     ICD-10-CM: R94.31  ICD-9-CM: 794.31         .    The following portions of the patient's history were reviewed and updated as appropriate: allergies, current medications, past family history, past medical history, past social history, past surgical history and problem list.    Past Medical History:   Diagnosis Date   • Breast cancer, Left     1998, 2009 metastisized to lungs   • Diabetes mellitus (HCC)     type 2    • Hyperlipidemia    • Hypothyroidism 01/2009   • Left upper pulmonary nodule 07/05/2012   • Osteoporosis 10/17/2011     reports that she has never smoked. She has never used smokeless tobacco. She reports that she does not drink alcohol and does not use drugs.   Family History   Problem Relation Age of Onset   • Tuberculosis Mother    • Diabetes Mother    • Thyroid cancer Sister    • Lung  "cancer Sister    • Lung cancer Brother         In his 70s.   • Bone cancer Brother         in his 70s.   • Diabetes Maternal Grandmother    • Lung cancer Brother         In his 70s.    • Bone cancer Brother         in his 70s.   • Hypertension Neg Hx    • Heart disease Neg Hx        Review of Systems  Constitutional: No wt loss, fever, fatigue  Gastrointestinal: No nausea, abdominal pain  Behavioral/Psych: No insomnia or anxiety   Cardiovascular no chest pains or tightness in the chest  Objective:       Physical Exam  /78   Pulse 71   Ht 154.9 cm (61\")   Wt 52.6 kg (116 lb)   BMI 21.92 kg/m²   General appearance: No acute changes   Neck: Trachea midline; NECK, supple, no thyromegaly or lymphadenopathy   Lungs: Normal size and shape, normal breath sounds, equal distribution of air, no rales and rhonchi   CV: S1-S2 regular, no murmurs, no rub, no gallop   Abdomen: Soft, nontender; no masses , no abnormal abdominal sounds   Extremities: No deformity , normal color , no peripheral edema   Skin: Normal temperature, turgor and texture; no rash, ulcers          Procedures      Echocardiogram:        Current Outpatient Medications:   •  ACCU-CHEK DMITRIY PLUS test strip, USE TO TEST BLOOD SUGAR DAILY AS DIRECTED, Disp: , Rfl: 2  •  acetaminophen (ACETAMINOPHEN 8 HOUR) 650 MG 8 hr tablet, Take 1 tablet by mouth Every 8 (Eight) Hours As Needed for Mild Pain ., Disp: 180 tablet, Rfl: 1  •  atorvastatin (LIPITOR) 10 MG tablet, TAKE 1 TABLET BY MOUTH AT BEDTIME, Disp: , Rfl: 0  •  calcium carbonate (OS-JEWEL) 600 MG tablet, Take 1 tablet by mouth 2 (Two) Times a Day With Meals., Disp: 180 tablet, Rfl: 1  •  [employer] VITAMIN D 2000 UNITS capsule, TAKE 1 CAPSULE EVERY DAY, Disp: , Rfl: 6  •  Denosumab (PROLIA SC), Inject  under the skin into the appropriate area as directed Every 6 (Six) Months., Disp: , Rfl:   •  diclofenac (VOLTAREN) 50 MG EC tablet, Take 1 tablet by mouth 2 (Two) Times a Day., Disp: 20 tablet, Rfl: 0  •  " exemestane (AROMASIN) 25 MG chemo tablet, Take 1 tablet by mouth Daily., Disp: 90 tablet, Rfl: 3  •  ferrous sulfate 325 (65 FE) MG tablet, TAKE 1 TABLET BY MOUTH DAILY WITH BREAKFAST., Disp: 90 tablet, Rfl: 2  •  GLIMEPIRIDE PO, Take 4 mg by mouth 2 (two) times a day., Disp: , Rfl:   •  levothyroxine (SYNTHROID, LEVOTHROID) 25 MCG tablet, TAKE 1 TABLET BY MOUTH EVERY DAY, Disp: , Rfl: 2  •  lidocaine (LIDODERM) 5 %, Place 1 patch on the skin as directed by provider Daily. Remove & Discard patch within 12 hours or as directed by MD, Disp: 20 each, Rfl: 0  •  magnesium oxide (MAG-OX) 400 MG tablet, Take 2 tablets by mouth 2 (Two) Times a Day., Disp: 360 tablet, Rfl: 1  •  metFORMIN (GLUCOPHAGE) 500 MG tablet, TAKE 3 TABLETS BY MOUTH EVERY MORNING, AND 2 TABLETS EVERY EVENING WITH FOOD FOR DIABETES, Disp: , Rfl: 3  •  omeprazole (priLOSEC) 20 MG capsule, TAKE 1 CAPSULE BY MOUTH EVERY DAY FOR 10 DAYS AND THEN AS NEEDED DAILY, Disp: 90 capsule, Rfl: 1   Assessment:        Patient Active Problem List   Diagnosis   • Malignant neoplasm of female breast (HCC)   • Anemia   • Iron deficiency anemia   • Malignant neoplasm metastatic to left lung (HCC)   • Osteoporosis   • Hypomagnesemia   • Acute bronchitis due to COVID-19 virus   • Hypothyroidism   • Diabetes mellitus (HCC)   • Hyperlipidemia   • Hyponatremia   • Immunocompromised (HCC)   • Acute respiratory failure with hypoxia (HCC)   • Other chest pain   • Abnormal electrocardiogram   • Abnormal CT scan of heart   Interpretation Summary       · Findings consistent with an equivocal ECG stress test.  · Left ventricular ejection fraction is normal. (Calculated EF = 60%).  · Myocardial perfusion imaging indicates a normal myocardial perfusion study with no evidence of ischemia.  · Impressions are consistent with a low risk study.    Interpretation Summary    · Calculated left ventricular EF = 61% Estimated left ventricular EF was in agreement with the calculated left  ventricular EF.  · Left ventricular diastolic function was normal.  · There is no evidence of pericardial effusion. .                Plan:            ICD-10-CM ICD-9-CM   1. Pure hypercholesterolemia  E78.00 272.0   2. Abnormal CT scan of heart  R93.1 793.2   3. Abnormal electrocardiogram (ECG) (EKG)   R94.31 794.31   4. Other chest pain  R07.89 786.59   5. Abnormal electrocardiogram  R94.31 794.31     1. Pure hypercholesterolemia  Continue current treatment  - Adult Transthoracic Echo Complete W/ Cont if Necessary Per Protocol; Future    2. Abnormal CT scan of heart  Considering patient's medical condition as well as the risk factors, patient will require echocardiogram for further evaluation for the LV function, four-chamber evaluation, including the pressures, valvular function and  pericardial disease and pericardial effusion    - Adult Transthoracic Echo Complete W/ Cont if Necessary Per Protocol; Future    3. Abnormal electrocardiogram (ECG) (EKG)     - Adult Transthoracic Echo Complete W/ Cont if Necessary Per Protocol; Future    4. Other chest pain  Under control    5. Abnormal electrocardiogram  Negative work-up     Specificity and sensitivity of the stress test/ cardiac workup has been explained. Pt has been explained if  Symptoms continue please go to ER, and further w/p will be required.    Also explained this does not rule out coronary artery disease or the future events, continue to emphasize on risk reductions for coronary artery disease    Pt also advised to contact PCP for other causes of symptoms    1 YR  WITH ECHO    COUNSELING:    Brie CAMACHO ElderCounseling was given to patient for the following topics: diagnostic results, risk factor reductions, impressions, risks and benefits of treatment options and importance of treatment compliance .       SMOKING COUNSELING:    [unfilled]    Dictated using Dragon dictation

## 2022-10-02 ENCOUNTER — APPOINTMENT (OUTPATIENT)
Dept: CT IMAGING | Facility: HOSPITAL | Age: 77
End: 2022-10-02

## 2022-10-02 ENCOUNTER — APPOINTMENT (OUTPATIENT)
Dept: GENERAL RADIOLOGY | Facility: HOSPITAL | Age: 77
End: 2022-10-02

## 2022-10-02 ENCOUNTER — HOSPITAL ENCOUNTER (EMERGENCY)
Facility: HOSPITAL | Age: 77
Discharge: HOME | End: 2022-10-02
Attending: EMERGENCY MEDICINE | Admitting: EMERGENCY MEDICINE

## 2022-10-02 VITALS
OXYGEN SATURATION: 98 % | HEART RATE: 68 BPM | RESPIRATION RATE: 16 BRPM | TEMPERATURE: 97.3 F | SYSTOLIC BLOOD PRESSURE: 145 MMHG | DIASTOLIC BLOOD PRESSURE: 76 MMHG

## 2022-10-02 DIAGNOSIS — S02.40CA CLOSED FRACTURE OF RIGHT SIDE OF MAXILLA, INITIAL ENCOUNTER: ICD-10-CM

## 2022-10-02 DIAGNOSIS — S02.2XXA CLOSED FRACTURE OF NASAL BONE, INITIAL ENCOUNTER: ICD-10-CM

## 2022-10-02 DIAGNOSIS — S80.211A ABRASION OF RIGHT KNEE, INITIAL ENCOUNTER: ICD-10-CM
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
## 2022-10-02 DIAGNOSIS — S00.81XA ABRASION OF FACE, INITIAL ENCOUNTER: ICD-10-CM

## 2022-10-02 PROCEDURE — 25010000002 TETANUS-DIPHTH-ACELL PERTUSSIS 5-2.5-18.5 LF-MCG/0.5 SUSPENSION PREFILLED SYRINGE: Performed by: EMERGENCY MEDICINE

## 2022-10-02 PROCEDURE — 73562 X-RAY EXAM OF KNEE 3: CPT

## 2022-10-02 PROCEDURE — 73030 X-RAY EXAM OF SHOULDER: CPT

## 2022-10-02 PROCEDURE — 70486 CT MAXILLOFACIAL W/O DYE: CPT

## 2022-10-02 PROCEDURE — 90715 TDAP VACCINE 7 YRS/> IM: CPT | Performed by: EMERGENCY MEDICINE

## 2022-10-02 PROCEDURE — 70450 CT HEAD/BRAIN W/O DYE: CPT

## 2022-10-02 PROCEDURE — 99282 EMERGENCY DEPT VISIT SF MDM: CPT

## 2022-10-02 PROCEDURE — 72125 CT NECK SPINE W/O DYE: CPT

## 2022-10-02 PROCEDURE — 73060 X-RAY EXAM OF HUMERUS: CPT

## 2022-10-02 PROCEDURE — 73070 X-RAY EXAM OF ELBOW: CPT

## 2022-10-02 PROCEDURE — 90471 IMMUNIZATION ADMIN: CPT | Performed by: EMERGENCY MEDICINE

## 2022-10-02 PROCEDURE — 73590 X-RAY EXAM OF LOWER LEG: CPT

## 2022-10-02 RX ORDER — AMOXICILLIN 500 MG/1
500 CAPSULE ORAL 2 TIMES DAILY
Qty: 10 CAPSULE | Refills: 0 | Status: SHIPPED | OUTPATIENT
Start: 2022-10-02 | End: 2022-10-07

## 2022-10-02 RX ORDER — OXYMETAZOLINE HYDROCHLORIDE 0.05 G/100ML
1 SPRAY NASAL 2 TIMES DAILY
Qty: 1 ML | Refills: 0 | Status: SHIPPED | OUTPATIENT
Start: 2022-10-02 | End: 2022-10-05

## 2022-10-02 RX ADMIN — TETANUS TOXOID, REDUCED DIPHTHERIA TOXOID AND ACELLULAR PERTUSSIS VACCINE, ADSORBED 0.5 ML: 5; 2.5; 8; 8; 2.5 SUSPENSION INTRAMUSCULAR at 19:07

## 2022-10-02 NOTE — ED TRIAGE NOTES
Pt reports she was she fell outside hitting the right side of her face on concrete. Pt denies LOC. Pt also reports pain to her right knee.  Pt denies blood thinners.     Pt was wearing a mask during assessment.  This RN wore appropriate PPE

## 2022-10-02 NOTE — ED PROVIDER NOTES
Subjective   History of Present Illness  76-year-old female with past medical history of type 2 diabetes, hyperlipidemia, hypothyroidism, breast cancer here for chief complaint of a mechanical fall.  History is obtained from the patient and the nephew.  According them, patient was at [facility] when she was looking down and tripped over the curb.  She states that she landed face forward hitting the concrete.  She denies any loss of consciousness.  She denies being on any blood thinners.  Patient complains of pain at the right side of her face, right humerus, right knee, and states that she did hit her head.  She denies injuries to the left side of her body.  Patient states that after the fall she has been walking without any difficulties.  She denies any one-sided weakness or numbness.  Patient denies any chest pain, palpitations, shortness of breath, lightheadedness, dizziness, or any other symptoms prior to the fall.  She states that this was a mechanical fall.  I did confirm this multiple times with her and the nephew.        Review of Systems   All other systems reviewed and are negative.      Past Medical History:   Diagnosis Date   • Breast cancer, Left     1998, 2009 metastisized to lungs   • Diabetes mellitus (HCC)     type 2    • Hyperlipidemia    • Hypothyroidism 01/2009   • Left upper pulmonary nodule 07/05/2012   • Osteoporosis 10/17/2011       No Known Allergies    Past Surgical History:   Procedure Laterality Date   • BREAST BIOPSY Left 1995   • MASTECTOMY RADICAL Left 1995   • MOUTH SURGERY  08/2015    tooth extraction        Family History   Problem Relation Age of Onset   • Tuberculosis Mother    • Diabetes Mother    • Thyroid cancer Sister    • Lung cancer Sister    • Lung cancer Brother         In his 70s.   • Bone cancer Brother         in his 70s.   • Diabetes Maternal Grandmother    • Lung cancer Brother         In his 70s.    • Bone cancer Brother         in his 70s.   • Hypertension Neg Hx     • Heart disease Neg Hx        Social History     Socioeconomic History   • Marital status:    • Years of education: College   Tobacco Use   • Smoking status: Never Smoker   • Smokeless tobacco: Never Used   Vaping Use   • Vaping Use: Never used   Substance and Sexual Activity   • Alcohol use: No   • Drug use: No   • Sexual activity: Defer     Birth control/protection: Post-menopausal           Objective   Physical Exam  Constitutional:       General: She is not in acute distress.     Appearance: She is not ill-appearing, toxic-appearing or diaphoretic.   HENT:      Head: Normocephalic.      Comments: Possible abrasion noted to the right side of the nose on the face     Right Ear: External ear normal.      Left Ear: External ear normal.      Nose: Nose normal. No congestion or rhinorrhea.      Mouth/Throat:      Mouth: Mucous membranes are moist.      Pharynx: Oropharynx is clear. No oropharyngeal exudate or posterior oropharyngeal erythema.   Eyes:      General: No scleral icterus.        Right eye: No discharge.         Left eye: No discharge.      Extraocular Movements: Extraocular movements intact.      Conjunctiva/sclera: Conjunctivae normal.      Pupils: Pupils are equal, round, and reactive to light.   Neck:      Comments: No midline cervical tenderness noted  Cardiovascular:      Rate and Rhythm: Normal rate and regular rhythm.      Pulses: Normal pulses.      Heart sounds: Normal heart sounds. No murmur heard.    No friction rub. No gallop.   Pulmonary:      Effort: Pulmonary effort is normal. No respiratory distress.      Breath sounds: Normal breath sounds. No stridor. No wheezing, rhonchi or rales.   Chest:      Chest wall: No tenderness.   Abdominal:      General: Abdomen is flat. Bowel sounds are normal. There is no distension.      Palpations: Abdomen is soft.      Tenderness: There is no abdominal tenderness. There is no right CVA tenderness, left CVA tenderness, guarding or rebound.    Musculoskeletal:         General: Tenderness (Right humerus, right knee) present. No swelling, deformity or signs of injury. Normal range of motion.      Cervical back: Normal range of motion and neck supple. No rigidity or tenderness.      Comments: Right upper extremity: 2+ radial pulses, soft compartments, normal sensorimotor exam in ulnar, radial, median distribution, normal range of motion of the wrist, normal range of motion at the elbow, normal range of motion of the shoulder, mild tender to palpation at the humerus    Right lower: No tenderness of the hip, plus pedal pulses, normal sensorimotor exam of the foot, no tenderness to palpation of the foot, tib-fib, femur, mild tenderness to palpation of the knee, abrasions noted to the knee    No midline thoracic or lumbar tenderness noted    No tenderness to palpation of the left upper or lower extremities   Skin:     General: Skin is warm and dry.      Capillary Refill: Capillary refill takes less than 2 seconds.      Coloration: Skin is not jaundiced or pale.   Neurological:      Mental Status: She is alert and oriented to person, place, and time.      Sensory: No sensory deficit.      Motor: No weakness.      Gait: Gait normal.   Psychiatric:         Mood and Affect: Mood normal.         Behavior: Behavior normal.         Procedures           ED Course                                           MDM  Number of Diagnoses or Management Options     Amount and/or Complexity of Data Reviewed  Tests in the radiology section of CPT®: ordered and reviewed    Risk of Complications, Morbidity, and/or Mortality  General comments: When I first saw the patient, the patient appeared nontoxic.  Patient was ambulatory and had no difficulty walking.  Given that she fell and was complaining of right facial pain and head pain, CT head, C-spine, and CT facial bones were ordered.  She also got x-rays of her right knee, tib-fib, right shoulder, and right humerus.  These were all  negative for acute fracture.  Patient was neurovascular intact in all extremities.  Patient CT head and C-spine were negative.  Patient's CT facial bones showed that the patient had a right maxillary and nasal bone fractures.  Given this, I did consult oculoplastics.  I spoke to Dr. Anne Marie Al.  She was can have the patient follow-up in clinic on Wednesday.  She took the contact information and was never called the patient directly.  She wanted the patient to have a prescription for amoxicillin, this was prescribed.  She also wanted the patient to get Afrin, this was also prescribed.  She wanted the patient not to blow her nose and this was told to the patient and the nephew.  Patient was also told to sleep with the bed elevated.  Patient was given strict return precautions.  Patient was told that she could use cold compression on the face to help with the swelling.  All questions were answered.  Patient was discharged in improved condition.  Patient did have a small abrasion to the right side of the nose that did not need any suturing or Dermabond.  Patient was given strict return precautions.  The patient and nephew thanked me for the care and were discharged in good condition.        Final diagnoses:   Fall, initial encounter   Closed fracture of nasal bone, initial encounter   Closed fracture of right side of maxilla, initial encounter ([facility])   Abrasion of face, initial encounter   Abrasion of right knee, initial encounter       ED Disposition  ED Disposition     ED Disposition   Discharge    Condition   Stable    Comment   --             Sussy Payne MD  [address] [id number] [phone]    Schedule an appointment as soon as possible for a visit in 2 days      Chucky John MD  [address] [place] [id number] [phone]    Schedule an appointment as soon as possible for a visit in 3 days           Medication List      New Prescriptions    amoxicillin 500  MG capsule  Commonly known as: AMOXIL  Take 1 capsule by mouth 2 (Two) Times a Day for 5 days.     oxymetazoline 0.05 % nasal spray  Commonly known as: AFRIN  1 spray into the nostril(s) as directed by provider 2 (Two) Times a Day for 3 days.           Where to Get Your Medications      You can get these medications from any pharmacy    Bring a paper prescription for each of these medications  · amoxicillin 500 MG capsule  · oxymetazoline 0.05 % nasal spray          Nicholas Garduno MD  10/02/22 2033

## 2022-10-03 LAB — CREAT BLDA-MCNC: 0.9 MG/DL (ref 0.6–1.3)

## 2022-10-03 NOTE — DISCHARGE INSTRUCTIONS
Please do not blow your nose.    Please sleep with your head elevated.    Please take antibiotics as prescribed.    Please follow-up with oculoplastics on Wednesday in the clinic.  They will call you with an appointment tomorrow.  If you do not hear anything back please call Dr. John's office and let them know that you were seen in the emergency department.    You can apply cold compress to the right side of your face where you have the fractures.    Please return to the emergency department immediately if you have any trouble with your vision, pain, clear drainage from your nose, or any other concerning symptoms.

## 2022-11-11 ENCOUNTER — LAB (OUTPATIENT)
Dept: LAB | Facility: HOSPITAL | Age: 77
End: 2022-11-11

## 2022-11-11 ENCOUNTER — TRANSCRIBE ORDERS (OUTPATIENT)
Dept: ADMINISTRATIVE | Facility: HOSPITAL | Age: 77
End: 2022-11-11

## 2022-11-11 DIAGNOSIS — E10.22 CKD STAGE 2 DUE TO TYPE 1 DIABETES MELLITUS: ICD-10-CM

## 2022-11-11 DIAGNOSIS — N18.2 CKD STAGE 2 DUE TO TYPE 1 DIABETES MELLITUS: Primary | ICD-10-CM

## 2022-11-11 DIAGNOSIS — E10.22 CKD STAGE 2 DUE TO TYPE 1 DIABETES MELLITUS: Primary | ICD-10-CM

## 2022-11-11 DIAGNOSIS — N18.2 CKD STAGE 2 DUE TO TYPE 1 DIABETES MELLITUS: ICD-10-CM

## 2022-11-11 LAB
ANION GAP SERPL CALCULATED.3IONS-SCNC: 5.7 MMOL/L (ref 5–15)
BUN SERPL-MCNC: 25 MG/DL (ref 8–23)
BUN/CREAT SERPL: 28.7 (ref 7–25)
CALCIUM SPEC-SCNC: 9.2 MG/DL (ref 8.6–10.5)
CHLORIDE SERPL-SCNC: 109 MMOL/L (ref 98–107)
CO2 SERPL-SCNC: 27.3 MMOL/L (ref 22–29)
CREAT SERPL-MCNC: 0.87 MG/DL (ref 0.57–1)
EGFRCR SERPLBLD CKD-EPI 2021: 69.6 ML/MIN/1.73
GLUCOSE SERPL-MCNC: 179 MG/DL (ref 65–99)
HBA1C MFR BLD: 9.5 % (ref 4.8–5.6)
POTASSIUM SERPL-SCNC: 4.6 MMOL/L (ref 3.5–5.2)
SODIUM SERPL-SCNC: 142 MMOL/L (ref 136–145)

## 2022-11-11 PROCEDURE — 36415 COLL VENOUS BLD VENIPUNCTURE: CPT

## 2022-11-11 PROCEDURE — 83036 HEMOGLOBIN GLYCOSYLATED A1C: CPT

## 2022-11-11 PROCEDURE — 80048 BASIC METABOLIC PNL TOTAL CA: CPT

## 2022-12-20 ENCOUNTER — HOSPITAL ENCOUNTER (EMERGENCY)
Facility: HOSPITAL | Age: 77
Discharge: HOME | End: 2022-12-20
Attending: EMERGENCY MEDICINE | Admitting: EMERGENCY MEDICINE

## 2022-12-20 VITALS
WEIGHT: 113.76 LBS | DIASTOLIC BLOOD PRESSURE: 80 MMHG | TEMPERATURE: 98.1 F | OXYGEN SATURATION: 98 % | BODY MASS INDEX: 21.48 KG/M2 | RESPIRATION RATE: 18 BRPM | HEART RATE: 58 BPM | SYSTOLIC BLOOD PRESSURE: 136 MMHG | HEIGHT: 61 IN

## 2022-12-20 DIAGNOSIS — K52.9 FREQUENT STOOLS: Primary | ICD-10-CM

## 2022-12-20 LAB
ALBUMIN SERPL-MCNC: 4.4 G/DL (ref 3.5–5.2)
ALBUMIN/GLOB SERPL: 1.4 G/DL
ALP SERPL-CCNC: 93 U/L (ref 39–117)
ALT SERPL W P-5'-P-CCNC: 15 U/L (ref 1–33)
ANION GAP SERPL CALCULATED.3IONS-SCNC: 8.4 MMOL/L (ref 5–15)
AST SERPL-CCNC: 20 U/L (ref 1–32)
BASOPHILS # BLD AUTO: 0.04 10*3/MM3 (ref 0–0.2)
BASOPHILS NFR BLD AUTO: 0.8 % (ref 0–1.5)
BILIRUB SERPL-MCNC: 0.3 MG/DL (ref 0–1.2)
BUN SERPL-MCNC: 27 MG/DL (ref 8–23)
BUN/CREAT SERPL: 25 (ref 7–25)
CALCIUM SPEC-SCNC: 9.4 MG/DL (ref 8.6–10.5)
CHLORIDE SERPL-SCNC: 103 MMOL/L (ref 98–107)
CO2 SERPL-SCNC: 28.6 MMOL/L (ref 22–29)
CREAT SERPL-MCNC: 1.08 MG/DL (ref 0.57–1)
DEPRECATED RDW RBC AUTO: 45.3 FL (ref 37–54)
EGFRCR SERPLBLD CKD-EPI 2021: 53.3 ML/MIN/1.73
EOSINOPHIL # BLD AUTO: 0.06 10*3/MM3 (ref 0–0.4)
EOSINOPHIL NFR BLD AUTO: 1.2 % (ref 0.3–6.2)
ERYTHROCYTE [DISTWIDTH] IN BLOOD BY AUTOMATED COUNT: 12.8 % (ref 12.3–15.4)
GLOBULIN UR ELPH-MCNC: 3.2 GM/DL
GLUCOSE SERPL-MCNC: 191 MG/DL (ref 65–99)
HCT VFR BLD AUTO: 35.5 % (ref 34–46.6)
HGB BLD-MCNC: 11.7 G/DL (ref 12–15.9)
IMM GRANULOCYTES # BLD AUTO: 0.01 10*3/MM3 (ref 0–0.05)
IMM GRANULOCYTES NFR BLD AUTO: 0.2 % (ref 0–0.5)
LIPASE SERPL-CCNC: 45 U/L (ref 13–60)
LYMPHOCYTES # BLD AUTO: 1.79 10*3/MM3 (ref 0.7–3.1)
LYMPHOCYTES NFR BLD AUTO: 35.4 % (ref 19.6–45.3)
MCH RBC QN AUTO: 31.4 PG (ref 26.6–33)
MCHC RBC AUTO-ENTMCNC: 33 G/DL (ref 31.5–35.7)
MCV RBC AUTO: 95.2 FL (ref 79–97)
MONOCYTES # BLD AUTO: 0.45 10*3/MM3 (ref 0.1–0.9)
MONOCYTES NFR BLD AUTO: 8.9 % (ref 5–12)
NEUTROPHILS NFR BLD AUTO: 2.71 10*3/MM3 (ref 1.7–7)
NEUTROPHILS NFR BLD AUTO: 53.5 % (ref 42.7–76)
NRBC BLD AUTO-RTO: 0 /100 WBC (ref 0–0.2)
PLATELET # BLD AUTO: 203 10*3/MM3 (ref 140–450)
PMV BLD AUTO: 8.7 FL (ref 6–12)
POTASSIUM SERPL-SCNC: 4.6 MMOL/L (ref 3.5–5.2)
PROT SERPL-MCNC: 7.6 G/DL (ref 6–8.5)
RBC # BLD AUTO: 3.73 10*6/MM3 (ref 3.77–5.28)
SODIUM SERPL-SCNC: 140 MMOL/L (ref 136–145)
WBC NRBC COR # BLD: 5.06 10*3/MM3 (ref 3.4–10.8)

## 2022-12-20 PROCEDURE — 85025 COMPLETE CBC W/AUTO DIFF WBC: CPT | Performed by: EMERGENCY MEDICINE

## 2022-12-20 PROCEDURE — 80053 COMPREHEN METABOLIC PANEL: CPT | Performed by: EMERGENCY MEDICINE

## 2022-12-20 PROCEDURE — 36415 COLL VENOUS BLD VENIPUNCTURE: CPT

## 2022-12-20 PROCEDURE — 99282 EMERGENCY DEPT VISIT SF MDM: CPT

## 2022-12-20 PROCEDURE — 83690 ASSAY OF LIPASE: CPT | Performed by: EMERGENCY MEDICINE

## 2022-12-21 NOTE — ED PROVIDER NOTES
EMERGENCY DEPARTMENT ENCOUNTER    Room Number:  24/24  Date of encounter:  12/20/2022  PCP: Sussy Payne MD  Historian: Patient    Patient was placed in face mask during triage process. Patient was wearing facemask when I entered the room and throughout our encounter. I wore full protective equipment throughout this patient encounter including a face mask, eye protection, and gloves. Hand hygiene was performed before donning protective equipment and again following doffing of PPE after leaving the room.    HPI:  Chief Complaint: Frequent stooling/fecal urgency  A complete HPI/ROS/PMH/PSH/SH/FH are unobtainable due to: N/A   Context: Brie Reese is a 77 y.o. female who presents to the ED c/o 3 weeks worth of frequent stooling/fecal urgency.  The patient notes that she has multiple soft but not on bloody nondiarrhea stools per day.  She will be underwent have a bowel movement and then feel like when she stands up she has to have another one.  She denies any associated abdominal cramping, nausea, vomiting, abdominal pain.  She has no urinary urgency or dysuria.  No prior history of GI illnesses and no prior surgical history of abdomen reported.  Patient notes diminished appetite for quite some time even preceding this onset but cannot clearly tell me that food seems to stimulate the symptoms.  0-10 pain at this time with no clear exacerbating or relieving factors identified.      MEDICAL HISTORY REVIEW  EMR reviewed:    PAST MEDICAL HISTORY  Active Ambulatory Problems     Diagnosis Date Noted   • Malignant neoplasm of female breast (HCC) 03/14/2016   • Anemia 04/12/2016   • Iron deficiency anemia 04/20/2016   • Malignant neoplasm metastatic to left lung (HCC) 02/06/2017   • Osteoporosis 02/06/2017   • Hypomagnesemia 01/08/2019   • Acute bronchitis due to COVID-19 virus 08/04/2020   • Hypothyroidism 01/01/2009   • Diabetes mellitus (HCC) 08/04/2020   • Hyperlipidemia 08/04/2020   • Hyponatremia 08/04/2020   •  Immunocompromised (HCC) 08/04/2020   • Acute respiratory failure with hypoxia (HCC) 08/04/2020   • Other chest pain 08/22/2022   • Abnormal electrocardiogram 09/06/2022   • Abnormal CT scan of heart 09/06/2022     Resolved Ambulatory Problems     Diagnosis Date Noted   • No Resolved Ambulatory Problems     Past Medical History:   Diagnosis Date   • Breast cancer, Left    • Left upper pulmonary nodule 07/05/2012         PAST SURGICAL HISTORY  Past Surgical History:   Procedure Laterality Date   • BREAST BIOPSY Left 1995   • MASTECTOMY RADICAL Left 1995   • MOUTH SURGERY  08/2015    tooth extraction          FAMILY HISTORY  Family History   Problem Relation Age of Onset   • Tuberculosis Mother    • Diabetes Mother    • Thyroid cancer Sister    • Lung cancer Sister    • Lung cancer Brother         In his 70s.   • Bone cancer Brother         in his 70s.   • Diabetes Maternal Grandmother    • Lung cancer Brother         In his 70s.    • Bone cancer Brother         in his 70s.   • Hypertension Neg Hx    • Heart disease Neg Hx          SOCIAL HISTORY  Social History     Socioeconomic History   • Marital status:    • Years of education: College   Tobacco Use   • Smoking status: Never   • Smokeless tobacco: Never   Vaping Use   • Vaping Use: Never used   Substance and Sexual Activity   • Alcohol use: No   • Drug use: No   • Sexual activity: Defer     Birth control/protection: Post-menopausal         ALLERGIES  Patient has no known allergies.        REVIEW OF SYSTEMS  Review of Systems     All systems reviewed and negative except for those discussed in HPI.       PHYSICAL EXAM    I have reviewed the triage vital signs and nursing notes.    ED Triage Vitals   Temp Heart Rate Resp BP SpO2   12/20/22 2034 12/20/22 2034 12/20/22 2034 12/20/22 2044 12/20/22 2034   98.1 °F (36.7 °C) 74 18 140/68 98 %      Temp src Heart Rate Source Patient Position BP Location FiO2 (%)   -- -- 12/20/22 2044 12/20/22 2044 --     Sitting Right  arm        Physical Exam    Physical Exam   Constitutional: No distress.   HENT:  Head: Normocephalic and atraumatic.   Oropharynx: Mucous membranes are moist.   Eyes: No scleral icterus. No conjunctival pallor.  Neck: Painless range of motion noted. Neck supple.   Cardiovascular: Normal rate, regular rhythm and intact distal pulses.  Pulmonary/Chest: No respiratory distress. There are no wheezes, no rhonchi, and no rales.   Abdominal: Soft. There is no tenderness. There is no rebound and no guarding.  Benign abdominal exam  Musculoskeletal: Moves all extremities equally. There is no pedal edema or calf tenderness.   Neurological: Alert.  Baseline strength and sensation noted.   Skin: Skin is pink, warm, and dry. No pallor.   Psychiatric: Mood and affect normal.   Nursing note and vitals reviewed.    LAB RESULTS  Recent Results (from the past 24 hour(s))   Comprehensive Metabolic Panel    Collection Time: 12/20/22 10:04 PM    Specimen: Arm, Right; Blood   Result Value Ref Range    Glucose 191 (H) 65 - 99 mg/dL    BUN 27 (H) 8 - 23 mg/dL    Creatinine 1.08 (H) 0.57 - 1.00 mg/dL    Sodium 140 136 - 145 mmol/L    Potassium 4.6 3.5 - 5.2 mmol/L    Chloride 103 98 - 107 mmol/L    CO2 28.6 22.0 - 29.0 mmol/L    Calcium 9.4 8.6 - 10.5 mg/dL    Total Protein 7.6 6.0 - 8.5 g/dL    Albumin 4.40 3.50 - 5.20 g/dL    ALT (SGPT) 15 1 - 33 U/L    AST (SGOT) 20 1 - 32 U/L    Alkaline Phosphatase 93 39 - 117 U/L    Total Bilirubin 0.3 0.0 - 1.2 mg/dL    Globulin 3.2 gm/dL    A/G Ratio 1.4 g/dL    BUN/Creatinine Ratio 25.0 7.0 - 25.0    Anion Gap 8.4 5.0 - 15.0 mmol/L    eGFR 53.3 (L) >60.0 mL/min/1.73   Lipase    Collection Time: 12/20/22 10:04 PM    Specimen: Arm, Right; Blood   Result Value Ref Range    Lipase 45 13 - 60 U/L   CBC Auto Differential    Collection Time: 12/20/22 10:04 PM    Specimen: Arm, Right; Blood   Result Value Ref Range    WBC 5.06 3.40 - 10.80 10*3/mm3    RBC 3.73 (L) 3.77 - 5.28 10*6/mm3    Hemoglobin 11.7  (L) 12.0 - 15.9 g/dL    Hematocrit 35.5 34.0 - 46.6 %    MCV 95.2 79.0 - 97.0 fL    MCH 31.4 26.6 - 33.0 pg    MCHC 33.0 31.5 - 35.7 g/dL    RDW 12.8 12.3 - 15.4 %    RDW-SD 45.3 37.0 - 54.0 fl    MPV 8.7 6.0 - 12.0 fL    Platelets 203 140 - 450 10*3/mm3    Neutrophil % 53.5 42.7 - 76.0 %    Lymphocyte % 35.4 19.6 - 45.3 %    Monocyte % 8.9 5.0 - 12.0 %    Eosinophil % 1.2 0.3 - 6.2 %    Basophil % 0.8 0.0 - 1.5 %    Immature Grans % 0.2 0.0 - 0.5 %    Neutrophils, Absolute 2.71 1.70 - 7.00 10*3/mm3    Lymphocytes, Absolute 1.79 0.70 - 3.10 10*3/mm3    Monocytes, Absolute 0.45 0.10 - 0.90 10*3/mm3    Eosinophils, Absolute 0.06 0.00 - 0.40 10*3/mm3    Basophils, Absolute 0.04 0.00 - 0.20 10*3/mm3    Immature Grans, Absolute 0.01 0.00 - 0.05 10*3/mm3    nRBC 0.0 0.0 - 0.2 /100 WBC       Ordered the above labs and independently reviewed the results.        RADIOLOGY  No Radiology Exams Resulted Within Past 24 Hours    I ordered the above noted radiological studies. Reviewed by me and discussed with radiologist.  See dictation for official radiology interpretation.      PROCEDURES    Procedures        MEDICATIONS GIVEN IN ER    Medications - No data to display      PROGRESS, DATA ANALYSIS, CONSULTS, AND MEDICAL DECISION MAKING        All labs have been independently reviewed by me.  All radiology studies have been reviewed by me and discussed with radiologist dictating the report.   EKG's independently viewed and interpreted by me.  Discussion below represents my analysis of pertinent findings related to patient's condition, differential diagnosis, treatment plan and final disposition.      ED Course as of 12/20/22 2249   Tue Dec 20, 2022   2111 History and physical exam do not suggest acute life threat however we will check some blood work given the patient's concern. [RS]   2247 No acute life threats identified on laboratory evaluation.  Patient clearly needs to follow-up with gastroenterology and information to that  end will be provided to her. [RS]      ED Course User Index  [RS] Trung Douglas MD       AS OF 22:49 EST VITALS:    BP - 140/68  HR - 74  TEMP - 98.1 °F (36.7 °C)  O2 SATS - 98%        DIAGNOSIS  Final diagnoses:   Frequent stools         DISPOSITION  DISCHARGE    Patient discharged in stable condition.    Reviewed implications of results, diagnosis, meds, responsibility to follow up, warning signs and symptoms of possible worsening, potential complications and reasons to return to ER.    Patient/Family voiced understanding of above instructions.    Discussed plan for discharge, as there is no emergent indication for admission. Patient referred to primary care provider for regular health maintenance. Pt/family is agreeable and understands need for follow up and possible repeat testing.  Pt is aware that discharge does not mean that nothing is wrong but it indicates no emergency is present that requires admission and they must continue care with follow-up as given below or physician of their choice.     FOLLOW-UP  Sussy Payne MD  [address] [facility] [address]  [place] [id number] [phone]    Schedule an appointment as soon as possible for a visit in 3 days  If symptoms fail to improve    Trevon Naqvi MD  [address] [id number] [phone]    Schedule an appointment as soon as possible for a visit   Next available         Medication List      No changes were made to your prescriptions during this visit.            Trung Douglas MD  12/20/22 3032

## 2022-12-21 NOTE — ED TRIAGE NOTES
Patient presents to ED from home with report of increase in bowel movements over the last 3 weeks. She reports that she has more bowel movements each day than she can count, they are not diarrhea but are soft stool. She denies blood in stool or abdominal pain. She denies weakness and has no focal deficits. She has tried immodium without  Improvement.     Patient placed in  Mask upon arrival, triage staff wearing appropriate PPE.

## 2023-01-19 ENCOUNTER — TELEPHONE (OUTPATIENT)
Dept: GASTROENTEROLOGY | Facility: CLINIC | Age: 78
End: 2023-01-19
Payer: MEDICARE

## 2023-01-19 NOTE — TELEPHONE ENCOUNTER
Tried calling pt to schedule appt from referral but had to leave a VM. If pt calls back ok for hub to schedule with first available provider.

## 2023-01-28 ENCOUNTER — HOSPITAL ENCOUNTER (EMERGENCY)
Facility: HOSPITAL | Age: 78
Discharge: HOME | End: 2023-01-28
Attending: EMERGENCY MEDICINE | Admitting: EMERGENCY MEDICINE
Payer: MEDICARE

## 2023-01-28 VITALS
SYSTOLIC BLOOD PRESSURE: 150 MMHG | TEMPERATURE: 98.2 F | BODY MASS INDEX: 22.28 KG/M2 | OXYGEN SATURATION: 100 % | HEART RATE: 70 BPM | WEIGHT: 118 LBS | DIASTOLIC BLOOD PRESSURE: 83 MMHG | HEIGHT: 61 IN | RESPIRATION RATE: 16 BRPM

## 2023-01-28 DIAGNOSIS — N39.0 ACUTE UTI: Primary | ICD-10-CM

## 2023-01-28 LAB
ALBUMIN SERPL-MCNC: 4 G/DL (ref 3.5–5.2)
ALBUMIN/GLOB SERPL: 1.1 G/DL
ALP SERPL-CCNC: 115 U/L (ref 39–117)
ALT SERPL W P-5'-P-CCNC: 13 U/L (ref 1–33)
ANION GAP SERPL CALCULATED.3IONS-SCNC: 10 MMOL/L (ref 5–15)
AST SERPL-CCNC: 15 U/L (ref 1–32)
BACTERIA UR QL AUTO: ABNORMAL /HPF
BASOPHILS # BLD AUTO: 0.03 10*3/MM3 (ref 0–0.2)
BASOPHILS NFR BLD AUTO: 0.6 % (ref 0–1.5)
BILIRUB SERPL-MCNC: 0.3 MG/DL (ref 0–1.2)
BILIRUB UR QL STRIP: NEGATIVE
BUN SERPL-MCNC: 18 MG/DL (ref 8–23)
BUN/CREAT SERPL: 22.5 (ref 7–25)
CALCIUM SPEC-SCNC: 9.5 MG/DL (ref 8.6–10.5)
CHLORIDE SERPL-SCNC: 103 MMOL/L (ref 98–107)
CLARITY UR: CLEAR
CO2 SERPL-SCNC: 25 MMOL/L (ref 22–29)
COLOR UR: YELLOW
CREAT SERPL-MCNC: 0.8 MG/DL (ref 0.57–1)
DEPRECATED RDW RBC AUTO: 42.4 FL (ref 37–54)
EGFRCR SERPLBLD CKD-EPI 2021: 76.5 ML/MIN/1.73
EOSINOPHIL # BLD AUTO: 0.12 10*3/MM3 (ref 0–0.4)
EOSINOPHIL NFR BLD AUTO: 2.5 % (ref 0.3–6.2)
ERYTHROCYTE [DISTWIDTH] IN BLOOD BY AUTOMATED COUNT: 12.6 % (ref 12.3–15.4)
GLOBULIN UR ELPH-MCNC: 3.6 GM/DL
GLUCOSE SERPL-MCNC: 168 MG/DL (ref 65–99)
GLUCOSE UR STRIP-MCNC: ABNORMAL MG/DL
HCT VFR BLD AUTO: 33.1 % (ref 34–46.6)
HGB BLD-MCNC: 11 G/DL (ref 12–15.9)
HGB UR QL STRIP.AUTO: NEGATIVE
HYALINE CASTS UR QL AUTO: ABNORMAL /LPF
IMM GRANULOCYTES # BLD AUTO: 0.01 10*3/MM3 (ref 0–0.05)
IMM GRANULOCYTES NFR BLD AUTO: 0.2 % (ref 0–0.5)
KETONES UR QL STRIP: NEGATIVE
LEUKOCYTE ESTERASE UR QL STRIP.AUTO: ABNORMAL
LIPASE SERPL-CCNC: 42 U/L (ref 13–60)
LYMPHOCYTES # BLD AUTO: 1.37 10*3/MM3 (ref 0.7–3.1)
LYMPHOCYTES NFR BLD AUTO: 28.1 % (ref 19.6–45.3)
MCH RBC QN AUTO: 30.6 PG (ref 26.6–33)
MCHC RBC AUTO-ENTMCNC: 33.2 G/DL (ref 31.5–35.7)
MCV RBC AUTO: 91.9 FL (ref 79–97)
MONOCYTES # BLD AUTO: 0.52 10*3/MM3 (ref 0.1–0.9)
MONOCYTES NFR BLD AUTO: 10.7 % (ref 5–12)
NEUTROPHILS NFR BLD AUTO: 2.83 10*3/MM3 (ref 1.7–7)
NEUTROPHILS NFR BLD AUTO: 57.9 % (ref 42.7–76)
NITRITE UR QL STRIP: POSITIVE
NRBC BLD AUTO-RTO: 0 /100 WBC (ref 0–0.2)
PH UR STRIP.AUTO: 5.5 [PH] (ref 5–8)
PLATELET # BLD AUTO: 273 10*3/MM3 (ref 140–450)
PMV BLD AUTO: 8.9 FL (ref 6–12)
POTASSIUM SERPL-SCNC: 4.5 MMOL/L (ref 3.5–5.2)
PROT SERPL-MCNC: 7.6 G/DL (ref 6–8.5)
PROT UR QL STRIP: NEGATIVE
RBC # BLD AUTO: 3.6 10*6/MM3 (ref 3.77–5.28)
RBC # UR STRIP: ABNORMAL /HPF
REF LAB TEST METHOD: ABNORMAL
SODIUM SERPL-SCNC: 138 MMOL/L (ref 136–145)
SP GR UR STRIP: 1.01 (ref 1–1.03)
SQUAMOUS #/AREA URNS HPF: ABNORMAL /HPF
UROBILINOGEN UR QL STRIP: ABNORMAL
WBC # UR STRIP: ABNORMAL /HPF
WBC NRBC COR # BLD: 4.88 10*3/MM3 (ref 3.4–10.8)

## 2023-01-28 PROCEDURE — 85025 COMPLETE CBC W/AUTO DIFF WBC: CPT | Performed by: PHYSICIAN ASSISTANT

## 2023-01-28 PROCEDURE — 36415 COLL VENOUS BLD VENIPUNCTURE: CPT

## 2023-01-28 PROCEDURE — 99284 EMERGENCY DEPT VISIT MOD MDM: CPT

## 2023-01-28 PROCEDURE — 80053 COMPREHEN METABOLIC PANEL: CPT | Performed by: PHYSICIAN ASSISTANT

## 2023-01-28 PROCEDURE — 83690 ASSAY OF LIPASE: CPT | Performed by: PHYSICIAN ASSISTANT

## 2023-01-28 PROCEDURE — 87086 URINE CULTURE/COLONY COUNT: CPT | Performed by: PHYSICIAN ASSISTANT

## 2023-01-28 PROCEDURE — 87186 SC STD MICRODIL/AGAR DIL: CPT | Performed by: PHYSICIAN ASSISTANT

## 2023-01-28 PROCEDURE — 81001 URINALYSIS AUTO W/SCOPE: CPT | Performed by: PHYSICIAN ASSISTANT

## 2023-01-28 PROCEDURE — 87077 CULTURE AEROBIC IDENTIFY: CPT | Performed by: PHYSICIAN ASSISTANT

## 2023-01-28 RX ORDER — SODIUM CHLORIDE 0.9 % (FLUSH) 0.9 %
10 SYRINGE (ML) INJECTION AS NEEDED
Status: DISCONTINUED | OUTPATIENT
Start: 2023-01-28 | End: 2023-01-29 | Stop reason: HOSPADM

## 2023-01-28 RX ORDER — CEPHALEXIN 500 MG/1
500 CAPSULE ORAL 2 TIMES DAILY
Qty: 10 CAPSULE | Refills: 0 | Status: SHIPPED | OUTPATIENT
Start: 2023-01-28 | End: 2023-02-02

## 2023-01-28 RX ORDER — SACCHAROMYCES BOULARDII 250 MG
250 CAPSULE ORAL 2 TIMES DAILY
Qty: 30 CAPSULE | Refills: 0 | Status: SHIPPED | OUTPATIENT
Start: 2023-01-28 | End: 2023-02-04

## 2023-01-28 RX ORDER — CEPHALEXIN 500 MG/1
500 CAPSULE ORAL ONCE
Status: COMPLETED | OUTPATIENT
Start: 2023-01-28 | End: 2023-01-28

## 2023-01-28 RX ADMIN — CEPHALEXIN 500 MG: 500 CAPSULE ORAL at 22:06

## 2023-01-30 LAB — BACTERIA SPEC AEROBE CULT: ABNORMAL

## 2023-01-30 RX ORDER — EXEMESTANE 25 MG/1
TABLET ORAL
Qty: 90 TABLET | Refills: 1 | Status: SHIPPED | OUTPATIENT
Start: 2023-01-30 | End: 2023-02-27 | Stop reason: SDUPTHER

## 2023-02-27 ENCOUNTER — OFFICE VISIT (OUTPATIENT)
Dept: ONCOLOGY | Facility: CLINIC | Age: 78
End: 2023-02-27
Payer: MEDICARE

## 2023-02-27 ENCOUNTER — TELEPHONE (OUTPATIENT)
Dept: ONCOLOGY | Facility: CLINIC | Age: 78
End: 2023-02-27
Payer: MEDICARE

## 2023-02-27 ENCOUNTER — INFUSION (OUTPATIENT)
Dept: ONCOLOGY | Facility: HOSPITAL | Age: 78
End: 2023-02-27
Payer: MEDICARE

## 2023-02-27 ENCOUNTER — LAB (OUTPATIENT)
Dept: LAB | Facility: HOSPITAL | Age: 78
End: 2023-02-27
Payer: MEDICARE

## 2023-02-27 VITALS
SYSTOLIC BLOOD PRESSURE: 132 MMHG | HEIGHT: 61 IN | RESPIRATION RATE: 18 BRPM | WEIGHT: 112.8 LBS | TEMPERATURE: 97.1 F | OXYGEN SATURATION: 98 % | HEART RATE: 81 BPM | DIASTOLIC BLOOD PRESSURE: 75 MMHG | BODY MASS INDEX: 21.3 KG/M2

## 2023-02-27 DIAGNOSIS — M81.6 LOCALIZED OSTEOPOROSIS WITHOUT CURRENT PATHOLOGICAL FRACTURE: ICD-10-CM

## 2023-02-27 DIAGNOSIS — C50.112 MALIGNANT NEOPLASM OF CENTRAL PORTION OF LEFT BREAST IN FEMALE, ESTROGEN RECEPTOR POSITIVE: ICD-10-CM

## 2023-02-27 DIAGNOSIS — M81.6 LOCALIZED OSTEOPOROSIS WITHOUT CURRENT PATHOLOGICAL FRACTURE: Primary | ICD-10-CM

## 2023-02-27 DIAGNOSIS — Z78.0 POST-MENOPAUSAL: ICD-10-CM

## 2023-02-27 DIAGNOSIS — C78.02 MALIGNANT NEOPLASM METASTATIC TO LEFT LUNG: ICD-10-CM

## 2023-02-27 DIAGNOSIS — D50.9 IRON DEFICIENCY ANEMIA, UNSPECIFIED IRON DEFICIENCY ANEMIA TYPE: ICD-10-CM

## 2023-02-27 DIAGNOSIS — Z17.0 MALIGNANT NEOPLASM OF CENTRAL PORTION OF LEFT BREAST IN FEMALE, ESTROGEN RECEPTOR POSITIVE: ICD-10-CM

## 2023-02-27 DIAGNOSIS — C78.02 MALIGNANT NEOPLASM METASTATIC TO LEFT LUNG: Primary | ICD-10-CM

## 2023-02-27 LAB
ALBUMIN SERPL-MCNC: 4.3 G/DL (ref 3.5–5.2)
ALBUMIN/GLOB SERPL: 1.2 G/DL (ref 1.1–2.4)
ALP SERPL-CCNC: 129 U/L (ref 38–116)
ALT SERPL W P-5'-P-CCNC: 23 U/L (ref 0–33)
ANION GAP SERPL CALCULATED.3IONS-SCNC: 12.1 MMOL/L (ref 5–15)
AST SERPL-CCNC: 23 U/L (ref 0–32)
BASOPHILS # BLD AUTO: 0.05 10*3/MM3 (ref 0–0.2)
BASOPHILS NFR BLD AUTO: 0.9 % (ref 0–1.5)
BILIRUB SERPL-MCNC: 0.4 MG/DL (ref 0.2–1.2)
BUN SERPL-MCNC: 24 MG/DL (ref 6–20)
BUN/CREAT SERPL: 27.3 (ref 7.3–30)
CALCIUM SPEC-SCNC: 9.7 MG/DL (ref 8.5–10.2)
CHLORIDE SERPL-SCNC: 102 MMOL/L (ref 98–107)
CO2 SERPL-SCNC: 24.9 MMOL/L (ref 22–29)
CREAT SERPL-MCNC: 0.88 MG/DL (ref 0.6–1.1)
DEPRECATED RDW RBC AUTO: 43.3 FL (ref 37–54)
EGFRCR SERPLBLD CKD-EPI 2021: 68.2 ML/MIN/1.73
EOSINOPHIL # BLD AUTO: 0.13 10*3/MM3 (ref 0–0.4)
EOSINOPHIL NFR BLD AUTO: 2.4 % (ref 0.3–6.2)
ERYTHROCYTE [DISTWIDTH] IN BLOOD BY AUTOMATED COUNT: 12.5 % (ref 12.3–15.4)
FERRITIN SERPL-MCNC: 267.2 NG/ML (ref 13–150)
GLOBULIN UR ELPH-MCNC: 3.5 GM/DL (ref 1.8–3.5)
GLUCOSE SERPL-MCNC: 311 MG/DL (ref 74–124)
HCT VFR BLD AUTO: 36.1 % (ref 34–46.6)
HGB BLD-MCNC: 11.9 G/DL (ref 12–15.9)
IMM GRANULOCYTES # BLD AUTO: 0.01 10*3/MM3 (ref 0–0.05)
IMM GRANULOCYTES NFR BLD AUTO: 0.2 % (ref 0–0.5)
IRON 24H UR-MRATE: 117 MCG/DL (ref 37–145)
IRON SATN MFR SERPL: 29 % (ref 14–48)
LYMPHOCYTES # BLD AUTO: 1.42 10*3/MM3 (ref 0.7–3.1)
LYMPHOCYTES NFR BLD AUTO: 26.6 % (ref 19.6–45.3)
MAGNESIUM SERPL-MCNC: 1.9 MG/DL (ref 1.8–2.5)
MCH RBC QN AUTO: 31.2 PG (ref 26.6–33)
MCHC RBC AUTO-ENTMCNC: 33 G/DL (ref 31.5–35.7)
MCV RBC AUTO: 94.5 FL (ref 79–97)
MONOCYTES # BLD AUTO: 0.48 10*3/MM3 (ref 0.1–0.9)
MONOCYTES NFR BLD AUTO: 9 % (ref 5–12)
NEUTROPHILS NFR BLD AUTO: 3.24 10*3/MM3 (ref 1.7–7)
NEUTROPHILS NFR BLD AUTO: 60.9 % (ref 42.7–76)
NRBC BLD AUTO-RTO: 0 /100 WBC (ref 0–0.2)
PHOSPHATE SERPL-MCNC: 3.6 MG/DL (ref 2.5–4.5)
PLATELET # BLD AUTO: 247 10*3/MM3 (ref 140–450)
PMV BLD AUTO: 8.6 FL (ref 6–12)
POTASSIUM SERPL-SCNC: 4.8 MMOL/L (ref 3.5–4.7)
PROT SERPL-MCNC: 7.8 G/DL (ref 6.3–8)
RBC # BLD AUTO: 3.82 10*6/MM3 (ref 3.77–5.28)
SODIUM SERPL-SCNC: 139 MMOL/L (ref 134–145)
TIBC SERPL-MCNC: 405 MCG/DL (ref 249–505)
TRANSFERRIN SERPL-MCNC: 289 MG/DL (ref 200–360)
WBC NRBC COR # BLD: 5.33 10*3/MM3 (ref 3.4–10.8)

## 2023-02-27 PROCEDURE — 84466 ASSAY OF TRANSFERRIN: CPT

## 2023-02-27 PROCEDURE — 36415 COLL VENOUS BLD VENIPUNCTURE: CPT

## 2023-02-27 PROCEDURE — 96372 THER/PROPH/DIAG INJ SC/IM: CPT

## 2023-02-27 PROCEDURE — 83540 ASSAY OF IRON: CPT

## 2023-02-27 PROCEDURE — 25010000002 DENOSUMAB 60 MG/ML SOLUTION PREFILLED SYRINGE: Performed by: INTERNAL MEDICINE

## 2023-02-27 PROCEDURE — 99214 OFFICE O/P EST MOD 30 MIN: CPT | Performed by: INTERNAL MEDICINE

## 2023-02-27 PROCEDURE — 83735 ASSAY OF MAGNESIUM: CPT

## 2023-02-27 PROCEDURE — 1126F AMNT PAIN NOTED NONE PRSNT: CPT | Performed by: INTERNAL MEDICINE

## 2023-02-27 PROCEDURE — 85025 COMPLETE CBC W/AUTO DIFF WBC: CPT

## 2023-02-27 PROCEDURE — 80053 COMPREHEN METABOLIC PANEL: CPT

## 2023-02-27 PROCEDURE — 82728 ASSAY OF FERRITIN: CPT

## 2023-02-27 PROCEDURE — 84100 ASSAY OF PHOSPHORUS: CPT

## 2023-02-27 RX ORDER — EXEMESTANE 25 MG/1
25 TABLET ORAL DAILY
Qty: 90 TABLET | Refills: 3 | Status: SHIPPED | OUTPATIENT
Start: 2023-02-27

## 2023-02-27 RX ADMIN — DENOSUMAB 60 MG: 60 INJECTION SUBCUTANEOUS at 12:14

## 2023-02-27 NOTE — TELEPHONE ENCOUNTER
Returned call to family member and explained that she does not need to fast for her labs today.  He v/u.

## 2023-02-27 NOTE — PROGRESS NOTES
Glucose today is 311.  Patient states that Dr Payne follows her for her diabetes and that she takes Metformin, but did not take dose this morning.   Patient states that she has a follow up with Dr Payne in March.

## 2023-02-27 NOTE — TELEPHONE ENCOUNTER
CALLED AND SPOKE WITH PATIENT AND ADVISED OF HER APPT DATES AND TIMES - ADVISED THAT I WAS SENDING PATIENT HER APPTS TO HER WITH THE INS

## 2023-02-27 NOTE — TELEPHONE ENCOUNTER
Caller: Maximino(nephew)Pelon    Relationship: Emergency Contact    Best call back number: [phone]      What was the call regarding: DOES PATIENT NEED TO FAST FOR HER LABS TODAY?    Do you require a callback: YES

## 2023-03-09 ENCOUNTER — LAB (OUTPATIENT)
Dept: LAB | Facility: HOSPITAL | Age: 78
End: 2023-03-09
Payer: MEDICARE

## 2023-03-09 ENCOUNTER — TRANSCRIBE ORDERS (OUTPATIENT)
Dept: ADMINISTRATIVE | Facility: HOSPITAL | Age: 78
End: 2023-03-09
Payer: MEDICARE

## 2023-03-09 DIAGNOSIS — I10 HTN (HYPERTENSION) WITH GOAL TO BE DETERMINED: ICD-10-CM

## 2023-03-09 DIAGNOSIS — R79.9 ABNORMAL FINDING OF BLOOD CHEMISTRY, UNSPECIFIED: ICD-10-CM

## 2023-03-09 DIAGNOSIS — E03.9 HYPOTHYROIDISM, UNSPECIFIED TYPE: ICD-10-CM

## 2023-03-09 DIAGNOSIS — Z00.01 ENCOUNTER FOR GENERAL ADULT MEDICAL EXAMINATION WITH ABNORMAL FINDINGS: Primary | ICD-10-CM

## 2023-03-09 DIAGNOSIS — Z00.01 ENCOUNTER FOR GENERAL ADULT MEDICAL EXAMINATION WITH ABNORMAL FINDINGS: ICD-10-CM

## 2023-03-09 LAB
CHOLEST SERPL-MCNC: 177 MG/DL (ref 0–200)
HBA1C MFR BLD: 9.6 % (ref 4.8–5.6)
HDLC SERPL-MCNC: 53 MG/DL (ref 40–60)
LDLC SERPL CALC-MCNC: 109 MG/DL (ref 0–100)
LDLC/HDLC SERPL: 2.05 {RATIO}
TRIGL SERPL-MCNC: 78 MG/DL (ref 0–150)
TSH SERPL DL<=0.05 MIU/L-ACNC: 5.01 UIU/ML (ref 0.27–4.2)
VLDLC SERPL-MCNC: 15 MG/DL (ref 5–40)

## 2023-03-09 PROCEDURE — 84443 ASSAY THYROID STIM HORMONE: CPT

## 2023-03-09 PROCEDURE — 36415 COLL VENOUS BLD VENIPUNCTURE: CPT

## 2023-03-09 PROCEDURE — 83036 HEMOGLOBIN GLYCOSYLATED A1C: CPT

## 2023-03-09 PROCEDURE — 80061 LIPID PANEL: CPT

## 2023-03-14 ENCOUNTER — TRANSCRIBE ORDERS (OUTPATIENT)
Dept: ADMINISTRATIVE | Facility: HOSPITAL | Age: 78
End: 2023-03-14
Payer: MEDICARE

## 2023-03-14 ENCOUNTER — LAB (OUTPATIENT)
Dept: LAB | Facility: HOSPITAL | Age: 78
End: 2023-03-14
Payer: MEDICARE

## 2023-03-14 DIAGNOSIS — I10 HTN (HYPERTENSION), BENIGN: ICD-10-CM

## 2023-03-14 DIAGNOSIS — Z00.00 ROUTINE GENERAL MEDICAL EXAMINATION AT A HEALTH CARE FACILITY: Primary | ICD-10-CM

## 2023-03-14 DIAGNOSIS — I73.9 PERIPHERAL ANGIOPATHY: ICD-10-CM

## 2023-03-14 DIAGNOSIS — Z00.00 ROUTINE GENERAL MEDICAL EXAMINATION AT A HEALTH CARE FACILITY: ICD-10-CM

## 2023-03-14 LAB
ALBUMIN SERPL-MCNC: 4.2 G/DL (ref 3.5–5.2)
ALBUMIN/GLOB SERPL: 1.1 G/DL
ALP SERPL-CCNC: 132 U/L (ref 39–117)
ALT SERPL W P-5'-P-CCNC: 20 U/L (ref 1–33)
ANION GAP SERPL CALCULATED.3IONS-SCNC: 7 MMOL/L (ref 5–15)
AST SERPL-CCNC: 18 U/L (ref 1–32)
BACTERIA UR QL AUTO: ABNORMAL /HPF
BILIRUB SERPL-MCNC: 0.3 MG/DL (ref 0–1.2)
BILIRUB UR QL STRIP: NEGATIVE
BUN SERPL-MCNC: 16 MG/DL (ref 8–23)
BUN/CREAT SERPL: 18.8 (ref 7–25)
CALCIUM SPEC-SCNC: 9 MG/DL (ref 8.6–10.5)
CHLORIDE SERPL-SCNC: 103 MMOL/L (ref 98–107)
CLARITY UR: CLEAR
CO2 SERPL-SCNC: 27 MMOL/L (ref 22–29)
COLOR UR: YELLOW
CREAT SERPL-MCNC: 0.85 MG/DL (ref 0.57–1)
CREAT UR-MCNC: 100.7 MG/DL
DEPRECATED RDW RBC AUTO: 40.8 FL (ref 37–54)
EGFRCR SERPLBLD CKD-EPI 2021: 71.1 ML/MIN/1.73
ERYTHROCYTE [DISTWIDTH] IN BLOOD BY AUTOMATED COUNT: 12.2 % (ref 12.3–15.4)
FOLATE SERPL-MCNC: 16.6 NG/ML (ref 4.78–24.2)
GLOBULIN UR ELPH-MCNC: 3.9 GM/DL
GLUCOSE SERPL-MCNC: 227 MG/DL (ref 65–99)
GLUCOSE UR STRIP-MCNC: ABNORMAL MG/DL
HCT VFR BLD AUTO: 34 % (ref 34–46.6)
HGB BLD-MCNC: 11.3 G/DL (ref 12–15.9)
HGB UR QL STRIP.AUTO: ABNORMAL
HYALINE CASTS UR QL AUTO: ABNORMAL /LPF
KETONES UR QL STRIP: NEGATIVE
LEUKOCYTE ESTERASE UR QL STRIP.AUTO: ABNORMAL
MCH RBC QN AUTO: 30.5 PG (ref 26.6–33)
MCHC RBC AUTO-ENTMCNC: 33.2 G/DL (ref 31.5–35.7)
MCV RBC AUTO: 91.9 FL (ref 79–97)
NITRITE UR QL STRIP: POSITIVE
PH UR STRIP.AUTO: 5.5 [PH] (ref 5–8)
PLATELET # BLD AUTO: 263 10*3/MM3 (ref 140–450)
PMV BLD AUTO: 8.2 FL (ref 6–12)
POTASSIUM SERPL-SCNC: 4.5 MMOL/L (ref 3.5–5.2)
PROT ?TM UR-MCNC: 16.3 MG/DL
PROT SERPL-MCNC: 8.1 G/DL (ref 6–8.5)
PROT UR QL STRIP: ABNORMAL
PROT/CREAT UR: 161.9 MG/G CREA (ref 0–200)
RBC # BLD AUTO: 3.7 10*6/MM3 (ref 3.77–5.28)
RBC # UR STRIP: ABNORMAL /HPF
REF LAB TEST METHOD: ABNORMAL
SODIUM SERPL-SCNC: 137 MMOL/L (ref 136–145)
SP GR UR STRIP: 1.02 (ref 1–1.03)
SQUAMOUS #/AREA URNS HPF: ABNORMAL /HPF
UROBILINOGEN UR QL STRIP: ABNORMAL
VIT B12 BLD-MCNC: 913 PG/ML (ref 211–946)
WBC # UR STRIP: ABNORMAL /HPF
WBC NRBC COR # BLD: 3.92 10*3/MM3 (ref 3.4–10.8)

## 2023-03-14 PROCEDURE — 81001 URINALYSIS AUTO W/SCOPE: CPT

## 2023-03-14 PROCEDURE — 82570 ASSAY OF URINE CREATININE: CPT

## 2023-03-14 PROCEDURE — 82746 ASSAY OF FOLIC ACID SERUM: CPT

## 2023-03-14 PROCEDURE — 82607 VITAMIN B-12: CPT

## 2023-03-14 PROCEDURE — 84156 ASSAY OF PROTEIN URINE: CPT

## 2023-03-14 PROCEDURE — 80053 COMPREHEN METABOLIC PANEL: CPT

## 2023-03-14 PROCEDURE — 85027 COMPLETE CBC AUTOMATED: CPT

## 2023-03-14 PROCEDURE — 36415 COLL VENOUS BLD VENIPUNCTURE: CPT

## 2023-05-11 NOTE — OUTREACH NOTE
Medical Week 1 Survey      Responses   [facility] patient discharged from?  [facility]   COVID-19 Test Status  Confirmed   Does the patient have one of the following disease processes/diagnoses(primary or secondary)?  Other   Is there a successful TCM telephone encounter documented?  No   Week 1 attempt successful?  Yes   Call start time  1128   Call end time  1141   Discharge diagnosis  hypoxia, COVID - 19 bronchitis, metastatic breast CA,    Person spoke with today (if not patient) and relationship  Pt relative, Pelon Geiger reviewed with patient/caregiver?  Yes   Is the patient having any side effects they believe may be caused by any medication additions or changes?  No   Does the patient have all medications ordered at discharge?  Yes   Is the patient taking all medications as directed (includes completed medication regime)?  Yes   Does the patient have a primary care provider?   Yes   Does the patient have an appointment with their PCP within 7 days of discharge?  No   What is preventing the patient from scheduling follow up appointments within 7 days of discharge?  Haven't had time   Nursing Interventions  Educated patient on importance of making appointment, Advised patient to make appointment   Has the patient kept scheduled appointments due by today?  N/A   What is the Home health agency?   [facility]   Has home health visited the patient within 72 hours of discharge?  No   What DME was ordered?  O2 from [place]   Has all DME been delivered?  Yes   Pulse Ox monitoring  None   Psychosocial issues?  No   Notified Case Management  Home Health   Comments  Pt relative reports  has not contacted them. Please follow-up.    Did the patient receive a copy of their discharge instructions?  Yes   Nursing interventions  Reviewed instructions with patient   What is the patient's perception of their health status since discharge?  Improving   Is the patient/caregiver able to teach back signs and symptoms related to  disease process for when to call PCP?  Yes   Is the patient/caregiver able to teach back signs and symptoms related to disease process for when to call 911?  Yes   Is the patient/caregiver able to teach back the hierarchy of who to call/visit for symptoms/problems? PCP, Specialist, Home health nurse, Urgent Care, ED, 911  Yes   Additional teach back comments  Provided education about self-isolation and cleaning commonly used surfaces in the house.    Week 1 call completed?  Yes          Jamie Ramirez RN   weight-bearing as tolerated

## 2023-08-11 ENCOUNTER — LAB (OUTPATIENT)
Dept: LAB | Facility: HOSPITAL | Age: 78
End: 2023-08-11
Payer: MEDICARE

## 2023-08-11 ENCOUNTER — TRANSCRIBE ORDERS (OUTPATIENT)
Dept: LAB | Facility: HOSPITAL | Age: 78
End: 2023-08-11
Payer: MEDICARE

## 2023-08-11 DIAGNOSIS — E11.9 TYPE II DIABETES MELLITUS, WELL CONTROLLED: ICD-10-CM

## 2023-08-11 DIAGNOSIS — E03.9 HYPOTHYROIDISM, UNSPECIFIED TYPE: Primary | ICD-10-CM

## 2023-08-11 DIAGNOSIS — E03.9 HYPOTHYROIDISM, UNSPECIFIED TYPE: ICD-10-CM

## 2023-08-11 LAB
ANION GAP SERPL CALCULATED.3IONS-SCNC: 10.8 MMOL/L (ref 5–15)
BUN SERPL-MCNC: 28 MG/DL (ref 8–23)
BUN/CREAT SERPL: 31.5 (ref 7–25)
CALCIUM SPEC-SCNC: 9.4 MG/DL (ref 8.6–10.5)
CHLORIDE SERPL-SCNC: 106 MMOL/L (ref 98–107)
CO2 SERPL-SCNC: 20.2 MMOL/L (ref 22–29)
CREAT SERPL-MCNC: 0.89 MG/DL (ref 0.57–1)
EGFRCR SERPLBLD CKD-EPI 2021: 67.3 ML/MIN/1.73
GLUCOSE SERPL-MCNC: 173 MG/DL (ref 65–99)
HBA1C MFR BLD: 9.9 % (ref 4.8–5.6)
POTASSIUM SERPL-SCNC: 4.6 MMOL/L (ref 3.5–5.2)
SODIUM SERPL-SCNC: 137 MMOL/L (ref 136–145)
T4 FREE SERPL-MCNC: 1.23 NG/DL (ref 0.93–1.7)
TSH SERPL DL<=0.05 MIU/L-ACNC: 2.46 UIU/ML (ref 0.27–4.2)

## 2023-08-11 PROCEDURE — 83036 HEMOGLOBIN GLYCOSYLATED A1C: CPT

## 2023-08-11 PROCEDURE — 36415 COLL VENOUS BLD VENIPUNCTURE: CPT

## 2023-08-11 PROCEDURE — 84439 ASSAY OF FREE THYROXINE: CPT

## 2023-08-11 PROCEDURE — 84443 ASSAY THYROID STIM HORMONE: CPT

## 2023-08-11 PROCEDURE — 80048 BASIC METABOLIC PNL TOTAL CA: CPT

## 2023-08-29 ENCOUNTER — HOSPITAL ENCOUNTER (OUTPATIENT)
Dept: CT IMAGING | Facility: HOSPITAL | Age: 78
Discharge: HOME | End: 2023-08-29
Payer: MEDICARE

## 2023-08-29 ENCOUNTER — HOSPITAL ENCOUNTER (OUTPATIENT)
Dept: BONE DENSITY | Facility: HOSPITAL | Age: 78
Discharge: HOME | End: 2023-08-29
Payer: MEDICARE

## 2023-08-29 DIAGNOSIS — C78.02 MALIGNANT NEOPLASM METASTATIC TO LEFT LUNG: ICD-10-CM

## 2023-08-29 DIAGNOSIS — M81.6 LOCALIZED OSTEOPOROSIS WITHOUT CURRENT PATHOLOGICAL FRACTURE: ICD-10-CM

## 2023-08-29 DIAGNOSIS — Z78.0 POST-MENOPAUSAL: ICD-10-CM

## 2023-08-29 LAB — CREAT BLDA-MCNC: 0.9 MG/DL (ref 0.6–1.3)

## 2023-08-29 PROCEDURE — 82565 ASSAY OF CREATININE: CPT

## 2023-08-29 PROCEDURE — 74177 CT ABD & PELVIS W/CONTRAST: CPT

## 2023-08-29 PROCEDURE — 77080 DXA BONE DENSITY AXIAL: CPT

## 2023-08-29 PROCEDURE — 71260 CT THORAX DX C+: CPT

## 2023-08-29 PROCEDURE — 25510000001 IOPAMIDOL 61 % SOLUTION: Performed by: INTERNAL MEDICINE

## 2023-08-29 RX ADMIN — IOPAMIDOL 85 ML: 612 INJECTION, SOLUTION INTRAVENOUS at 11:43

## 2023-09-12 ENCOUNTER — LAB (OUTPATIENT)
Dept: LAB | Facility: HOSPITAL | Age: 78
End: 2023-09-12
Payer: MEDICARE

## 2023-09-12 ENCOUNTER — OFFICE VISIT (OUTPATIENT)
Dept: ONCOLOGY | Facility: CLINIC | Age: 78
End: 2023-09-12
Payer: MEDICARE

## 2023-09-12 ENCOUNTER — INFUSION (OUTPATIENT)
Dept: ONCOLOGY | Facility: HOSPITAL | Age: 78
End: 2023-09-12
Payer: MEDICARE

## 2023-09-12 VITALS
DIASTOLIC BLOOD PRESSURE: 74 MMHG | WEIGHT: 119 LBS | SYSTOLIC BLOOD PRESSURE: 130 MMHG | OXYGEN SATURATION: 98 % | HEART RATE: 66 BPM | BODY MASS INDEX: 22.47 KG/M2 | HEIGHT: 61 IN

## 2023-09-12 DIAGNOSIS — C50.112 MALIGNANT NEOPLASM OF CENTRAL PORTION OF LEFT BREAST IN FEMALE, ESTROGEN RECEPTOR POSITIVE: Primary | ICD-10-CM

## 2023-09-12 DIAGNOSIS — M81.6 LOCALIZED OSTEOPOROSIS WITHOUT CURRENT PATHOLOGICAL FRACTURE: ICD-10-CM

## 2023-09-12 DIAGNOSIS — Z17.0 MALIGNANT NEOPLASM OF CENTRAL PORTION OF LEFT BREAST IN FEMALE, ESTROGEN RECEPTOR POSITIVE: Primary | ICD-10-CM

## 2023-09-12 DIAGNOSIS — E83.42 HYPOMAGNESEMIA: ICD-10-CM

## 2023-09-12 DIAGNOSIS — C78.02 MALIGNANT NEOPLASM METASTATIC TO LEFT LUNG: ICD-10-CM

## 2023-09-12 DIAGNOSIS — C78.02 MALIGNANT NEOPLASM METASTATIC TO LEFT LUNG: Primary | ICD-10-CM

## 2023-09-12 LAB
ALBUMIN SERPL-MCNC: 4.4 G/DL (ref 3.5–5.2)
ALBUMIN/GLOB SERPL: 1.4 G/DL
ALP SERPL-CCNC: 89 U/L (ref 39–117)
ALT SERPL W P-5'-P-CCNC: 19 U/L (ref 1–33)
ANION GAP SERPL CALCULATED.3IONS-SCNC: 15.4 MMOL/L (ref 5–15)
AST SERPL-CCNC: 23 U/L (ref 1–32)
BASOPHILS # BLD AUTO: 0.05 10*3/MM3 (ref 0–0.2)
BASOPHILS NFR BLD AUTO: 1.2 % (ref 0–1.5)
BILIRUB SERPL-MCNC: 0.4 MG/DL (ref 0–1.2)
BUN SERPL-MCNC: 27 MG/DL (ref 8–23)
BUN/CREAT SERPL: 30.7 (ref 7–25)
CALCIUM SPEC-SCNC: 9.4 MG/DL (ref 8.6–10.5)
CHLORIDE SERPL-SCNC: 101 MMOL/L (ref 98–107)
CO2 SERPL-SCNC: 23.6 MMOL/L (ref 22–29)
CREAT SERPL-MCNC: 0.88 MG/DL (ref 0.6–1.1)
DEPRECATED RDW RBC AUTO: 42.4 FL (ref 37–54)
EGFRCR SERPLBLD CKD-EPI 2021: 68.2 ML/MIN/1.73
EOSINOPHIL # BLD AUTO: 0.13 10*3/MM3 (ref 0–0.4)
EOSINOPHIL NFR BLD AUTO: 3 % (ref 0.3–6.2)
ERYTHROCYTE [DISTWIDTH] IN BLOOD BY AUTOMATED COUNT: 12.1 % (ref 12.3–15.4)
GLOBULIN UR ELPH-MCNC: 3.2 GM/DL
GLUCOSE SERPL-MCNC: 248 MG/DL (ref 65–99)
HCT VFR BLD AUTO: 37.7 % (ref 34–46.6)
HGB BLD-MCNC: 12.6 G/DL (ref 12–15.9)
IMM GRANULOCYTES # BLD AUTO: 0 10*3/MM3 (ref 0–0.05)
IMM GRANULOCYTES NFR BLD AUTO: 0 % (ref 0–0.5)
LYMPHOCYTES # BLD AUTO: 1.4 10*3/MM3 (ref 0.7–3.1)
LYMPHOCYTES NFR BLD AUTO: 32.5 % (ref 19.6–45.3)
MAGNESIUM SERPL-MCNC: 1.9 MG/DL (ref 1.6–2.4)
MCH RBC QN AUTO: 32.1 PG (ref 26.6–33)
MCHC RBC AUTO-ENTMCNC: 33.4 G/DL (ref 31.5–35.7)
MCV RBC AUTO: 96.2 FL (ref 79–97)
MONOCYTES # BLD AUTO: 0.42 10*3/MM3 (ref 0.1–0.9)
MONOCYTES NFR BLD AUTO: 9.7 % (ref 5–12)
NEUTROPHILS NFR BLD AUTO: 2.31 10*3/MM3 (ref 1.7–7)
NEUTROPHILS NFR BLD AUTO: 53.6 % (ref 42.7–76)
NRBC BLD AUTO-RTO: 0 /100 WBC (ref 0–0.2)
PHOSPHATE SERPL-MCNC: 3 MG/DL (ref 2.5–4.5)
PLATELET # BLD AUTO: 244 10*3/MM3 (ref 140–450)
PMV BLD AUTO: 8.5 FL (ref 6–12)
POTASSIUM SERPL-SCNC: 4.2 MMOL/L (ref 3.5–5.2)
PROT SERPL-MCNC: 7.6 G/DL (ref 6–8.5)
RBC # BLD AUTO: 3.92 10*6/MM3 (ref 3.77–5.28)
SODIUM SERPL-SCNC: 140 MMOL/L (ref 136–145)
WBC NRBC COR # BLD: 4.31 10*3/MM3 (ref 3.4–10.8)

## 2023-09-12 PROCEDURE — 25010000002 DENOSUMAB 60 MG/ML SOLUTION PREFILLED SYRINGE: Performed by: INTERNAL MEDICINE

## 2023-09-12 PROCEDURE — 83735 ASSAY OF MAGNESIUM: CPT

## 2023-09-12 PROCEDURE — 85025 COMPLETE CBC W/AUTO DIFF WBC: CPT

## 2023-09-12 PROCEDURE — 80053 COMPREHEN METABOLIC PANEL: CPT

## 2023-09-12 PROCEDURE — 36415 COLL VENOUS BLD VENIPUNCTURE: CPT

## 2023-09-12 PROCEDURE — 96372 THER/PROPH/DIAG INJ SC/IM: CPT

## 2023-09-12 PROCEDURE — 84100 ASSAY OF PHOSPHORUS: CPT

## 2023-09-12 RX ORDER — PIOGLITAZONEHYDROCHLORIDE 30 MG/1
TABLET ORAL
COMMUNITY
Start: 2023-08-17

## 2023-09-12 RX ADMIN — DENOSUMAB 60 MG: 60 INJECTION SUBCUTANEOUS at 12:35

## 2023-09-12 NOTE — PROGRESS NOTES
REASON FOR FOLLOW-UP:    1. Metastatic breast cancer to the lungs, ER/[place] positive, HER2/berkley positive, undergoing hormonal therapy with Arimidex since the middle of March 2009.   2. Response to Arimidex as evidenced by serial CT exams, including on 01/10/2012, showing no significant disease.   3. New left upper lobe pulmonary nodule noted, measuring 1.1 cm by CT scan on 07/05/2012. No other evidence of metastatic disease. Hormonal therapy switched to Faslodex 07/12/2012.   4. Osteoporosis documented by bone density scan from 10/17/2011 and also 02/21/2014. Patient was started on Prolia therapy every 6 months. Patient had tooth extraction in August 2015. Prolia has been on hold.   5. CT scans of chest, abdomen and pelvis on 04/07/2016 showed stable disease. Faslodex will be continued.   6. CT scan examination reported disease progress on 01/30/2017, patient was switched to Aromasin in early February 2017. However she was unable to start Afinitor due to cost.   7. Repeated bone density scan on 2/27/2017 reported worsening osteoporosis. Prolia was restarted back on 03/02/2017. Plan for Q6 month treatment.   8.  Repeated chest CT on 5/15/2017 showed progressive left upper lobe pulmonary nodule.  Patient had stereotactic radiation therapy in early June 2017.  Aromasin was continued.   9.  CT scan of the chest on 9/25/2017 reported post treatment changes in the left upper lobe.   10.  CT scan examination for chest abdomen and pelvis with IV contrast on 6/21/2018 reported no evidence of disease progression.   11.  CT chest/abdomen/pelvis from 6/3/2020 showed no evidence of metastatic disease has developed.  12.  CT for chest abdomen pelvis on 6/21/2021 reported stable condition.      HISTORY OF PRESENT ILLNESS: Ms. Reese is a 77 y.o.  female with type 2 diabetes, hypothyroidism, hyperlipidemia, who presents today on 09/12/2023 for a 6-month follow-up evaluation.     The patient is here to discuss the results of recent  CT scan examination and ongoing management.    The CT of the chest, abdomen, and pelvis obtained on 08/29/2023 reported stable left upper lobe spiculated mass reporting 2.7 x 2.4 cm. There was associated scarring. No pleural effusion. No enlarging nodule or masses or pneumothorax. There was no enlarged or subacute supraclavicular, axillary, mediastinum, or hilar lymph nodes. Abdomen has no evidence for malignancy. The hepatic contour is somewhat nodular. The bone window demonstrated degenerative changes without suspicious osseous lesion.    The patient also had a DEXA bone density scan on 08/29/2023 which reported ongoing osteoporosis of the lumbar spine.    The patient reports she is at her baseline condition, physically active, performance status ECOG 1. She denies any specific problems, denies bone pain. No headaches or vision changes.  She has been eating well.  No nausea vomiting.      Past Medical History:   Diagnosis Date    Breast cancer, Left     1998, 2009 metastisized to lungs    Diabetes mellitus     type 2     Hyperlipidemia     Hypothyroidism 01/2009    Left upper pulmonary nodule 07/05/2012    Osteoporosis 10/17/2011     Past Surgical History:   Procedure Laterality Date    BREAST BIOPSY Left 1995    MASTECTOMY RADICAL Left 1995    MOUTH SURGERY  08/2015    tooth extraction            ONCOLOGIC/HEMATOLOGIC HISTORY: History from previous dates can be found in the separate document.       CT scan on 04/07/2016 showed stable disease with the small left upper lobe pulmonary nodule, no change compared to previous imaging studies, no evidence of metastatic disease in the abdomen or pelvis or bony structure.       Unfortunately her CT scan on 01/30/2017 showed slight disease progress in the left upper lobe of the lung, by 3 mm, up to 1.4 centimeters. No evidence of new metastatic disease or bone metastases.       Patient was seen on February 7, 2017. We'll propose switch therapy to Aromasin plus Afinitor.   however she could not afford Afinitor because the cost. Our pharmacy staff tried to apply for premedication, but was not successful. So she is only on Aromasin with good tolerance.      Repeated a CT of the chest on 5/15/2017 showed progressive left upper lobe pulmonary nodule.  Patient had stereotactic radiation therapy in early June 2017.  Aromasin was continued.     CT scan examination reported no active disease on 6/10/2019.  Had a reasonable iron studies including ferritin 188 ng/mL, free iron 75 TIBC 410 and iron saturation 18%.  And also improve the magnesium level 1.8.  Mild anemic with hemoglobin 11.5 but normal platelets 253,000 and WBC 4020.  Chemistry lab reported normal CMP except elevated glucose which was at 201.     Patient presented on 12/3/2019 for 6-month followup.  She reports at baseline condition, she retired.  She denies weight loss, no fever, no sweating, no headaches.  Her performance status is ECOG 0.  Patient had teeth extraction in February 2018.  She now has full-mouth denture.    Patient otherwise reports no dyspnea, no cough, hemoptysis.  She reports no fever or sweating.   She is on Aromasin with good tolerance. She denies arthralgia, hot flashes or sweating.  She denies bone pains.       Laboratory study on 12/3/2019 reported marginal anemia hemoglobin 11.7, otherwise normal CBC.  Chemistry lab reported magnesium 1.5, glucose 156 otherwise unremarkable.       Her CT scan for chest abdomen and pelvis with IV contrast on 6/3/2020 reported no evidence of worsening metastatic disease.      Laboratory studies on 6/3/2020 reported stable mild anemia with Hb 11.7, with MCV 97.8, normal platelets 260,000 and a WBC 4680.  Iron study reported ferritin 210, saturation 24%.  Unremarkable CMP with normal glucose, electrolytes and liver function panel and renal function.      Laboratory study on 8/6/2020 reported a peak ferritin 1124 when she was hospitalized for COVID-19 infection.  She had  "significant elevated CRP 7.73 mg/dL.  Her hemoglobin was 11.6, platelets 259,000 and WBC 11,733 ANC 10,390.     Laboratory studies 2020 reported normal CBC including Hb 12.6, WBC 5180, ANC 2790, lymphocytes 1500, platelets 274,000, magnesium 1.7, electrolytes normal otherwise,, elevated glucose 185, unremarkable CMP.  Ferritin 135, and iron saturation 24%.    The patient no longer takes iron. Laboratory study on 2022 reported excellent ferritin 251 and iron saturation 17% with free iron 63 and TIBC 372.    Laboratory study today on 2023 reported stable marginal anemia with hemoglobin 11.9 g/dL, MCV 94.5, normal platelets 247,000, WBC 5330 including neutrophils 3240.  Iron study reported ferritin 267 and iron saturation 29% free iron 117 TIBC 405.      MEDICATIONS: The current medication list was reviewed with the patient and updated in the EMR this date per the medical assistant. Medication dosages and frequencies were confirmed to be accurate.       ALLERGIES: LETROZOLE (questionably causing skin rash).       SOCIAL HISTORY: The patient is . She finished college education. 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 She has never smoked. No alcohol use. No illegal drug use. No risks for HIV.        FAMILY HISTORY: Her mother  of tuberculosis at the age of 48. A sister had thyroid cancer/lung cancer - no details are available. Two brothers had lung cancer and bone cancers in their 70s - no details available. Her maternal grandmother and her mother both had diabetes. No family history of hypertension or heart disease.       REVIEW OF SYSTEMS:   As per HPI      VITAL SIGNS:   Vitals:    23 1134   BP: 130/74   Pulse: 66   SpO2: 98%   Weight: 54 kg (119 lb)   Height: 154.9 cm (60.98\")     ECO      PHYSICAL EXAMINATION:   GENERAL:  Well-developed, well-nourished in no acute distress.  Orientated to time place and the people.  SKIN:  Warm, dry without rashes, purpura or " petechiae.  HEENT:  Normocephalic.   LYMPHATICS:  No cervical, supraclavicular adenopathy.  CHEST: Normal respiratory effort.  Lungs clear to auscultation. Good airflow.  CARDIAC:  Regular rate and rhythm without murmurs. Normal S1,S2.  ABDOMEN:  Soft, nontender with no organomegaly or masses.  Bowel sounds normal.  EXTREMITIES:  No lower extremity edema.      LABORATORY DATA:       Results from last 7 days   Lab Units 09/12/23  1116   WBC 10*3/mm3 4.31   NEUTROS ABS 10*3/mm3 2.31   HEMOGLOBIN g/dL 12.6   HEMATOCRIT % 37.7   PLATELETS 10*3/mm3 244     Lab Results   Component Value Date    GLUCOSE 248 (H) 09/12/2023    BUN 27 (H) 09/12/2023    CREATININE 0.88 09/12/2023    EGFR 68.2 09/12/2023    BCR 30.7 (H) 09/12/2023    K 4.2 09/12/2023    CO2 23.6 09/12/2023    CALCIUM 9.4 09/12/2023    ALBUMIN 4.4 09/12/2023    BILITOT 0.4 09/12/2023    AST 23 09/12/2023    ALT 19 09/12/2023     Lab Results   Component Value Date    DVOGPXPK72 913 03/14/2023     Lab Results   Component Value Date    FOLATE 16.60 03/14/2023         Lab Results   Component Value Date    IRON 117 02/27/2023    TIBC 405 02/27/2023    FERRITIN 267.20 (H) 02/27/2023   Iron saturation 29% on 2/27/2023         IMAGING:   CT Chest With Contrast Diagnostic (08/29/2023 11:46)     CT Abdomen Pelvis With Contrast (08/29/2023 11:46)      DEXA Bone Density Axial (08/29/2023 10:27)     ASSESSMENT:   1. Metastatic breast cancer with metastasis in the lungs, biopsy confirmed. ER/[place] positive. Her2 positive.    She had good response to Arimidex for many years.  However in early 2017, CT scan showed disease progression with solitary lung metastases.  We switched her to Aromasin in early February 2017, however she could not obtain Afinitor, because of the cost issue.     We obtained chest CT scan on 5/15/2017 which showed further disease progression.  Patient was treated with stereotactic radiation therapy in early June 2017.    We obtained CT scan twice on 9/25/2017  and on 6/21/2018 which showed post radiation therapy changes.    We obtained CT scan examination again on 6/10/2019 which showed no evidence of active disease.    Patient reports she is at baseline condition.  Physical examination is no evidence for palpable disease.  She continues to tolerate endocrine therapy with Aromasin as of 12/3/2020.    CT scan examination on 6/3/2020 showed no evidence of disease progression.  Aromasin will be continued.  On 12/8/2020, patient reports she has been tolerating Aromasin.    On 6/21/2021, patient had a CT scan for chest abdomen pelvis, which reported stable small pulmonary nodules and no evidence for metastatic disease.  Discussed with patient 6/30/2021, will continue current treatment with exemestane.  She returns for 6-month evaluation 1/31/2022 without evidence of recurrent disease.  She continues to tolerate exemestane very well.  We will repeat imaging in 6 months.   CT for chest abdomen pelvis on 8/16/2022 reported no evidence for metastatic disease in chest abdomen pelvis.  02/27/2023, patient reports tolerating exemestane. This will be continued because she has metastatic disease. I recommended to obtain CT scan for chest, abdomen, and pelvis in 6 months with IV conscious for reassessment of her metastatic breast cancer.    The patient had T scan examination for chest, abdomen, and pelvis on 08/29/2023. There is a stable left upper lobe pulmonary nodule post treatment changes. No increasing size and no new pulmonary nodules or lymphadenopathy.  There is no evidence for disease progression.  We discussed with patient today on 09/12/2023, she will continue treatment with exemestane.       2. Osteoporosis. She had bone density scan on 2/27/2017 which showed statistically significant worsening osteoporosis in the left hip.  We restarted her back on Prolia on 03/02/17 and repeat every 6 months.   Her last dose of Prolia was in June 2018.  Patient reported that she had pain  involving the left lower jaw after her teeth extraction in February 2018.  We concerned about possibility of necrosis of the jaw bone, so I recommended to discontinue prolia.    The patient will continue oral calcium and vitamin D supplementation.   Discussed again with the patient on 12/8/2020, recommended patient to have dental assessment before we restart her on Prolia.    6/30/2021, patient reports she was cleared by her dentist for resuming Prolia.   Repeat DEXA scan 8/11/2021 with ongoing osteoporosis  The patient has received clearance from her dentist and will therefore proceed with resumption of Prolia on 1/31/2022.  Continue Prolia today on 8/22/2022.  We will continue Prolia every 6 months. The patient will be given one dose today on 02/27/2023. I recommended to obtain a bone density scan in 08/2023, this is biannual examination.  The patient had a DEXA scan on 08/29/2023, which reported persistent osteoporosis, however, slightly improved bone density in the lumbar spine. Today, the patient will be given Prolia injection on 09/12/2023 and continue every 6 months.      3. Mild anemia with mild iron deficiency.    Patient has been taking oral iron supplementation with good results, laboratory study showed improved with ferritin and iron saturation, and normalized hemoglobin.   Her iron study on 6/10/2019 reported excellent ferritin 188, and the marginal iron saturation 18%.   Iron study on 6/3/2020 reported ferritin 210 and iron saturation 24%.  Patient was instructed to continue oral iron supplementation and oral vitamin B12 supplementation.   Laboratory study 12/8/2020 reported normal ferritin 135 and iron saturation 24%, together with normal hemoglobin 12.6.   Hemoglobin is within normal at 12.6 on 8/31/2022.  Hemoglobin 12.0, ferritin 251 and iron saturation 17% with free iron 63 TIBC 372 on 8/22/2022.  Patient discontinued oral iron in 08/2022. We are repeating laboratory studies today with the results  showed excellent ferritin 267 and normal iron saturation 29% with free iron 117 TIBC 405.   On 09/12/2023, the patient has normal hemoglobin 12.6.      4.  Hypomagnesemia.    She was started on oral magnesium oxide.  Repeat labs showed improved and marginally normal magnesium level 1.8 on 6/10/2019.    Recurrent hypomagnesemia 1.5 on 12/3/2019.   Magnesium 1.7 on 12/8/2020.  I asked patient to continue oral magnesium oxide.  Improved magnesium 1.9 on 2/24/2021.   Magnesium is normal today at 1.9 on 1/31/2022.  Magnesium 1.6 on 8/22/2022.  Discussed with patient, will increase oral magnesium to 800 mg twice a day.  On 2/27/2023 normal magnesium 1.9.  Continue oral magnesium 800 mg twice a day.   On 09/12/2023, laboratory studies reported normal magnesium 1.9.  Continue oral magnesium.        PLAN:   Proceed ahead with Prolia injection today.    Patient will continue exemestane 25 mg daily.  Continue oral magnesium 800 mg twice a day.  The patient will come back to see me in 6 months for reevaluation. We will obtain laboratory studies, CBC, CMP, magnesium level, phosphorus and due for next Prolia injection.   We will plan to have repeating CT scan in 12 months.         Discussed with the patient and her nephew today and I also shared the CT scan images with the patient and her nephew today.  They voiced understanding and agreeable with the treatment plan.        Carlos Castillo MD PhD  09/12/2023            CC:  Sussy Payne M.D.    Zari Palacios M.D.       Transcribed from ambient dictation for Carlos Castillo MD PhD by Alem Gallegos.  09/12/23   13:37 EDT    Patient or patient representative verbalized consent to the visit recording.  I have personally performed the services described in this document as transcribed by the above individual, and it is both accurate and complete.    Carlos Castillo MD PhD

## 2023-09-14 ENCOUNTER — OFFICE VISIT (OUTPATIENT)
Dept: CARDIOLOGY | Facility: CLINIC | Age: 78
End: 2023-09-14
Payer: MEDICARE

## 2023-09-14 ENCOUNTER — HOSPITAL ENCOUNTER (OUTPATIENT)
Dept: CARDIOLOGY | Facility: HOSPITAL | Age: 78
Discharge: HOME | End: 2023-09-14
Payer: MEDICARE

## 2023-09-14 VITALS
BODY MASS INDEX: 22.48 KG/M2 | HEIGHT: 61 IN | SYSTOLIC BLOOD PRESSURE: 148 MMHG | DIASTOLIC BLOOD PRESSURE: 71 MMHG | HEART RATE: 67 BPM | WEIGHT: 119.05 LBS

## 2023-09-14 VITALS
DIASTOLIC BLOOD PRESSURE: 71 MMHG | HEIGHT: 61 IN | SYSTOLIC BLOOD PRESSURE: 136 MMHG | BODY MASS INDEX: 23.03 KG/M2 | WEIGHT: 122 LBS | HEART RATE: 63 BPM

## 2023-09-14 DIAGNOSIS — R93.1 ABNORMAL CT SCAN OF HEART: ICD-10-CM

## 2023-09-14 DIAGNOSIS — R07.89 OTHER CHEST PAIN: ICD-10-CM

## 2023-09-14 DIAGNOSIS — R94.31 ABNORMAL ELECTROCARDIOGRAM: ICD-10-CM

## 2023-09-14 DIAGNOSIS — E78.00 PURE HYPERCHOLESTEROLEMIA: Primary | ICD-10-CM

## 2023-09-14 DIAGNOSIS — E78.00 PURE HYPERCHOLESTEROLEMIA: ICD-10-CM

## 2023-09-14 DIAGNOSIS — R94.31 ABNORMAL ELECTROCARDIOGRAM (ECG) (EKG): ICD-10-CM

## 2023-09-14 PROCEDURE — 93306 TTE W/DOPPLER COMPLETE: CPT | Performed by: INTERNAL MEDICINE

## 2023-09-14 PROCEDURE — 93000 ELECTROCARDIOGRAM COMPLETE: CPT | Performed by: INTERNAL MEDICINE

## 2023-09-14 PROCEDURE — 99213 OFFICE O/P EST LOW 20 MIN: CPT | Performed by: INTERNAL MEDICINE

## 2023-09-14 PROCEDURE — 93306 TTE W/DOPPLER COMPLETE: CPT

## 2023-09-14 NOTE — PROGRESS NOTES
Follow up    Subjective:        Brie Reese is a 77 y.o. female who here for follow up    CC  Follow-up hyperlipidemia abnormal EKG  HPI  77 years old female with hyperlipidemia abnormal EKG here for the follow-up with no complaints of chest pain tightness heaviness or the pressure sensation     Problems Addressed this Visit          Cardiac and Vasculature    Hyperlipidemia - Primary    Other chest pain    Abnormal electrocardiogram    Abnormal CT scan of heart     Diagnoses         Codes Comments    Pure hypercholesterolemia    -  Primary ICD-10-CM: E78.00  ICD-9-CM: 272.0     Abnormal CT scan of heart     ICD-10-CM: R93.1  ICD-9-CM: 793.2     Other chest pain     ICD-10-CM: R07.89  ICD-9-CM: 786.59     Abnormal electrocardiogram     ICD-10-CM: R94.31  ICD-9-CM: 794.31           .    The following portions of the patient's history were reviewed and updated as appropriate: allergies, current medications, past family history, past medical history, past social history, past surgical history and problem list.    Past Medical History:   Diagnosis Date    Breast cancer, Left     1998, 2009 metastisized to lungs    Diabetes mellitus     type 2     Hyperlipidemia     Hypothyroidism 01/2009    Left upper pulmonary nodule 07/05/2012    Osteoporosis 10/17/2011     reports that she has never smoked. She has never used smokeless tobacco. She reports that she does not drink alcohol and does not use drugs.   Family History   Problem Relation Age of Onset    Tuberculosis Mother     Diabetes Mother     Thyroid cancer Sister     Lung cancer Sister     Lung cancer Brother         In his 70s.    Bone cancer Brother         in his 70s.    Diabetes Maternal Grandmother     Lung cancer Brother         In his 70s.     Bone cancer Brother         in his 70s.    Hypertension Neg Hx     Heart disease Neg Hx        Review of Systems  Constitutional: No wt loss, fever, fatigue  Gastrointestinal: No nausea, abdominal pain  Behavioral/Psych:  "No insomnia or anxiety   Cardiovascular no chest pains or tightness in the chest  Objective:       Physical Exam           Physical Exam  /71 (BP Location: Right arm, Patient Position: Sitting, Cuff Size: Adult)   Pulse 63   Ht 154.9 cm (61\")   Wt 55.3 kg (122 lb)   BMI 23.05 kg/m²     General appearance: No acute changes   Eyes: Sclerae conjunctivae normal, pupils reactive   HENT: Atraumatic; oropharynx clear with moist mucous membranes and no mucosal ulcerations;  Neck: Trachea midline; NECK, supple, no thyromegaly or lymphadenopathy   Lungs: Normal size and shape, normal breath sounds, equal distribution of air, no rales and rhonchi   CV: S1-S2 regular, no murmurs, no rub, no gallop   Abdomen: Soft, nontender; no masses , no abnormal abdominal sounds   Extremities: No deformity , normal color , no peripheral edema   Skin: Normal temperature, turgor and texture; no rash, ulcers  Psych: Appropriate affect, alert and oriented to person, place and time             ECG 12 Lead    Date/Time: 9/14/2023 11:46 AM  Performed by: Zari Palacios MD  Authorized by: Zari Palacios MD   Comparison: compared with previous ECG   Similar to previous ECG  Rhythm: sinus rhythm    Clinical impression: non-specific ECG          Echocardiogram:        Current Outpatient Medications:     ACCU-CHEK DMITRIY PLUS test strip, USE TO TEST BLOOD SUGAR DAILY AS DIRECTED, Disp: , Rfl: 2    acetaminophen (ACETAMINOPHEN 8 HOUR) 650 MG 8 hr tablet, Take 1 tablet by mouth Every 8 (Eight) Hours As Needed for Mild Pain ., Disp: 180 tablet, Rfl: 1    atorvastatin (LIPITOR) 10 MG tablet, TAKE 1 TABLET BY MOUTH AT BEDTIME, Disp: , Rfl: 0    calcium carbonate (OS-JEWEL) 600 MG tablet, Take 1 tablet by mouth 2 (Two) Times a Day With Meals., Disp: 180 tablet, Rfl: 1    [employer] VITAMIN D 2000 UNITS capsule, TAKE 1 CAPSULE EVERY DAY, Disp: , Rfl: 6    Denosumab (PROLIA SC), Inject  under the skin into the appropriate area as directed Every " 6 (Six) Months., Disp: , Rfl:     diclofenac (VOLTAREN) 50 MG EC tablet, Take 1 tablet by mouth 2 (Two) Times a Day., Disp: 20 tablet, Rfl: 0    exemestane (AROMASIN) 25 MG chemo tablet, Take 1 tablet by mouth Daily., Disp: 90 tablet, Rfl: 3    GLIMEPIRIDE PO, Take 4 mg by mouth 2 (two) times a day., Disp: , Rfl:     levothyroxine (SYNTHROID, LEVOTHROID) 25 MCG tablet, TAKE 1 TABLET BY MOUTH EVERY DAY, Disp: , Rfl: 2    lidocaine (LIDODERM) 5 %, Place 1 patch on the skin as directed by provider Daily. Remove & Discard patch within 12 hours or as directed by MD, Disp: 20 each, Rfl: 0    Magnesium Oxide 400 (240 Mg) MG tablet, TAKE 2 TABLETS BY MOUTH TWICE A DAY, Disp: 360 tablet, Rfl: 0    metFORMIN (GLUCOPHAGE) 500 MG tablet, TAKE 3 TABLETS BY MOUTH EVERY MORNING, AND 2 TABLETS EVERY EVENING WITH FOOD FOR DIABETES, Disp: , Rfl: 3    omeprazole (priLOSEC) 20 MG capsule, TAKE 1 CAPSULE BY MOUTH EVERY DAY FOR 10 DAYS AND THEN AS NEEDED DAILY, Disp: 90 capsule, Rfl: 1    pioglitazone (ACTOS) 30 MG tablet, , Disp: , Rfl:    Assessment:        Patient Active Problem List   Diagnosis    Malignant neoplasm of female breast    Anemia    Iron deficiency anemia    Malignant neoplasm metastatic to left lung    Osteoporosis    Hypomagnesemia    Acute bronchitis due to COVID-19 virus    Hypothyroidism    Diabetes mellitus    Hyperlipidemia    Hyponatremia    Immunocompromised    Acute respiratory failure with hypoxia    Other chest pain    Abnormal electrocardiogram    Abnormal CT scan of heart               Plan:            ICD-10-CM ICD-9-CM   1. Pure hypercholesterolemia  E78.00 272.0   2. Abnormal CT scan of heart  R93.1 793.2   3. Other chest pain  R07.89 786.59   4. Abnormal electrocardiogram  R94.31 794.31     1. Pure hypercholesterolemia  Continue current treatment    2. Abnormal CT scan of heart  No chest    3. Other chest pain  No chest pain    4. Abnormal electrocardiogram  No chest pain      1  ydr  COUNSELING:    Bire ReeseCounspati was given to patient for the following topics: diagnostic results, risk factor reductions, impressions, risks and benefits of treatment options and importance of treatment compliance .       SMOKING COUNSELING:        Dictated using Dragon dictation

## 2023-09-18 LAB
AORTIC DIMENSIONLESS INDEX: 0.74 (DI)
ASCENDING AORTA: 3.1 CM
BH CV ECHO MEAS - ACS: 1.8 CM
BH CV ECHO MEAS - AO MAX PG: 5.2 MMHG
BH CV ECHO MEAS - AO MEAN PG: 2.5 MMHG
BH CV ECHO MEAS - AO ROOT DIAM: 3.3 CM
BH CV ECHO MEAS - AO V2 MAX: 114 CM/SEC
BH CV ECHO MEAS - AO V2 VTI: 23.8 CM
BH CV ECHO MEAS - AVA(I,D): 2.37 CM2
BH CV ECHO MEAS - EDV(CUBED): 42.5 ML
BH CV ECHO MEAS - EDV(MOD-SP2): 49.7 ML
BH CV ECHO MEAS - EDV(MOD-SP4): 56.3 ML
BH CV ECHO MEAS - EF(MOD-BP): 62.9 %
BH CV ECHO MEAS - EF(MOD-SP2): 64 %
BH CV ECHO MEAS - EF(MOD-SP4): 61.8 %
BH CV ECHO MEAS - ESV(CUBED): 11.7 ML
BH CV ECHO MEAS - ESV(MOD-SP2): 17.9 ML
BH CV ECHO MEAS - ESV(MOD-SP4): 21.5 ML
BH CV ECHO MEAS - FS: 35 %
BH CV ECHO MEAS - IVS/LVPW: 1.05 CM
BH CV ECHO MEAS - IVSD: 1.25 CM
BH CV ECHO MEAS - LAT PEAK E' VEL: 5.2 CM/SEC
BH CV ECHO MEAS - LV DIASTOLIC VOL/BSA (35-75): 37.3 CM2
BH CV ECHO MEAS - LV MASS(C)D: 138.7 GRAMS
BH CV ECHO MEAS - LV MAX PG: 3.5 MMHG
BH CV ECHO MEAS - LV MEAN PG: 2 MMHG
BH CV ECHO MEAS - LV SYSTOLIC VOL/BSA (12-30): 14.3 CM2
BH CV ECHO MEAS - LV V1 MAX: 93.1 CM/SEC
BH CV ECHO MEAS - LV V1 VTI: 19.9 CM
BH CV ECHO MEAS - LVIDD: 3.5 CM
BH CV ECHO MEAS - LVIDS: 2.27 CM
BH CV ECHO MEAS - LVOT AREA: 2.8 CM2
BH CV ECHO MEAS - LVOT DIAM: 1.9 CM
BH CV ECHO MEAS - LVPWD: 1.19 CM
BH CV ECHO MEAS - MED PEAK E' VEL: 4.9 CM/SEC
BH CV ECHO MEAS - MV A MAX VEL: 85.9 CM/SEC
BH CV ECHO MEAS - MV DEC SLOPE: 442 CM/SEC2
BH CV ECHO MEAS - MV DEC TIME: 0.36 MSEC
BH CV ECHO MEAS - MV E MAX VEL: 46.1 CM/SEC
BH CV ECHO MEAS - MV E/A: 0.54
BH CV ECHO MEAS - MV MAX PG: 4.5 MMHG
BH CV ECHO MEAS - MV MEAN PG: 1 MMHG
BH CV ECHO MEAS - MV P1/2T: 70.9 MSEC
BH CV ECHO MEAS - MV V2 VTI: 27.3 CM
BH CV ECHO MEAS - MVA(P1/2T): 3.1 CM2
BH CV ECHO MEAS - MVA(VTI): 2.06 CM2
BH CV ECHO MEAS - PA ACC TIME: 0.08 SEC
BH CV ECHO MEAS - PA V2 MAX: 102 CM/SEC
BH CV ECHO MEAS - PULM A REVS DUR: 0.16 SEC
BH CV ECHO MEAS - PULM A REVS VEL: 23.8 CM/SEC
BH CV ECHO MEAS - PULM DIAS VEL: 27.2 CM/SEC
BH CV ECHO MEAS - PULM S/D: 1.16
BH CV ECHO MEAS - PULM SYS VEL: 31.5 CM/SEC
BH CV ECHO MEAS - QP/QS: 0.39
BH CV ECHO MEAS - RAP SYSTOLE: 8 MMHG
BH CV ECHO MEAS - RV MAX PG: 2.41 MMHG
BH CV ECHO MEAS - RV V1 MAX: 77.6 CM/SEC
BH CV ECHO MEAS - RV V1 VTI: 14.4 CM
BH CV ECHO MEAS - RVOT DIAM: 1.4 CM
BH CV ECHO MEAS - RVSP: 34 MMHG
BH CV ECHO MEAS - SI(MOD-SP2): 21.1 ML/M2
BH CV ECHO MEAS - SI(MOD-SP4): 23.1 ML/M2
BH CV ECHO MEAS - SV(LVOT): 56.3 ML
BH CV ECHO MEAS - SV(MOD-SP2): 31.8 ML
BH CV ECHO MEAS - SV(MOD-SP4): 34.8 ML
BH CV ECHO MEAS - SV(RVOT): 22.2 ML
BH CV ECHO MEAS - TAPSE (>1.6): 2.04 CM
BH CV ECHO MEAS - TR MAX PG: 23.8 MMHG
BH CV ECHO MEAS - TR MAX VEL: 244 CM/SEC
BH CV ECHO MEASUREMENTS AVERAGE E/E' RATIO: 9.13
BH CV XLRA - RV BASE: 2.06 CM
BH CV XLRA - RV LENGTH: 6.3 CM
BH CV XLRA - RV MID: 1.81 CM
BH CV XLRA - TDI S': 10.2 CM/SEC
LEFT ATRIUM VOLUME INDEX: 20.8 ML/M2
SINUS: 3.1 CM
STJ: 2.32 CM

## 2023-12-11 ENCOUNTER — APPOINTMENT (OUTPATIENT)
Dept: CT IMAGING | Facility: HOSPITAL | Age: 78
End: 2023-12-11
Payer: MEDICARE

## 2023-12-11 ENCOUNTER — HOSPITAL ENCOUNTER (EMERGENCY)
Facility: HOSPITAL | Age: 78
Discharge: HOME | End: 2023-12-12
Attending: EMERGENCY MEDICINE | Admitting: EMERGENCY MEDICINE
Payer: MEDICARE

## 2023-12-11 DIAGNOSIS — M79.18 MUSCULOSKELETAL PAIN: Primary | ICD-10-CM

## 2023-12-11 DIAGNOSIS — M54.9 ACUTE LEFT-SIDED BACK PAIN, UNSPECIFIED BACK LOCATION: ICD-10-CM

## 2023-12-11 DIAGNOSIS — J06.9 VIRAL URI: ICD-10-CM

## 2023-12-11 DIAGNOSIS — M62.838 TRAPEZIUS MUSCLE SPASM: ICD-10-CM

## 2023-12-11 LAB
ALBUMIN SERPL-MCNC: 3.5 G/DL (ref 3.5–5.2)
ALBUMIN/GLOB SERPL: 1.3 G/DL
ALP SERPL-CCNC: 117 U/L (ref 39–117)
ALT SERPL W P-5'-P-CCNC: 27 U/L (ref 1–33)
ANION GAP SERPL CALCULATED.3IONS-SCNC: 10 MMOL/L (ref 5–15)
AST SERPL-CCNC: 25 U/L (ref 1–32)
BASOPHILS # BLD AUTO: 0.05 10*3/MM3 (ref 0–0.2)
BASOPHILS NFR BLD AUTO: 1.3 % (ref 0–1.5)
BILIRUB SERPL-MCNC: 0.2 MG/DL (ref 0–1.2)
BUN SERPL-MCNC: 16 MG/DL (ref 8–23)
BUN/CREAT SERPL: 17 (ref 7–25)
CALCIUM SPEC-SCNC: 8.6 MG/DL (ref 8.6–10.5)
CHLORIDE SERPL-SCNC: 103 MMOL/L (ref 98–107)
CO2 SERPL-SCNC: 23 MMOL/L (ref 22–29)
CREAT SERPL-MCNC: 0.94 MG/DL (ref 0.57–1)
DEPRECATED RDW RBC AUTO: 43.6 FL (ref 37–54)
EGFRCR SERPLBLD CKD-EPI 2021: 62.6 ML/MIN/1.73
EOSINOPHIL # BLD AUTO: 0.07 10*3/MM3 (ref 0–0.4)
EOSINOPHIL NFR BLD AUTO: 1.8 % (ref 0.3–6.2)
ERYTHROCYTE [DISTWIDTH] IN BLOOD BY AUTOMATED COUNT: 12.5 % (ref 12.3–15.4)
FLUAV RNA RESP QL NAA+PROBE: NOT DETECTED
FLUBV RNA RESP QL NAA+PROBE: NOT DETECTED
GLOBULIN UR ELPH-MCNC: 2.6 GM/DL
GLUCOSE SERPL-MCNC: 320 MG/DL (ref 65–99)
HCT VFR BLD AUTO: 33.8 % (ref 34–46.6)
HGB BLD-MCNC: 11.1 G/DL (ref 12–15.9)
IMM GRANULOCYTES # BLD AUTO: 0.01 10*3/MM3 (ref 0–0.05)
IMM GRANULOCYTES NFR BLD AUTO: 0.3 % (ref 0–0.5)
LYMPHOCYTES # BLD AUTO: 1.25 10*3/MM3 (ref 0.7–3.1)
LYMPHOCYTES NFR BLD AUTO: 32.8 % (ref 19.6–45.3)
MCH RBC QN AUTO: 31.7 PG (ref 26.6–33)
MCHC RBC AUTO-ENTMCNC: 32.8 G/DL (ref 31.5–35.7)
MCV RBC AUTO: 96.6 FL (ref 79–97)
MONOCYTES # BLD AUTO: 0.42 10*3/MM3 (ref 0.1–0.9)
MONOCYTES NFR BLD AUTO: 11 % (ref 5–12)
NEUTROPHILS NFR BLD AUTO: 2.01 10*3/MM3 (ref 1.7–7)
NEUTROPHILS NFR BLD AUTO: 52.8 % (ref 42.7–76)
NRBC BLD AUTO-RTO: 0 /100 WBC (ref 0–0.2)
PLATELET # BLD AUTO: 208 10*3/MM3 (ref 140–450)
PMV BLD AUTO: 8.9 FL (ref 6–12)
POTASSIUM SERPL-SCNC: 4.3 MMOL/L (ref 3.5–5.2)
PROT SERPL-MCNC: 6.1 G/DL (ref 6–8.5)
RBC # BLD AUTO: 3.5 10*6/MM3 (ref 3.77–5.28)
RSV RNA NPH QL NAA+NON-PROBE: NOT DETECTED
SARS-COV-2 RNA RESP QL NAA+PROBE: NOT DETECTED
SODIUM SERPL-SCNC: 136 MMOL/L (ref 136–145)
WBC NRBC COR # BLD AUTO: 3.81 10*3/MM3 (ref 3.4–10.8)

## 2023-12-11 PROCEDURE — 25510000001 IOPAMIDOL PER 1 ML: Performed by: EMERGENCY MEDICINE

## 2023-12-11 PROCEDURE — 71275 CT ANGIOGRAPHY CHEST: CPT

## 2023-12-11 PROCEDURE — 87637 SARSCOV2&INF A&B&RSV AMP PRB: CPT | Performed by: PHYSICIAN ASSISTANT

## 2023-12-11 PROCEDURE — 80053 COMPREHEN METABOLIC PANEL: CPT | Performed by: PHYSICIAN ASSISTANT

## 2023-12-11 PROCEDURE — 99285 EMERGENCY DEPT VISIT HI MDM: CPT

## 2023-12-11 PROCEDURE — 85025 COMPLETE CBC W/AUTO DIFF WBC: CPT | Performed by: PHYSICIAN ASSISTANT

## 2023-12-11 PROCEDURE — 25810000003 LACTATED RINGERS SOLUTION: Performed by: PHYSICIAN ASSISTANT

## 2023-12-11 RX ORDER — ACETAMINOPHEN 500 MG
1000 TABLET ORAL EVERY 6 HOURS PRN
Qty: 30 TABLET | Refills: 0 | Status: SHIPPED | OUTPATIENT
Start: 2023-12-11

## 2023-12-11 RX ORDER — SODIUM CHLORIDE 0.9 % (FLUSH) 0.9 %
10 SYRINGE (ML) INJECTION AS NEEDED
Status: DISCONTINUED | OUTPATIENT
Start: 2023-12-11 | End: 2023-12-12 | Stop reason: HOSPADM

## 2023-12-11 RX ORDER — LIDOCAINE 4 G/G
1 PATCH TOPICAL
Status: DISCONTINUED | OUTPATIENT
Start: 2023-12-11 | End: 2023-12-12 | Stop reason: HOSPADM

## 2023-12-11 RX ORDER — LIDOCAINE 50 MG/G
1 PATCH TOPICAL EVERY 24 HOURS
Qty: 15 PATCH | Refills: 0 | Status: SHIPPED | OUTPATIENT
Start: 2023-12-11

## 2023-12-11 RX ORDER — ACETAMINOPHEN 500 MG
1000 TABLET ORAL ONCE
Status: COMPLETED | OUTPATIENT
Start: 2023-12-11 | End: 2023-12-11

## 2023-12-11 RX ADMIN — ACETAMINOPHEN 1000 MG: 500 TABLET ORAL at 20:35

## 2023-12-11 RX ADMIN — SODIUM CHLORIDE, POTASSIUM CHLORIDE, SODIUM LACTATE AND CALCIUM CHLORIDE 500 ML: 600; 310; 30; 20 INJECTION, SOLUTION INTRAVENOUS at 21:12

## 2023-12-11 RX ADMIN — LIDOCAINE 1 PATCH: 4 PATCH TOPICAL at 20:35

## 2023-12-11 RX ADMIN — IOPAMIDOL 95 ML: 755 INJECTION, SOLUTION INTRAVENOUS at 22:40

## 2023-12-12 VITALS
SYSTOLIC BLOOD PRESSURE: 155 MMHG | HEART RATE: 74 BPM | OXYGEN SATURATION: 98 % | TEMPERATURE: 98.7 F | DIASTOLIC BLOOD PRESSURE: 83 MMHG | BODY MASS INDEX: 23.03 KG/M2 | RESPIRATION RATE: 16 BRPM | HEIGHT: 61 IN | WEIGHT: 122 LBS

## 2023-12-12 RX ORDER — LIDOCAINE 4 G/G
1 PATCH TOPICAL ONCE
Status: DISCONTINUED | OUTPATIENT
Start: 2023-12-12 | End: 2023-12-12 | Stop reason: HOSPADM

## 2023-12-12 RX ORDER — BENZONATATE 200 MG/1
200 CAPSULE ORAL 3 TIMES DAILY PRN
Qty: 15 CAPSULE | Refills: 0 | Status: SHIPPED | OUTPATIENT
Start: 2023-12-12

## 2023-12-12 RX ADMIN — LIDOCAINE 1 PATCH: 4 PATCH TOPICAL at 00:07

## 2023-12-12 NOTE — ED PROVIDER NOTES
MD ATTESTATION NOTE    The RUDI and I have discussed this patient's history, physical exam, and treatment plan.  I have reviewed the documentation and personally had a face to face interaction with the patient. I affirm the documentation and agree with the treatment and plan.  The attached note describes my personal findings.      I provided a substantive portion of the care of the patient.  I personally performed the physical exam in its entirety, and below are my findings.      Brief HPI: Patient presents for evaluation of left upper back pain.  Patient states pain has been there for several days.  Worse with certain movements.  Patient does have history of metastatic breast cancer.  Has had no shortness of breath.  Has had no hemoptysis.    PHYSICAL EXAM  ED Triage Vitals   Temp Heart Rate Resp BP SpO2   12/11/23 1923 12/11/23 1923 12/11/23 1923 12/11/23 1926 12/11/23 1923   98.7 °F (37.1 °C) 91 18 158/82 98 %      Temp src Heart Rate Source Patient Position BP Location FiO2 (%)   12/11/23 1923 12/11/23 1923 12/11/23 1926 12/11/23 1926 --   Tympanic Monitor Sitting Left arm          GENERAL: no acute distress  HENT: nares patent  EYES: no scleral icterus  CV: regular rhythm, normal rate  RESPIRATORY: normal effort  ABDOMEN: soft  MUSCULOSKELETAL: no deformity  NEURO: alert, moves all extremities, follows commands  PSYCH:  calm, cooperative  SKIN: warm, dry    Vital signs and nursing notes reviewed.        Plan: Pain control, CT angiogram       Myles Bueno MD  12/11/23 [id number]

## 2023-12-12 NOTE — ED PROVIDER NOTES
EMERGENCY DEPARTMENT ENCOUNTER    Room Number:  25/25  PCP: Sussy Payne MD      HPI:  Chief Complaint: Back pain  A complete HPI/ROS/PMH/PSH/SH/FH are unobtainable due to: Nothing  Context: Brie Reese is a 78 y.o. female who presents to the ED c/o back pain is been ongoing for the past 5 days.  Patient states is worse with movement and better when she remains still.  Last night it was hurting bad enough that she was unable to sleep.  She states she has had a mild cough but denies fever or chills.  She denies nausea, vomiting, abnormal bowel movements, shortness of breath, chest pain, palpitations associated with the back pain.  She does not recall any specific injury.  She is here for further evaluation.    Patient was seen 9/12/2023 by Dr. Castillo for metastatic breast cancer.  She is tolerating exemestane.  This is to be continued because she has metastatic disease.  A CT scan of the chest abdomen pelvis was recommended in 6 months time.      PAST MEDICAL HISTORY  Active Ambulatory Problems     Diagnosis Date Noted    Malignant neoplasm of female breast 03/14/2016    Anemia 04/12/2016    Iron deficiency anemia 04/20/2016    Malignant neoplasm metastatic to left lung 02/06/2017    Osteoporosis 02/06/2017    Hypomagnesemia 01/08/2019    Acute bronchitis due to COVID-19 virus 08/04/2020    Hypothyroidism 01/01/2009    Diabetes mellitus 08/04/2020    Hyperlipidemia 08/04/2020    Hyponatremia 08/04/2020    Immunocompromised 08/04/2020    Acute respiratory failure with hypoxia 08/04/2020    Other chest pain 08/22/2022    Abnormal electrocardiogram 09/06/2022    Abnormal CT scan of heart 09/06/2022     Resolved Ambulatory Problems     Diagnosis Date Noted    No Resolved Ambulatory Problems     Past Medical History:   Diagnosis Date    Breast cancer, Left     Left upper pulmonary nodule 07/05/2012         PAST SURGICAL HISTORY  Past Surgical History:   Procedure Laterality Date    BREAST BIOPSY Left 1995    MASTECTOMY  RADICAL Left 1995    MOUTH SURGERY  08/2015    tooth extraction          FAMILY HISTORY  Family History   Problem Relation Age of Onset    Tuberculosis Mother     Diabetes Mother     Thyroid cancer Sister     Lung cancer Sister     Lung cancer Brother         In his 70s.    Bone cancer Brother         in his 70s.    Diabetes Maternal Grandmother     Lung cancer Brother         In his 70s.     Bone cancer Brother         in his 70s.    Hypertension Neg Hx     Heart disease Neg Hx          SOCIAL HISTORY  Social History     Socioeconomic History    Marital status:     Years of education: College   Tobacco Use    Smoking status: Never    Smokeless tobacco: Never   Vaping Use    Vaping Use: Never used   Substance and Sexual Activity    Alcohol use: No    Drug use: No    Sexual activity: Defer     Birth control/protection: Post-menopausal         ALLERGIES  Patient has no known allergies.        REVIEW OF SYSTEMS  Review of Systems     All systems reviewed and negative except for those discussed in HPI.       PHYSICAL EXAM  ED Triage Vitals   Temp Heart Rate Resp BP SpO2   12/11/23 1923 12/11/23 1923 12/11/23 1923 12/11/23 1926 12/11/23 1923   98.7 °F (37.1 °C) 91 18 158/82 98 %      Temp src Heart Rate Source Patient Position BP Location FiO2 (%)   12/11/23 1923 12/11/23 1923 12/11/23 1926 12/11/23 1926 --   Tympanic Monitor Sitting Left arm        Physical Exam      GENERAL: Very pleasant, nontoxic, no acute distress  HENT: nares patent  EYES: no scleral icterus  CV: regular rhythm, normal rate  RESPIRATORY: normal effort, lungs CTA B  ABDOMEN: soft, nontender  MUSCULOSKELETAL: TTP left trapezius and rhomboid vicinity of the back.  Pain worse with extension, flexion, twisting of the trunk and movement of the left arm.  NEURO: alert, moves all extremities, follows commands  PSYCH:  calm, cooperative  SKIN: warm, dry    Vital signs and nursing notes reviewed.          LAB RESULTS  Recent Results (from the past 24  hour(s))   CBC Auto Differential    Collection Time: 12/11/23  9:11 PM    Specimen: Blood   Result Value Ref Range    WBC 3.81 3.40 - 10.80 10*3/mm3    RBC 3.50 (L) 3.77 - 5.28 10*6/mm3    Hemoglobin 11.1 (L) 12.0 - 15.9 g/dL    Hematocrit 33.8 (L) 34.0 - 46.6 %    MCV 96.6 79.0 - 97.0 fL    MCH 31.7 26.6 - 33.0 pg    MCHC 32.8 31.5 - 35.7 g/dL    RDW 12.5 12.3 - 15.4 %    RDW-SD 43.6 37.0 - 54.0 fl    MPV 8.9 6.0 - 12.0 fL    Platelets 208 140 - 450 10*3/mm3    Neutrophil % 52.8 42.7 - 76.0 %    Lymphocyte % 32.8 19.6 - 45.3 %    Monocyte % 11.0 5.0 - 12.0 %    Eosinophil % 1.8 0.3 - 6.2 %    Basophil % 1.3 0.0 - 1.5 %    Immature Grans % 0.3 0.0 - 0.5 %    Neutrophils, Absolute 2.01 1.70 - 7.00 10*3/mm3    Lymphocytes, Absolute 1.25 0.70 - 3.10 10*3/mm3    Monocytes, Absolute 0.42 0.10 - 0.90 10*3/mm3    Eosinophils, Absolute 0.07 0.00 - 0.40 10*3/mm3    Basophils, Absolute 0.05 0.00 - 0.20 10*3/mm3    Immature Grans, Absolute 0.01 0.00 - 0.05 10*3/mm3    nRBC 0.0 0.0 - 0.2 /100 WBC   COVID-19, FLU A/B, RSV PCR 1 HR TAT - Swab, Nasopharynx    Collection Time: 12/11/23  9:12 PM    Specimen: Nasopharynx; Swab   Result Value Ref Range    COVID19 Not Detected Not Detected - Ref. Range    Influenza A PCR Not Detected Not Detected    Influenza B PCR Not Detected Not Detected    RSV, PCR Not Detected Not Detected   Comprehensive Metabolic Panel    Collection Time: 12/11/23  9:54 PM    Specimen: Blood   Result Value Ref Range    Glucose 320 (H) 65 - 99 mg/dL    BUN 16 8 - 23 mg/dL    Creatinine 0.94 0.57 - 1.00 mg/dL    Sodium 136 136 - 145 mmol/L    Potassium 4.3 3.5 - 5.2 mmol/L    Chloride 103 98 - 107 mmol/L    CO2 23.0 22.0 - 29.0 mmol/L    Calcium 8.6 8.6 - 10.5 mg/dL    Total Protein 6.1 6.0 - 8.5 g/dL    Albumin 3.5 3.5 - 5.2 g/dL    ALT (SGPT) 27 1 - 33 U/L    AST (SGOT) 25 1 - 32 U/L    Alkaline Phosphatase 117 39 - 117 U/L    Total Bilirubin 0.2 0.0 - 1.2 mg/dL    Globulin 2.6 gm/dL    A/G Ratio 1.3 g/dL     BUN/Creatinine Ratio 17.0 7.0 - 25.0    Anion Gap 10.0 5.0 - 15.0 mmol/L    eGFR 62.6 >60.0 mL/min/1.73       Ordered the above labs and reviewed the results.        RADIOLOGY  CT Angiogram Chest    Result Date: 12/11/2023  CT ANGIOGRAM OF THE CHEST  HISTORY: Pleuritic chest pain. HISTORY of breast cancer.  COMPARISON: August 29, 2023  TECHNIQUE: Axial CT imaging was obtained through the thorax. IV contrast was administered. Three-D reformatted images were obtained.  FINDINGS: No acute pulmonary thromboembolus is seen. Thoracic aorta is normal in caliber. There is atherosclerotic involvement of the thoracic aorta and coronary arteries. No dissection is seen. The thyroid gland and trachea appear within normal limits. There is a small hiatal hernia. Mediastinal lymph nodes do not appear pathologically enlarged. The patient has an area of consolidation again noted in the left upper lobe. This measures 2.3 x 2.5 cm, which is similar to the prior study, when it measured 2.7 x 2.4 cm. No new pulmonary nodules or masses are seen. Patient appears to have some minimal atelectasis or scarring within the lungs. Images of the upper abdomen do not demonstrate any acute abnormalities. No acute osseous abnormalities are seen. Patient is status post left mastectomy.      No acute intrathoracic findings.  Radiation dose reduction techniques were utilized, including automated exposure control and exposure modulation based on body size.   This report was finalized on 12/11/2023 11:42 PM by Dr. Edna Pretty M.D on Workstation: [id number]       Ordered the above noted radiological studies. Reviewed by me in PACS.          PROCEDURES  Procedures          MEDICATIONS GIVEN IN ER  Medications   acetaminophen (TYLENOL) tablet 1,000 mg (1,000 mg Oral Given 12/11/23 2035)   lactated ringers bolus 500 mL (0 mL Intravenous Stopped 12/11/23 2350)   iopamidol (ISOVUE-370) 76 % injection 95 mL (95 mL Intravenous Given by Other 12/11/23  7663)         MEDICAL DECISION MAKING, PROGRESS, and CONSULTS    Discussion below represents my analysis of pertinent findings related to patient's condition, differential diagnosis, treatment plan and final disposition.      Orders placed during this visit:  Orders Placed This Encounter   Procedures    COVID-19, FLU A/B, RSV PCR 1 HR TAT - Swab, Nasopharynx    CT Angiogram Chest    Comprehensive Metabolic Panel    CBC Auto Differential    CBC & Differential         Additional sources:  - Discussed/obtained information from independent historians: None none  Additional information was obtained to confirm the patient's history.    - External (non-ED) record review: As above            - Chronic or social conditions impacting care: None        Differential diagnosis:    Muscle strain, muscle spasm, PE, bony mets, other underlying pathology.  Metastatic cancer, will obtain CBC, CMP, CTA to further evaluate.          DIAGNOSIS  Final diagnoses:   Musculoskeletal pain   Acute left-sided back pain, unspecified back location   Trapezius muscle spasm   Viral URI         DISPOSITION  discharge      Latest Documented Vital Signs:  As of 06:10 EST  BP- 155/83 HR- 74 Temp- 98.7 °F (37.1 °C) (Tympanic) O2 sat- 98%      --    Please note that portions of this were completed with a voice recognition program.       Note Disclaimer: At [facility], we believe that sharing information builds trust and better relationships. You are receiving this note because you are receiving care at [facility] or recently visited. It is possible you will see health information before a provider has talked with you about it. This kind of information can be easy to misunderstand. To help you fully understand what it means for your health, we urge you to discuss this note with your provider.         Saulo Noriega III, PA  12/12/23 [id number]

## 2023-12-12 NOTE — ED TRIAGE NOTES
Pt c/o left scapular pain that radiates into her left neck that started approx 1wk ago. Denies injury. Pt tender to area and worse with movement

## 2024-02-05 ENCOUNTER — TRANSCRIBE ORDERS (OUTPATIENT)
Dept: LAB | Facility: HOSPITAL | Age: 79
End: 2024-02-05
Payer: MEDICARE

## 2024-02-05 ENCOUNTER — LAB (OUTPATIENT)
Dept: LAB | Facility: HOSPITAL | Age: 79
End: 2024-02-05
Payer: MEDICARE

## 2024-02-05 DIAGNOSIS — I10 HTN (HYPERTENSION), BENIGN: ICD-10-CM

## 2024-02-05 DIAGNOSIS — Z86.39 HISTORY OF TYPE 2 DIABETES MELLITUS: ICD-10-CM

## 2024-02-05 DIAGNOSIS — E03.9 HYPOTHYROIDISM, UNSPECIFIED TYPE: ICD-10-CM

## 2024-02-05 DIAGNOSIS — Z86.39 HISTORY OF TYPE 2 DIABETES MELLITUS: Primary | ICD-10-CM

## 2024-02-05 LAB
ALBUMIN SERPL-MCNC: 4 G/DL (ref 3.5–5.2)
ALBUMIN/GLOB SERPL: 1.1 G/DL
ALP SERPL-CCNC: 129 U/L (ref 39–117)
ALT SERPL W P-5'-P-CCNC: 16 U/L (ref 1–33)
ANION GAP SERPL CALCULATED.3IONS-SCNC: 10.8 MMOL/L (ref 5–15)
AST SERPL-CCNC: 15 U/L (ref 1–32)
BACTERIA UR QL AUTO: ABNORMAL /HPF
BASOPHILS # BLD AUTO: 0.04 10*3/MM3 (ref 0–0.2)
BASOPHILS NFR BLD AUTO: 0.8 % (ref 0–1.5)
BILIRUB SERPL-MCNC: 0.3 MG/DL (ref 0–1.2)
BILIRUB UR QL STRIP: NEGATIVE
BUN SERPL-MCNC: 18 MG/DL (ref 8–23)
BUN/CREAT SERPL: 19.8 (ref 7–25)
CALCIUM SPEC-SCNC: 9 MG/DL (ref 8.6–10.5)
CHLORIDE SERPL-SCNC: 102 MMOL/L (ref 98–107)
CLARITY UR: ABNORMAL
CO2 SERPL-SCNC: 23.2 MMOL/L (ref 22–29)
COLOR UR: YELLOW
CREAT SERPL-MCNC: 0.91 MG/DL (ref 0.57–1)
DEPRECATED RDW RBC AUTO: 38.8 FL (ref 37–54)
EGFRCR SERPLBLD CKD-EPI 2021: 65.1 ML/MIN/1.73
EOSINOPHIL # BLD AUTO: 0.08 10*3/MM3 (ref 0–0.4)
EOSINOPHIL NFR BLD AUTO: 1.7 % (ref 0.3–6.2)
ERYTHROCYTE [DISTWIDTH] IN BLOOD BY AUTOMATED COUNT: 11.6 % (ref 12.3–15.4)
FOLATE SERPL-MCNC: >20 NG/ML (ref 4.78–24.2)
GLOBULIN UR ELPH-MCNC: 3.6 GM/DL
GLUCOSE SERPL-MCNC: 272 MG/DL (ref 65–99)
GLUCOSE UR STRIP-MCNC: ABNORMAL MG/DL
HBA1C MFR BLD: 11.3 % (ref 4.8–5.6)
HCT VFR BLD AUTO: 37.1 % (ref 34–46.6)
HGB BLD-MCNC: 11.8 G/DL (ref 12–15.9)
HGB UR QL STRIP.AUTO: ABNORMAL
HYALINE CASTS UR QL AUTO: ABNORMAL /LPF
IMM GRANULOCYTES # BLD AUTO: 0.01 10*3/MM3 (ref 0–0.05)
IMM GRANULOCYTES NFR BLD AUTO: 0.2 % (ref 0–0.5)
KETONES UR QL STRIP: NEGATIVE
LEUKOCYTE ESTERASE UR QL STRIP.AUTO: ABNORMAL
LYMPHOCYTES # BLD AUTO: 1.47 10*3/MM3 (ref 0.7–3.1)
LYMPHOCYTES NFR BLD AUTO: 31.1 % (ref 19.6–45.3)
MCH RBC QN AUTO: 29.4 PG (ref 26.6–33)
MCHC RBC AUTO-ENTMCNC: 31.8 G/DL (ref 31.5–35.7)
MCV RBC AUTO: 92.3 FL (ref 79–97)
MONOCYTES # BLD AUTO: 0.45 10*3/MM3 (ref 0.1–0.9)
MONOCYTES NFR BLD AUTO: 9.5 % (ref 5–12)
NEUTROPHILS NFR BLD AUTO: 2.67 10*3/MM3 (ref 1.7–7)
NEUTROPHILS NFR BLD AUTO: 56.7 % (ref 42.7–76)
NITRITE UR QL STRIP: NEGATIVE
NRBC BLD AUTO-RTO: 0 /100 WBC (ref 0–0.2)
PH UR STRIP.AUTO: 5.5 [PH] (ref 5–8)
PLATELET # BLD AUTO: 287 10*3/MM3 (ref 140–450)
PMV BLD AUTO: 8.4 FL (ref 6–12)
POTASSIUM SERPL-SCNC: 4.4 MMOL/L (ref 3.5–5.2)
PROT SERPL-MCNC: 7.6 G/DL (ref 6–8.5)
PROT UR QL STRIP: NEGATIVE
RBC # BLD AUTO: 4.02 10*6/MM3 (ref 3.77–5.28)
RBC # UR STRIP: ABNORMAL /HPF
REF LAB TEST METHOD: ABNORMAL
SODIUM SERPL-SCNC: 136 MMOL/L (ref 136–145)
SP GR UR STRIP: 1.02 (ref 1–1.03)
SQUAMOUS #/AREA URNS HPF: ABNORMAL /HPF
TSH SERPL DL<=0.05 MIU/L-ACNC: 5.24 UIU/ML (ref 0.27–4.2)
UROBILINOGEN UR QL STRIP: ABNORMAL
VIT B12 BLD-MCNC: 831 PG/ML (ref 211–946)
WBC # UR STRIP: ABNORMAL /HPF
WBC NRBC COR # BLD AUTO: 4.72 10*3/MM3 (ref 3.4–10.8)

## 2024-02-05 PROCEDURE — 82607 VITAMIN B-12: CPT

## 2024-02-05 PROCEDURE — 83036 HEMOGLOBIN GLYCOSYLATED A1C: CPT

## 2024-02-05 PROCEDURE — 85025 COMPLETE CBC W/AUTO DIFF WBC: CPT

## 2024-02-05 PROCEDURE — 80053 COMPREHEN METABOLIC PANEL: CPT

## 2024-02-05 PROCEDURE — 81001 URINALYSIS AUTO W/SCOPE: CPT

## 2024-02-05 PROCEDURE — 84443 ASSAY THYROID STIM HORMONE: CPT

## 2024-02-05 PROCEDURE — 36415 COLL VENOUS BLD VENIPUNCTURE: CPT

## 2024-02-05 PROCEDURE — 82746 ASSAY OF FOLIC ACID SERUM: CPT

## 2024-03-16 ENCOUNTER — APPOINTMENT (OUTPATIENT)
Dept: GENERAL RADIOLOGY | Facility: HOSPITAL | Age: 79
End: 2024-03-16
Payer: MEDICARE

## 2024-03-16 ENCOUNTER — HOSPITAL ENCOUNTER (EMERGENCY)
Facility: HOSPITAL | Age: 79
Discharge: HOME | End: 2024-03-16
Attending: EMERGENCY MEDICINE
Payer: MEDICARE

## 2024-03-16 VITALS
DIASTOLIC BLOOD PRESSURE: 74 MMHG | BODY MASS INDEX: 22.47 KG/M2 | WEIGHT: 119 LBS | OXYGEN SATURATION: 97 % | TEMPERATURE: 99.8 F | HEART RATE: 79 BPM | SYSTOLIC BLOOD PRESSURE: 147 MMHG | RESPIRATION RATE: 14 BRPM | HEIGHT: 61 IN

## 2024-03-16 DIAGNOSIS — R73.9 HYPERGLYCEMIA: ICD-10-CM

## 2024-03-16 DIAGNOSIS — R50.9 FEVER IN ADULT: ICD-10-CM

## 2024-03-16 DIAGNOSIS — U07.1 COVID-19: Primary | ICD-10-CM

## 2024-03-16 DIAGNOSIS — Z86.39 HISTORY OF DIABETES MELLITUS: ICD-10-CM

## 2024-03-16 DIAGNOSIS — R05.1 ACUTE COUGH: ICD-10-CM

## 2024-03-16 LAB
ANION GAP SERPL CALCULATED.3IONS-SCNC: 11 MMOL/L (ref 5–15)
B PARAPERT DNA SPEC QL NAA+PROBE: NOT DETECTED
B PERT DNA SPEC QL NAA+PROBE: NOT DETECTED
BASOPHILS # BLD AUTO: 0.05 10*3/MM3 (ref 0–0.2)
BASOPHILS NFR BLD AUTO: 0.6 % (ref 0–1.5)
BUN SERPL-MCNC: 18 MG/DL (ref 8–23)
BUN/CREAT SERPL: 17.1 (ref 7–25)
C PNEUM DNA NPH QL NAA+NON-PROBE: NOT DETECTED
CALCIUM SPEC-SCNC: 9 MG/DL (ref 8.6–10.5)
CHLORIDE SERPL-SCNC: 102 MMOL/L (ref 98–107)
CO2 SERPL-SCNC: 22 MMOL/L (ref 22–29)
CREAT SERPL-MCNC: 1.05 MG/DL (ref 0.57–1)
D-LACTATE SERPL-SCNC: 2 MMOL/L (ref 0.5–2)
DEPRECATED RDW RBC AUTO: 41.5 FL (ref 37–54)
EGFRCR SERPLBLD CKD-EPI 2021: 54.8 ML/MIN/1.73
EOSINOPHIL # BLD AUTO: 0.05 10*3/MM3 (ref 0–0.4)
EOSINOPHIL NFR BLD AUTO: 0.6 % (ref 0.3–6.2)
ERYTHROCYTE [DISTWIDTH] IN BLOOD BY AUTOMATED COUNT: 12.1 % (ref 12.3–15.4)
FLUAV SUBTYP SPEC NAA+PROBE: NOT DETECTED
FLUBV RNA ISLT QL NAA+PROBE: NOT DETECTED
GLUCOSE SERPL-MCNC: 355 MG/DL (ref 65–99)
HADV DNA SPEC NAA+PROBE: NOT DETECTED
HCOV 229E RNA SPEC QL NAA+PROBE: NOT DETECTED
HCOV HKU1 RNA SPEC QL NAA+PROBE: NOT DETECTED
HCOV NL63 RNA SPEC QL NAA+PROBE: DETECTED
HCOV OC43 RNA SPEC QL NAA+PROBE: NOT DETECTED
HCT VFR BLD AUTO: 39.7 % (ref 34–46.6)
HGB BLD-MCNC: 12.9 G/DL (ref 12–15.9)
HMPV RNA NPH QL NAA+NON-PROBE: NOT DETECTED
HPIV1 RNA ISLT QL NAA+PROBE: NOT DETECTED
HPIV2 RNA SPEC QL NAA+PROBE: NOT DETECTED
HPIV3 RNA NPH QL NAA+PROBE: NOT DETECTED
HPIV4 P GENE NPH QL NAA+PROBE: NOT DETECTED
IMM GRANULOCYTES # BLD AUTO: 0.01 10*3/MM3 (ref 0–0.05)
IMM GRANULOCYTES NFR BLD AUTO: 0.1 % (ref 0–0.5)
LYMPHOCYTES # BLD AUTO: 1.69 10*3/MM3 (ref 0.7–3.1)
LYMPHOCYTES NFR BLD AUTO: 20.1 % (ref 19.6–45.3)
M PNEUMO IGG SER IA-ACNC: NOT DETECTED
MAGNESIUM SERPL-MCNC: 1.9 MG/DL (ref 1.6–2.4)
MCH RBC QN AUTO: 30.6 PG (ref 26.6–33)
MCHC RBC AUTO-ENTMCNC: 32.5 G/DL (ref 31.5–35.7)
MCV RBC AUTO: 94.1 FL (ref 79–97)
MONOCYTES # BLD AUTO: 0.67 10*3/MM3 (ref 0.1–0.9)
MONOCYTES NFR BLD AUTO: 8 % (ref 5–12)
NEUTROPHILS NFR BLD AUTO: 5.92 10*3/MM3 (ref 1.7–7)
NEUTROPHILS NFR BLD AUTO: 70.6 % (ref 42.7–76)
NRBC BLD AUTO-RTO: 0 /100 WBC (ref 0–0.2)
PLATELET # BLD AUTO: 218 10*3/MM3 (ref 140–450)
PMV BLD AUTO: 9 FL (ref 6–12)
POTASSIUM SERPL-SCNC: 4.8 MMOL/L (ref 3.5–5.2)
PROCALCITONIN SERPL-MCNC: 0.02 NG/ML (ref 0–0.25)
RBC # BLD AUTO: 4.22 10*6/MM3 (ref 3.77–5.28)
RHINOVIRUS RNA SPEC NAA+PROBE: NOT DETECTED
RSV RNA NPH QL NAA+NON-PROBE: NOT DETECTED
SARS-COV-2 RNA NPH QL NAA+NON-PROBE: DETECTED
SODIUM SERPL-SCNC: 135 MMOL/L (ref 136–145)
WBC NRBC COR # BLD AUTO: 8.39 10*3/MM3 (ref 3.4–10.8)

## 2024-03-16 PROCEDURE — 0202U NFCT DS 22 TRGT SARS-COV-2: CPT | Performed by: EMERGENCY MEDICINE

## 2024-03-16 PROCEDURE — 36415 COLL VENOUS BLD VENIPUNCTURE: CPT

## 2024-03-16 PROCEDURE — 99283 EMERGENCY DEPT VISIT LOW MDM: CPT

## 2024-03-16 PROCEDURE — 83605 ASSAY OF LACTIC ACID: CPT | Performed by: EMERGENCY MEDICINE

## 2024-03-16 PROCEDURE — 80048 BASIC METABOLIC PNL TOTAL CA: CPT | Performed by: EMERGENCY MEDICINE

## 2024-03-16 PROCEDURE — 83735 ASSAY OF MAGNESIUM: CPT | Performed by: EMERGENCY MEDICINE

## 2024-03-16 PROCEDURE — 85025 COMPLETE CBC W/AUTO DIFF WBC: CPT | Performed by: EMERGENCY MEDICINE

## 2024-03-16 PROCEDURE — 71045 X-RAY EXAM CHEST 1 VIEW: CPT

## 2024-03-16 PROCEDURE — 25810000003 SODIUM CHLORIDE 0.9 % SOLUTION: Performed by: EMERGENCY MEDICINE

## 2024-03-16 PROCEDURE — 84145 PROCALCITONIN (PCT): CPT | Performed by: EMERGENCY MEDICINE

## 2024-03-16 RX ORDER — ACETAMINOPHEN 500 MG
1000 TABLET ORAL ONCE
Status: COMPLETED | OUTPATIENT
Start: 2024-03-16 | End: 2024-03-16

## 2024-03-16 RX ADMIN — ACETAMINOPHEN 1000 MG: 500 TABLET ORAL at 11:57

## 2024-03-16 RX ADMIN — SODIUM CHLORIDE 1000 ML: 9 INJECTION, SOLUTION INTRAVENOUS at 14:21

## 2024-03-16 NOTE — DISCHARGE INSTRUCTIONS
Do not take your atorvastatin until 2 to 3 days after completion of the Paxlovid.  Stay well-hydrated, continue all other current medications, follow-up with primary care provider on Monday as discussed, ED return for worsening symptoms as needed.

## 2024-03-16 NOTE — ED PROVIDER NOTES
EMERGENCY DEPARTMENT ENCOUNTER    Room Number:  20/20  PCP: Sussy Payne MD  Historian: Patient      HPI:  Chief Complaint: Cough, fever  A complete HPI/ROS/PMH/PSH/SH/FH are unobtainable due to: None    Context: Brie Reese is a 78 y.o. female who presents to the ED via private vehicle from home c/o acute cough which is productive with white sputum and mild shortness of breath for 4 days.  Fever today.  No vomiting or diarrhea.  Has been running higher on her blood glucose levels in the 300s at home.      MEDICAL RECORD REVIEW    External (non-ED) record review: Adult transthoracic echo September 18, 2023 shows ejection fraction 61 to 65%              PAST MEDICAL HISTORY  Active Ambulatory Problems     Diagnosis Date Noted    Malignant neoplasm of female breast 03/14/2016    Anemia 04/12/2016    Iron deficiency anemia 04/20/2016    Malignant neoplasm metastatic to left lung 02/06/2017    Osteoporosis 02/06/2017    Hypomagnesemia 01/08/2019    Acute bronchitis due to COVID-19 virus 08/04/2020    Hypothyroidism 01/01/2009    Diabetes mellitus 08/04/2020    Hyperlipidemia 08/04/2020    Hyponatremia 08/04/2020    Immunocompromised 08/04/2020    Acute respiratory failure with hypoxia 08/04/2020    Other chest pain 08/22/2022    Abnormal electrocardiogram 09/06/2022    Abnormal CT scan of heart 09/06/2022     Resolved Ambulatory Problems     Diagnosis Date Noted    No Resolved Ambulatory Problems     Past Medical History:   Diagnosis Date    Breast cancer, Left     Left upper pulmonary nodule 07/05/2012         PAST SURGICAL HISTORY  Past Surgical History:   Procedure Laterality Date    BREAST BIOPSY Left 1995    MASTECTOMY RADICAL Left 1995    MOUTH SURGERY  08/2015    tooth extraction          FAMILY HISTORY  Family History   Problem Relation Age of Onset    Tuberculosis Mother     Diabetes Mother     Thyroid cancer Sister     Lung cancer Sister     Lung cancer Brother         In his 70s.    Bone cancer Brother          in his 70s.    Diabetes Maternal Grandmother     Lung cancer Brother         In his 70s.     Bone cancer Brother         in his 70s.    Hypertension Neg Hx     Heart disease Neg Hx          SOCIAL HISTORY  Social History     Socioeconomic History    Marital status:     Years of education: College   Tobacco Use    Smoking status: Never    Smokeless tobacco: Never   Vaping Use    Vaping status: Never Used   Substance and Sexual Activity    Alcohol use: No    Drug use: No    Sexual activity: Defer     Birth control/protection: Post-menopausal         ALLERGIES  Patient has no known allergies.        REVIEW OF SYSTEMS  Review of Systems     All systems reviewed and negative except for those discussed in HPI.       PHYSICAL EXAM    I have reviewed the triage vital signs and nursing notes.    ED Triage Vitals   Temp Heart Rate Resp BP SpO2   03/16/24 1121 03/16/24 1121 03/16/24 1121 03/16/24 1155 03/16/24 1121   (!) 100.8 °F (38.2 °C) 107 18 135/78 97 %      Temp src Heart Rate Source Patient Position BP Location FiO2 (%)   03/16/24 1121 03/16/24 1121 03/16/24 1155 03/16/24 1155 --   Tympanic Monitor Lying Right arm        Physical Exam  General: No acute distress, nontoxic  HEENT: Mucous membranes moist, atraumatic, EOMI  Neck: Full ROM  Pulm: Symmetric chest rise, nonlabored, lungs CTAB  Cardiovascular: Regular rate and rhythm, intact distal pulses, no peripheral edema  GI: Soft, nontender, nondistended, no rebound, no guarding, bowel sounds present  MSK: Full ROM, no deformity  Skin: Warm, dry  Neuro: Awake, alert, oriented x 4, GCS 15, moving all extremities, no focal deficits  Psych: Calm, cooperative      I wore an N95 mask, eye protection, and gloves used during this encounter.       LAB RESULTS  Recent Results (from the past 24 hour(s))   Respiratory Panel PCR w/COVID-19(SARS-CoV-2) SIRISHA/ANGELITA/GAMAL/PAD/COR/SHERYL In-House, NP Swab in UTM/VTM, 2 HR TAT - Swab, Nasopharynx    Collection Time: 03/16/24 11:45  AM    Specimen: Nasopharynx; Swab   Result Value Ref Range    ADENOVIRUS, PCR Not Detected Not Detected    Coronavirus 229E Not Detected Not Detected    Coronavirus HKU1 Not Detected Not Detected    Coronavirus NL63 Detected (A) Not Detected    Coronavirus OC43 Not Detected Not Detected    COVID19 Detected (C) Not Detected - Ref. Range    Human Metapneumovirus Not Detected Not Detected    Human Rhinovirus/Enterovirus Not Detected Not Detected    Influenza A PCR Not Detected Not Detected    Influenza B PCR Not Detected Not Detected    Parainfluenza Virus 1 Not Detected Not Detected    Parainfluenza Virus 2 Not Detected Not Detected    Parainfluenza Virus 3 Not Detected Not Detected    Parainfluenza Virus 4 Not Detected Not Detected    RSV, PCR Not Detected Not Detected    Bordetella pertussis pcr Not Detected Not Detected    Bordetella parapertussis PCR Not Detected Not Detected    Chlamydophila pneumoniae PCR Not Detected Not Detected    Mycoplasma pneumo by PCR Not Detected Not Detected   Lactic Acid, Plasma    Collection Time: 03/16/24 12:00 PM    Specimen: Blood   Result Value Ref Range    Lactate 2.0 0.5 - 2.0 mmol/L   CBC Auto Differential    Collection Time: 03/16/24 12:00 PM    Specimen: Blood   Result Value Ref Range    WBC 8.39 3.40 - 10.80 10*3/mm3    RBC 4.22 3.77 - 5.28 10*6/mm3    Hemoglobin 12.9 12.0 - 15.9 g/dL    Hematocrit 39.7 34.0 - 46.6 %    MCV 94.1 79.0 - 97.0 fL    MCH 30.6 26.6 - 33.0 pg    MCHC 32.5 31.5 - 35.7 g/dL    RDW 12.1 (L) 12.3 - 15.4 %    RDW-SD 41.5 37.0 - 54.0 fl    MPV 9.0 6.0 - 12.0 fL    Platelets 218 140 - 450 10*3/mm3    Neutrophil % 70.6 42.7 - 76.0 %    Lymphocyte % 20.1 19.6 - 45.3 %    Monocyte % 8.0 5.0 - 12.0 %    Eosinophil % 0.6 0.3 - 6.2 %    Basophil % 0.6 0.0 - 1.5 %    Immature Grans % 0.1 0.0 - 0.5 %    Neutrophils, Absolute 5.92 1.70 - 7.00 10*3/mm3    Lymphocytes, Absolute 1.69 0.70 - 3.10 10*3/mm3    Monocytes, Absolute 0.67 0.10 - 0.90 10*3/mm3     Eosinophils, Absolute 0.05 0.00 - 0.40 10*3/mm3    Basophils, Absolute 0.05 0.00 - 0.20 10*3/mm3    Immature Grans, Absolute 0.01 0.00 - 0.05 10*3/mm3    nRBC 0.0 0.0 - 0.2 /100 WBC   Basic Metabolic Panel    Collection Time: 03/16/24  1:25 PM    Specimen: Blood   Result Value Ref Range    Glucose 355 (H) 65 - 99 mg/dL    BUN 18 8 - 23 mg/dL    Creatinine 1.05 (H) 0.57 - 1.00 mg/dL    Sodium 135 (L) 136 - 145 mmol/L    Potassium 4.8 3.5 - 5.2 mmol/L    Chloride 102 98 - 107 mmol/L    CO2 22.0 22.0 - 29.0 mmol/L    Calcium 9.0 8.6 - 10.5 mg/dL    BUN/Creatinine Ratio 17.1 7.0 - 25.0    Anion Gap 11.0 5.0 - 15.0 mmol/L    eGFR 54.8 (L) >60.0 mL/min/1.73   Procalcitonin    Collection Time: 03/16/24  1:25 PM    Specimen: Blood   Result Value Ref Range    Procalcitonin 0.02 0.00 - 0.25 ng/mL   Magnesium    Collection Time: 03/16/24  1:25 PM    Specimen: Blood   Result Value Ref Range    Magnesium 1.9 1.6 - 2.4 mg/dL       Ordered the above labs and independently interpreted results. My findings will be discussed in the medical decision making section below        RADIOLOGY  XR Chest 1 View    Result Date: 3/16/2024  XR CHEST 1 VW-   INDICATION: Cough, fever  COMPARISON: Chest radiograph August 3, 2020  TECHNIQUE: 1 view chest  FINDINGS:  Stable nodular opacity in the apical left upper lobe. No new opacities. No effusions. Stable mediastinum. Heart is normal in size.       1. Stable chest. 2. Stable nodular opacity in the apical left upper lobe, suspect radiated mass  This report was finalized on 3/16/2024 12:37 PM by Dr. Jeremy Franco M.D on Workstation: [id number]       Ordered the above noted radiological studies.  Independently interpreted by me and my independent review of findings can be found in the ED Course.  See dictation for official radiology interpretation.      PROCEDURES    Procedures        MEDICATIONS GIVEN IN ER    Medications   acetaminophen (TYLENOL) tablet 1,000 mg (1,000 mg Oral Given 3/16/24  1157)   sodium chloride 0.9 % bolus 1,000 mL (0 mL Intravenous Stopped 3/16/24 1634)         PROGRESS, DATA ANALYSIS, CONSULTS, AND MEDICAL DECISION MAKING    Please note that this section constitutes my independent interpretation of clinical data including lab results, radiology, EKG's.  This constitutes my independent professional opinion regarding differential diagnosis and management of this patient.  It may include any factors such as history from outside sources, review of external records, social determinants of health, management of medications, response to those treatments, and discussions with other providers.    Differential Diagnosis and Plan: Initial concern for viral process, pneumonia, hyperglycemia, DKA, renal failure, electrolyte abnormalities, among others.  Plan for labs, chest x-ray, viral panel, supportive care, reevaluation with results.    Additional sources:  - Discussed/ obtained information from independent historians:       - (Social Determinants of Health): None     - Shared decision making:  Patient and  at bedside fully updated on and in agreement with the course and plan moving forward    ED Course as of 03/16/24 1833   Sat Mar 16, 2024   1226 XR Chest 1 View  Per my independent interpretation of the chest x-ray, left upper lung opacity noted, no pneumothorax [DC]   1243 WBC: 8.39 [DC]   1243 Hemoglobin: 12.9 [DC]   1243 Platelets: 218 [DC]   1243 Lactate: 2.0 [DC]   1243 XR Chest 1 View  Radiology report reviewed, stable nodular opacity in the apical left upper lobe as compared to August 3, 2020 [DC]   1317 Coronavirus NL63(!): Detected [DC]   1317 COVID19(!!): Detected [DC]   1413 Glucose(!): 355 [DC]   1413 BUN: 18 [DC]   1413 Creatinine(!): 1.05 [DC]   1413 Sodium(!): 135 [DC]   1413 Potassium: 4.8 [DC]   1413 CO2: 22.0 [DC]   1413 Anion Gap: 11.0 [DC]   1413 Magnesium: 1.9 [DC]   1432 Will advise patient to withhold Atorvastatin until 2-3 days after completion of Paxlovid  [DC]      ED Course User Index  [DC] Faizan Myles MD     No indication for hospitalization at this time, patient is without hypoxia, no increased work of breathing, no pneumonia.    Hospitalization Considered?:     Orders Placed During This Visit:  Orders Placed This Encounter   Procedures    Respiratory Panel PCR w/COVID-19(SARS-CoV-2) SIRISHA/ANGELITA/GAMAL/PAD/COR/SHERYL In-House, NP Swab in UTM/VTM, 2 HR TAT - Swab, Nasopharynx    XR Chest 1 View    Basic Metabolic Panel    Lactic Acid, Plasma    Procalcitonin    Magnesium    CBC Auto Differential    CBC & Differential       Additional orders considered but not placed:      Independent interpretation of labs, radiology studies, and discussions with consultants: See ED Course        AS OF 18:33 EDT VITALS:    BP - 147/74  HR - 79  TEMP - 99.8 °F (37.7 °C)  02 SATS - 97%          DIAGNOSIS  Final diagnoses:   COVID-19   Fever in adult   Acute cough   Hyperglycemia   History of diabetes mellitus         DISPOSITION  ED Disposition       ED Disposition   Discharge    Condition   Stable    Comment   --                Please note that portions of this document were completed with a voice recognition program.    Note Disclaimer: At [facility], we believe that sharing information builds trust and better relationships. You are receiving this note because you recently visited [facility]. It is possible you will see health information before a provider has talked with you about it. This kind of information can be easy to misunderstand. To help you fully understand what it means for your health, we urge you to discuss this note with your provider.                       Faizan Myles MD  03/16/24 [id number]

## 2024-03-18 RX ORDER — EXEMESTANE 25 MG/1
25 TABLET ORAL DAILY
Qty: 90 TABLET | Refills: 1 | Status: SHIPPED | OUTPATIENT
Start: 2024-03-18

## 2024-04-02 ENCOUNTER — APPOINTMENT (OUTPATIENT)
Dept: GENERAL RADIOLOGY | Facility: HOSPITAL | Age: 79
End: 2024-04-02
Payer: MEDICARE

## 2024-04-02 ENCOUNTER — HOSPITAL ENCOUNTER (EMERGENCY)
Facility: HOSPITAL | Age: 79
Discharge: HOME | End: 2024-04-02
Attending: EMERGENCY MEDICINE | Admitting: EMERGENCY MEDICINE
Payer: MEDICARE

## 2024-04-02 VITALS
SYSTOLIC BLOOD PRESSURE: 145 MMHG | RESPIRATION RATE: 18 BRPM | HEIGHT: 61 IN | DIASTOLIC BLOOD PRESSURE: 75 MMHG | WEIGHT: 119 LBS | OXYGEN SATURATION: 97 % | HEART RATE: 65 BPM | TEMPERATURE: 98.8 F | BODY MASS INDEX: 22.47 KG/M2

## 2024-04-02 DIAGNOSIS — R07.89 CHEST WALL PAIN: Primary | ICD-10-CM

## 2024-04-02 DIAGNOSIS — R05.2 SUBACUTE COUGH: ICD-10-CM

## 2024-04-02 DIAGNOSIS — Z86.16 HISTORY OF COVID-19: ICD-10-CM

## 2024-04-02 LAB
ALBUMIN SERPL-MCNC: 3.9 G/DL (ref 3.5–5.2)
ALBUMIN/GLOB SERPL: 1.1 G/DL
ALP SERPL-CCNC: 116 U/L (ref 39–117)
ALT SERPL W P-5'-P-CCNC: 11 U/L (ref 1–33)
ANION GAP SERPL CALCULATED.3IONS-SCNC: 9.6 MMOL/L (ref 5–15)
AST SERPL-CCNC: 15 U/L (ref 1–32)
BASOPHILS # BLD AUTO: 0.04 10*3/MM3 (ref 0–0.2)
BASOPHILS NFR BLD AUTO: 0.9 % (ref 0–1.5)
BILIRUB SERPL-MCNC: 0.2 MG/DL (ref 0–1.2)
BUN SERPL-MCNC: 22 MG/DL (ref 8–23)
BUN/CREAT SERPL: 22.2 (ref 7–25)
CALCIUM SPEC-SCNC: 9.3 MG/DL (ref 8.6–10.5)
CHLORIDE SERPL-SCNC: 99 MMOL/L (ref 98–107)
CO2 SERPL-SCNC: 25.4 MMOL/L (ref 22–29)
CREAT SERPL-MCNC: 0.99 MG/DL (ref 0.57–1)
D DIMER PPP FEU-MCNC: 0.42 MCGFEU/ML (ref 0–0.77)
DEPRECATED RDW RBC AUTO: 41.5 FL (ref 37–54)
EGFRCR SERPLBLD CKD-EPI 2021: 58.8 ML/MIN/1.73
EOSINOPHIL # BLD AUTO: 0.09 10*3/MM3 (ref 0–0.4)
EOSINOPHIL NFR BLD AUTO: 2 % (ref 0.3–6.2)
ERYTHROCYTE [DISTWIDTH] IN BLOOD BY AUTOMATED COUNT: 12.1 % (ref 12.3–15.4)
GLOBULIN UR ELPH-MCNC: 3.4 GM/DL
GLUCOSE SERPL-MCNC: 279 MG/DL (ref 65–99)
HCT VFR BLD AUTO: 37.7 % (ref 34–46.6)
HGB BLD-MCNC: 12 G/DL (ref 12–15.9)
IMM GRANULOCYTES # BLD AUTO: 0.01 10*3/MM3 (ref 0–0.05)
IMM GRANULOCYTES NFR BLD AUTO: 0.2 % (ref 0–0.5)
INR PPP: 1 (ref 0.9–1.1)
LYMPHOCYTES # BLD AUTO: 1.51 10*3/MM3 (ref 0.7–3.1)
LYMPHOCYTES NFR BLD AUTO: 34.2 % (ref 19.6–45.3)
MCH RBC QN AUTO: 29.5 PG (ref 26.6–33)
MCHC RBC AUTO-ENTMCNC: 31.8 G/DL (ref 31.5–35.7)
MCV RBC AUTO: 92.6 FL (ref 79–97)
MONOCYTES # BLD AUTO: 0.47 10*3/MM3 (ref 0.1–0.9)
MONOCYTES NFR BLD AUTO: 10.6 % (ref 5–12)
NEUTROPHILS NFR BLD AUTO: 2.3 10*3/MM3 (ref 1.7–7)
NEUTROPHILS NFR BLD AUTO: 52.1 % (ref 42.7–76)
NRBC BLD AUTO-RTO: 0 /100 WBC (ref 0–0.2)
PLATELET # BLD AUTO: 255 10*3/MM3 (ref 140–450)
PMV BLD AUTO: 8.5 FL (ref 6–12)
POTASSIUM SERPL-SCNC: 4.5 MMOL/L (ref 3.5–5.2)
PROT SERPL-MCNC: 7.3 G/DL (ref 6–8.5)
PROTHROMBIN TIME: 13.4 SECONDS (ref 11.7–14.2)
QT INTERVAL: 414 MS
QTC INTERVAL: 498 MS
RBC # BLD AUTO: 4.07 10*6/MM3 (ref 3.77–5.28)
SODIUM SERPL-SCNC: 134 MMOL/L (ref 136–145)
TROPONIN T SERPL HS-MCNC: 11 NG/L
WBC NRBC COR # BLD AUTO: 4.42 10*3/MM3 (ref 3.4–10.8)

## 2024-04-02 PROCEDURE — 85610 PROTHROMBIN TIME: CPT | Performed by: EMERGENCY MEDICINE

## 2024-04-02 PROCEDURE — 96375 TX/PRO/DX INJ NEW DRUG ADDON: CPT

## 2024-04-02 PROCEDURE — 25010000002 MORPHINE PER 10 MG: Performed by: EMERGENCY MEDICINE

## 2024-04-02 PROCEDURE — 71045 X-RAY EXAM CHEST 1 VIEW: CPT

## 2024-04-02 PROCEDURE — 80053 COMPREHEN METABOLIC PANEL: CPT | Performed by: EMERGENCY MEDICINE

## 2024-04-02 PROCEDURE — 85025 COMPLETE CBC W/AUTO DIFF WBC: CPT | Performed by: EMERGENCY MEDICINE

## 2024-04-02 PROCEDURE — 85379 FIBRIN DEGRADATION QUANT: CPT | Performed by: EMERGENCY MEDICINE

## 2024-04-02 PROCEDURE — 99284 EMERGENCY DEPT VISIT MOD MDM: CPT

## 2024-04-02 PROCEDURE — 96374 THER/PROPH/DIAG INJ IV PUSH: CPT

## 2024-04-02 PROCEDURE — 84484 ASSAY OF TROPONIN QUANT: CPT | Performed by: EMERGENCY MEDICINE

## 2024-04-02 PROCEDURE — 93005 ELECTROCARDIOGRAM TRACING: CPT | Performed by: EMERGENCY MEDICINE

## 2024-04-02 PROCEDURE — 25010000002 ONDANSETRON PER 1 MG: Performed by: EMERGENCY MEDICINE

## 2024-04-02 RX ORDER — BENZONATATE 200 MG/1
200 CAPSULE ORAL 3 TIMES DAILY PRN
Qty: 15 CAPSULE | Refills: 0 | Status: SHIPPED | OUTPATIENT
Start: 2024-04-02

## 2024-04-02 RX ORDER — MORPHINE SULFATE 2 MG/ML
2 INJECTION, SOLUTION INTRAMUSCULAR; INTRAVENOUS ONCE
Status: COMPLETED | OUTPATIENT
Start: 2024-04-02 | End: 2024-04-02

## 2024-04-02 RX ORDER — ONDANSETRON 2 MG/ML
4 INJECTION INTRAMUSCULAR; INTRAVENOUS ONCE
Status: COMPLETED | OUTPATIENT
Start: 2024-04-02 | End: 2024-04-02

## 2024-04-02 RX ORDER — SODIUM CHLORIDE 0.9 % (FLUSH) 0.9 %
10 SYRINGE (ML) INJECTION AS NEEDED
Status: DISCONTINUED | OUTPATIENT
Start: 2024-04-02 | End: 2024-04-03 | Stop reason: HOSPADM

## 2024-04-02 RX ADMIN — ONDANSETRON 4 MG: 2 INJECTION INTRAMUSCULAR; INTRAVENOUS at 21:55

## 2024-04-02 RX ADMIN — MORPHINE SULFATE 2 MG: 2 INJECTION, SOLUTION INTRAMUSCULAR; INTRAVENOUS at 21:55

## 2024-04-03 NOTE — ED PROVIDER NOTES
EMERGENCY DEPARTMENT ENCOUNTER    Room Number:  18/18  Date seen:  4/2/2024  PCP: Sussy Payne MD  Historian: Patient      HPI:  Chief Complaint: Not feeling well  Context: Brie Reese is a 78 y.o. female who presents to the ED c/o not feeling well after being diagnosed with COVID on 3/16/2024.  The patient states for the past weeks has been having chest and back pain that is worse with cough.  She states that her fever and chills is better.  She states she still becomes short of breath when she walks.  Patient denies any lower extremity pain swelling or edema.      PAST MEDICAL HISTORY  Active Ambulatory Problems     Diagnosis Date Noted    Malignant neoplasm of female breast 03/14/2016    Anemia 04/12/2016    Iron deficiency anemia 04/20/2016    Malignant neoplasm metastatic to left lung 02/06/2017    Osteoporosis 02/06/2017    Hypomagnesemia 01/08/2019    Acute bronchitis due to COVID-19 virus 08/04/2020    Hypothyroidism 01/01/2009    Diabetes mellitus 08/04/2020    Hyperlipidemia 08/04/2020    Hyponatremia 08/04/2020    Immunocompromised 08/04/2020    Acute respiratory failure with hypoxia 08/04/2020    Other chest pain 08/22/2022    Abnormal electrocardiogram 09/06/2022    Abnormal CT scan of heart 09/06/2022     Resolved Ambulatory Problems     Diagnosis Date Noted    No Resolved Ambulatory Problems     Past Medical History:   Diagnosis Date    Breast cancer, Left     Left upper pulmonary nodule 07/05/2012         REVIEW OF SYSTEMS  All systems reviewed and negative except for those discussed in HPI.       PAST SURGICAL HISTORY  Past Surgical History:   Procedure Laterality Date    BREAST BIOPSY Left 1995    MASTECTOMY RADICAL Left 1995    MOUTH SURGERY  08/2015    tooth extraction          FAMILY HISTORY  Family History   Problem Relation Age of Onset    Tuberculosis Mother     Diabetes Mother     Thyroid cancer Sister     Lung cancer Sister     Lung cancer Brother         In his 70s.    Bone cancer  Brother         in his 70s.    Diabetes Maternal Grandmother     Lung cancer Brother         In his 70s.     Bone cancer Brother         in his 70s.    Hypertension Neg Hx     Heart disease Neg Hx          SOCIAL HISTORY  Social History     Socioeconomic History    Marital status:     Years of education: College   Tobacco Use    Smoking status: Never    Smokeless tobacco: Never   Vaping Use    Vaping status: Never Used   Substance and Sexual Activity    Alcohol use: No    Drug use: No    Sexual activity: Defer     Birth control/protection: Post-menopausal         ALLERGIES  Patient has no known allergies.      PHYSICAL EXAM  ED Triage Vitals [04/02/24 2047]   Temp Heart Rate Resp BP SpO2   98.8 °F (37.1 °C) 100 18 155/85 98 %      Temp src Heart Rate Source Patient Position BP Location FiO2 (%)   -- -- -- -- --       Physical Exam      GENERAL: 78-year-old female in no acute distress  HENT: NCAT: nares patent: Neck supple  EYES: no scleral icterus  CV: regular rhythm, normal rate: The patient has reproducible chest wall and back pain on examination  RESPIRATORY: normal effort  ABDOMEN: soft, NTND: Bowel sounds positive  MUSCULOSKELETAL: no deformity: No pedal edema or calf tenderness  NEURO: alert with nonfocal neuro exam  PSYCH:  calm, cooperative  SKIN: warm, dry    Vital signs and nursing notes reviewed.      LAB RESULTS  Recent Results (from the past 24 hour(s))   ECG 12 Lead Chest Pain    Collection Time: 04/02/24  9:23 PM   Result Value Ref Range    QT Interval 414 ms    QTC Interval 498 ms   Comprehensive Metabolic Panel    Collection Time: 04/02/24  9:58 PM    Specimen: Blood   Result Value Ref Range    Glucose 279 (H) 65 - 99 mg/dL    BUN 22 8 - 23 mg/dL    Creatinine 0.99 0.57 - 1.00 mg/dL    Sodium 134 (L) 136 - 145 mmol/L    Potassium 4.5 3.5 - 5.2 mmol/L    Chloride 99 98 - 107 mmol/L    CO2 25.4 22.0 - 29.0 mmol/L    Calcium 9.3 8.6 - 10.5 mg/dL    Total Protein 7.3 6.0 - 8.5 g/dL    Albumin 3.9  3.5 - 5.2 g/dL    ALT (SGPT) 11 1 - 33 U/L    AST (SGOT) 15 1 - 32 U/L    Alkaline Phosphatase 116 39 - 117 U/L    Total Bilirubin 0.2 0.0 - 1.2 mg/dL    Globulin 3.4 gm/dL    A/G Ratio 1.1 g/dL    BUN/Creatinine Ratio 22.2 7.0 - 25.0    Anion Gap 9.6 5.0 - 15.0 mmol/L    eGFR 58.8 (L) >60.0 mL/min/1.73   Protime-INR    Collection Time: 04/02/24  9:58 PM    Specimen: Blood   Result Value Ref Range    Protime 13.4 11.7 - 14.2 Seconds    INR 1.00 0.90 - 1.10   Single High Sensitivity Troponin T    Collection Time: 04/02/24  9:58 PM    Specimen: Blood   Result Value Ref Range    HS Troponin T 11 <14 ng/L   D-dimer, Quantitative    Collection Time: 04/02/24  9:58 PM    Specimen: Blood   Result Value Ref Range    D-Dimer, Quantitative 0.42 0.00 - 0.77 MCGFEU/mL   CBC Auto Differential    Collection Time: 04/02/24  9:58 PM    Specimen: Blood   Result Value Ref Range    WBC 4.42 3.40 - 10.80 10*3/mm3    RBC 4.07 3.77 - 5.28 10*6/mm3    Hemoglobin 12.0 12.0 - 15.9 g/dL    Hematocrit 37.7 34.0 - 46.6 %    MCV 92.6 79.0 - 97.0 fL    MCH 29.5 26.6 - 33.0 pg    MCHC 31.8 31.5 - 35.7 g/dL    RDW 12.1 (L) 12.3 - 15.4 %    RDW-SD 41.5 37.0 - 54.0 fl    MPV 8.5 6.0 - 12.0 fL    Platelets 255 140 - 450 10*3/mm3    Neutrophil % 52.1 42.7 - 76.0 %    Lymphocyte % 34.2 19.6 - 45.3 %    Monocyte % 10.6 5.0 - 12.0 %    Eosinophil % 2.0 0.3 - 6.2 %    Basophil % 0.9 0.0 - 1.5 %    Immature Grans % 0.2 0.0 - 0.5 %    Neutrophils, Absolute 2.30 1.70 - 7.00 10*3/mm3    Lymphocytes, Absolute 1.51 0.70 - 3.10 10*3/mm3    Monocytes, Absolute 0.47 0.10 - 0.90 10*3/mm3    Eosinophils, Absolute 0.09 0.00 - 0.40 10*3/mm3    Basophils, Absolute 0.04 0.00 - 0.20 10*3/mm3    Immature Grans, Absolute 0.01 0.00 - 0.05 10*3/mm3    nRBC 0.0 0.0 - 0.2 /100 WBC       Ordered the above labs and reviewed the results.        RADIOLOGY  XR Chest 1 View    Result Date: 4/2/2024  SINGLE VIEW OF THE CHEST  HISTORY: Cough and back pain  COMPARISON: March 16, 2024   FINDINGS: There is cardiomegaly. There is no vascular congestion. No pneumothorax or pleural effusion is seen.. Consolidation is again noted at the left lung apex. This has previously been felt to be an area of radiation fibrosis.      No acute findings.  This report was finalized on 4/2/2024 9:28 PM by Dr. Edna Pretty M.D on Workstation: [id number]       Ordered the above noted radiological studies. Reviewed by me in PACS.            PROCEDURES  Procedures          MEDICATIONS GIVEN IN ER  Medications   sodium chloride 0.9 % flush 10 mL (has no administration in time range)   ondansetron (ZOFRAN) injection 4 mg (4 mg Intravenous Given 4/2/24 2155)   morphine injection 2 mg (2 mg Intravenous Given 4/2/24 2155)             MEDICAL DECISION MAKING, PROGRESS, and CONSULTS    All labs have been independently reviewed by me.  All radiology studies have been reviewed by me and I have also reviewed the radiology report.   EKG's independently viewed and interpreted by me.  Discussion below represents my analysis of pertinent findings related to patient's condition, differential diagnosis, treatment plan and final disposition.      Additional sources:  - Discussed/ obtained information from independent historians: The patient's family is here who states that she appears better but wanted to get checked out tonight    - External (non-ED) record review: I reviewed the patient's ER visit from 3/16 where she was diagnosed with COVID but was stable for discharge    - Chronic or social conditions impacting care: Patient lives at home with family    - Shared decision making: After shared decision-making discussion to myself and the patient we agreed she is stable for discharge and outpatient follow-up      Orders placed during this visit:  Orders Placed This Encounter   Procedures    XR Chest 1 View    Comprehensive Metabolic Panel    Protime-INR    Single High Sensitivity Troponin T    D-dimer, Quantitative    CBC Auto  Differential    Monitor Blood Pressure    Continuous Pulse Oximetry    ECG 12 Lead Chest Pain    Insert Peripheral IV    CBC & Differential         Differential diagnosis:  My differential diagnosis includes but is not limited to myocardial infarction, acute coronary syndrome, pericarditis, chest wall pain, pneumonia, pulmonary embolism, pneumothorax, or esophageal spasm.      Independent interpretation of labs, radiology studies, and discussions with consultants:  ED Course as of 04/02/24 2357   Tue Apr 02, 2024 2111 The patient is status post COVID with chest pain and back pain and continued dyspnea on exertion.  I will check x-ray, labs, EKG for further evaluation currently she is hemodynamically stable with room air sats were 98% at rest [GP]   2230 EKG    EKG time: 2123  Rhythm/Rate: Normal sinus rate 87  Left bundle branch block  No Acute Ischemia  Non-Specific ST-T changes    Similar compared to prior on 6/29/2019    Interpreted Contemporaneously by me.  Independently viewed by me     [GP]   2231 My independent interpretation the patient's chest x-ray is no pneumonia, rib fractures or pneumothorax [GP]   2231 The patient's D-dimer is negative [GP]   2317 The patient's EKG, chest x-ray, white count, D-dimer and troponin are normal.  Her vital signs remained stable throughout her ER course.  I believe the patient presents with chest wall pain secondary to her recent COVID and coughing. [GP]   2320 On repeat examination the patient is resting company in the room with normal vital signs.  I advised her that her workup is negative and we will discharge her home with cough medicine and outpatient follow-up.  She understands and agrees with the plan. [GP]      ED Course User Index  [GP] Chase Metz MD               DIAGNOSIS  Final diagnoses:   Chest wall pain   Subacute cough   History of COVID-19         DISPOSITION  Discharged            Latest Documented Vital Signs:  As of 23:59 EDT  BP- 145/75 HR- 65  Temp- 98.8 °F (37.1 °C) O2 sat- 97%--      --------------------  Please note that portions of this were completed with a voice recognition program.       Note Disclaimer: At [facility], we believe that sharing information builds trust and better relationships. You are receiving this note because you are receiving care at [facility] or recently visited. It is possible you will see health information before a provider has talked with you about it. This kind of information can be easy to misunderstand. To help you fully understand what it means for your health, we urge you to discuss this note with your provider.             Chase Metz MD  04/03/24 0000

## 2024-04-05 ENCOUNTER — OFFICE VISIT (OUTPATIENT)
Dept: ONCOLOGY | Facility: CLINIC | Age: 79
End: 2024-04-05
Payer: MEDICARE

## 2024-04-05 ENCOUNTER — INFUSION (OUTPATIENT)
Dept: ONCOLOGY | Facility: HOSPITAL | Age: 79
End: 2024-04-05
Payer: MEDICARE

## 2024-04-05 ENCOUNTER — TELEPHONE (OUTPATIENT)
Dept: ONCOLOGY | Facility: CLINIC | Age: 79
End: 2024-04-05

## 2024-04-05 ENCOUNTER — LAB (OUTPATIENT)
Dept: LAB | Facility: HOSPITAL | Age: 79
End: 2024-04-05
Payer: MEDICARE

## 2024-04-05 VITALS
SYSTOLIC BLOOD PRESSURE: 137 MMHG | HEIGHT: 61 IN | OXYGEN SATURATION: 99 % | HEART RATE: 76 BPM | DIASTOLIC BLOOD PRESSURE: 85 MMHG | RESPIRATION RATE: 16 BRPM | TEMPERATURE: 96.8 F | BODY MASS INDEX: 22.62 KG/M2 | WEIGHT: 119.8 LBS

## 2024-04-05 DIAGNOSIS — D64.9 ANEMIA, UNSPECIFIED TYPE: ICD-10-CM

## 2024-04-05 DIAGNOSIS — E83.42 HYPOMAGNESEMIA: ICD-10-CM

## 2024-04-05 DIAGNOSIS — C78.02 MALIGNANT NEOPLASM METASTATIC TO LEFT LUNG: ICD-10-CM

## 2024-04-05 DIAGNOSIS — Z17.0 MALIGNANT NEOPLASM OF CENTRAL PORTION OF LEFT BREAST IN FEMALE, ESTROGEN RECEPTOR POSITIVE: ICD-10-CM

## 2024-04-05 DIAGNOSIS — C50.112 MALIGNANT NEOPLASM OF CENTRAL PORTION OF LEFT BREAST IN FEMALE, ESTROGEN RECEPTOR POSITIVE: ICD-10-CM

## 2024-04-05 DIAGNOSIS — C78.02 MALIGNANT NEOPLASM METASTATIC TO LEFT LUNG: Primary | ICD-10-CM

## 2024-04-05 DIAGNOSIS — M81.6 LOCALIZED OSTEOPOROSIS WITHOUT CURRENT PATHOLOGICAL FRACTURE: ICD-10-CM

## 2024-04-05 LAB
ALBUMIN SERPL-MCNC: 4.5 G/DL (ref 3.5–5.2)
ALBUMIN/GLOB SERPL: 1.4 G/DL
ALP SERPL-CCNC: 115 U/L (ref 39–117)
ALT SERPL W P-5'-P-CCNC: 13 U/L (ref 1–33)
ANION GAP SERPL CALCULATED.3IONS-SCNC: 9.8 MMOL/L (ref 5–15)
AST SERPL-CCNC: 17 U/L (ref 1–32)
BASOPHILS # BLD AUTO: 0.05 10*3/MM3 (ref 0–0.2)
BASOPHILS NFR BLD AUTO: 1.3 % (ref 0–1.5)
BILIRUB SERPL-MCNC: 0.3 MG/DL (ref 0–1.2)
BUN SERPL-MCNC: 21 MG/DL (ref 8–23)
BUN/CREAT SERPL: 23.9 (ref 7–25)
CALCIUM SPEC-SCNC: 10.3 MG/DL (ref 8.6–10.5)
CHLORIDE SERPL-SCNC: 98 MMOL/L (ref 98–107)
CO2 SERPL-SCNC: 28.2 MMOL/L (ref 22–29)
CREAT SERPL-MCNC: 0.88 MG/DL (ref 0.57–1)
DEPRECATED RDW RBC AUTO: 41.1 FL (ref 37–54)
EGFRCR SERPLBLD CKD-EPI 2021: 67.8 ML/MIN/1.73
EOSINOPHIL # BLD AUTO: 0.08 10*3/MM3 (ref 0–0.4)
EOSINOPHIL NFR BLD AUTO: 2.1 % (ref 0.3–6.2)
ERYTHROCYTE [DISTWIDTH] IN BLOOD BY AUTOMATED COUNT: 12 % (ref 12.3–15.4)
GLOBULIN UR ELPH-MCNC: 3.3 GM/DL
GLUCOSE SERPL-MCNC: 283 MG/DL (ref 65–99)
HCT VFR BLD AUTO: 40.2 % (ref 34–46.6)
HGB BLD-MCNC: 13.3 G/DL (ref 12–15.9)
IMM GRANULOCYTES # BLD AUTO: 0.01 10*3/MM3 (ref 0–0.05)
IMM GRANULOCYTES NFR BLD AUTO: 0.3 % (ref 0–0.5)
LYMPHOCYTES # BLD AUTO: 1.18 10*3/MM3 (ref 0.7–3.1)
LYMPHOCYTES NFR BLD AUTO: 31.2 % (ref 19.6–45.3)
MAGNESIUM SERPL-MCNC: 1.8 MG/DL (ref 1.6–2.4)
MCH RBC QN AUTO: 30.7 PG (ref 26.6–33)
MCHC RBC AUTO-ENTMCNC: 33.1 G/DL (ref 31.5–35.7)
MCV RBC AUTO: 92.8 FL (ref 79–97)
MONOCYTES # BLD AUTO: 0.32 10*3/MM3 (ref 0.1–0.9)
MONOCYTES NFR BLD AUTO: 8.5 % (ref 5–12)
NEUTROPHILS NFR BLD AUTO: 2.14 10*3/MM3 (ref 1.7–7)
NEUTROPHILS NFR BLD AUTO: 56.6 % (ref 42.7–76)
NRBC BLD AUTO-RTO: 0 /100 WBC (ref 0–0.2)
PHOSPHATE SERPL-MCNC: 3.3 MG/DL (ref 2.5–4.5)
PLATELET # BLD AUTO: 251 10*3/MM3 (ref 140–450)
PMV BLD AUTO: 8.8 FL (ref 6–12)
POTASSIUM SERPL-SCNC: 4.4 MMOL/L (ref 3.5–5.2)
PROT SERPL-MCNC: 7.8 G/DL (ref 6–8.5)
RBC # BLD AUTO: 4.33 10*6/MM3 (ref 3.77–5.28)
SODIUM SERPL-SCNC: 136 MMOL/L (ref 136–145)
WBC NRBC COR # BLD AUTO: 3.78 10*3/MM3 (ref 3.4–10.8)

## 2024-04-05 PROCEDURE — 80053 COMPREHEN METABOLIC PANEL: CPT

## 2024-04-05 PROCEDURE — 96372 THER/PROPH/DIAG INJ SC/IM: CPT

## 2024-04-05 PROCEDURE — 85025 COMPLETE CBC W/AUTO DIFF WBC: CPT

## 2024-04-05 PROCEDURE — 84100 ASSAY OF PHOSPHORUS: CPT

## 2024-04-05 PROCEDURE — 25010000002 DENOSUMAB 60 MG/ML SOLUTION PREFILLED SYRINGE: Performed by: INTERNAL MEDICINE

## 2024-04-05 PROCEDURE — 36415 COLL VENOUS BLD VENIPUNCTURE: CPT

## 2024-04-05 PROCEDURE — 83735 ASSAY OF MAGNESIUM: CPT

## 2024-04-05 RX ADMIN — DENOSUMAB 60 MG: 60 INJECTION SUBCUTANEOUS at 12:18

## 2024-04-05 NOTE — TELEPHONE ENCOUNTER
Caller: Maximino(nephew)Pelon    Relationship: Emergency Contact    Best call back number: [phone]     What is the best time to reach you: ASAP    Who are you requesting to speak with (clinical staff, provider,  specific staff member): CLINICAL      What was the call regarding: PATIENT HAS LAB SCHEDULED 11:00 TODAY, AND FOLLOW UP.  SHE JUST HAD LABS AT  [facility] ON 4/[facility], CAN THOSE LABS BE USED INSTEAD OF DRAWING ANOTHER LAB TODAY?  PLEASE CALL BACK TO [facility], SINCE SHE IS A HARD STICK.

## 2024-04-05 NOTE — PROGRESS NOTES
REASON FOR FOLLOW-UP:    1. Metastatic breast cancer to the lungs, ER/OK positive, HER2/berkley positive, undergoing hormonal therapy.       HISTORY OF PRESENT ILLNESS: Ms. Reese is a 78 y.o.  female with type 2 diabetes, hypothyroidism, hyperlipidemia, and metastatic breast cancer who today for 6-month follow-up evaluation.  Patient is accompanied by his nephew as always.    Patient reports that she is at her baseline condition, has mild arthralgia here and there however not cancerous bone pains.  Performance status is ECOG 1-0.  She continues on endocrine therapy with Aromasin/exemestane.  She denies headaches no vision changes.  No chest pain no dyspnea no cough hemoptysis.    This patient had a chest angiogram study on 12/11/2024, which reported no acute intrathoracic findings.    Laboratory study today on 4/5/2024 reported normal CBC and elevated glucose 283, otherwise unremarkable CMP.    Past Medical History:   Diagnosis Date    Breast cancer, Left     1998, 2009 metastisized to lungs    Diabetes mellitus     type 2     Hyperlipidemia     Hypothyroidism 01/2009    Left upper pulmonary nodule 07/05/2012    Osteoporosis 10/17/2011     Past Surgical History:   Procedure Laterality Date    BREAST BIOPSY Left 1995    MASTECTOMY RADICAL Left 1995    MOUTH SURGERY  08/2015    tooth extraction            ONCOLOGIC/HEMATOLOGIC HISTORY: History from previous dates can be found in the separate document.    1. Metastatic breast cancer to the lungs, ER/OK positive, HER2/berkley positive, undergoing hormonal therapy with Arimidex since the middle of March 2009.   2. Response to Arimidex as evidenced by serial CT exams, including on 01/10/2012, showing no significant disease.   3. New left upper lobe pulmonary nodule noted, measuring 1.1 cm by CT scan on 07/05/2012. No other evidence of metastatic disease. Hormonal therapy switched to Faslodex 07/12/2012.   4. Osteoporosis documented by bone density scan from 10/17/2011 and  also 02/21/2014. Patient was started on Prolia therapy every 6 months. Patient had tooth extraction in August 2015. Prolia has been on hold.   5. CT scans of chest, abdomen and pelvis on 04/07/2016 showed stable disease. Faslodex will be continued.   6. CT scan examination reported disease progress on 01/30/2017, patient was switched to Aromasin in early February 2017. However she was unable to start Afinitor due to cost.   7. Repeated bone density scan on 2/27/2017 reported worsening osteoporosis. Prolia was restarted back on 03/02/2017. Plan for Q6 month treatment.   8.  Repeated chest CT on 5/15/2017 showed progressive left upper lobe pulmonary nodule.  Patient had stereotactic radiation therapy in early June 2017.  Aromasin was continued.   9.  CT scan of the chest on 9/25/2017 reported post treatment changes in the left upper lobe.   10.  CT scan examination for chest abdomen and pelvis with IV contrast on 6/21/2018 reported no evidence of disease progression.   11.  CT chest/abdomen/pelvis from 6/3/2020 showed no evidence of metastatic disease has developed.  12.  CT for chest abdomen pelvis on 6/21/2021 reported stable condition.        CT scan on 04/07/2016 showed stable disease with the small left upper lobe pulmonary nodule, no change compared to previous imaging studies, no evidence of metastatic disease in the abdomen or pelvis or bony structure.       Unfortunately her CT scan on 01/30/2017 showed slight disease progress in the left upper lobe of the lung, by 3 mm, up to 1.4 centimeters. No evidence of new metastatic disease or bone metastases.       Patient was seen on February 7, 2017. We'll propose switch therapy to Aromasin plus Afinitor.  however she could not afford Afinitor because the cost. Our pharmacy staff tried to apply for premedication, but was not successful. So she is only on Aromasin with good tolerance.      Repeated a CT of the chest on 5/15/2017 showed progressive left upper lobe  pulmonary nodule.  Patient had stereotactic radiation therapy in early June 2017.  Aromasin was continued.     CT scan examination reported no active disease on 6/10/2019.  Had a reasonable iron studies including ferritin 188 ng/mL, free iron 75 TIBC 410 and iron saturation 18%.  And also improve the magnesium level 1.8.  Mild anemic with hemoglobin 11.5 but normal platelets 253,000 and WBC 4020.  Chemistry lab reported normal CMP except elevated glucose which was at 201.     Patient presented on 12/3/2019 for 6-month followup.  She reports at baseline condition, she retired.  She denies weight loss, no fever, no sweating, no headaches.  Her performance status is ECOG 0.  Patient had teeth extraction in February 2018.  She now has full-mouth denture.    Patient otherwise reports no dyspnea, no cough, hemoptysis.  She reports no fever or sweating.   She is on Aromasin with good tolerance. She denies arthralgia, hot flashes or sweating.  She denies bone pains.       Laboratory study on 12/3/2019 reported marginal anemia hemoglobin 11.7, otherwise normal CBC.  Chemistry lab reported magnesium 1.5, glucose 156 otherwise unremarkable.       Her CT scan for chest abdomen and pelvis with IV contrast on 6/3/2020 reported no evidence of worsening metastatic disease.      Laboratory studies on 6/3/2020 reported stable mild anemia with Hb 11.7, with MCV 97.8, normal platelets 260,000 and a WBC 4680.  Iron study reported ferritin 210, saturation 24%.  Unremarkable CMP with normal glucose, electrolytes and liver function panel and renal function.      Laboratory study on 8/6/2020 reported a peak ferritin 1124 when she was hospitalized for COVID-19 infection.  She had significant elevated CRP 7.73 mg/dL.  Her hemoglobin was 11.6, platelets 259,000 and WBC 11,733 ANC 10,390.     Laboratory studies 12/18/2020 reported normal CBC including Hb 12.6, WBC 5180, ANC 2790, lymphocytes 1500, platelets 274,000, magnesium 1.7, electrolytes  normal otherwise,, elevated glucose 185, unremarkable CMP.  Ferritin 135, and iron saturation 24%.    The patient no longer takes iron. Laboratory study on 2022 reported excellent ferritin 251 and iron saturation 17% with free iron 63 and TIBC 372.    Laboratory study today on 2023 reported stable marginal anemia with hemoglobin 11.9 g/dL, MCV 94.5, normal platelets 247,000, WBC 5330 including neutrophils 3240.  Iron study reported ferritin 267 and iron saturation 29% free iron 117 TIBC 405.    The CT of the chest, abdomen, and pelvis obtained on 2023 reported stable left upper lobe spiculated mass reporting 2.7 x 2.4 cm. There was associated scarring. No pleural effusion. No enlarging nodule or masses or pneumothorax. There was no enlarged or subacute supraclavicular, axillary, mediastinum, or hilar lymph nodes. Abdomen has no evidence for malignancy. The hepatic contour is somewhat nodular. The bone window demonstrated degenerative changes without suspicious osseous lesion.    The patient also had a DEXA bone density scan on 2023 which reported ongoing osteoporosis of the lumbar spine.    MEDICATIONS: The current medication list was reviewed with the patient and updated in the EMR this date per the medical assistant. Medication dosages and frequencies were confirmed to be accurate.       ALLERGIES: LETROZOLE (questionably causing skin rash).       SOCIAL HISTORY: The patient is . She finished college education.  She is retired.  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 She has never smoked. No alcohol use. No illegal drug use. No risks for HIV.        FAMILY HISTORY: Her mother  of tuberculosis at the age of 48. A sister had thyroid cancer/lung cancer - no details are available. Two brothers had lung cancer and bone cancers in their 70s - no details available. Her maternal grandmother and her mother both had diabetes. No family history of hypertension or heart disease.     "   REVIEW OF SYSTEMS:   As per HPI      VITAL SIGNS:   Vitals:    24 1124   BP: 137/85   Pulse: 76   Resp: 16   Temp: 96.8 °F (36 °C)   TempSrc: Temporal   SpO2: 99%   Weight: 54.3 kg (119 lb 12.8 oz)   Height: 154.9 cm (61\")   PainSc: 0-No pain     ECO      PHYSICAL EXAMINATION:   GENERAL:  Well-developed, well-nourished elderly female, in no acute distress.  Orientated to time place and the people.  SKIN:  Warm, dry without rashes, purpura or petechiae.  HEENT:  Normocephalic.   LYMPHATICS:  No cervical, supraclavicular or axillary adenopathy.  CHEST: Normal respiratory effort.  Lungs clear to auscultation. Good airflow.  CARDIAC:  Regular rate and rhythm. Normal S1,S2.  ABDOMEN:  Soft, no tender.  Bowel sounds normal.  EXTREMITIES:  No lower extremity edema.    LABORATORY DATA:     Results from last 7 days   Lab Units 24  1121 24  2158   WBC 10*3/mm3 3.78 4.42   NEUTROS ABS 10*3/mm3 2.14 2.30   HEMOGLOBIN g/dL 13.3 12.0   HEMATOCRIT % 40.2 37.7   PLATELETS 10*3/mm3 251 255     Lab Results   Component Value Date    OJXTCCUX69 831 2024     Lab Results   Component Value Date    FOLATE >20.00 2024     Results from last 7 days   Lab Units 24  2158   SODIUM mmol/L 134*   POTASSIUM mmol/L 4.5   CHLORIDE mmol/L 99   CO2 mmol/L 25.4   BUN mg/dL 22   CREATININE mg/dL 0.99   CALCIUM mg/dL 9.3   ALBUMIN g/dL 3.9   BILIRUBIN mg/dL 0.2   ALK PHOS U/L 116   ALT (SGPT) U/L 11   AST (SGOT) U/L 15   GLUCOSE mg/dL 279*     Lab Results   Component Value Date    IRON 117 2023    TIBC 405 2023    FERRITIN 267.20 (H) 2023   Iron saturation 29% on 2023    Results from last 7 days   Lab Units 24  2158   INR  1.00      IMAGING:   CT Chest With Contrast Diagnostic (2023 11:46)     CT Abdomen Pelvis With Contrast (2023 11:46)      DEXA Bone Density Axial (2023 10:27)     CT Angiogram Chest (2023 22:38)       ASSESSMENT:   1. Metastatic breast cancer " with metastasis in the lungs, biopsy confirmed. ER/AR positive. Her2 positive.    She had good response to Arimidex for many years.  However in early 2017, CT scan showed disease progression with solitary lung metastases.  We switched her to Aromasin in early February 2017, however she could not obtain Afinitor, because of the cost issue.     We obtained chest CT scan on 5/15/2017 which showed further disease progression.  Patient was treated with stereotactic radiation therapy in early June 2017.    We obtained CT scan twice on 9/25/2017 and on 6/21/2018 which showed post radiation therapy changes.    We obtained CT scan examination again on 6/10/2019 which showed no evidence of active disease.    Patient reports she is at baseline condition.  Physical examination is no evidence for palpable disease.  She continues to tolerate endocrine therapy with Aromasin as of 12/3/2020.    CT scan examination on 6/3/2020 showed no evidence of disease progression.  Aromasin will be continued.  On 12/8/2020, patient reports she has been tolerating Aromasin.    On 6/21/2021, patient had a CT scan for chest abdomen pelvis, which reported stable small pulmonary nodules and no evidence for metastatic disease.  Discussed with patient 6/30/2021, will continue current treatment with exemestane.  She returns for 6-month evaluation 1/31/2022 without evidence of recurrent disease.  She continues to tolerate exemestane very well.  We will repeat imaging in 6 months.   CT for chest abdomen pelvis on 8/16/2022 reported no evidence for metastatic disease in chest abdomen pelvis.  02/27/2023, patient reports tolerating exemestane. This will be continued because she has metastatic disease. I recommended to obtain CT scan for chest, abdomen, and pelvis in 6 months with IV conscious for reassessment of her metastatic breast cancer.    The patient had CT scan examination for chest, abdomen, and pelvis on 08/29/2023. There is a stable left upper  lobe pulmonary nodule post treatment changes. No increasing size and no new pulmonary nodules or lymphadenopathy.  There is no evidence for disease progression.  We discussed with patient today on 09/12/2023, she will continue treatment with exemestane.  Patient had a chest angiogram study on 12/11/2023 because of dyspnea.  Nevertheless CT scan showed no acute intrathoracic changes.  Today 4/5/2024 patient reports she is at baseline condition.  Denies chest pain dyspnea cough hemoptysis.  Recommend to continue endocrine therapy and repeating CT scan for chest abdomen pelvis in 6 months.       2. Osteoporosis. She had bone density scan on 2/27/2017 which showed statistically significant worsening osteoporosis in the left hip.  We restarted her back on Prolia on 03/02/17 and repeat every 6 months.   Her last dose of Prolia was in June 2018.  Patient reported that she had pain involving the left lower jaw after her teeth extraction in February 2018.  We concerned about possibility of necrosis of the jaw bone, so I recommended to discontinue prolia.    The patient will continue oral calcium and vitamin D supplementation.   Discussed again with the patient on 12/8/2020, recommended patient to have dental assessment before we restart her on Prolia.    6/30/2021, patient reports she was cleared by her dentist for resuming Prolia.   Repeat DEXA scan 8/11/2021 with ongoing osteoporosis  The patient has received clearance from her dentist and will therefore proceed with resumption of Prolia on 1/31/2022.  Continue Prolia today on 8/22/2022.  We will continue Prolia every 6 months. The patient will be given one dose today on 02/27/2023. I recommended to obtain a bone density scan in 08/2023, this is biannual examination.  The patient had a DEXA scan on 08/29/2023, which reported persistent osteoporosis, however, slightly improved bone density in the lumbar spine. Today, the patient will be given Prolia injection on 09/12/2023  and continue every 6 months.  Prolia will be given today 4/5/2024.    3. Mild anemia with mild iron deficiency.    Patient has been taking oral iron supplementation with good results, laboratory study showed improved with ferritin and iron saturation, and normalized hemoglobin.   Her iron study on 6/10/2019 reported excellent ferritin 188, and the marginal iron saturation 18%.   Iron study on 6/3/2020 reported ferritin 210 and iron saturation 24%.  Patient was instructed to continue oral iron supplementation and oral vitamin B12 supplementation.   Laboratory study 12/8/2020 reported normal ferritin 135 and iron saturation 24%, together with normal hemoglobin 12.6.   Hemoglobin is within normal at 12.6 on 8/31/2022.  Hemoglobin 12.0, ferritin 251 and iron saturation 17% with free iron 63 TIBC 372 on 8/22/2022.  Patient discontinued oral iron in 08/2022.   2/27/2023 laboratory studies showed hemoglobin 11.9, excellent ferritin 267 and normal iron saturation 29% with free iron 117 TIBC 405.   On 3/14/2023 vitamin B12 level 913, and folate 16.6 ng/mL and hemoglobin 11.3.  On 09/12/2023, the patient has normal hemoglobin 12.6.   2/5/2024 hemoglobin 11.8, B12 level 831 and folate > 20 ng/mL.  On 4/5/2024 patient has normal hemoglobin 13.3.      4.  Hypomagnesemia.    She was started on oral magnesium oxide.  Repeat labs showed improved and marginally normal magnesium level 1.8 on 6/10/2019.    Recurrent hypomagnesemia 1.5 on 12/3/2019.   Magnesium 1.7 on 12/8/2020.  I asked patient to continue oral magnesium oxide.  Improved magnesium 1.9 on 2/24/2021.   Magnesium is normal today at 1.9 on 1/31/2022.  Magnesium 1.6 on 8/22/2022.  Discussed with patient, will increase oral magnesium to 800 mg twice a day.  On 2/27/2023 normal magnesium 1.9.  Continue oral magnesium 800 mg twice a day.   On 09/12/2023, laboratory studies reported normal magnesium 1.9.  Continue oral magnesium.   Today on 4/5/2024 magnesium level  1.8.      PLAN:   Prolia injection today.  This will be repeated every 6 months.    Continue exemestane 25 mg daily as we prescribed.  Continue oral magnesium 800 mg twice a day.  Arrange CT scan for chest abdomen pelvis with IV contrast for reevaluation of metastatic breast cancer.  I will see patient 1 week after CT scan, and obtain laboratory studies and Prolia treatment.      I discussed with the patient about laboratory results and further management plan.  Patient voiced understanding and agreeable.      Carlos Castillo MD PhD  04/05/2024          CC:  LIZETH Combs M.D.

## 2024-05-06 ENCOUNTER — TRANSCRIBE ORDERS (OUTPATIENT)
Dept: LAB | Facility: HOSPITAL | Age: 79
End: 2024-05-06
Payer: MEDICARE

## 2024-05-06 ENCOUNTER — LAB (OUTPATIENT)
Dept: LAB | Facility: HOSPITAL | Age: 79
End: 2024-05-06
Payer: MEDICARE

## 2024-05-06 DIAGNOSIS — R73.09 OTHER ABNORMAL GLUCOSE: ICD-10-CM

## 2024-05-06 DIAGNOSIS — E78.5 DYSLIPIDEMIA: ICD-10-CM

## 2024-05-06 DIAGNOSIS — Z00.01 ENCOUNTER FOR GENERAL ADULT MEDICAL EXAMINATION WITH ABNORMAL FINDINGS: ICD-10-CM

## 2024-05-06 DIAGNOSIS — Z00.01 ENCOUNTER FOR GENERAL ADULT MEDICAL EXAMINATION WITH ABNORMAL FINDINGS: Primary | ICD-10-CM

## 2024-05-06 DIAGNOSIS — D64.9 ANEMIA, UNSPECIFIED TYPE: ICD-10-CM

## 2024-05-06 LAB
ALBUMIN SERPL-MCNC: 4.1 G/DL (ref 3.5–5.2)
ALBUMIN/GLOB SERPL: 1.3 G/DL
ALP SERPL-CCNC: 88 U/L (ref 39–117)
ALT SERPL W P-5'-P-CCNC: 15 U/L (ref 1–33)
ANION GAP SERPL CALCULATED.3IONS-SCNC: 12.4 MMOL/L (ref 5–15)
AST SERPL-CCNC: 18 U/L (ref 1–32)
BACTERIA UR QL AUTO: ABNORMAL /HPF
BILIRUB SERPL-MCNC: 0.4 MG/DL (ref 0–1.2)
BILIRUB UR QL STRIP: NEGATIVE
BUN SERPL-MCNC: 23 MG/DL (ref 8–23)
BUN/CREAT SERPL: 23.7 (ref 7–25)
CALCIUM SPEC-SCNC: 9.5 MG/DL (ref 8.6–10.5)
CHLORIDE SERPL-SCNC: 101 MMOL/L (ref 98–107)
CHOLEST SERPL-MCNC: 218 MG/DL (ref 0–200)
CLARITY UR: CLEAR
CO2 SERPL-SCNC: 22.6 MMOL/L (ref 22–29)
COLOR UR: YELLOW
CREAT SERPL-MCNC: 0.97 MG/DL (ref 0.57–1)
CREAT UR-MCNC: 133.5 MG/DL
DEPRECATED RDW RBC AUTO: 47.4 FL (ref 37–54)
EGFRCR SERPLBLD CKD-EPI 2021: 60.3 ML/MIN/1.73
ERYTHROCYTE [DISTWIDTH] IN BLOOD BY AUTOMATED COUNT: 13.2 % (ref 12.3–15.4)
GLOBULIN UR ELPH-MCNC: 3.1 GM/DL
GLUCOSE SERPL-MCNC: 192 MG/DL (ref 65–99)
GLUCOSE UR STRIP-MCNC: ABNORMAL MG/DL
HBA1C MFR BLD: 10.4 % (ref 4.8–5.6)
HCT VFR BLD AUTO: 38.7 % (ref 34–46.6)
HDLC SERPL-MCNC: 46 MG/DL (ref 40–60)
HGB BLD-MCNC: 12.2 G/DL (ref 12–15.9)
HGB UR QL STRIP.AUTO: NEGATIVE
HYALINE CASTS UR QL AUTO: ABNORMAL /LPF
KETONES UR QL STRIP: ABNORMAL
LDLC SERPL CALC-MCNC: 147 MG/DL (ref 0–100)
LDLC/HDLC SERPL: 3.14 {RATIO}
LEUKOCYTE ESTERASE UR QL STRIP.AUTO: ABNORMAL
MCH RBC QN AUTO: 30.4 PG (ref 26.6–33)
MCHC RBC AUTO-ENTMCNC: 31.5 G/DL (ref 31.5–35.7)
MCV RBC AUTO: 96.5 FL (ref 79–97)
NITRITE UR QL STRIP: NEGATIVE
PH UR STRIP.AUTO: 5.5 [PH] (ref 5–8)
PLATELET # BLD AUTO: 234 10*3/MM3 (ref 140–450)
PMV BLD AUTO: 8.9 FL (ref 6–12)
POTASSIUM SERPL-SCNC: 4.4 MMOL/L (ref 3.5–5.2)
PROT ?TM UR-MCNC: 9.7 MG/DL
PROT SERPL-MCNC: 7.2 G/DL (ref 6–8.5)
PROT UR QL STRIP: NEGATIVE
PROT/CREAT UR: 72.7 MG/G CREA (ref 0–200)
RBC # BLD AUTO: 4.01 10*6/MM3 (ref 3.77–5.28)
RBC # UR STRIP: ABNORMAL /HPF
REF LAB TEST METHOD: ABNORMAL
SODIUM SERPL-SCNC: 136 MMOL/L (ref 136–145)
SP GR UR STRIP: 1.02 (ref 1–1.03)
SQUAMOUS #/AREA URNS HPF: ABNORMAL /HPF
T4 FREE SERPL-MCNC: 1.24 NG/DL (ref 0.93–1.7)
TRIGL SERPL-MCNC: 138 MG/DL (ref 0–150)
TSH SERPL DL<=0.05 MIU/L-ACNC: 2.89 UIU/ML (ref 0.27–4.2)
UROBILINOGEN UR QL STRIP: ABNORMAL
VLDLC SERPL-MCNC: 25 MG/DL (ref 5–40)
WBC # UR STRIP: ABNORMAL /HPF
WBC NRBC COR # BLD AUTO: 5.02 10*3/MM3 (ref 3.4–10.8)

## 2024-05-06 PROCEDURE — 80061 LIPID PANEL: CPT

## 2024-05-06 PROCEDURE — 84156 ASSAY OF PROTEIN URINE: CPT

## 2024-05-06 PROCEDURE — 80053 COMPREHEN METABOLIC PANEL: CPT

## 2024-05-06 PROCEDURE — 85027 COMPLETE CBC AUTOMATED: CPT

## 2024-05-06 PROCEDURE — 81001 URINALYSIS AUTO W/SCOPE: CPT

## 2024-05-06 PROCEDURE — 82570 ASSAY OF URINE CREATININE: CPT

## 2024-05-06 PROCEDURE — 36415 COLL VENOUS BLD VENIPUNCTURE: CPT

## 2024-05-06 PROCEDURE — 84443 ASSAY THYROID STIM HORMONE: CPT

## 2024-05-06 PROCEDURE — 83036 HEMOGLOBIN GLYCOSYLATED A1C: CPT

## 2024-05-06 PROCEDURE — 84439 ASSAY OF FREE THYROXINE: CPT

## 2024-07-31 ENCOUNTER — HOSPITAL ENCOUNTER (EMERGENCY)
Facility: HOSPITAL | Age: 79
Discharge: HOME | End: 2024-07-31
Attending: EMERGENCY MEDICINE
Payer: MEDICARE

## 2024-07-31 ENCOUNTER — APPOINTMENT (OUTPATIENT)
Dept: CT IMAGING | Facility: HOSPITAL | Age: 79
End: 2024-07-31
Payer: MEDICARE

## 2024-07-31 VITALS
TEMPERATURE: 97.1 F | WEIGHT: 116 LBS | OXYGEN SATURATION: 97 % | SYSTOLIC BLOOD PRESSURE: 134 MMHG | HEART RATE: 68 BPM | DIASTOLIC BLOOD PRESSURE: 68 MMHG | HEIGHT: 61 IN | RESPIRATION RATE: 19 BRPM | BODY MASS INDEX: 21.9 KG/M2

## 2024-07-31 DIAGNOSIS — R11.2 NAUSEA VOMITING AND DIARRHEA: Primary | ICD-10-CM

## 2024-07-31 DIAGNOSIS — T50.905A ADVERSE EFFECT OF DRUG, INITIAL ENCOUNTER: ICD-10-CM

## 2024-07-31 DIAGNOSIS — R19.7 NAUSEA VOMITING AND DIARRHEA: Primary | ICD-10-CM

## 2024-07-31 LAB
B PARAPERT DNA SPEC QL NAA+PROBE: NOT DETECTED
B PERT DNA SPEC QL NAA+PROBE: NOT DETECTED
BASOPHILS # BLD AUTO: 0.06 10*3/MM3 (ref 0–0.2)
BASOPHILS NFR BLD AUTO: 0.4 % (ref 0–1.5)
C PNEUM DNA NPH QL NAA+NON-PROBE: NOT DETECTED
DEPRECATED RDW RBC AUTO: 44.1 FL (ref 37–54)
EOSINOPHIL # BLD AUTO: 0.09 10*3/MM3 (ref 0–0.4)
EOSINOPHIL NFR BLD AUTO: 0.7 % (ref 0.3–6.2)
ERYTHROCYTE [DISTWIDTH] IN BLOOD BY AUTOMATED COUNT: 12.2 % (ref 12.3–15.4)
FLUAV SUBTYP SPEC NAA+PROBE: NOT DETECTED
FLUBV RNA ISLT QL NAA+PROBE: NOT DETECTED
HADV DNA SPEC NAA+PROBE: NOT DETECTED
HCOV 229E RNA SPEC QL NAA+PROBE: NOT DETECTED
HCOV HKU1 RNA SPEC QL NAA+PROBE: NOT DETECTED
HCOV NL63 RNA SPEC QL NAA+PROBE: NOT DETECTED
HCOV OC43 RNA SPEC QL NAA+PROBE: NOT DETECTED
HCT VFR BLD AUTO: 38.6 % (ref 34–46.6)
HGB BLD-MCNC: 12.3 G/DL (ref 12–15.9)
HMPV RNA NPH QL NAA+NON-PROBE: NOT DETECTED
HPIV1 RNA ISLT QL NAA+PROBE: NOT DETECTED
HPIV2 RNA SPEC QL NAA+PROBE: NOT DETECTED
HPIV3 RNA NPH QL NAA+PROBE: NOT DETECTED
HPIV4 P GENE NPH QL NAA+PROBE: NOT DETECTED
IMM GRANULOCYTES # BLD AUTO: 0.21 10*3/MM3 (ref 0–0.05)
IMM GRANULOCYTES NFR BLD AUTO: 1.6 % (ref 0–0.5)
LYMPHOCYTES # BLD AUTO: 1.93 10*3/MM3 (ref 0.7–3.1)
LYMPHOCYTES NFR BLD AUTO: 14.4 % (ref 19.6–45.3)
M PNEUMO IGG SER IA-ACNC: NOT DETECTED
MCH RBC QN AUTO: 31.4 PG (ref 26.6–33)
MCHC RBC AUTO-ENTMCNC: 31.9 G/DL (ref 31.5–35.7)
MCV RBC AUTO: 98.5 FL (ref 79–97)
MONOCYTES # BLD AUTO: 0.9 10*3/MM3 (ref 0.1–0.9)
MONOCYTES NFR BLD AUTO: 6.7 % (ref 5–12)
NEUTROPHILS NFR BLD AUTO: 10.24 10*3/MM3 (ref 1.7–7)
NEUTROPHILS NFR BLD AUTO: 76.2 % (ref 42.7–76)
NT-PROBNP SERPL-MCNC: 180 PG/ML (ref 0–1800)
PHOSPHATE SERPL-MCNC: 2.6 MG/DL (ref 2.5–4.5)
PLATELET # BLD AUTO: 222 10*3/MM3 (ref 140–450)
PMV BLD AUTO: 10 FL (ref 6–12)
QT INTERVAL: 454 MS
QTC INTERVAL: 518 MS
RBC # BLD AUTO: 3.92 10*6/MM3 (ref 3.77–5.28)
RBC MORPH BLD: NORMAL
RHINOVIRUS RNA SPEC NAA+PROBE: NOT DETECTED
RSV RNA NPH QL NAA+NON-PROBE: NOT DETECTED
SARS-COV-2 RNA NPH QL NAA+NON-PROBE: NOT DETECTED
SMALL PLATELETS BLD QL SMEAR: ADEQUATE
WBC MORPH BLD: NORMAL
WBC NRBC COR # BLD AUTO: 13.43 10*3/MM3 (ref 3.4–10.8)

## 2024-07-31 PROCEDURE — 25010000002 PROCHLORPERAZINE 10 MG/2ML SOLUTION: Performed by: EMERGENCY MEDICINE

## 2024-07-31 PROCEDURE — 96375 TX/PRO/DX INJ NEW DRUG ADDON: CPT

## 2024-07-31 PROCEDURE — 36415 COLL VENOUS BLD VENIPUNCTURE: CPT

## 2024-07-31 PROCEDURE — 63710000001 ONDANSETRON ODT 4 MG TABLET DISPERSIBLE: Performed by: NURSE PRACTITIONER

## 2024-07-31 PROCEDURE — 25010000002 DIPHENHYDRAMINE PER 50 MG: Performed by: EMERGENCY MEDICINE

## 2024-07-31 PROCEDURE — 93005 ELECTROCARDIOGRAM TRACING: CPT | Performed by: EMERGENCY MEDICINE

## 2024-07-31 PROCEDURE — 25810000003 SODIUM CHLORIDE 0.9 % SOLUTION: Performed by: EMERGENCY MEDICINE

## 2024-07-31 PROCEDURE — 74176 CT ABD & PELVIS W/O CONTRAST: CPT

## 2024-07-31 PROCEDURE — 85025 COMPLETE CBC W/AUTO DIFF WBC: CPT | Performed by: EMERGENCY MEDICINE

## 2024-07-31 PROCEDURE — 93010 ELECTROCARDIOGRAM REPORT: CPT | Performed by: INTERNAL MEDICINE

## 2024-07-31 PROCEDURE — 0202U NFCT DS 22 TRGT SARS-COV-2: CPT | Performed by: EMERGENCY MEDICINE

## 2024-07-31 PROCEDURE — 25010000002 ONDANSETRON PER 1 MG: Performed by: EMERGENCY MEDICINE

## 2024-07-31 PROCEDURE — 96374 THER/PROPH/DIAG INJ IV PUSH: CPT

## 2024-07-31 PROCEDURE — 83880 ASSAY OF NATRIURETIC PEPTIDE: CPT | Performed by: EMERGENCY MEDICINE

## 2024-07-31 PROCEDURE — 99284 EMERGENCY DEPT VISIT MOD MDM: CPT

## 2024-07-31 PROCEDURE — 84100 ASSAY OF PHOSPHORUS: CPT | Performed by: EMERGENCY MEDICINE

## 2024-07-31 PROCEDURE — 85007 BL SMEAR W/DIFF WBC COUNT: CPT | Performed by: EMERGENCY MEDICINE

## 2024-07-31 RX ORDER — ONDANSETRON 2 MG/ML
4 INJECTION INTRAMUSCULAR; INTRAVENOUS ONCE
Status: COMPLETED | OUTPATIENT
Start: 2024-07-31 | End: 2024-07-31

## 2024-07-31 RX ORDER — DIPHENHYDRAMINE HYDROCHLORIDE 50 MG/ML
12.5 INJECTION INTRAMUSCULAR; INTRAVENOUS ONCE
Status: COMPLETED | OUTPATIENT
Start: 2024-07-31 | End: 2024-07-31

## 2024-07-31 RX ORDER — ATORVASTATIN CALCIUM 20 MG/1
20 TABLET, FILM COATED ORAL
COMMUNITY
Start: 2024-05-10

## 2024-07-31 RX ORDER — ONDANSETRON 4 MG/1
4 TABLET, ORALLY DISINTEGRATING ORAL ONCE
Status: COMPLETED | OUTPATIENT
Start: 2024-07-31 | End: 2024-07-31

## 2024-07-31 RX ORDER — SODIUM CHLORIDE 0.9 % (FLUSH) 0.9 %
10 SYRINGE (ML) INJECTION AS NEEDED
Status: DISCONTINUED | OUTPATIENT
Start: 2024-07-31 | End: 2024-07-31 | Stop reason: HOSPADM

## 2024-07-31 RX ORDER — SODIUM CHLORIDE 9 MG/ML
100 INJECTION, SOLUTION INTRAVENOUS CONTINUOUS
Status: DISCONTINUED | OUTPATIENT
Start: 2024-07-31 | End: 2024-07-31 | Stop reason: HOSPADM

## 2024-07-31 RX ORDER — PROCHLORPERAZINE EDISYLATE 5 MG/ML
5 INJECTION INTRAMUSCULAR; INTRAVENOUS ONCE
Status: COMPLETED | OUTPATIENT
Start: 2024-07-31 | End: 2024-07-31

## 2024-07-31 RX ORDER — CANDESARTAN 4 MG/1
4 TABLET ORAL
COMMUNITY
Start: 2024-07-25

## 2024-07-31 RX ORDER — DONEPEZIL HYDROCHLORIDE 5 MG/1
5 TABLET, FILM COATED ORAL
COMMUNITY
Start: 2024-07-25

## 2024-07-31 RX ADMIN — ONDANSETRON 4 MG: 4 TABLET, ORALLY DISINTEGRATING ORAL at 07:39

## 2024-07-31 RX ADMIN — SODIUM CHLORIDE 500 ML: 9 INJECTION, SOLUTION INTRAVENOUS at 05:51

## 2024-07-31 RX ADMIN — DIPHENHYDRAMINE HYDROCHLORIDE 12.5 MG: 50 INJECTION, SOLUTION INTRAMUSCULAR; INTRAVENOUS at 06:58

## 2024-07-31 RX ADMIN — PROCHLORPERAZINE EDISYLATE 5 MG: 5 INJECTION INTRAMUSCULAR; INTRAVENOUS at 06:57

## 2024-07-31 RX ADMIN — SODIUM CHLORIDE 100 ML/HR: 9 INJECTION, SOLUTION INTRAVENOUS at 06:59

## 2024-07-31 RX ADMIN — ONDANSETRON 4 MG: 2 INJECTION INTRAMUSCULAR; INTRAVENOUS at 05:52

## 2024-07-31 NOTE — ED PROVIDER NOTES
EMERGENCY DEPARTMENT ENCOUNTER    Room Number:  12/12  PCP: Sussy Payne MD  Independent Historians: Patient and Family    HPI:  Chief Complaint: Nausea, vomiting, diarrhea, and chills    A complete HPI/ROS/PMH/PSH/SH/FH are unobtainable due to: None    Chronic or social conditions impacting patient care (Social Determinants of Health): None      Context: Brie Reese is a 78 y.o. female with a medical history of dementia, anemia, hypertension, hypothyroidism who presents to the ED c/o acute chills, vomiting, diarrhea, and nausea that started after taking evening medication.  Patient is new to taking donezepil and candesartan.  Patient and family member believe these medications cause patient's symptoms this evening as she was doing well earlier today and yesterday.  She was also briefly on these meds a few months ago with nausea, vomtiing and diarrhea so they were discontinued.  Patient with no cough, shortness of breath, palpitations, chest pain.  Patient tried to drink some water at home after her initial symptoms and was still unable to tolerate p.o. so decided to come in for evaluation.        Review of prior external notes (non-ED) -and- Review of prior external test results outside of this encounter: Patient followed by Dr. Castillo with oncology for metastatic breast cancer initially diagnosed in March 2009 and currently on hormone therapy..    Prescription drug monitoring program review:     N/A    PAST MEDICAL HISTORY  Active Ambulatory Problems     Diagnosis Date Noted   • Malignant neoplasm of female breast 03/14/2016   • Anemia 04/12/2016   • Iron deficiency anemia 04/20/2016   • Malignant neoplasm metastatic to left lung 02/06/2017   • Osteoporosis 02/06/2017   • Hypomagnesemia 01/08/2019   • Acute bronchitis due to COVID-19 virus 08/04/2020   • Hypothyroidism 01/01/2009   • Diabetes mellitus 08/04/2020   • Hyperlipidemia 08/04/2020   • Hyponatremia 08/04/2020   • Immunocompromised 08/04/2020   • Acute  respiratory failure with hypoxia 08/04/2020   • Other chest pain 08/22/2022   • Abnormal electrocardiogram 09/06/2022   • Abnormal CT scan of heart 09/06/2022     Resolved Ambulatory Problems     Diagnosis Date Noted   • No Resolved Ambulatory Problems     Past Medical History:   Diagnosis Date   • Breast cancer, Left    • Left upper pulmonary nodule 07/05/2012         PAST SURGICAL HISTORY  Past Surgical History:   Procedure Laterality Date   • BREAST BIOPSY Left 1995   • MASTECTOMY RADICAL Left 1995   • MOUTH SURGERY  08/2015    tooth extraction          FAMILY HISTORY  Family History   Problem Relation Age of Onset   • Tuberculosis Mother    • Diabetes Mother    • Thyroid cancer Sister    • Lung cancer Sister    • Lung cancer Brother         In his 70s.   • Bone cancer Brother         in his 70s.   • Diabetes Maternal Grandmother    • Lung cancer Brother         In his 70s.    • Bone cancer Brother         in his 70s.   • Hypertension Neg Hx    • Heart disease Neg Hx          SOCIAL HISTORY  Social History     Socioeconomic History   • Marital status:    • Years of education: College   Tobacco Use   • Smoking status: Never   • Smokeless tobacco: Never   Vaping Use   • Vaping status: Never Used   Substance and Sexual Activity   • Alcohol use: No   • Drug use: No   • Sexual activity: Defer     Birth control/protection: Post-menopausal         ALLERGIES  Patient has no known allergies.        REVIEW OF SYSTEMS  Review of Systems  Included in HPI  All systems reviewed and negative except for those discussed in HPI.      PHYSICAL EXAM    I have reviewed the triage vital signs and nursing notes.    ED Triage Vitals [07/31/24 0503]   Temp Heart Rate Resp BP SpO2   97.1 °F (36.2 °C) 88 18 -- 96 %      Temp src Heart Rate Source Patient Position BP Location FiO2 (%)   Tympanic Monitor -- -- --       Physical Exam  GENERAL: Pleasant cooperative and conversant female, alert, no acute distress  SKIN: Warm, dry  HENT:  Normocephalic, atraumatic  EYES: no scleral icterus, EOMI  CV: regular rhythm, regular rate  RESPIRATORY: normal effort, lungs clear, no wheezing, no rhonchi  ABDOMEN: soft, nontender, nondistended, no rebound, no guarding  MUSCULOSKELETAL: no deformity no lower extremity edema, no calf tenderness to palpation  NEURO: alert, moves all extremities, follows commands                                                                 LAB RESULTS  Recent Results (from the past 24 hour(s))   ECG 12 Lead Other; nausea, epigastric discomfort    Collection Time: 07/31/24  5:22 AM   Result Value Ref Range    QT Interval 454 ms    QTC Interval 518 ms   BNP    Collection Time: 07/31/24  5:24 AM    Specimen: Blood   Result Value Ref Range    proBNP 180.0 0.0 - 1,800.0 pg/mL   Phosphorus    Collection Time: 07/31/24  5:24 AM    Specimen: Blood   Result Value Ref Range    Phosphorus 2.6 2.5 - 4.5 mg/dL   CBC Auto Differential    Collection Time: 07/31/24  5:24 AM    Specimen: Blood   Result Value Ref Range    WBC 13.43 (H) 3.40 - 10.80 10*3/mm3    RBC 3.92 3.77 - 5.28 10*6/mm3    Hemoglobin 12.3 12.0 - 15.9 g/dL    Hematocrit 38.6 34.0 - 46.6 %    MCV 98.5 (H) 79.0 - 97.0 fL    MCH 31.4 26.6 - 33.0 pg    MCHC 31.9 31.5 - 35.7 g/dL    RDW 12.2 (L) 12.3 - 15.4 %    RDW-SD 44.1 37.0 - 54.0 fl    MPV 10.0 6.0 - 12.0 fL    Platelets 222 140 - 450 10*3/mm3    Neutrophil % 76.2 (H) 42.7 - 76.0 %    Lymphocyte % 14.4 (L) 19.6 - 45.3 %    Monocyte % 6.7 5.0 - 12.0 %    Eosinophil % 0.7 0.3 - 6.2 %    Basophil % 0.4 0.0 - 1.5 %    Immature Grans % 1.6 (H) 0.0 - 0.5 %    Neutrophils, Absolute 10.24 (H) 1.70 - 7.00 10*3/mm3    Lymphocytes, Absolute 1.93 0.70 - 3.10 10*3/mm3    Monocytes, Absolute 0.90 0.10 - 0.90 10*3/mm3    Eosinophils, Absolute 0.09 0.00 - 0.40 10*3/mm3    Basophils, Absolute 0.06 0.00 - 0.20 10*3/mm3    Immature Grans, Absolute 0.21 (H) 0.00 - 0.05 10*3/mm3   Scan Slide    Collection Time: 07/31/24  5:24 AM    Specimen: Blood    Result Value Ref Range    RBC Morphology Normal Normal    WBC Morphology Normal Normal    Platelet Estimate Adequate Normal   Respiratory Panel PCR w/COVID-19(SARS-CoV-2) SIRISHA/ANGELITA/GAMAL/PAD/COR/SHERYL In-House, NP Swab in UTM/VTM, 2 HR TAT - Swab, Nasopharynx    Collection Time: 07/31/24  5:26 AM    Specimen: Nasopharynx; Swab   Result Value Ref Range    ADENOVIRUS, PCR Not Detected Not Detected    Coronavirus 229E Not Detected Not Detected    Coronavirus HKU1 Not Detected Not Detected    Coronavirus NL63 Not Detected Not Detected    Coronavirus OC43 Not Detected Not Detected    COVID19 Not Detected Not Detected - Ref. Range    Human Metapneumovirus Not Detected Not Detected    Human Rhinovirus/Enterovirus Not Detected Not Detected    Influenza A PCR Not Detected Not Detected    Influenza B PCR Not Detected Not Detected    Parainfluenza Virus 1 Not Detected Not Detected    Parainfluenza Virus 2 Not Detected Not Detected    Parainfluenza Virus 3 Not Detected Not Detected    Parainfluenza Virus 4 Not Detected Not Detected    RSV, PCR Not Detected Not Detected    Bordetella pertussis pcr Not Detected Not Detected    Bordetella parapertussis PCR Not Detected Not Detected    Chlamydophila pneumoniae PCR Not Detected Not Detected    Mycoplasma pneumo by PCR Not Detected Not Detected         RADIOLOGY  No Radiology Exams Resulted Within Past 24 Hours      MEDICATIONS GIVEN IN ER  Medications   sodium chloride 0.9 % flush 10 mL (has no administration in time range)   prochlorperazine (COMPAZINE) injection 5 mg (has no administration in time range)   diphenhydrAMINE (BENADRYL) injection 12.5 mg (has no administration in time range)   sodium chloride 0.9 % infusion (has no administration in time range)   sodium chloride 0.9 % bolus 500 mL (0 mL Intravenous Stopped 7/31/24 0621)   ondansetron (ZOFRAN) injection 4 mg (4 mg Intravenous Given 7/31/24 0523)         ORDERS PLACED DURING THIS VISIT:  Orders Placed This Encounter    Procedures   • Respiratory Panel PCR w/COVID-19(SARS-CoV-2) SIRISHA/ANGELITA/GAMAL/PAD/COR/SHERYL In-House, NP Swab in UTM/VTM, 2 HR TAT - Swab, Nasopharynx   • Comprehensive Metabolic Panel   • Lipase   • BNP   • Single High Sensitivity Troponin T   • Magnesium   • Phosphorus   • CBC Auto Differential   • Scan Slide   • ECG 12 Lead Other; nausea, epigastric discomfort   • Insert Peripheral IV   • CBC & Differential         OUTPATIENT MEDICATION MANAGEMENT:  Current Facility-Administered Medications Ordered in Epic   Medication Dose Route Frequency Provider Last Rate Last Admin   • diphenhydrAMINE (BENADRYL) injection 12.5 mg  12.5 mg Intravenous Once Jannette De Leon MD       • prochlorperazine (COMPAZINE) injection 5 mg  5 mg Intravenous Once Jannette De Leon MD       • sodium chloride 0.9 % flush 10 mL  10 mL Intravenous PRN Jannette De Leon MD       • sodium chloride 0.9 % infusion  100 mL/hr Intravenous Continuous Jannette De Leon MD         Current Outpatient Medications Ordered in Epic   Medication Sig Dispense Refill   • acetaminophen (ACETAMINOPHEN 8 HOUR) 650 MG 8 hr tablet Take 1 tablet by mouth Every 8 (Eight) Hours As Needed for Mild Pain . 180 tablet 1   • acetaminophen (TYLENOL) 500 MG tablet Take 2 tablets by mouth Every 6 (Six) Hours As Needed for Moderate Pain. 30 tablet 0   • atorvastatin (LIPITOR) 20 MG tablet Take 1 tablet by mouth every night at bedtime.     • candesartan (ATACAND) 4 MG tablet Take 1 tablet by mouth every night at bedtime.     • donepezil (ARICEPT) 5 MG tablet Take 1 tablet by mouth every night at bedtime.     • exemestane (AROMASIN) 25 MG tablet TAKE 1 TABLET BY MOUTH EVERY DAY 90 tablet 1   • levothyroxine (SYNTHROID, LEVOTHROID) 25 MCG tablet TAKE 1 TABLET BY MOUTH EVERY DAY  2   • Magnesium Oxide 400 (240 Mg) MG tablet TAKE 2 TABLETS BY MOUTH TWICE A  tablet 0   • metFORMIN (GLUCOPHAGE) 500 MG tablet TAKE 3 TABLETS BY MOUTH EVERY MORNING, AND 2 TABLETS EVERY  EVENING WITH FOOD FOR DIABETES  3   • pioglitazone (ACTOS) 30 MG tablet      • ACCU-CHEK DMITRIY PLUS test strip USE TO TEST BLOOD SUGAR DAILY AS DIRECTED  2   • benzonatate (TESSALON) 200 MG capsule Take 1 capsule by mouth 3 (Three) Times a Day As Needed for Cough. 15 capsule 0   • calcium carbonate (OS-JEWEL) 600 MG tablet Take 1 tablet by mouth 2 (Two) Times a Day With Meals. 180 tablet 1   • [employer] VITAMIN D 2000 UNITS capsule TAKE 1 CAPSULE EVERY DAY  6   • Denosumab (PROLIA SC) Inject  under the skin into the appropriate area as directed Every 6 (Six) Months.     • diclofenac (VOLTAREN) 50 MG EC tablet Take 1 tablet by mouth 2 (Two) Times a Day. 20 tablet 0   • GLIMEPIRIDE PO Take 4 mg by mouth 2 (two) times a day.     • lidocaine (LIDODERM) 5 % Place 1 patch on the skin as directed by provider Daily. Remove & Discard patch within 12 hours or as directed by MD 20 each 0   • lidocaine (LIDODERM) 5 % Place 1 patch on the skin as directed by provider Daily. Remove & Discard patch within 12 hours or as directed by MD 15 patch 0   • omeprazole (priLOSEC) 20 MG capsule TAKE 1 CAPSULE BY MOUTH EVERY DAY FOR 10 DAYS AND THEN AS NEEDED DAILY 90 capsule 1         PROCEDURES  Procedures            PROGRESS, DATA ANALYSIS, CONSULTS, AND MEDICAL DECISION MAKING  All labs have been independently interpreted by me.  All radiology studies have been reviewed by me. All EKG's have been independently viewed and interpreted by me.  Discussion below represents my analysis of pertinent findings related to patient's condition, differential diagnosis, treatment plan and final disposition.    Differential diagnosis includes but is not limited to   This patient presents with a constellation of symptoms likely representing medication effect versus viral infection as characterized by: Chills, nausea, vomiting, diarrhea. No chest pain nor shortness of breath. The patient´s presentation is not consistent with other acute cardiopulmonary  emergencies like ACS, CHF, or pericardial effusion. Possible COVID-19 (coronavirus) but no specific sick contacts, no respiratory compromise, nontoxic appearance.   Other diagnoses were considered in the differential diagnosis for this patient. Considered: CNS infection, bacterial sinusitis, and pneumonia but low likelihood given documented exam and history.  Other possibilities include pancreatitis, cholelithiasis, gastroenteritis, gastritis, bowel obstruction.            ED Course as of 07/31/24 2243   Wed Jul 31, 2024   0530 EKG ER MD interpretation   Time: 5: 22  Rhythm and rate: Normal sinus rhythm at a rate of 78  Axis: Normal  P waves: Normal  QRS complexes: Left bundle branch block  ST segments: no unexpected ST segment changes given the patient has a left bundle branch block  T waves: no flattening or inversions  Comparison EKG is from April 2, 2024 [AR]   0642 Patient still quite nauseated despite 500 cc bolus iv fluids and zofran.  Plan additional meds and fluids.  Labs recollected due to hemolysis in lab. [AR]   0709 Patient difficult IV stick and per RN patient does not want to be stuck again for redraw of CMP. Patient did receive benadryl, compazine, fluids prior to IV going bad. Nephew at bedside agrees she does not want to be stuck again.  Will PO challenge patient. [JG]   0712 WBC(!): 13.43 [JG]   0712 RBC: 3.92 [JG]   0712 Hemoglobin: 12.3 [JG]   0712 Hematocrit: 38.6 [JG]   0712 Platelets: 222 [JG]   0719 Pt recheck: still does not want another IV or lab still. Reports she is still having some nausea and was having diarrhea. Discussed that we cannot fully evaluate without labs. She is agreeable to CT scan, but no further IV sticks. Family at bedside agrees.  [JG]   0846 Updated patient on ED workup, including labs and imaging (if performed). We discussed follow up instructions and return precautions. The patient verbalizes understanding and is ready for discharge.   Tolerating PO. Discussed again  that we were unable to get a CMP and she does not want another IV attempt.  [JG]      ED Course User Index  [AR] Jannette De Leon MD  [JG] Cheri Parry APRN             AS OF 06:44 EDT VITALS:    BP - 155/71  HR - 84  TEMP - 97.1 °F (36.2 °C) (Tympanic)  O2 SATS - 96%        DIAGNOSIS  Final diagnoses:   Nausea vomiting and diarrhea   Adverse effect of drug, initial encounter         DISPOSITION  ED Disposition       None             Please note that portions of this document were completed with a voice recognition program.    Note Disclaimer: At [facility], we believe that sharing information builds trust and better relationships. You are receiving this note because you recently visited [facility]. It is possible you will see health information before a provider has talked with you about it. This kind of information can be easy to misunderstand. To help you fully understand what it means for your health, we urge you to discuss this note with your provider.         Jannette De Leon MD  07/31/24 0644       Jannette De Leon MD  07/31/24 9007

## 2024-07-31 NOTE — DISCHARGE INSTRUCTIONS
Stop taking new medication that is causing your vomiting   [place] diet, advance as tolerated

## 2024-07-31 NOTE — ED NOTES
According to PA there is no need to recollect blood due to pt not wanting to have another IV or butterfly stick. RN went over bland diet recommendations and fluid hydration. Pt nor family had follow up questions.

## 2024-07-31 NOTE — ED NOTES
This RN entered pt room to find IV was no longer working. IV was pulled and pt expressed how she would like to refuse being stuck again for access as she had previously been stuck multiple times for her initial IV. RN educated pt and family that she would be unable to receive the IV fluids or any other medication through the IV without access. Family and pt were understanding of this and were agreeable to speaking with provider regarding next steps in care without access. RN informed Cheri Parry NP of this event. NP to speak with pt

## 2024-07-31 NOTE — ED PROVIDER NOTES
EMERGENCY DEPARTMENT ENCOUNTER    Room Number:  12/12  Date seen:  7/31/2024  PCP: Sussy Payne MD    Turnover from Dr. De Leon pending labs, discharge      HPI:  Brought in by family with n/v and chills after starting a new medication tonight. Patient taking donezepil and candestartan. She states that she had a similar reaction months ago when trying to start the same medication. No other complaints.       PAST MEDICAL HISTORY  Active Ambulatory Problems     Diagnosis Date Noted    Malignant neoplasm of female breast 03/14/2016    Anemia 04/12/2016    Iron deficiency anemia 04/20/2016    Malignant neoplasm metastatic to left lung 02/06/2017    Osteoporosis 02/06/2017    Hypomagnesemia 01/08/2019    Acute bronchitis due to COVID-19 virus 08/04/2020    Hypothyroidism 01/01/2009    Diabetes mellitus 08/04/2020    Hyperlipidemia 08/04/2020    Hyponatremia 08/04/2020    Immunocompromised 08/04/2020    Acute respiratory failure with hypoxia 08/04/2020    Other chest pain 08/22/2022    Abnormal electrocardiogram 09/06/2022    Abnormal CT scan of heart 09/06/2022     Resolved Ambulatory Problems     Diagnosis Date Noted    No Resolved Ambulatory Problems     Past Medical History:   Diagnosis Date    Breast cancer, Left     Left upper pulmonary nodule 07/05/2012         PAST SURGICAL HISTORY  Past Surgical History:   Procedure Laterality Date    BREAST BIOPSY Left 1995    MASTECTOMY RADICAL Left 1995    MOUTH SURGERY  08/2015    tooth extraction          FAMILY HISTORY  Family History   Problem Relation Age of Onset    Tuberculosis Mother     Diabetes Mother     Thyroid cancer Sister     Lung cancer Sister     Lung cancer Brother         In his 70s.    Bone cancer Brother         in his 70s.    Diabetes Maternal Grandmother     Lung cancer Brother         In his 70s.     Bone cancer Brother         in his 70s.    Hypertension Neg Hx     Heart disease Neg Hx          SOCIAL HISTORY  Social History     Socioeconomic History     Marital status:     Years of education: College   Tobacco Use    Smoking status: Never    Smokeless tobacco: Never   Vaping Use    Vaping status: Never Used   Substance and Sexual Activity    Alcohol use: No    Drug use: No    Sexual activity: Defer     Birth control/protection: Post-menopausal         ALLERGIES  Patient has no known allergies.        REVIEW OF SYSTEMS  Per HPI, otherwise negative.       PHYSICAL EXAM  ED Triage Vitals   Temp Heart Rate Resp BP SpO2   07/31/24 0503 07/31/24 0503 07/31/24 0503 07/31/24 0509 07/31/24 0503   97.1 °F (36.2 °C) 88 18 179/81 96 %      Temp src Heart Rate Source Patient Position BP Location FiO2 (%)   07/31/24 0503 07/31/24 0503 -- -- --   Tympanic Monitor          Physical Exam  Vitals and nursing note reviewed.   Constitutional:       Appearance: Normal appearance.   HENT:      Head: Normocephalic and atraumatic.      Mouth/Throat:      Mouth: Mucous membranes are moist.   Eyes:      Conjunctiva/sclera: Conjunctivae normal.   Cardiovascular:      Rate and Rhythm: Normal rate and regular rhythm.      Pulses: Normal pulses.      Heart sounds: Normal heart sounds.   Pulmonary:      Effort: Pulmonary effort is normal.      Breath sounds: Normal breath sounds. No wheezing.   Abdominal:      General: Bowel sounds are normal.      Palpations: Abdomen is soft.      Tenderness: There is no abdominal tenderness. There is no guarding.   Skin:     General: Skin is warm.      Capillary Refill: Capillary refill takes less than 2 seconds.   Neurological:      Mental Status: She is alert and oriented to person, place, and time. Mental status is at baseline.   Psychiatric:         Mood and Affect: Mood normal.               LAB RESULTS  Recent Results (from the past 24 hour(s))   ECG 12 Lead Other; nausea, epigastric discomfort    Collection Time: 07/31/24  5:22 AM   Result Value Ref Range    QT Interval 454 ms    QTC Interval 518 ms   BNP    Collection Time: 07/31/24  5:24 AM     Specimen: Blood   Result Value Ref Range    proBNP 180.0 0.0 - 1,800.0 pg/mL   Phosphorus    Collection Time: 07/31/24  5:24 AM    Specimen: Blood   Result Value Ref Range    Phosphorus 2.6 2.5 - 4.5 mg/dL   CBC Auto Differential    Collection Time: 07/31/24  5:24 AM    Specimen: Blood   Result Value Ref Range    WBC 13.43 (H) 3.40 - 10.80 10*3/mm3    RBC 3.92 3.77 - 5.28 10*6/mm3    Hemoglobin 12.3 12.0 - 15.9 g/dL    Hematocrit 38.6 34.0 - 46.6 %    MCV 98.5 (H) 79.0 - 97.0 fL    MCH 31.4 26.6 - 33.0 pg    MCHC 31.9 31.5 - 35.7 g/dL    RDW 12.2 (L) 12.3 - 15.4 %    RDW-SD 44.1 37.0 - 54.0 fl    MPV 10.0 6.0 - 12.0 fL    Platelets 222 140 - 450 10*3/mm3    Neutrophil % 76.2 (H) 42.7 - 76.0 %    Lymphocyte % 14.4 (L) 19.6 - 45.3 %    Monocyte % 6.7 5.0 - 12.0 %    Eosinophil % 0.7 0.3 - 6.2 %    Basophil % 0.4 0.0 - 1.5 %    Immature Grans % 1.6 (H) 0.0 - 0.5 %    Neutrophils, Absolute 10.24 (H) 1.70 - 7.00 10*3/mm3    Lymphocytes, Absolute 1.93 0.70 - 3.10 10*3/mm3    Monocytes, Absolute 0.90 0.10 - 0.90 10*3/mm3    Eosinophils, Absolute 0.09 0.00 - 0.40 10*3/mm3    Basophils, Absolute 0.06 0.00 - 0.20 10*3/mm3    Immature Grans, Absolute 0.21 (H) 0.00 - 0.05 10*3/mm3   Scan Slide    Collection Time: 07/31/24  5:24 AM    Specimen: Blood   Result Value Ref Range    RBC Morphology Normal Normal    WBC Morphology Normal Normal    Platelet Estimate Adequate Normal   Respiratory Panel PCR w/COVID-19(SARS-CoV-2) SIRISHA/ANGELITA/GAMAL/PAD/[facility]/SHERYL In-House, NP Swab in UTM/VTM, 2 HR TAT - Swab, Nasopharynx    Collection Time: 07/31/24  5:26 AM    Specimen: Nasopharynx; Swab   Result Value Ref Range    ADENOVIRUS, PCR Not Detected Not Detected    Coronavirus 229E Not Detected Not Detected    Coronavirus HKU1 Not Detected Not Detected    Coronavirus NL63 Not Detected Not Detected    Coronavirus OC43 Not Detected Not Detected    COVID19 Not Detected Not Detected - Ref. Range    Human Metapneumovirus Not Detected Not Detected    Human  Rhinovirus/Enterovirus Not Detected Not Detected    Influenza A PCR Not Detected Not Detected    Influenza B PCR Not Detected Not Detected    Parainfluenza Virus 1 Not Detected Not Detected    Parainfluenza Virus 2 Not Detected Not Detected    Parainfluenza Virus 3 Not Detected Not Detected    Parainfluenza Virus 4 Not Detected Not Detected    RSV, PCR Not Detected Not Detected    Bordetella pertussis pcr Not Detected Not Detected    Bordetella parapertussis PCR Not Detected Not Detected    Chlamydophila pneumoniae PCR Not Detected Not Detected    Mycoplasma pneumo by PCR Not Detected Not Detected       Ordered the above labs and reviewed the results.        RADIOLOGY  CT Abdomen Pelvis Without Contrast    Result Date: 7/31/2024  CT ABDOMEN PELVIS WO CONTRAST-  INDICATION: Nausea, vomiting and diarrhea  COMPARISON: CT abdomen/pelvis August 29, 2023  TECHNIQUE: Routine CT abdomen/pelvis without IV contrast. Coronal and sagittal reformats. Radiation dose reduction techniques were utilized, including automated exposure control and exposure modulation based on body size.  FINDINGS:  Lung bases: Left mastectomy. Coronary artery atherosclerotic calcifications. Airways disease at the bases. Subsegmental atelectasis or scarring at the bases.  ABDOMEN: Normal liver, gallbladder, spleen, pancreas and adrenal glands. Symmetric perinephric edema. Small fluid attenuating cyst seen in the inferior pole right kidney. Punctate nonobstructing nephrolithiasis versus hyperattenuating renal pyramids from increased urine osmolality. For example, punctate nonobstructing nephrolithiasis in the superior pole left kidney, series 3, coronal image 83 versus hyperattenuating renal pyramid from increased urine osmolality. No hydronephrosis or ureterolithiasis.  Pelvis: Underdistended bladder. No bladder calculus. Anteverted uterus. No adnexal mass.  Bowel: No obstruction. Colonic diverticulosis. Normal appendix.  Abdominal wall: Unremarkable.   Retroperitoneum: No lymphadenopathy.  Vasculature: No abdominal aortic aneurysm. Mild aortoiliac atherosclerotic calcification.  Osseous structures: No destructive osseous lesions. Mild disc height loss at L5/S1.       1. No acute findings identified in the abdomen or pelvis. 2. Colonic diverticulosis. 3. Punctate nonobstructing nephrolithiasis versus increased urine osmolality.  This report was finalized on 7/31/2024 7:59 AM by Dr. Jeremy Franco M.D on Workstation: [id number]       Ordered the above noted radiological studies. Reviewed by me in PACS.        MEDICATIONS GIVEN IN ER  Medications   sodium chloride 0.9 % flush 10 mL (has no administration in time range)   sodium chloride 0.9 % infusion (0 mL/hr Intravenous Stopped 7/31/24 0734)   sodium chloride 0.9 % bolus 500 mL (0 mL Intravenous Stopped 7/31/24 0621)   ondansetron (ZOFRAN) injection 4 mg (4 mg Intravenous Given 7/31/24 0552)   prochlorperazine (COMPAZINE) injection 5 mg (5 mg Intravenous Given 7/31/24 0657)   diphenhydrAMINE (BENADRYL) injection 12.5 mg (12.5 mg Intravenous Given 7/31/24 0658)   ondansetron ODT (ZOFRAN-ODT) disintegrating tablet 4 mg (4 mg Oral Given 7/31/24 0739)           MEDICAL DECISION MAKING, PROGRESS, and CONSULTS    All labs have been independently reviewed by me.  All radiology studies have been reviewed by me and I have also reviewed the radiology report.   EKG's independently viewed and interpreted by me.  Discussion below represents my analysis of pertinent findings related to patient's condition, differential diagnosis, treatment plan and final disposition.    78-year-old female who presents with nausea and vomiting after starting a medication.  Family at bedside states that she had has had a similar reaction when trying this in the past.  CBC unremarkable.  Chemistry was unable to be collected after multiple attempts and patient family declined repeat IV stick despite recommendation to evaluate chemistry.  CT  abdomen pelvis was ultimately performed due to being unable to get full lab panel which was unremarkable for acute abnormality.  Discussed bland diet she is tolerating p.o. and will follow-up as needed.        Orders placed during this visit:  Orders Placed This Encounter   Procedures    Respiratory Panel PCR w/COVID-19(SARS-CoV-2) SIRISHA/ANGELITA/GAMAL/PAD/[facility]/SHERYL In-House, NP Swab in UTM/VTM, 2 HR TAT - Swab, Nasopharynx    CT Abdomen Pelvis Without Contrast    Comprehensive Metabolic Panel    Lipase    BNP    Single High Sensitivity Troponin T    Magnesium    Phosphorus    CBC Auto Differential    Scan Slide    ECG 12 Lead Other; nausea, epigastric discomfort    Insert Peripheral IV    CBC & Differential       Independent interpretation of labs, radiology studies, and discussions with consultants:    Discussed with Dr. De Leon, who agrees with plan.     ED Course as of 07/31/24 0849   Wed Jul 31, 2024   0530 EKG ER MD interpretation   Time: 5: 22  Rhythm and rate: Normal sinus rhythm at a rate of 78  Axis: Normal  P waves: Normal  QRS complexes: Left bundle branch block  ST segments: no unexpected ST segment changes given the patient has a left bundle branch block  T waves: no flattening or inversions  Comparison EKG is from April 2, 2024 [AR]   0642 Patient still quite nauseated despite 500 cc bolus iv fluids and zofran.  Plan additional meds and fluids.  Labs recollected due to hemolysis in lab. [AR]   0709 Patient difficult IV stick and per RN patient does not want to be stuck again for redraw of CMP. Patient did receive benadryl, compazine, fluids prior to IV going bad. Nephew at bedside agrees she does not want to be stuck again.  Will PO challenge patient. [JG]   0712 WBC(!): 13.43 [JG]   0712 RBC: 3.92 [JG]   0712 Hemoglobin: 12.3 [JG]   0712 Hematocrit: 38.6 [JG]   0712 Platelets: 222 [JG]   0719 Pt recheck: still does not want another IV or lab still. Reports she is still having some nausea and was having  diarrhea. Discussed that we cannot fully evaluate without labs. She is agreeable to CT scan, but no further IV sticks. Family at bedside agrees.  [JG]   6371 Updated patient on ED workup, including labs and imaging (if performed). We discussed follow up instructions and return precautions. The patient verbalizes understanding and is ready for discharge.   Tolerating PO. Discussed again that we were unable to get a CMP and she does not want another IV attempt.  [JG]      ED Course User Index  [AR] Jannette De Leon MD  [JG] Cheri Parry APRN             DIAGNOSIS  Final diagnoses:   Nausea vomiting and diarrhea   Adverse effect of drug, initial encounter         DISPOSITION  discharge      Latest Documented Vital Signs:  As of 08:49 EDT  BP- 128/62 HR- 70 Temp- 97.1 °F (36.2 °C) (Tympanic) O2 sat- 96%              --    Please note that portions of this were completed with a voice recognition program.       Note Disclaimer: At [facility], we believe that sharing information builds trust and better relationships. You are receiving this note because you are receiving care at [facility] or recently visited. It is possible you will see health information before a provider has talked with you about it. This kind of information can be easy to misunderstand. To help you fully understand what it means for your health, we urge you to discuss this note with your provider.             Cheri Parry APRN  07/31/24 [id number]

## 2024-09-11 ENCOUNTER — TELEPHONE (OUTPATIENT)
Dept: ONCOLOGY | Facility: CLINIC | Age: 79
End: 2024-09-11
Payer: MEDICARE

## 2024-09-11 NOTE — TELEPHONE ENCOUNTER
Caller: Maximino(nephew)Pelon (ON  [address])    Relationship to patient: Emergency Contact    Best call back number: [phone]    Chief complaint:     Type of visit: LAB & F/U 2     Requested date: NEEDS TO BE A MONDAY, CALL TO R/S     If rescheduling, when is the original appointment: 10/4/2024

## 2024-09-25 ENCOUNTER — HOSPITAL ENCOUNTER (OUTPATIENT)
Dept: PET IMAGING | Facility: HOSPITAL | Age: 79
Discharge: HOME | End: 2024-09-25
Admitting: INTERNAL MEDICINE
Payer: MEDICARE

## 2024-09-25 DIAGNOSIS — C78.02 MALIGNANT NEOPLASM METASTATIC TO LEFT LUNG: ICD-10-CM

## 2024-09-25 DIAGNOSIS — M81.6 LOCALIZED OSTEOPOROSIS WITHOUT CURRENT PATHOLOGICAL FRACTURE: ICD-10-CM

## 2024-09-25 LAB — CREAT BLDA-MCNC: 1 MG/DL (ref 0.6–1.3)

## 2024-09-25 PROCEDURE — 82565 ASSAY OF CREATININE: CPT

## 2024-09-25 PROCEDURE — 74177 CT ABD & PELVIS W/CONTRAST: CPT

## 2024-09-25 PROCEDURE — 71260 CT THORAX DX C+: CPT

## 2024-09-25 PROCEDURE — 25510000001 IOPAMIDOL 61 % SOLUTION: Performed by: INTERNAL MEDICINE

## 2024-09-25 PROCEDURE — 0 DIATRIZOATE MEGLUMINE & SODIUM PER 1 ML: Performed by: INTERNAL MEDICINE

## 2024-09-25 RX ORDER — DIATRIZOATE MEGLUMINE AND DIATRIZOATE SODIUM 660; 100 MG/ML; MG/ML
30 SOLUTION ORAL; RECTAL
Status: COMPLETED | OUTPATIENT
Start: 2024-09-25 | End: 2024-09-25

## 2024-09-25 RX ORDER — IOPAMIDOL 612 MG/ML
100 INJECTION, SOLUTION INTRAVASCULAR
Status: COMPLETED | OUTPATIENT
Start: 2024-09-25 | End: 2024-09-25

## 2024-09-25 RX ADMIN — IOPAMIDOL 85 ML: 612 INJECTION, SOLUTION INTRAVENOUS at 09:27

## 2024-09-25 RX ADMIN — DIATRIZOATE MEGLUMINE AND DIATRIZOATE SODIUM 30 ML: 660; 100 LIQUID ORAL; RECTAL at 09:27

## 2024-10-07 ENCOUNTER — OFFICE VISIT (OUTPATIENT)
Dept: ONCOLOGY | Facility: CLINIC | Age: 79
End: 2024-10-07
Payer: MEDICARE

## 2024-10-07 ENCOUNTER — INFUSION (OUTPATIENT)
Dept: ONCOLOGY | Facility: HOSPITAL | Age: 79
End: 2024-10-07
Payer: MEDICARE

## 2024-10-07 ENCOUNTER — LAB (OUTPATIENT)
Dept: LAB | Facility: HOSPITAL | Age: 79
End: 2024-10-07
Payer: MEDICARE

## 2024-10-07 VITALS
OXYGEN SATURATION: 96 % | DIASTOLIC BLOOD PRESSURE: 76 MMHG | BODY MASS INDEX: 20.95 KG/M2 | SYSTOLIC BLOOD PRESSURE: 133 MMHG | WEIGHT: 110.9 LBS | TEMPERATURE: 98.6 F | HEART RATE: 77 BPM

## 2024-10-07 DIAGNOSIS — Z17.0 MALIGNANT NEOPLASM OF CENTRAL PORTION OF LEFT BREAST IN FEMALE, ESTROGEN RECEPTOR POSITIVE: ICD-10-CM

## 2024-10-07 DIAGNOSIS — C78.02 MALIGNANT NEOPLASM METASTATIC TO LEFT LUNG: Primary | ICD-10-CM

## 2024-10-07 DIAGNOSIS — E83.42 HYPOMAGNESEMIA: ICD-10-CM

## 2024-10-07 DIAGNOSIS — M81.6 LOCALIZED OSTEOPOROSIS WITHOUT CURRENT PATHOLOGICAL FRACTURE: ICD-10-CM

## 2024-10-07 DIAGNOSIS — C50.112 MALIGNANT NEOPLASM OF CENTRAL PORTION OF LEFT BREAST IN FEMALE, ESTROGEN RECEPTOR POSITIVE: ICD-10-CM

## 2024-10-07 DIAGNOSIS — C78.02 MALIGNANT NEOPLASM METASTATIC TO LEFT LUNG: ICD-10-CM

## 2024-10-07 LAB
ALBUMIN SERPL-MCNC: 4.3 G/DL (ref 3.5–5.2)
ALBUMIN/GLOB SERPL: 1.1 G/DL
ALP SERPL-CCNC: 98 U/L (ref 39–117)
ALT SERPL W P-5'-P-CCNC: 12 U/L (ref 1–33)
ANION GAP SERPL CALCULATED.3IONS-SCNC: 10.5 MMOL/L (ref 5–15)
AST SERPL-CCNC: 22 U/L (ref 1–32)
BASOPHILS # BLD AUTO: 0.04 10*3/MM3 (ref 0–0.2)
BASOPHILS NFR BLD AUTO: 0.9 % (ref 0–1.5)
BILIRUB SERPL-MCNC: 0.4 MG/DL (ref 0–1.2)
BUN SERPL-MCNC: 13 MG/DL (ref 8–23)
BUN/CREAT SERPL: 14 (ref 7–25)
CALCIUM SPEC-SCNC: 10 MG/DL (ref 8.6–10.5)
CHLORIDE SERPL-SCNC: 101 MMOL/L (ref 98–107)
CO2 SERPL-SCNC: 26.5 MMOL/L (ref 22–29)
CREAT SERPL-MCNC: 0.93 MG/DL (ref 0.57–1)
DEPRECATED RDW RBC AUTO: 40.9 FL (ref 37–54)
EGFRCR SERPLBLD CKD-EPI 2021: 63.4 ML/MIN/1.73
EOSINOPHIL # BLD AUTO: 0.06 10*3/MM3 (ref 0–0.4)
EOSINOPHIL NFR BLD AUTO: 1.4 % (ref 0.3–6.2)
ERYTHROCYTE [DISTWIDTH] IN BLOOD BY AUTOMATED COUNT: 11.9 % (ref 12.3–15.4)
GLOBULIN UR ELPH-MCNC: 3.9 GM/DL
GLUCOSE SERPL-MCNC: 233 MG/DL (ref 65–99)
HCT VFR BLD AUTO: 40.3 % (ref 34–46.6)
HGB BLD-MCNC: 13.5 G/DL (ref 12–15.9)
IMM GRANULOCYTES # BLD AUTO: 0 10*3/MM3 (ref 0–0.05)
IMM GRANULOCYTES NFR BLD AUTO: 0 % (ref 0–0.5)
LYMPHOCYTES # BLD AUTO: 1.69 10*3/MM3 (ref 0.7–3.1)
LYMPHOCYTES NFR BLD AUTO: 39.5 % (ref 19.6–45.3)
MAGNESIUM SERPL-MCNC: 2 MG/DL (ref 1.6–2.4)
MCH RBC QN AUTO: 31.5 PG (ref 26.6–33)
MCHC RBC AUTO-ENTMCNC: 33.5 G/DL (ref 31.5–35.7)
MCV RBC AUTO: 94.2 FL (ref 79–97)
MONOCYTES # BLD AUTO: 0.38 10*3/MM3 (ref 0.1–0.9)
MONOCYTES NFR BLD AUTO: 8.9 % (ref 5–12)
NEUTROPHILS NFR BLD AUTO: 2.11 10*3/MM3 (ref 1.7–7)
NEUTROPHILS NFR BLD AUTO: 49.3 % (ref 42.7–76)
NRBC BLD AUTO-RTO: 0 /100 WBC (ref 0–0.2)
PHOSPHATE SERPL-MCNC: 2.9 MG/DL (ref 2.5–4.5)
PLATELET # BLD AUTO: 242 10*3/MM3 (ref 140–450)
PMV BLD AUTO: 8.4 FL (ref 6–12)
POTASSIUM SERPL-SCNC: 4.5 MMOL/L (ref 3.5–5.2)
PROT SERPL-MCNC: 8.2 G/DL (ref 6–8.5)
RBC # BLD AUTO: 4.28 10*6/MM3 (ref 3.77–5.28)
SODIUM SERPL-SCNC: 138 MMOL/L (ref 136–145)
WBC NRBC COR # BLD AUTO: 4.28 10*3/MM3 (ref 3.4–10.8)

## 2024-10-07 PROCEDURE — 36415 COLL VENOUS BLD VENIPUNCTURE: CPT

## 2024-10-07 PROCEDURE — 96372 THER/PROPH/DIAG INJ SC/IM: CPT

## 2024-10-07 PROCEDURE — 80053 COMPREHEN METABOLIC PANEL: CPT

## 2024-10-07 PROCEDURE — 85025 COMPLETE CBC W/AUTO DIFF WBC: CPT

## 2024-10-07 PROCEDURE — 84100 ASSAY OF PHOSPHORUS: CPT

## 2024-10-07 PROCEDURE — 25010000002 DENOSUMAB 60 MG/ML SOLUTION PREFILLED SYRINGE: Performed by: INTERNAL MEDICINE

## 2024-10-07 PROCEDURE — 83735 ASSAY OF MAGNESIUM: CPT

## 2024-10-07 RX ORDER — EXEMESTANE 25 MG/1
25 TABLET ORAL DAILY
Qty: 90 TABLET | Refills: 1 | Status: SHIPPED | OUTPATIENT
Start: 2024-10-07

## 2024-10-07 RX ADMIN — DENOSUMAB 60 MG: 60 INJECTION SUBCUTANEOUS at 13:51

## 2024-10-07 NOTE — PROGRESS NOTES
REASON FOR FOLLOW-UP:    1. Metastatic breast cancer to the lungs, ER/DE positive, HER2/berkley positive, undergoing hormonal therapy.       HISTORY OF PRESENT ILLNESS: Ms. Reese is a 78 y.o.  female with type 2 diabetes, hypothyroidism, hyperlipidemia, and metastatic breast cancer who today for 6-month follow-up evaluation.  Patient is accompanied by his nephew as always.    History of Present Illness  The patient is here today, 10/07/2024, for a 6-month follow-up evaluation. This is following her recent CT scan of the chest, abdomen, and pelvis conducted on 09/25/2024. She is joined by an adult male.    She reports no new health issues and overall feels well. She experiences occasional joint pain, which she manages with massage. She denies experiencing any hot flashes or sweating. She has not eaten lunch today and did not take her medication. The accompanying adult male requests a 3-month supply of her medication.    Results  Laboratory Studies  HB 13.5, MCV 94.2, platelets 242,000, WBC 4280, neutrophils at 2100, glucose 233, otherwise unremarkable CMP.    Imaging  CT scan of chest, abdomen, and pelvis showed masslike opacity in the left apical area measuring 2.7 x 2.1 cm, stable compared to previous measurement of 2.7 x 2.3 cm. Solid pulmonary nodule in the anterior basilar lobe left lower lobe measures 3 mm and is also stable. Normal liver, gallbladder, spleen, pancreas, adrenal glands. No lymphadenopathy. No bone lesions. Bowel appears normal.          Past Medical History:   Diagnosis Date    Breast cancer, Left     1998, 2009 metastisized to lungs    Diabetes mellitus     type 2     Hyperlipidemia     Hypothyroidism 01/2009    Left upper pulmonary nodule 07/05/2012    Osteoporosis 10/17/2011     Past Surgical History:   Procedure Laterality Date    BREAST BIOPSY Left 1995    MASTECTOMY RADICAL Left 1995    MOUTH SURGERY  08/2015    tooth extraction            ONCOLOGIC/HEMATOLOGIC HISTORY: History from  previous dates can be found in the separate document.    1. Metastatic breast cancer to the lungs, ER/[place] positive, HER2/berkley positive, undergoing hormonal therapy with Arimidex since the middle of March 2009.   2. Response to Arimidex as evidenced by serial CT exams, including on 01/10/2012, showing no significant disease.   3. New left upper lobe pulmonary nodule noted, measuring 1.1 cm by CT scan on 07/05/2012. No other evidence of metastatic disease. Hormonal therapy switched to Faslodex 07/12/2012.   4. Osteoporosis documented by bone density scan from 10/17/2011 and also 02/21/2014. Patient was started on Prolia therapy every 6 months. Patient had tooth extraction in August 2015. Prolia has been on hold.   5. CT scans of chest, abdomen and pelvis on 04/07/2016 showed stable disease. Faslodex will be continued.   6. CT scan examination reported disease progress on 01/30/2017, patient was switched to Aromasin in early February 2017. However she was unable to start Afinitor due to cost.   7. Repeated bone density scan on 2/27/2017 reported worsening osteoporosis. Prolia was restarted back on 03/02/2017. Plan for Q6 month treatment.   8.  Repeated chest CT on 5/15/2017 showed progressive left upper lobe pulmonary nodule.  Patient had stereotactic radiation therapy in early June 2017.  Aromasin was continued.   9.  CT scan of the chest on 9/25/2017 reported post treatment changes in the left upper lobe.   10.  CT scan examination for chest abdomen and pelvis with IV contrast on 6/21/2018 reported no evidence of disease progression.   11.  CT chest/abdomen/pelvis from 6/3/2020 showed no evidence of metastatic disease has developed.  12.  CT for chest abdomen pelvis on 6/21/2021 reported stable condition.        CT scan on 04/07/2016 showed stable disease with the small left upper lobe pulmonary nodule, no change compared to previous imaging studies, no evidence of metastatic disease in the abdomen or pelvis or bony  structure.       Unfortunately her CT scan on 01/30/2017 showed slight disease progress in the left upper lobe of the lung, by 3 mm, up to 1.4 centimeters. No evidence of new metastatic disease or bone metastases.       Patient was seen on February 7, 2017. We'll propose switch therapy to Aromasin plus Afinitor.  however she could not afford Afinitor because the cost. Our pharmacy staff tried to apply for premedication, but was not successful. So she is only on Aromasin with good tolerance.      Repeated a CT of the chest on 5/15/2017 showed progressive left upper lobe pulmonary nodule.  Patient had stereotactic radiation therapy in early June 2017.  Aromasin was continued.     CT scan examination reported no active disease on 6/10/2019.  Had a reasonable iron studies including ferritin 188 ng/mL, free iron 75 TIBC 410 and iron saturation 18%.  And also improve the magnesium level 1.8.  Mild anemic with hemoglobin 11.5 but normal platelets 253,000 and WBC 4020.  Chemistry lab reported normal CMP except elevated glucose which was at 201.     Patient presented on 12/3/2019 for 6-month followup.  She reports at baseline condition, she retired.  She denies weight loss, no fever, no sweating, no headaches.  Her performance status is ECOG 0.  Patient had teeth extraction in February 2018.  She now has full-mouth denture.    Patient otherwise reports no dyspnea, no cough, hemoptysis.  She reports no fever or sweating.   She is on Aromasin with good tolerance. She denies arthralgia, hot flashes or sweating.  She denies bone pains.       Laboratory study on 12/3/2019 reported marginal anemia hemoglobin 11.7, otherwise normal CBC.  Chemistry lab reported magnesium 1.5, glucose 156 otherwise unremarkable.       Her CT scan for chest abdomen and pelvis with IV contrast on 6/3/2020 reported no evidence of worsening metastatic disease.      Laboratory studies on 6/3/2020 reported stable mild anemia with Hb 11.7, with MCV 97.8,  normal platelets 260,000 and a WBC 4680.  Iron study reported ferritin 210, saturation 24%.  Unremarkable CMP with normal glucose, electrolytes and liver function panel and renal function.      Laboratory study on 8/6/2020 reported a peak ferritin 1124 when she was hospitalized for COVID-19 infection.  She had significant elevated CRP 7.73 mg/dL.  Her hemoglobin was 11.6, platelets 259,000 and WBC 11,733 ANC 10,390.     Laboratory studies 12/18/2020 reported normal CBC including Hb 12.6, WBC 5180, ANC 2790, lymphocytes 1500, platelets 274,000, magnesium 1.7, electrolytes normal otherwise,, elevated glucose 185, unremarkable CMP.  Ferritin 135, and iron saturation 24%.    The patient no longer takes iron. Laboratory study on 8/22/2022 reported excellent ferritin 251 and iron saturation 17% with free iron 63 and TIBC 372.    Laboratory study today on 2/27/2023 reported stable marginal anemia with hemoglobin 11.9 g/dL, MCV 94.5, normal platelets 247,000, WBC 5330 including neutrophils 3240.  Iron study reported ferritin 267 and iron saturation 29% free iron 117 TIBC 405.    The CT of the chest, abdomen, and pelvis obtained on 08/29/2023 reported stable left upper lobe spiculated mass reporting 2.7 x 2.4 cm. There was associated scarring. No pleural effusion. No enlarging nodule or masses or pneumothorax. There was no enlarged or subacute supraclavicular, axillary, mediastinum, or hilar lymph nodes. Abdomen has no evidence for malignancy. The hepatic contour is somewhat nodular. The bone window demonstrated degenerative changes without suspicious osseous lesion.    The patient also had a DEXA bone density scan on 08/29/2023 which reported ongoing osteoporosis of the lumbar spine.    MEDICATIONS: The current medication list was reviewed with the patient and updated in the EMR this date per the medical assistant. Medication dosages and frequencies were confirmed to be accurate.       ALLERGIES: LETROZOLE (questionably  causing skin rash).       SOCIAL HISTORY: The patient is . She finished college education.  She is retired.  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 She has never smoked. No alcohol use. No illegal drug use. No risks for HIV.        FAMILY HISTORY: Her mother  of tuberculosis at the age of 48. A sister had thyroid cancer/lung cancer - no details are available. Two brothers had lung cancer and bone cancers in their 70s - no details available. Her maternal grandmother and her mother both had diabetes. No family history of hypertension or heart disease.       REVIEW OF SYSTEMS:   As per HPI      VITAL SIGNS:   Vitals:    10/07/24 1312   BP: 133/76   Pulse: 77   Temp: 98.6 °F (37 °C)   TempSrc: Oral   SpO2: 96%   Weight: 50.3 kg (110 lb 14.4 oz)   PainSc: 0-No pain     ECO      Physical Exam    GENERAL:  Well-developed, well-nourished in no acute distress.    SKIN:  Warm, dry without rashes, purpura or petechiae.  LYMPHATICS:  No cervical, supraclavicular or axillary adenopathy.  CHEST: Normal respiratory effort.  Lungs clear to auscultation. Good airflow.  CARDIAC:  Regular rate and rhythm. Normal S1,S2.  ABDOMEN:  Soft, no tender.  Bowel sounds normal.  EXTREMITIES:  No lower extremity edema.  .    LABORATORY DATA:     Results from last 7 days   Lab Units 10/07/24  1301   WBC 10*3/mm3 4.28   NEUTROS ABS 10*3/mm3 2.11   HEMOGLOBIN g/dL 13.5   HEMATOCRIT % 40.3   PLATELETS 10*3/mm3 242     Lab Results   Component Value Date    RLISOAEH08 831 2024     Lab Results   Component Value Date    FOLATE >20.00 2024     Results from last 7 days   Lab Units 10/07/24  1301   SODIUM mmol/L 138   POTASSIUM mmol/L 4.5   CHLORIDE mmol/L 101   CO2 mmol/L 26.5   BUN mg/dL 13   CREATININE mg/dL 0.93   CALCIUM mg/dL 10.0   ALBUMIN g/dL 4.3   BILIRUBIN mg/dL 0.4   ALK PHOS U/L 98   ALT (SGPT) U/L 12   AST (SGOT) U/L 22   GLUCOSE mg/dL 233*   MAGNESIUM mg/dL 2.0     Lab Results   Component Value Date     IRON 117 02/27/2023    TIBC 405 02/27/2023    FERRITIN 267.20 (H) 02/27/2023   Iron saturation 29% on 2/27/2023           IMAGING:   CT Chest With Contrast Diagnostic (09/25/2024 09:41)     CT Abdomen Pelvis With Contrast (09/25/2024 09:41)     DEXA Bone Density Axial (08/29/2023 10:27)     Assessment & Plan      ASSESSMENT:   1. Metastatic breast cancer with metastasis in the lungs, biopsy confirmed. ER/[place] positive. Her2 positive.    She had good response to Arimidex for many years.  However in early 2017, CT scan showed disease progression with solitary lung metastases.  We switched her to Aromasin in early February 2017, however she could not obtain Afinitor, because of the cost issue.     We obtained chest CT scan on 5/15/2017 which showed further disease progression.  Patient was treated with stereotactic radiation therapy in early June 2017.    We obtained CT scan twice on 9/25/2017 and on 6/21/2018 which showed post radiation therapy changes.    We obtained CT scan examination again on 6/10/2019 which showed no evidence of active disease.    Patient reports she is at baseline condition.  Physical examination is no evidence for palpable disease.  She continues to tolerate endocrine therapy with Aromasin as of 12/3/2020.    CT scan examination on 6/3/2020 showed no evidence of disease progression.  Aromasin will be continued.  On 12/8/2020, patient reports she has been tolerating Aromasin.    On 6/21/2021, patient had a CT scan for chest abdomen pelvis, which reported stable small pulmonary nodules and no evidence for metastatic disease.  Discussed with patient 6/30/2021, will continue current treatment with exemestane.  She returns for 6-month evaluation 1/31/2022 without evidence of recurrent disease.  She continues to tolerate exemestane very well.  We will repeat imaging in 6 months.   CT for chest abdomen pelvis on 8/16/2022 reported no evidence for metastatic disease in chest abdomen pelvis.  02/27/2023,  patient reports tolerating exemestane. This will be continued because she has metastatic disease. I recommended to obtain CT scan for chest, abdomen, and pelvis in 6 months with IV conscious for reassessment of her metastatic breast cancer.    The patient had CT scan examination for chest, abdomen, and pelvis on 08/29/2023. There is a stable left upper lobe pulmonary nodule post treatment changes. No increasing size and no new pulmonary nodules or lymphadenopathy.  There is no evidence for disease progression.  We discussed with patient today on 09/12/2023, she will continue treatment with exemestane.  Patient had a chest angiogram study on 12/11/2023 because of dyspnea.  Nevertheless CT scan showed no acute intrathoracic changes.  On 4/5/2024 patient reports she is at baseline condition.  Denies chest pain dyspnea cough hemoptysis.  Recommend to continue endocrine therapy and repeating CT scan for chest abdomen pelvis in 6 months.  The CT scan on 9/25/2024 showed no evidence of disease progression. The masslike opacity in the left apical area measures 2.7 x 2.1 cm, previously 2.7 x 2.3 cm, and the solid pulmonary nodule in the anterior basilar lobe left lower lobe measures 3 mm and is stable. She will continue exemestane 25 mg daily.        2. Osteoporosis. She had bone density scan on 2/27/2017 which showed statistically significant worsening osteoporosis in the left hip.  We restarted her back on Prolia on 03/02/17 and repeat every 6 months.   Her last dose of Prolia was in June 2018.  Patient reported that she had pain involving the left lower jaw after her teeth extraction in February 2018.  We concerned about possibility of necrosis of the jaw bone, so I recommended to discontinue prolia.    The patient will continue oral calcium and vitamin D supplementation.   Discussed again with the patient on 12/8/2020, recommended patient to have dental assessment before we restart her on Prolia.    6/30/2021, patient  reports she was cleared by her dentist for resuming Prolia.   Repeat DEXA scan 8/11/2021 with ongoing osteoporosis  The patient has received clearance from her dentist and will therefore proceed with resumption of Prolia on 1/31/2022.  Continue Prolia today on 8/22/2022.  We will continue Prolia every 6 months. The patient will be given one dose today on 02/27/2023. I recommended to obtain a bone density scan in 08/2023, this is biannual examination.  The patient had a DEXA scan on 08/29/2023, which reported persistent osteoporosis, however, slightly improved bone density in the lumbar spine. Today, the patient will be given Prolia injection on 09/12/2023 and continue every 6 months.  Prolia will be given today 4/5/2024.   She is due for a Prolia injection today on 10/7/2024 and will continue this every 6 months.    3. Mild anemia with mild iron deficiency.    Patient has been taking oral iron supplementation with good results, laboratory study showed improved with ferritin and iron saturation, and normalized hemoglobin.   Her iron study on 6/10/2019 reported excellent ferritin 188, and the marginal iron saturation 18%.   Iron study on 6/3/2020 reported ferritin 210 and iron saturation 24%.  Patient was instructed to continue oral iron supplementation and oral vitamin B12 supplementation.   Laboratory study 12/8/2020 reported normal ferritin 135 and iron saturation 24%, together with normal hemoglobin 12.6.   Hemoglobin is within normal at 12.6 on 8/31/2022.  Hemoglobin 12.0, ferritin 251 and iron saturation 17% with free iron 63 TIBC 372 on 8/22/2022.  Patient discontinued oral iron in 08/2022.   2/27/2023 laboratory studies showed hemoglobin 11.9, excellent ferritin 267 and normal iron saturation 29% with free iron 117 TIBC 405.   On 3/14/2023 vitamin B12 level 913, and folate 16.6 ng/mL and hemoglobin 11.3.  On 09/12/2023, the patient has normal hemoglobin 12.6.   2/5/2024 hemoglobin 11.8, B12 level 831 and  folate > 20 ng/mL.  On 4/5/2024 patient has normal hemoglobin 13.3.  10/7/2024 normal hemoglobin 13.5.      4.  Hypomagnesemia.    She was started on oral magnesium oxide.  Repeat labs showed improved and marginally normal magnesium level 1.8 on 6/10/2019.    Recurrent hypomagnesemia 1.5 on 12/3/2019.   Magnesium 1.7 on 12/8/2020.  I asked patient to continue oral magnesium oxide.  Improved magnesium 1.9 on 2/24/2021.   Magnesium is normal today at 1.9 on 1/31/2022.  Magnesium 1.6 on 8/22/2022.  Discussed with patient, will increase oral magnesium to 800 mg twice a day.  On 2/27/2023 normal magnesium 1.9.  Continue oral magnesium 800 mg twice a day.   On 09/12/2023, laboratory studies reported normal magnesium 1.9.  Continue oral magnesium.   On 4/5/2024 magnesium level 1.8.  10/7/2027 normal magnesium 2.0.  Continue oral magnesium.      5. Diabetes Mellitus.  Today 10/7/2024 her blood glucose level is elevated at 233. She forgot to take her medicine today. She will need to continue follow-up with her PCP for management of diabetes, which is not well controlled.        PLAN:   She will continue exemestane 25 mg daily. A new prescription has been sent to her pharmacy for a 90-day supply and one more refill.   Prolia injection today.  This will be repeated every 6 months.   Continue management of diabetes and followed by PCP.  A CT scan will be obtained on an annual basis, due in 1 year.   Continue oral magnesium 800 mg twice a day.  I will see patient in 6 months and obtain laboratory studies CMP, CBC, MAG, PHOS and Prolia treatment.      I discussed with the patient and his nephew about CT scan results and about laboratory results and further management plan.  Patient voiced understanding and agreeable.    More than 45 min were used for patient care, over half of that time were for counseling.      Carlos Castillo MD PhD  10/07/2024          CC:  Sussy Payne M.D.    Zari Palacios M.D.

## 2024-10-29 ENCOUNTER — HOSPITAL ENCOUNTER (EMERGENCY)
Facility: HOSPITAL | Age: 79
Discharge: HOME | End: 2024-10-29
Attending: EMERGENCY MEDICINE | Admitting: EMERGENCY MEDICINE
Payer: MEDICARE

## 2024-10-29 ENCOUNTER — APPOINTMENT (OUTPATIENT)
Dept: GENERAL RADIOLOGY | Facility: HOSPITAL | Age: 79
End: 2024-10-29
Payer: MEDICARE

## 2024-10-29 VITALS
OXYGEN SATURATION: 100 % | HEIGHT: 61 IN | SYSTOLIC BLOOD PRESSURE: 144 MMHG | WEIGHT: 110.89 LBS | HEART RATE: 70 BPM | TEMPERATURE: 98.6 F | DIASTOLIC BLOOD PRESSURE: 64 MMHG | BODY MASS INDEX: 20.94 KG/M2 | RESPIRATION RATE: 18 BRPM

## 2024-10-29 DIAGNOSIS — J20.6 ACUTE BRONCHITIS DUE TO RHINOVIRUS: Primary | ICD-10-CM

## 2024-10-29 LAB
B PARAPERT DNA SPEC QL NAA+PROBE: NOT DETECTED
B PERT DNA SPEC QL NAA+PROBE: NOT DETECTED
C PNEUM DNA NPH QL NAA+NON-PROBE: NOT DETECTED
FLUAV SUBTYP SPEC NAA+PROBE: NOT DETECTED
FLUBV RNA ISLT QL NAA+PROBE: NOT DETECTED
HADV DNA SPEC NAA+PROBE: NOT DETECTED
HCOV 229E RNA SPEC QL NAA+PROBE: NOT DETECTED
HCOV HKU1 RNA SPEC QL NAA+PROBE: NOT DETECTED
HCOV NL63 RNA SPEC QL NAA+PROBE: NOT DETECTED
HCOV OC43 RNA SPEC QL NAA+PROBE: NOT DETECTED
HMPV RNA NPH QL NAA+NON-PROBE: NOT DETECTED
HPIV1 RNA ISLT QL NAA+PROBE: NOT DETECTED
HPIV2 RNA SPEC QL NAA+PROBE: NOT DETECTED
HPIV3 RNA NPH QL NAA+PROBE: NOT DETECTED
HPIV4 P GENE NPH QL NAA+PROBE: NOT DETECTED
M PNEUMO IGG SER IA-ACNC: NOT DETECTED
RHINOVIRUS RNA SPEC NAA+PROBE: DETECTED
RSV RNA NPH QL NAA+NON-PROBE: NOT DETECTED
SARS-COV-2 RNA NPH QL NAA+NON-PROBE: NOT DETECTED

## 2024-10-29 PROCEDURE — 71045 X-RAY EXAM CHEST 1 VIEW: CPT

## 2024-10-29 PROCEDURE — 99283 EMERGENCY DEPT VISIT LOW MDM: CPT

## 2024-10-29 PROCEDURE — 0202U NFCT DS 22 TRGT SARS-COV-2: CPT

## 2024-10-30 NOTE — ED PROVIDER NOTES
EMERGENCY DEPARTMENT ENCOUNTER  Room Number:  27/27  PCP: Sussy Payne MD  Independent Historians: Patient      HPI:  Chief Complaint: had concerns including Cough.     A complete HPI/ROS/PMH/PSH/SH/FH are unobtainable due to: Nothing      Context: Brie Reese is a 78 y.o. female with a medical history of breast cancer with metastatic disease to the lung, diabetes who presents to the ED c/o acute cough for the last 9 days.  She denies shortness of breath.  No chest pain.  She denies known fever or chills.  She denies sore throat.  She denies significant phlegm production.  No leg pain or leg swelling.  She denies history of COPD, asthma, CHF.      Review of prior external notes (non-ED) -and- Review of prior external test results outside of this encounter: I reviewed oncology visit from 10/7/2024.  She was seen in follow-up for metastatic breast cancer to the lungs.  CT scan dated 9/25/2024 showed no evidence of disease progression.  The masslike opacity in the left apical area measured 2.7 x 2.1 cm in the solid pulmonary nodule in the anterior basilar lobe left lower lobe measured 3 mm and was stable.  She was continued on exemestane 25 mg daily        PAST MEDICAL HISTORY  Active Ambulatory Problems     Diagnosis Date Noted    Malignant neoplasm of female breast 03/14/2016    Anemia 04/12/2016    Iron deficiency anemia 04/20/2016    Malignant neoplasm metastatic to left lung 02/06/2017    Osteoporosis 02/06/2017    Hypomagnesemia 01/08/2019    Acute bronchitis due to COVID-19 virus 08/04/2020    Hypothyroidism 01/01/2009    Diabetes mellitus 08/04/2020    Hyperlipidemia 08/04/2020    Hyponatremia 08/04/2020    Immunocompromised 08/04/2020    Acute respiratory failure with hypoxia 08/04/2020    Other chest pain 08/22/2022    Abnormal electrocardiogram 09/06/2022    Abnormal CT scan of heart 09/06/2022     Resolved Ambulatory Problems     Diagnosis Date Noted    No Resolved Ambulatory Problems     Past Medical  History:   Diagnosis Date    Breast cancer, Left     Left upper pulmonary nodule 07/05/2012         PAST SURGICAL HISTORY  Past Surgical History:   Procedure Laterality Date    BREAST BIOPSY Left 1995    MASTECTOMY RADICAL Left 1995    MOUTH SURGERY  08/2015    tooth extraction          FAMILY HISTORY  Family History   Problem Relation Age of Onset    Tuberculosis Mother     Diabetes Mother     Thyroid cancer Sister     Lung cancer Sister     Lung cancer Brother         In his 70s.    Bone cancer Brother         in his 70s.    Diabetes Maternal Grandmother     Lung cancer Brother         In his 70s.     Bone cancer Brother         in his 70s.    Hypertension Neg Hx     Heart disease Neg Hx          SOCIAL HISTORY  Social History     Socioeconomic History    Marital status:     Years of education: College   Tobacco Use    Smoking status: Never    Smokeless tobacco: Never   Vaping Use    Vaping status: Never Used   Substance and Sexual Activity    Alcohol use: No    Drug use: No    Sexual activity: Defer     Birth control/protection: Post-menopausal         ALLERGIES  Patient has no known allergies.      REVIEW OF SYSTEMS  Review of all 14 systems is negative other than stated in the HPI above.      PHYSICAL EXAM    I have reviewed the triage vital signs and nursing notes.    ED Triage Vitals   Temp Heart Rate Resp BP SpO2   10/29/24 1724 10/29/24 1724 10/29/24 1724 10/29/24 1725 10/29/24 1724   98.6 °F (37 °C) 86 19 154/70 99 %      Temp src Heart Rate Source Patient Position BP Location FiO2 (%)   -- -- -- -- --                GENERAL: awake and alert, no acute distress  HENT: Normocephalic, atraumatic  EYES: no scleral icterus, EOMI  CV: regular rhythm, regular rate  RESPIRATORY: normal effort, no wheezing or crackles  ABDOMEN: soft, nontender throughout  MUSCULOSKELETAL: no deformity, no calf tenderness bilaterally, no peripheral edema  NEURO: alert, moves all extremities, follows commands, cranial nerves  II through XII intact, speech fluent and clear  PSYCH: calm, cooperative  SKIN: Warm, dry          LAB RESULTS  Recent Results (from the past 24 hours)   Respiratory Panel PCR w/COVID-19(SARS-CoV-2) SIRISHA/ANGELITA/GAMAL/PAD/COR/SHERYL In-House, NP Swab in UTM/VTM, 2 HR TAT - Swab, Nasopharynx    Collection Time: 10/29/24  5:35 PM    Specimen: Nasopharynx; Swab   Result Value Ref Range    ADENOVIRUS, PCR Not Detected Not Detected    Coronavirus 229E Not Detected Not Detected    Coronavirus HKU1 Not Detected Not Detected    Coronavirus NL63 Not Detected Not Detected    Coronavirus OC43 Not Detected Not Detected    COVID19 Not Detected Not Detected - Ref. Range    Human Metapneumovirus Not Detected Not Detected    Human Rhinovirus/Enterovirus Detected (A) Not Detected    Influenza A PCR Not Detected Not Detected    Influenza B PCR Not Detected Not Detected    Parainfluenza Virus 1 Not Detected Not Detected    Parainfluenza Virus 2 Not Detected Not Detected    Parainfluenza Virus 3 Not Detected Not Detected    Parainfluenza Virus 4 Not Detected Not Detected    RSV, PCR Not Detected Not Detected    Bordetella pertussis pcr Not Detected Not Detected    Bordetella parapertussis PCR Not Detected Not Detected    Chlamydophila pneumoniae PCR Not Detected Not Detected    Mycoplasma pneumo by PCR Not Detected Not Detected       The above labs were ordered by me and independently reviewed by me.     RADIOLOGY  XR Chest 1 View    Result Date: 10/29/2024  XR CHEST 1 VW-  CLINICAL HISTORY:  cough  COMPARISON: CT chest 9/25/2024 and chest x-ray of 4/2/2024  FINDINGS: A single view of the chest demonstrates the heart to be within normal limits in size. Increased density is appreciated within the left upper lobe medially which appears similar to slight more prominent as compared to the chest x-ray from 3/16/2024. This can be better assessed with a CT examination of the chest. There is no evidence of consolidation, effusion or congestive failure.      This report was finalized on 10/29/2024 8:17 PM by Dr. Curt Cooley M.D on Workstation: [id number]       The above radiology studies were ordered by me.  See ED course for independent interpretations.     MEDICATIONS GIVEN IN ER  Medications - No data to display      ORDERS PLACED DURING THIS VISIT:  Orders Placed This Encounter   Procedures    Respiratory Panel PCR w/COVID-19(SARS-CoV-2) SIRISHA/ANGELITA/GAMAL/PAD/COR/SHERYL In-House, NP Swab in UTM/VTM, 2 HR TAT - Swab, Nasopharynx    XR Chest 1 View         OUTPATIENT MEDICATION MANAGEMENT:  No current Epic-ordered facility-administered medications on file.     Current Outpatient Medications Ordered in Epic   Medication Sig Dispense Refill    ACCU-CHEK DMITRIY PLUS test strip USE TO TEST BLOOD SUGAR DAILY AS DIRECTED  2    acetaminophen (TYLENOL) 500 MG tablet Take 2 tablets by mouth Every 6 (Six) Hours As Needed for Moderate Pain. 30 tablet 0    atorvastatin (LIPITOR) 20 MG tablet Take 1 tablet by mouth every night at bedtime.      calcium carbonate (OS-JEWEL) 600 MG tablet Take 1 tablet by mouth 2 (Two) Times a Day With Meals. (Patient not taking: Reported on 10/7/2024) 180 tablet 1    [employer] VITAMIN D 2000 UNITS capsule TAKE 1 CAPSULE EVERY DAY  6    Denosumab (PROLIA SC) Inject  under the skin into the appropriate area as directed Every 6 (Six) Months.      exemestane (AROMASIN) 25 MG tablet Take 1 tablet by mouth Daily. 90 tablet 1    GLIMEPIRIDE PO Take 4 mg by mouth 2 (two) times a day.      levothyroxine (SYNTHROID, LEVOTHROID) 25 MCG tablet TAKE 1 TABLET BY MOUTH EVERY DAY  2    Magnesium Oxide 400 (240 Mg) MG tablet TAKE 2 TABLETS BY MOUTH TWICE A  tablet 0    metFORMIN (GLUCOPHAGE) 500 MG tablet TAKE 3 TABLETS BY MOUTH EVERY MORNING, AND 2 TABLETS EVERY EVENING WITH FOOD FOR DIABETES  3    omeprazole (priLOSEC) 20 MG capsule TAKE 1 CAPSULE BY MOUTH EVERY DAY FOR 10 DAYS AND THEN AS NEEDED DAILY 90 capsule 1         PROCEDURES  Procedures            PROGRESS,  DATA ANALYSIS, CONSULTS, AND MEDICAL DECISION MAKING  All labs have been independently interpreted by me.  All radiology studies have been reviewed by me. All EKG's have been independently viewed and interpreted by me.  Discussion below represents my analysis of pertinent findings related to patient's condition, differential diagnosis, treatment plan and final disposition.    Differential diagnosis includes but is not limited to:  Pneumonia  Pulmonary edema  Bronchitis      Clinical Scores:                  ED Course as of 10/29/24 2041   Tue Oct 29, 2024   1947 Human Rhinovirus/Enterovirus(!): Detected [JR]   1947 Chest x-ray independently interpreted PACS.  There is no focal infiltrate or pulmonary edema.  Left apical opacity correlates with patient's known metastatic disease to the lung, as described on recent CT scan from 9/25/2024. [JR]   2011 Patient is not hypoxic.  She is in no respiratory distress.  She has no focal pneumonia on chest x-ray and viral panel is positive for rhinovirus suggesting acute bronchitis.  I did offer her antitussives with codeine however she states she had taken this previously and did not like it so she will stick with over-the-counter antitussives such as Mucinex DM and throat lozenges.  Advised that she may have a cough for another 2 to 3 weeks.  Follow-up with PCP.  Return precautions were discussed. [JR]      ED Course User Index  [JR] Parker Ferguson MD             AS OF 20:41 EDT VITALS:    BP - 144/64  HR - 70  TEMP - 98.6 °F (37 °C)  O2 SATS - 100%    COMPLEXITY OF CARE  Admission was considered but after careful review of the patient's presentation, physical examination, diagnostic results, and response to treatment the patient may be safely discharged with outpatient follow-up.      Chronic or social conditions impacting patient care (Social Determinants of Health):     DIAGNOSIS  Final diagnoses:   Acute bronchitis due to Rhinovirus            DISPOSITION  DISCHARGE    Patient discharged in stable condition.    Reviewed implications of results, diagnosis, meds, responsibility to follow up, warning signs and symptoms of possible worsening, potential complications and reasons to return to ER.    Patient/Family voiced understanding of above instructions.    Discussed plan for discharge, as there is no emergent indication for admission. Patient referred to primary care provider for BP management due to today's BP. Pt/family is agreeable and understands need for follow up and repeat testing.  Pt is aware that discharge does not mean that nothing is wrong but it indicates no emergency is present that requires admission and they must continue care with follow-up as given below or physician of their choice.     FOLLOW-UP  Sussy Payne MD  [address] [address]  [phone]    Schedule an appointment as soon as possible for a visit            Medication List      No changes were made to your prescriptions during this visit.             Prescription drug monitoring program review:           Please note that portions of this document were completed with a voice recognition program.    Note Disclaimer: At [facility], we believe that sharing information builds trust and better relationships. You are receiving this note because you recently visited [facility]. It is possible you will see health information before a provider has talked with you about it. This kind of information can be easy to misunderstand. To help you fully understand what it means for your health, we urge you to discuss this note with your provider.         Parker Ferguson MD  10/29/24 2041

## 2025-02-27 ENCOUNTER — APPOINTMENT (OUTPATIENT)
Dept: GENERAL RADIOLOGY | Facility: HOSPITAL | Age: 80
End: 2025-02-27
Payer: MEDICARE

## 2025-02-27 ENCOUNTER — HOSPITAL ENCOUNTER (EMERGENCY)
Facility: HOSPITAL | Age: 80
Discharge: HOME | End: 2025-02-28
Attending: STUDENT IN AN ORGANIZED HEALTH CARE EDUCATION/TRAINING PROGRAM
Payer: MEDICARE

## 2025-02-27 VITALS
RESPIRATION RATE: 16 BRPM | TEMPERATURE: 97.6 F | BODY MASS INDEX: 21.52 KG/M2 | HEIGHT: 61 IN | DIASTOLIC BLOOD PRESSURE: 71 MMHG | OXYGEN SATURATION: 96 % | SYSTOLIC BLOOD PRESSURE: 130 MMHG | HEART RATE: 57 BPM | WEIGHT: 114 LBS

## 2025-02-27 DIAGNOSIS — N39.0 ACUTE UTI: ICD-10-CM

## 2025-02-27 DIAGNOSIS — R42 ORTHOSTATIC LIGHTHEADEDNESS: Primary | ICD-10-CM

## 2025-02-27 LAB
ALBUMIN SERPL-MCNC: 3.9 G/DL (ref 3.5–5.2)
ALBUMIN/GLOB SERPL: 0.9 G/DL
ALP SERPL-CCNC: 166 U/L (ref 39–117)
ALT SERPL W P-5'-P-CCNC: 17 U/L (ref 1–33)
ANION GAP SERPL CALCULATED.3IONS-SCNC: 12 MMOL/L (ref 5–15)
AST SERPL-CCNC: 19 U/L (ref 1–32)
BACTERIA UR QL AUTO: ABNORMAL /HPF
BASOPHILS # BLD MANUAL: 0.11 10*3/MM3 (ref 0–0.2)
BASOPHILS NFR BLD MANUAL: 2 % (ref 0–1.5)
BILIRUB SERPL-MCNC: 0.4 MG/DL (ref 0–1.2)
BILIRUB UR QL STRIP: NEGATIVE
BUN SERPL-MCNC: 17 MG/DL (ref 8–23)
BUN/CREAT SERPL: 16.2 (ref 7–25)
CALCIUM SPEC-SCNC: 9.7 MG/DL (ref 8.6–10.5)
CHLORIDE SERPL-SCNC: 98 MMOL/L (ref 98–107)
CLARITY UR: CLEAR
CO2 SERPL-SCNC: 26 MMOL/L (ref 22–29)
COLOR UR: YELLOW
CREAT SERPL-MCNC: 1.05 MG/DL (ref 0.57–1)
D DIMER PPP FEU-MCNC: 0.54 MCGFEU/ML (ref 0–0.78)
DEPRECATED RDW RBC AUTO: 41.7 FL (ref 37–54)
EGFRCR SERPLBLD CKD-EPI 2021: 54.5 ML/MIN/1.73
EOSINOPHIL # BLD MANUAL: 0.11 10*3/MM3 (ref 0–0.4)
EOSINOPHIL NFR BLD MANUAL: 2 % (ref 0.3–6.2)
ERYTHROCYTE [DISTWIDTH] IN BLOOD BY AUTOMATED COUNT: 12.2 % (ref 12.3–15.4)
GLOBULIN UR ELPH-MCNC: 4.2 GM/DL
GLUCOSE SERPL-MCNC: 356 MG/DL (ref 65–99)
GLUCOSE UR STRIP-MCNC: ABNORMAL MG/DL
HCT VFR BLD AUTO: 41 % (ref 34–46.6)
HGB BLD-MCNC: 13.4 G/DL (ref 12–15.9)
HGB UR QL STRIP.AUTO: NEGATIVE
HOLD SPECIMEN: NORMAL
HOLD SPECIMEN: NORMAL
HYALINE CASTS UR QL AUTO: ABNORMAL /LPF
KETONES UR QL STRIP: NEGATIVE
LEUKOCYTE ESTERASE UR QL STRIP.AUTO: ABNORMAL
LYMPHOCYTES # BLD MANUAL: 1.56 10*3/MM3 (ref 0.7–3.1)
LYMPHOCYTES NFR BLD MANUAL: 9.1 % (ref 5–12)
MAGNESIUM SERPL-MCNC: 1.9 MG/DL (ref 1.6–2.4)
MCH RBC QN AUTO: 30.5 PG (ref 26.6–33)
MCHC RBC AUTO-ENTMCNC: 32.7 G/DL (ref 31.5–35.7)
MCV RBC AUTO: 93.2 FL (ref 79–97)
MONOCYTES # BLD: 0.48 10*3/MM3 (ref 0.1–0.9)
NEUTROPHILS # BLD AUTO: 3.06 10*3/MM3 (ref 1.7–7)
NEUTROPHILS NFR BLD MANUAL: 57.6 % (ref 42.7–76)
NITRITE UR QL STRIP: POSITIVE
NRBC BLD AUTO-RTO: 0 /100 WBC (ref 0–0.2)
PH UR STRIP.AUTO: 5.5 [PH] (ref 5–8)
PLAT MORPH BLD: NORMAL
PLATELET # BLD AUTO: 262 10*3/MM3 (ref 140–450)
PMV BLD AUTO: 8.4 FL (ref 6–12)
POTASSIUM SERPL-SCNC: 4.5 MMOL/L (ref 3.5–5.2)
PROT SERPL-MCNC: 8.1 G/DL (ref 6–8.5)
PROT UR QL STRIP: NEGATIVE
QT INTERVAL: 428 MS
QTC INTERVAL: 471 MS
RBC # BLD AUTO: 4.4 10*6/MM3 (ref 3.77–5.28)
RBC # UR STRIP: ABNORMAL /HPF
RBC MORPH BLD: NORMAL
REF LAB TEST METHOD: ABNORMAL
SODIUM SERPL-SCNC: 136 MMOL/L (ref 136–145)
SP GR UR STRIP: 1.02 (ref 1–1.03)
SQUAMOUS #/AREA URNS HPF: ABNORMAL /HPF
TROPONIN T SERPL HS-MCNC: 10 NG/L
TROPONIN T SERPL HS-MCNC: 14 NG/L
UROBILINOGEN UR QL STRIP: ABNORMAL
VARIANT LYMPHS NFR BLD MANUAL: 29.3 % (ref 19.6–45.3)
WBC # UR STRIP: ABNORMAL /HPF
WBC MORPH BLD: NORMAL
WBC NRBC COR # BLD AUTO: 5.31 10*3/MM3 (ref 3.4–10.8)
WHOLE BLOOD HOLD COAG: NORMAL
WHOLE BLOOD HOLD SPECIMEN: NORMAL

## 2025-02-27 PROCEDURE — 81001 URINALYSIS AUTO W/SCOPE: CPT | Performed by: STUDENT IN AN ORGANIZED HEALTH CARE EDUCATION/TRAINING PROGRAM

## 2025-02-27 PROCEDURE — 73562 X-RAY EXAM OF KNEE 3: CPT

## 2025-02-27 PROCEDURE — 99284 EMERGENCY DEPT VISIT MOD MDM: CPT

## 2025-02-27 PROCEDURE — 85025 COMPLETE CBC W/AUTO DIFF WBC: CPT | Performed by: STUDENT IN AN ORGANIZED HEALTH CARE EDUCATION/TRAINING PROGRAM

## 2025-02-27 PROCEDURE — 93010 ELECTROCARDIOGRAM REPORT: CPT | Performed by: INTERNAL MEDICINE

## 2025-02-27 PROCEDURE — 93005 ELECTROCARDIOGRAM TRACING: CPT | Performed by: STUDENT IN AN ORGANIZED HEALTH CARE EDUCATION/TRAINING PROGRAM

## 2025-02-27 PROCEDURE — 73030 X-RAY EXAM OF SHOULDER: CPT

## 2025-02-27 PROCEDURE — 71045 X-RAY EXAM CHEST 1 VIEW: CPT

## 2025-02-27 PROCEDURE — 96360 HYDRATION IV INFUSION INIT: CPT

## 2025-02-27 PROCEDURE — 25810000003 SODIUM CHLORIDE 0.9 % SOLUTION: Performed by: PHYSICIAN ASSISTANT

## 2025-02-27 PROCEDURE — 84484 ASSAY OF TROPONIN QUANT: CPT | Performed by: PHYSICIAN ASSISTANT

## 2025-02-27 PROCEDURE — 93005 ELECTROCARDIOGRAM TRACING: CPT

## 2025-02-27 PROCEDURE — 85007 BL SMEAR W/DIFF WBC COUNT: CPT | Performed by: STUDENT IN AN ORGANIZED HEALTH CARE EDUCATION/TRAINING PROGRAM

## 2025-02-27 PROCEDURE — 80053 COMPREHEN METABOLIC PANEL: CPT | Performed by: STUDENT IN AN ORGANIZED HEALTH CARE EDUCATION/TRAINING PROGRAM

## 2025-02-27 PROCEDURE — 83735 ASSAY OF MAGNESIUM: CPT | Performed by: STUDENT IN AN ORGANIZED HEALTH CARE EDUCATION/TRAINING PROGRAM

## 2025-02-27 PROCEDURE — 36415 COLL VENOUS BLD VENIPUNCTURE: CPT | Performed by: STUDENT IN AN ORGANIZED HEALTH CARE EDUCATION/TRAINING PROGRAM

## 2025-02-27 PROCEDURE — 85379 FIBRIN DEGRADATION QUANT: CPT | Performed by: PHYSICIAN ASSISTANT

## 2025-02-27 PROCEDURE — 84484 ASSAY OF TROPONIN QUANT: CPT | Performed by: STUDENT IN AN ORGANIZED HEALTH CARE EDUCATION/TRAINING PROGRAM

## 2025-02-27 RX ORDER — CEFDINIR 300 MG/1
300 CAPSULE ORAL ONCE
Status: COMPLETED | OUTPATIENT
Start: 2025-02-27 | End: 2025-02-27

## 2025-02-27 RX ORDER — SODIUM CHLORIDE 0.9 % (FLUSH) 0.9 %
10 SYRINGE (ML) INJECTION AS NEEDED
Status: DISCONTINUED | OUTPATIENT
Start: 2025-02-27 | End: 2025-02-28 | Stop reason: HOSPADM

## 2025-02-27 RX ORDER — CEFDINIR 300 MG/1
300 CAPSULE ORAL 2 TIMES DAILY
Qty: 14 CAPSULE | Refills: 0 | Status: SHIPPED | OUTPATIENT
Start: 2025-02-27 | End: 2025-02-27

## 2025-02-27 RX ORDER — CANDESARTAN 4 MG/1
4 TABLET ORAL DAILY
COMMUNITY

## 2025-02-27 RX ORDER — CEFDINIR 300 MG/1
300 CAPSULE ORAL 2 TIMES DAILY
Qty: 14 CAPSULE | Refills: 0 | Status: SHIPPED | OUTPATIENT
Start: 2025-02-27

## 2025-02-27 RX ADMIN — CEFDINIR 300 MG: 300 CAPSULE ORAL at 23:39

## 2025-02-27 RX ADMIN — SODIUM CHLORIDE 500 ML: 9 INJECTION, SOLUTION INTRAVENOUS at 21:24

## 2025-02-28 NOTE — ED PROVIDER NOTES
EMERGENCY DEPARTMENT MD ATTESTATION NOTE    Room Number:  38/38  PCP: Pallares, Clara Ann, MD  Independent Historians: Patient and Family    HPI:    Context: Brie Reese is a 79 y.o. female with a medical history of lung cancer, HLD, diabetes, HTN who presents to the ED c/o acute dizziness.  Symptoms are worse whenever patient transitions from sitting to standing.  Patient feels lightheaded and like she might fall.  Patient recently started on new blood pressure medication.  Patient denies chest pain and shortness of breath.        PHYSICAL EXAM    I have reviewed the triage vital signs and nursing notes.    ED Triage Vitals   Temp Heart Rate Resp BP SpO2   02/27/25 1830 02/27/25 1830 02/27/25 1830 02/27/25 1831 02/27/25 1830   97.6 °F (36.4 °C) 81 16 163/78 96 %      Temp src Heart Rate Source Patient Position BP Location FiO2 (%)   02/27/25 1830 -- 02/27/25 2026 -- --   Tympanic  Lying         Physical Exam  GENERAL: alert, no acute distress  SKIN: Warm, dry  HENT: Normocephalic, atraumatic  EYES: no scleral icterus  CV: regular rhythm, regular rate  RESPIRATORY: normal effort, lungs clear  ABDOMEN: soft, nontender, nondistended  MUSCULOSKELETAL: no deformity  NEURO: alert, moves all extremities, follows commands            MEDICATIONS GIVEN IN ER  Medications   sodium chloride 0.9 % flush 10 mL (has no administration in time range)   sodium chloride 0.9 % bolus 500 mL (500 mL Intravenous New Bag 2/27/25 2124)         ORDERS PLACED DURING THIS VISIT:  Orders Placed This Encounter   Procedures   • XR Chest 1 View   • XR Shoulder 2+ View Right   • XR Knee 3 View Right   • [place] Draw   • Comprehensive Metabolic Panel   • High Sensitivity Troponin T   • Magnesium   • Urinalysis With Microscopic If Indicated (No Culture) - Urine, Clean Catch   • CBC Auto Differential   • Manual Differential   • High Sensitivity Troponin T   • D-dimer, Quantitative   • Urinalysis, Microscopic Only - Urine, Clean Catch   • High  Sensitivity Troponin T 1Hr   • NPO Diet NPO Type: Strict NPO   • Undress & Gown   • Continuous Pulse Oximetry   • Vital Signs   • Orthostatic Blood Pressure   • Oxygen Therapy- Nasal Cannula; Titrate 1-6 LPM Per SpO2; 90 - 95%   • POC Glucose Once   • ECG 12 Lead ED Triage Standing Order; Weak / Dizzy / AMS   • Telemetry Scan   • Telemetry Scan   • Insert Peripheral IV   • Fall Precautions   • CBC & Differential   • Green Top (Gel)   • Lavender Top   • Gold Top - SST   • Light Blue Top         PROCEDURES  Procedures            PROGRESS, DATA ANALYSIS, CONSULTS, AND MEDICAL DECISION MAKING  All labs have been independently interpreted by me.  All radiology studies have been reviewed by me. All EKG's have been independently viewed and interpreted by me.  Discussion below represents my analysis of pertinent findings related to patient's condition, differential diagnosis, treatment plan and final disposition.    Differential diagnosis includes but is not limited to orthostatic hypotension, medication side effect, UTI, electrolyte abnormality.    Clinical Scores:                                     ED Course as of 02/28/25 0234   Thu Feb 27, 2025 2059 XR Chest 1 View  My independent interpretation of the chest x-ray is no acute filtrate [CC]   2059 XR Knee 3 View Right  My independent rotation of the knee x-ray is no acute fracture [CC]   2059 XR Shoulder 2+ View Right  My independent interpretation of the shoulder x-ray is no acute fracture [CC]   2059 WBC: 5.31 [CC]   2059 Hemoglobin: 13.4 [CC]   2059 Glucose(!): 356  Patient is type II diabetic [CC]   2059 HS Troponin T(!): 14 [CC]   2059 Patient did not have significant changes to her orthostatic vital signs but she was somewhat dizzy with standing. [CC]   2105 I discussed the case with Dr. Jean and he agrees to evaluate the patient at the bedside.    [CC]   2130 Patient's nephew has returned and does not remember the name of the medicine.  He believes it starts  with a C and believes it is 4 mg.  There is no medication on the patient's med list that fits this description.  She does not have a diagnosis of hypertension.  Apparently she had not been taking it while she was in the [facility] but took it today and that is when she became dizzy.  He said he would go home and get the prescription bottle. [CC]   2237 D-Dimer, Quant: 0.54 [CC]   2251 Nitrite, UA(!): Positive [CC]   2251 Bacteria, UA(!): 2+ [CC]   2329 Patient was able to ambulate here in the emergency department without difficulty.  She is no longer dizzy and says she is feeling much better.  We discussed discontinuing the blood pressure medication until she follows up with her primary care provider in the office.  Will treat with oral medication for urinary tract infection.  Return precautions were given.  Patient will be discharged with outpatient follow-up. [CC]      ED Course User Index  [CC] Jamia Gomes, AZUCENA       MDM: 79-year-old female presenting for evaluation of lightheadedness in the setting of recent new medication.  Workup in the ER is notable for urinalysis consistent with UTI and hyperglycemia.  Believe patient's symptoms are likely secondary to new blood pressure medication.  Patient received IV fluids.  Offered admission however patient prefers to discharge home.  Will reassess after fluid administration and if stable will discharge.      COMPLEXITY OF CARE  Admission was considered but after careful review of the patient's presentation, physical examination, diagnostic results, and response to treatment the patient may be safely discharged with outpatient follow-up.    Please note that portions of this document were completed with a voice recognition program.    Note Disclaimer: At [facility], we believe that sharing information builds trust and better relationships. You are receiving this note because you recently visited [facility]. It is possible you will see health information  before a provider has talked with you about it. This kind of information can be easy to misunderstand. To help you fully understand what it means for your health, we urge you to discuss this note with your provider.         Jarrett Jean MD  02/27/25 0744       Jarrett Jean MD  02/28/25 3954

## 2025-02-28 NOTE — ED PROVIDER NOTES
EMERGENCY DEPARTMENT ENCOUNTER  Room Number:  38/38  PCP: Pallares, Clara Ann, MD  Independent Historians: Patient      HPI:  Chief Complaint: had concerns including Fall and Dizziness.     A complete HPI/ROS/PMH/PSH/SH/FH are unobtainable due to: None    Chronic or social conditions impacting patient care (Social Determinants of Health): None      Context: Brie Reese is a 79 y.o. female with a medical history of lung cancer, hyperlipidemia, diabetes, hypothyroidism, and hypertension presents emergency department today complaining of orthostatic dizziness it has been ongoing since Saturday.  Patient says when she stands up she feels like she has to hold onto something or she feels like she going to fall.  She denies any room spinning dizziness and more describes lightheadedness. .  He was here earlier had told the nursing staff that the patient recently started a new blood pressure medicine but the patient does not remember if she started a new blood pressure medicine and cannot tell me what that blood pressure medicine is.  She says sometimes she gets confused and her nephew helps to contribute to the history but unfortunately he is not at the bedside at the time of my evaluation.  Patient says she did have a fall on Saturday prior to leaving the [facility] and arrived back in the [place] on Saturday after a long flight.  She says that the fall was because she tripped on her house shoes and not because she was lightheaded or passed out.  She denies chest pain or shortness of breath.  She denies leg swelling.  Overall patient is a poor historian and history is somewhat limited.    Review of prior external notes (non-ED) -and- Review of prior external test results outside of this encounter:   Patient has metastatic breast cancer with mets to the lungs.  She is currently maintained on exemestane.  She follows with Dr. Castillo    I reviewed labs from 10/7/2024, hemoglobin 13.5, creatinine 0.93      PAST  MEDICAL HISTORY  Active Ambulatory Problems     Diagnosis Date Noted    Malignant neoplasm of female breast 03/14/2016    Anemia 04/12/2016    Iron deficiency anemia 04/20/2016    Malignant neoplasm metastatic to left lung 02/06/2017    Osteoporosis 02/06/2017    Hypomagnesemia 01/08/2019    Acute bronchitis due to COVID-19 virus 08/04/2020    Hypothyroidism 01/01/2009    Diabetes mellitus 08/04/2020    Hyperlipidemia 08/04/2020    Hyponatremia 08/04/2020    Immunocompromised 08/04/2020    Acute respiratory failure with hypoxia 08/04/2020    Other chest pain 08/22/2022    Abnormal electrocardiogram 09/06/2022    Abnormal CT scan of heart 09/06/2022     Resolved Ambulatory Problems     Diagnosis Date Noted    No Resolved Ambulatory Problems     Past Medical History:   Diagnosis Date    Breast cancer, Left     Left upper pulmonary nodule 07/05/2012         PAST SURGICAL HISTORY  Past Surgical History:   Procedure Laterality Date    BREAST BIOPSY Left 1995    MASTECTOMY RADICAL Left 1995    MOUTH SURGERY  08/2015    tooth extraction          FAMILY HISTORY  Family History   Problem Relation Age of Onset    Tuberculosis Mother     Diabetes Mother     Thyroid cancer Sister     Lung cancer Sister     Lung cancer Brother         In his 70s.    Bone cancer Brother         in his 70s.    Diabetes Maternal Grandmother     Lung cancer Brother         In his 70s.     Bone cancer Brother         in his 70s.    Hypertension Neg Hx     Heart disease Neg Hx          SOCIAL HISTORY  Social History     Socioeconomic History    Marital status:     Years of education: College   Tobacco Use    Smoking status: Never    Smokeless tobacco: Never   Vaping Use    Vaping status: Never Used   Substance and Sexual Activity    Alcohol use: No    Drug use: No    Sexual activity: Defer     Birth control/protection: Post-menopausal         ALLERGIES  Patient has no known allergies.      REVIEW OF SYSTEMS  Included in HPI  All systems  reviewed and negative except for those discussed in HPI.      PHYSICAL EXAM    I have reviewed the triage vital signs and nursing notes.    ED Triage Vitals   Temp Heart Rate Resp BP SpO2   02/27/25 1830 02/27/25 1830 02/27/25 1830 02/27/25 1831 02/27/25 1830   97.6 °F (36.4 °C) 81 16 163/78 96 %      Temp src Heart Rate Source Patient Position BP Location FiO2 (%)   02/27/25 1830 -- 02/27/25 2026 -- --   Tympanic  Lying         Physical Exam  Constitutional:       General: She is not in acute distress.     Appearance: Normal appearance.   HENT:      Head: Normocephalic and atraumatic.      Nose: Nose normal.      Mouth/Throat:      Mouth: Mucous membranes are moist.   Eyes:      Extraocular Movements: Extraocular movements intact.      Pupils: Pupils are equal, round, and reactive to light.   Cardiovascular:      Rate and Rhythm: Normal rate and regular rhythm.      Pulses: Normal pulses.      Heart sounds: Normal heart sounds.   Pulmonary:      Effort: Pulmonary effort is normal. No respiratory distress.      Breath sounds: Normal breath sounds. No wheezing, rhonchi or rales.   Abdominal:      General: Abdomen is flat. There is no distension.      Palpations: Abdomen is soft.      Tenderness: There is no abdominal tenderness.   Musculoskeletal:         General: Normal range of motion.      Cervical back: Normal range of motion and neck supple.   Skin:     General: Skin is warm and dry.      Capillary Refill: Capillary refill takes less than 2 seconds.   Neurological:      General: No focal deficit present.      Mental Status: She is alert and oriented to person, place, and time.      Comments:  Cranial nerves II-XII are intact.  Sensation is intact and symmetric throughout, no deficit.  Motor function is 5/5 and symmetric in the extremities x 4.  There is no facial droop, aphasia, dysarthria, speech is clear and coherent.  Normal FTN.   Psychiatric:         Mood and Affect: Mood normal.         Behavior: Behavior  normal.       LAB RESULTS  Recent Results (from the past 24 hours)   ECG 12 Lead ED Triage Standing Order; Weak / Dizzy / AMS    Collection Time: 02/27/25  6:42 PM   Result Value Ref Range    QT Interval 428 ms    QTC Interval 471 ms   Comprehensive Metabolic Panel    Collection Time: 02/27/25  6:45 PM    Specimen: Arm, Right; Blood   Result Value Ref Range    Glucose 356 (H) 65 - 99 mg/dL    BUN 17 8 - 23 mg/dL    Creatinine 1.05 (H) 0.57 - 1.00 mg/dL    Sodium 136 136 - 145 mmol/L    Potassium 4.5 3.5 - 5.2 mmol/L    Chloride 98 98 - 107 mmol/L    CO2 26.0 22.0 - 29.0 mmol/L    Calcium 9.7 8.6 - 10.5 mg/dL    Total Protein 8.1 6.0 - 8.5 g/dL    Albumin 3.9 3.5 - 5.2 g/dL    ALT (SGPT) 17 1 - 33 U/L    AST (SGOT) 19 1 - 32 U/L    Alkaline Phosphatase 166 (H) 39 - 117 U/L    Total Bilirubin 0.4 0.0 - 1.2 mg/dL    Globulin 4.2 gm/dL    A/G Ratio 0.9 g/dL    BUN/Creatinine Ratio 16.2 7.0 - 25.0    Anion Gap 12.0 5.0 - 15.0 mmol/L    eGFR 54.5 (L) >60.0 mL/min/1.73   High Sensitivity Troponin T    Collection Time: 02/27/25  6:45 PM    Specimen: Arm, Right; Blood   Result Value Ref Range    HS Troponin T 14 (H) <14 ng/L   Magnesium    Collection Time: 02/27/25  6:45 PM    Specimen: Arm, Right; Blood   Result Value Ref Range    Magnesium 1.9 1.6 - 2.4 mg/dL   Green Top (Gel)    Collection Time: 02/27/25  6:45 PM   Result Value Ref Range    Extra Tube Hold for add-ons.    Lavender Top    Collection Time: 02/27/25  6:45 PM   Result Value Ref Range    Extra Tube hold for add-on    Gold Top - SST    Collection Time: 02/27/25  6:45 PM   Result Value Ref Range    Extra Tube Hold for add-ons.    Light Blue Top    Collection Time: 02/27/25  6:45 PM   Result Value Ref Range    Extra Tube Hold for add-ons.    CBC Auto Differential    Collection Time: 02/27/25  6:45 PM    Specimen: Arm, Right; Blood   Result Value Ref Range    WBC 5.31 3.40 - 10.80 10*3/mm3    RBC 4.40 3.77 - 5.28 10*6/mm3    Hemoglobin 13.4 12.0 - 15.9 g/dL     Hematocrit 41.0 34.0 - 46.6 %    MCV 93.2 79.0 - 97.0 fL    MCH 30.5 26.6 - 33.0 pg    MCHC 32.7 31.5 - 35.7 g/dL    RDW 12.2 (L) 12.3 - 15.4 %    RDW-SD 41.7 37.0 - 54.0 fl    MPV 8.4 6.0 - 12.0 fL    Platelets 262 140 - 450 10*3/mm3    nRBC 0.0 0.0 - 0.2 /100 WBC   Manual Differential    Collection Time: 02/27/25  6:45 PM    Specimen: Arm, Right; Blood   Result Value Ref Range    Neutrophil % 57.6 42.7 - 76.0 %    Lymphocyte % 29.3 19.6 - 45.3 %    Monocyte % 9.1 5.0 - 12.0 %    Eosinophil % 2.0 0.3 - 6.2 %    Basophil % 2.0 (H) 0.0 - 1.5 %    Neutrophils Absolute 3.06 1.70 - 7.00 10*3/mm3    Lymphocytes Absolute 1.56 0.70 - 3.10 10*3/mm3    Monocytes Absolute 0.48 0.10 - 0.90 10*3/mm3    Eosinophils Absolute 0.11 0.00 - 0.40 10*3/mm3    Basophils Absolute 0.11 0.00 - 0.20 10*3/mm3    RBC Morphology Normal Normal    WBC Morphology Normal Normal    Platelet Morphology Normal Normal   D-dimer, Quantitative    Collection Time: 02/27/25  6:45 PM    Specimen: Arm, Right; Blood   Result Value Ref Range    D-Dimer, Quantitative 0.54 0.00 - 0.78 MCGFEU/mL   High Sensitivity Troponin T    Collection Time: 02/27/25 10:23 PM    Specimen: Arm, Right; Blood   Result Value Ref Range    HS Troponin T 10 <14 ng/L   Urinalysis With Microscopic If Indicated (No Culture) - Urine, Clean Catch    Collection Time: 02/27/25 10:24 PM    Specimen: Urine, Clean Catch   Result Value Ref Range    Color, UA Yellow Yellow, Straw    Appearance, UA Clear Clear    pH, UA 5.5 5.0 - 8.0    Specific Gravity, UA 1.017 1.005 - 1.030    Glucose, UA >=1000 mg/dL (3+) (A) Negative    Ketones, UA Negative Negative    Bilirubin, UA Negative Negative    Blood, UA Negative Negative    Protein, UA Negative Negative    Leuk Esterase, UA Trace (A) Negative    Nitrite, UA Positive (A) Negative    Urobilinogen, UA 0.2 E.U./dL 0.2 - 1.0 E.U./dL   Urinalysis, Microscopic Only - Urine, Clean Catch    Collection Time: 02/27/25 10:24 PM    Specimen: Urine, Clean Catch    Result Value Ref Range    RBC, UA 0-2 None Seen, 0-2 /HPF    WBC, UA 11-20 (A) None Seen, 0-2 /HPF    Bacteria, UA 2+ (A) None Seen /HPF    Squamous Epithelial Cells, UA 0-2 None Seen, 0-2 /HPF    Hyaline Casts, UA 0-2 None Seen /LPF    Methodology Automated Microscopy          RADIOLOGY  XR Chest 1 View, XR Knee 3 View Right    Result Date: 2/27/2025  CHEST SINGLE VIEW, RIGHT KNEE: 3 VIEWS  HISTORY: 79 years of age, Female.  Weak/Dizzy/AMS triage protocol  COMPARISON: AP chest 10/29/2024, CT chest 09/25/2024  FINDINGS:  CHEST: Heart size within normal limits. The aortic knob appears enlarged and this is without evidence for change allowing for patient rotation. There is persistent increased density within the left upper lobe extending lateral to and above the aortic knob which correlates with the radiated mass in the apical left upper lobe lung demonstrated on previous exams and is without evidence for radiographic change. Lungs otherwise appear clear. No evidence for perihilar edema or effusion.  RIGHT KNEE: There is mild medial compartment joint space narrowing with marginal spurring. There is also marginal spur formation at the patellofemoral compartment. There is enthesophyte present at the upper pole of the patella. There is no evidence for fracture or acute abnormality. Vascular calcifications are present.      1. No evidence for significant change or active disease in the chest. 2. Arthritic changes in the medial and patellofemoral compartments without evidence for acute abnormality of the right knee.  This report was finalized on 2/27/2025 8:42 PM by Chase Galeano M.D on Workstation: [id number]      XR Shoulder 2+ View Right    Result Date: 2/27/2025  XR SHOULDER 2+ VW RIGHT-   HISTORY:   fall  TECHNIQUE: 3 views of the right shoulder.  FINDINGS:  Degenerative change, no evidence of acute fracture, dislocation, or radiopaque foreign body.       No acute abnormality.   This report was finalized on  2/27/2025 7:45 PM by Dr. Denys Mccrary M.D on Workstation: [id number]           MEDICATIONS GIVEN IN ER  Medications   sodium chloride 0.9 % bolus 500 mL (0 mL Intravenous Stopped 2/27/25 8378)   cefdinir (OMNICEF) capsule 300 mg (300 mg Oral Given 2/27/25 7844)           OUTPATIENT MEDICATION MANAGEMENT:  No current Epic-ordered facility-administered medications on file.     Current Outpatient Medications Ordered in Epic   Medication Sig Dispense Refill    cefdinir (OMNICEF) 300 MG capsule Take 1 capsule by mouth 2 (Two) Times a Day. 14 capsule 0    ACCU-CHEK DMITRIY PLUS test strip USE TO TEST BLOOD SUGAR DAILY AS DIRECTED  2    acetaminophen (TYLENOL) 500 MG tablet Take 2 tablets by mouth Every 6 (Six) Hours As Needed for Moderate Pain. 30 tablet 0    atorvastatin (LIPITOR) 20 MG tablet Take 1 tablet by mouth every night at bedtime.      calcium carbonate (OS-JEWEL) 600 MG tablet Take 1 tablet by mouth 2 (Two) Times a Day With Meals. (Patient not taking: Reported on 10/7/2024) 180 tablet 1    candesartan (ATACAND) 4 MG tablet Take 1 tablet by mouth Daily.      [employer] VITAMIN D 2000 UNITS capsule TAKE 1 CAPSULE EVERY DAY  6    Denosumab (PROLIA SC) Inject  under the skin into the appropriate area as directed Every 6 (Six) Months.      exemestane (AROMASIN) 25 MG tablet Take 1 tablet by mouth Daily. 90 tablet 1    GLIMEPIRIDE PO Take 4 mg by mouth 2 (two) times a day.      levothyroxine (SYNTHROID, LEVOTHROID) 25 MCG tablet TAKE 1 TABLET BY MOUTH EVERY DAY  2    Magnesium Oxide 400 (240 Mg) MG tablet TAKE 2 TABLETS BY MOUTH TWICE A  tablet 0    metFORMIN (GLUCOPHAGE) 500 MG tablet TAKE 3 TABLETS BY MOUTH EVERY MORNING, AND 2 TABLETS EVERY EVENING WITH FOOD FOR DIABETES  3    omeprazole (priLOSEC) 20 MG capsule TAKE 1 CAPSULE BY MOUTH EVERY DAY FOR 10 DAYS AND THEN AS NEEDED DAILY 90 capsule 1         PROGRESS, DATA ANALYSIS, CONSULTS, AND MEDICAL DECISION MAKING  ORDERS PLACED DURING THIS VISIT:  Orders Placed  This Encounter   Procedures    XR Chest 1 View    XR Shoulder 2+ View Right    XR Knee 3 View Right    Winfred Draw    Comprehensive Metabolic Panel    High Sensitivity Troponin T    Magnesium    Urinalysis With Microscopic If Indicated (No Culture) - Urine, Clean Catch    CBC Auto Differential    Manual Differential    High Sensitivity Troponin T    D-dimer, Quantitative    Urinalysis, Microscopic Only - Urine, Clean Catch    Undress & Gown    Continuous Pulse Oximetry    Vital Signs    Orthostatic Blood Pressure    ECG 12 Lead ED Triage Standing Order; Weak / Dizzy / AMS    Telemetry Scan    Telemetry Scan    CBC & Differential    Green Top (Gel)    Lavender Top    Gold Top - SST    Light Blue Top       All labs have been independently interpreted by me.  All radiology studies have been reviewed by me. All EKG's have been independently viewed and interpreted by me.  Discussion below represents my analysis of pertinent findings related to patient's condition, differential diagnosis, treatment plan and final disposition.    Differential diagnosis includes but is not limited to:   My differential diagnosis for syncope includes but is not limited to:  Vasovagal reflex - situational stimulus, micturition, defecation, cough, sneezing, swallowing, postprandial state, react sinus hypersensitivity  Vascular-prolonged recumbency, sudden postural change, prolonged standing, hypovolemia, vasodilator drugs, autonomic neuropathy, adrenal insufficiency, subclavian steal, pulmonary embolism  Cardiac -arrhythmia, heart block, myocardial infarction, aortic stenosis, cardiac myxoma, cardiac, LV Dysfunction, Aortic Dissection, Pulmonary Hypertension, Pulmonary Stenosis, Pacemaker Failure  CNS-seizure, hypoxia, hypoglycemia, TIA,(basal vertebral), hydrocephalus      ED Course:  ED Course as of 02/28/25 0224   Thu Feb 27, 2025 2059 XR Chest 1 View  My independent interpretation of the chest x-ray is no acute filtrate [CC]   2059 XR  Knee 3 View Right  My independent rotation of the knee x-ray is no acute fracture [CC]   2059 XR Shoulder 2+ View Right  My independent interpretation of the shoulder x-ray is no acute fracture [CC]   2059 WBC: 5.31 [CC]   2059 Hemoglobin: 13.4 [CC]   2059 Glucose(!): 356  Patient is type II diabetic [CC]   2059 HS Troponin T(!): 14 [CC]   2059 Patient did not have significant changes to her orthostatic vital signs but she was somewhat dizzy with standing. [CC]   2105 I discussed the case with Dr. Jean and he agrees to evaluate the patient at the bedside.    [CC]   2130 Patient's nephew has returned and does not remember the name of the medicine.  He believes it starts with a C and believes it is 4 mg.  There is no medication on the patient's med list that fits this description.  She does not have a diagnosis of hypertension.  Apparently she had not been taking it while she was in the [facility] but took it today and that is when she became dizzy.  He said he would go home and get the prescription bottle. [CC]   2237 D-Dimer, Quant: 0.54 [CC]   2251 Nitrite, UA(!): Positive [CC]   2251 Bacteria, UA(!): 2+ [CC]   2329 Patient was able to ambulate here in the emergency department without difficulty.  She is no longer dizzy and says she is feeling much better.  We discussed discontinuing the blood pressure medication until she follows up with her primary care provider in the office.  Will treat with oral medication for urinary tract infection.  Return precautions were given.  Patient will be discharged with outpatient follow-up. [CC]      ED Course User Index  [CC] Jamia Gomes PA-C           AS OF 02:24 EST VITALS:    BP - 130/71  HR - 57  TEMP - 97.6 °F (36.4 °C) (Tympanic)  O2 SATS - 96%      MDM:  Patient is a 79-year-old female who presents emergency department today with orthostatic dizziness.  On arrival here in the emergency department vitals are reassuring, she is afebrile.  My exam the patient is  well-appearing sitting in the bed.  She reports feeling lightheaded with standing but otherwise reports no complaints.  She did have a fall a few days ago and was evaluated for her injuries with x-rays and no traumatic injuries were identified.  She was evaluated with labs which were overall reassuring.  She was mildly hyperglycemic but is a diabetic.  D-dimer was negative.  She was found to have a nitrate positive UTI which may be contributing to some of her symptoms but I think the main culprit here today is that she was recently started on candesartan.  The symptoms seem to directly correlate when she takes this medication.  Orthostatics were performed and she did not have a significant drop in blood pressure but did report feeling dizzy when orthostatics were performed.  She was hydrated with IV fluids and on reevaluation she was able to ambulate without feeling dizzy or lightheaded.  I offered her admission to the hospital but patient declined and would prefer to go home.  She understands that she should discontinue this medication until she follows up with her primary care and the office will also treat urinary tract infection with antibiotics.  Initial dose given here in the ED.  All discharge instructions were discussed.  Return precautions were given.  Patient is appropriate for discharge with outpatient follow-up.      COMPLEXITY OF CARE  Admission was considered but after careful review of the patient's presentation, physical examination, diagnostic results, and response to treatment the patient may be safely discharged with outpatient follow-up.        DIAGNOSIS  Final diagnoses:   Orthostatic lightheadedness   Acute UTI         DISPOSITION  ED Disposition       ED Disposition   Discharge    Condition   Stable    Comment   --                      Please note that portions of this document were completed with a voice recognition program.    Note Disclaimer: At [facility], we believe that sharing  information builds trust and better relationships. You are receiving this note because you recently visited [facility]. It is possible you will see health information before a provider has talked with you about it. This kind of information can be easy to misunderstand. To help you fully understand what it means for your health, we urge you to discuss this note with your provider.     Jamia Gomes PA-C  02/28/25 0227

## 2025-02-28 NOTE — DISCHARGE INSTRUCTIONS
Stop taking your blood pressure medicine and follow-up with your primary care.  Stay well-hydrated drink lots of water.    Although you are being discharged in the ED today, I encouraged her to return for worsening symptoms.  Things can, and do, change such a treatment at home with medication may not be adequate.  Specifically I recommend returning for chest pain or discomfort, difficulty breathing, persistent vomiting or difficulty holding down liquids or medications, fever > 102.0 F,  or any other worsening or alarming symptoms.     Take meds as prescribed.     You have been evaluated in the emergency department for your presenting symptoms and deemed appropriate for discharge home.  Understand that your health care does not end here today.  It is important that your primary care physician be made aware of your visit.  I recommend calling your primary care provider in the next 48 hours to arrange follow-up care.  Your primary care provider needs to review your treatment and recovery to ensure that no further treatment is necessary.  Additional testing or procedures may be necessary as an outpatient at the discretion of your primary care provider.    I also recommend following up with your PCP for recheck of your blood pressure and treatment as needed.

## 2025-03-18 ENCOUNTER — TELEPHONE (OUTPATIENT)
Dept: ONCOLOGY | Facility: CLINIC | Age: 80
End: 2025-03-18
Payer: MEDICARE

## 2025-03-18 NOTE — TELEPHONE ENCOUNTER
Caller: Maximino(nephew)Bijal    Relationship: Emergency Contact    Best call back number: [phone]    What specialty or service is being requested: RADIOLOGY     What is the provider, practice or medical service name:     What is the office location: [facility], [place]    Any additional details: BIJAL STATES PT WAS WITHOUT HER CHEMO MEDICATION FOR 4 - 5 WEEKS [place]ILE [place] OF [place], SINCE BEING BACK SHE IS BACK ON ROUTINE WITH THE MEDICATION BUT PT STATES SHE IS HAVING MORE PAIN THAN USUAL & IS REQUESTING A CT SCAN OF HER CHEST BEFORE HER 4/29/25 F/U.   IF SCHEDULING A SCAN MONDAY'S ARE BEST FOR BIJAL TO BRING HER. PLEASE ADVISE

## 2025-03-18 NOTE — TELEPHONE ENCOUNTER
Patient's nephew called and explained Dr Castillo is out of the office until next week. Will review with Dr Castillo and will call patient and patient's nephew back next week.

## 2025-04-29 ENCOUNTER — OFFICE VISIT (OUTPATIENT)
Dept: ONCOLOGY | Facility: CLINIC | Age: 80
End: 2025-04-29
Payer: MEDICARE

## 2025-04-29 ENCOUNTER — INFUSION (OUTPATIENT)
Dept: ONCOLOGY | Facility: HOSPITAL | Age: 80
End: 2025-04-29
Payer: MEDICARE

## 2025-04-29 ENCOUNTER — LAB (OUTPATIENT)
Dept: LAB | Facility: HOSPITAL | Age: 80
End: 2025-04-29
Payer: MEDICARE

## 2025-04-29 VITALS
OXYGEN SATURATION: 97 % | SYSTOLIC BLOOD PRESSURE: 139 MMHG | DIASTOLIC BLOOD PRESSURE: 77 MMHG | WEIGHT: 115.2 LBS | BODY MASS INDEX: 21.75 KG/M2 | HEIGHT: 61 IN | HEART RATE: 79 BPM | TEMPERATURE: 98.2 F

## 2025-04-29 DIAGNOSIS — Z17.0 MALIGNANT NEOPLASM OF CENTRAL PORTION OF LEFT BREAST IN FEMALE, ESTROGEN RECEPTOR POSITIVE: Primary | ICD-10-CM

## 2025-04-29 DIAGNOSIS — C50.112 MALIGNANT NEOPLASM OF CENTRAL PORTION OF LEFT BREAST IN FEMALE, ESTROGEN RECEPTOR POSITIVE: Primary | ICD-10-CM

## 2025-04-29 DIAGNOSIS — C78.02 MALIGNANT NEOPLASM METASTATIC TO LEFT LUNG: ICD-10-CM

## 2025-04-29 DIAGNOSIS — D64.9 ANEMIA, UNSPECIFIED TYPE: ICD-10-CM

## 2025-04-29 DIAGNOSIS — E83.42 HYPOMAGNESEMIA: ICD-10-CM

## 2025-04-29 DIAGNOSIS — M81.6 LOCALIZED OSTEOPOROSIS WITHOUT CURRENT PATHOLOGICAL FRACTURE: ICD-10-CM

## 2025-04-29 LAB
ALBUMIN SERPL-MCNC: 4.1 G/DL (ref 3.5–5.2)
ALBUMIN/GLOB SERPL: 1.3 G/DL
ALP SERPL-CCNC: 115 U/L (ref 39–117)
ALT SERPL W P-5'-P-CCNC: 15 U/L (ref 1–33)
ANION GAP SERPL CALCULATED.3IONS-SCNC: 9.3 MMOL/L (ref 5–15)
AST SERPL-CCNC: 20 U/L (ref 1–32)
BASOPHILS # BLD AUTO: 0.03 10*3/MM3 (ref 0–0.2)
BASOPHILS NFR BLD AUTO: 0.5 % (ref 0–1.5)
BILIRUB SERPL-MCNC: 0.4 MG/DL (ref 0–1.2)
BUN SERPL-MCNC: 18 MG/DL (ref 8–23)
BUN/CREAT SERPL: 16.5 (ref 7–25)
CALCIUM SPEC-SCNC: 9.6 MG/DL (ref 8.6–10.5)
CHLORIDE SERPL-SCNC: 104 MMOL/L (ref 98–107)
CO2 SERPL-SCNC: 24.7 MMOL/L (ref 22–29)
CREAT SERPL-MCNC: 1.09 MG/DL (ref 0.57–1)
DEPRECATED RDW RBC AUTO: 43 FL (ref 37–54)
EGFRCR SERPLBLD CKD-EPI 2021: 52.1 ML/MIN/1.73
EOSINOPHIL # BLD AUTO: 0.08 10*3/MM3 (ref 0–0.4)
EOSINOPHIL NFR BLD AUTO: 1.4 % (ref 0.3–6.2)
ERYTHROCYTE [DISTWIDTH] IN BLOOD BY AUTOMATED COUNT: 12.8 % (ref 12.3–15.4)
GLOBULIN UR ELPH-MCNC: 3.2 GM/DL
GLUCOSE SERPL-MCNC: 305 MG/DL (ref 65–99)
HCT VFR BLD AUTO: 34.7 % (ref 34–46.6)
HGB BLD-MCNC: 11.6 G/DL (ref 12–15.9)
IMM GRANULOCYTES # BLD AUTO: 0.01 10*3/MM3 (ref 0–0.05)
IMM GRANULOCYTES NFR BLD AUTO: 0.2 % (ref 0–0.5)
LYMPHOCYTES # BLD AUTO: 2.08 10*3/MM3 (ref 0.7–3.1)
LYMPHOCYTES NFR BLD AUTO: 37.5 % (ref 19.6–45.3)
MAGNESIUM SERPL-MCNC: 1.7 MG/DL (ref 1.6–2.4)
MCH RBC QN AUTO: 30.9 PG (ref 26.6–33)
MCHC RBC AUTO-ENTMCNC: 33.4 G/DL (ref 31.5–35.7)
MCV RBC AUTO: 92.3 FL (ref 79–97)
MONOCYTES # BLD AUTO: 0.49 10*3/MM3 (ref 0.1–0.9)
MONOCYTES NFR BLD AUTO: 8.8 % (ref 5–12)
NEUTROPHILS NFR BLD AUTO: 2.85 10*3/MM3 (ref 1.7–7)
NEUTROPHILS NFR BLD AUTO: 51.6 % (ref 42.7–76)
NRBC BLD AUTO-RTO: 0 /100 WBC (ref 0–0.2)
PHOSPHATE SERPL-MCNC: 3.6 MG/DL (ref 2.5–4.5)
PLATELET # BLD AUTO: 215 10*3/MM3 (ref 140–450)
PMV BLD AUTO: 8.5 FL (ref 6–12)
POTASSIUM SERPL-SCNC: 4.3 MMOL/L (ref 3.5–5.2)
PROT SERPL-MCNC: 7.3 G/DL (ref 6–8.5)
RBC # BLD AUTO: 3.76 10*6/MM3 (ref 3.77–5.28)
SODIUM SERPL-SCNC: 138 MMOL/L (ref 136–145)
WBC NRBC COR # BLD AUTO: 5.54 10*3/MM3 (ref 3.4–10.8)

## 2025-04-29 PROCEDURE — 96372 THER/PROPH/DIAG INJ SC/IM: CPT

## 2025-04-29 PROCEDURE — 85025 COMPLETE CBC W/AUTO DIFF WBC: CPT

## 2025-04-29 PROCEDURE — 25010000002 DENOSUMAB 60 MG/ML SOLUTION PREFILLED SYRINGE: Performed by: INTERNAL MEDICINE

## 2025-04-29 PROCEDURE — 80053 COMPREHEN METABOLIC PANEL: CPT

## 2025-04-29 PROCEDURE — 84100 ASSAY OF PHOSPHORUS: CPT

## 2025-04-29 PROCEDURE — 36415 COLL VENOUS BLD VENIPUNCTURE: CPT

## 2025-04-29 PROCEDURE — 83735 ASSAY OF MAGNESIUM: CPT

## 2025-04-29 RX ORDER — PHENOL 1.4 %
600 AEROSOL, SPRAY (ML) MUCOUS MEMBRANE 2 TIMES DAILY WITH MEALS
Qty: 180 TABLET | Refills: 1 | Status: SHIPPED | OUTPATIENT
Start: 2025-04-29 | End: 2025-05-01 | Stop reason: CLARIF

## 2025-04-29 RX ORDER — EXEMESTANE 25 MG/1
25 TABLET ORAL DAILY
Qty: 90 TABLET | Refills: 1 | Status: SHIPPED | OUTPATIENT
Start: 2025-04-29

## 2025-04-29 RX ADMIN — DENOSUMAB 60 MG: 60 INJECTION SUBCUTANEOUS at 17:24

## 2025-04-29 NOTE — PROGRESS NOTES
REASON FOR FOLLOW-UP:    1. Metastatic breast cancer to the lungs, ER/SC positive, HER2/berkley positive, undergoing hormonal therapy.       HISTORY OF PRESENT ILLNESS: Ms. Reese is a 79 y.o.  female with type 2 diabetes, hypothyroidism, hyperlipidemia, and metastatic breast cancer who returns follow-up evaluation.  Patient is accompanied by his nephew as always.    History of Present Illness  The patient presents for evaluation of metastatic breast cancer.      Patient complains severe bilateral breast pain, predominantly on the right side, which has been experienced for the past 2 weeks. No history of injury or trauma to the area is reported. There is no swelling in the breasts, but persistent pain in the left chest wall occasionally disrupts sleep. No bruising on the chest wall is noted. A history of left mastectomy is noted.    She was seen in the ER on 02/27/2025 for dizziness, which was attributed to her blood pressure medication. No shortness of breath, or leg swelling was experienced at that time.  Discontinuation of the medication was advised, and it has not been taken since. A fall occurred on the Saturday due to tripping on house shoes off the long flight from [facility]. She was off her medications for a month while in the [facility].      Results    Imaging   - X-ray of the chest: 02/27/2025, Some arthritic changes, no acute issues.   - X-ray of the right knee: 02/27/2025, No fractures.   - X-ray of the shoulder: 02/27/2025, Degenerative changes, no acute fractures or dislocations.    4/29/2025 hemoglobin 11.6 platelets 215,000 WBC 5540 neutrophils 2850 lymphocytes 2080.        Past Medical History:   Diagnosis Date    Breast cancer, Left     1998, 2009 metastisized to lungs    Diabetes mellitus     type 2     Hyperlipidemia     Hypothyroidism 01/2009    Left upper pulmonary nodule 07/05/2012    Osteoporosis 10/17/2011     Past Surgical History:   Procedure Laterality Date    BREAST BIOPSY Left 1995     MASTECTOMY RADICAL Left 1995    MOUTH SURGERY  08/2015    tooth extraction            ONCOLOGIC/HEMATOLOGIC HISTORY: History from previous dates can be found in the separate document.    1. Metastatic breast cancer to the lungs, ER/FL positive, HER2/berkley positive, undergoing hormonal therapy with Arimidex since the middle of March 2009.   2. Response to Arimidex as evidenced by serial CT exams, including on 01/10/2012, showing no significant disease.   3. New left upper lobe pulmonary nodule noted, measuring 1.1 cm by CT scan on 07/05/2012. No other evidence of metastatic disease. Hormonal therapy switched to Faslodex 07/12/2012.   4. Osteoporosis documented by bone density scan from 10/17/2011 and also 02/21/2014. Patient was started on Prolia therapy every 6 months. Patient had tooth extraction in August 2015. Prolia has been on hold.   5. CT scans of chest, abdomen and pelvis on 04/07/2016 showed stable disease. Faslodex will be continued.   6. CT scan examination reported disease progress on 01/30/2017, patient was switched to Aromasin in early February 2017. However she was unable to start Afinitor due to cost.   7. Repeated bone density scan on 2/27/2017 reported worsening osteoporosis. Prolia was restarted back on 03/02/2017. Plan for Q6 month treatment.   8.  Repeated chest CT on 5/15/2017 showed progressive left upper lobe pulmonary nodule.  Patient had stereotactic radiation therapy in early June 2017.  Aromasin was continued.   9.  CT scan of the chest on 9/25/2017 reported post treatment changes in the left upper lobe.   10.  CT scan examination for chest abdomen and pelvis with IV contrast on 6/21/2018 reported no evidence of disease progression.   11.  CT chest/abdomen/pelvis from 6/3/2020 showed no evidence of metastatic disease has developed.  12.  CT for chest abdomen pelvis on 6/21/2021 reported stable condition.        CT scan on 04/07/2016 showed stable disease with the small left upper lobe  pulmonary nodule, no change compared to previous imaging studies, no evidence of metastatic disease in the abdomen or pelvis or bony structure.       Unfortunately her CT scan on 01/30/2017 showed slight disease progress in the left upper lobe of the lung, by 3 mm, up to 1.4 centimeters. No evidence of new metastatic disease or bone metastases.       Patient was seen on February 7, 2017. We'll propose switch therapy to Aromasin plus Afinitor.  however she could not afford Afinitor because the cost. Our pharmacy staff tried to apply for premedication, but was not successful. So she is only on Aromasin with good tolerance.      Repeated a CT of the chest on 5/15/2017 showed progressive left upper lobe pulmonary nodule.  Patient had stereotactic radiation therapy in early June 2017.  Aromasin was continued.     CT scan examination reported no active disease on 6/10/2019.  Had a reasonable iron studies including ferritin 188 ng/mL, free iron 75 TIBC 410 and iron saturation 18%.  And also improve the magnesium level 1.8.  Mild anemic with hemoglobin 11.5 but normal platelets 253,000 and WBC 4020.  Chemistry lab reported normal CMP except elevated glucose which was at 201.     Patient presented on 12/3/2019 for 6-month followup.  She reports at baseline condition, she retired.  She denies weight loss, no fever, no sweating, no headaches.  Her performance status is ECOG 0.  Patient had teeth extraction in February 2018.  She now has full-mouth denture.    Patient otherwise reports no dyspnea, no cough, hemoptysis.  She reports no fever or sweating.   She is on Aromasin with good tolerance. She denies arthralgia, hot flashes or sweating.  She denies bone pains.       Laboratory study on 12/3/2019 reported marginal anemia hemoglobin 11.7, otherwise normal CBC.  Chemistry lab reported magnesium 1.5, glucose 156 otherwise unremarkable.       Her CT scan for chest abdomen and pelvis with IV contrast on 6/3/2020 reported no  evidence of worsening metastatic disease.      Laboratory studies on 6/3/2020 reported stable mild anemia with Hb 11.7, with MCV 97.8, normal platelets 260,000 and a WBC 4680.  Iron study reported ferritin 210, saturation 24%.  Unremarkable CMP with normal glucose, electrolytes and liver function panel and renal function.      Laboratory study on 8/6/2020 reported a peak ferritin 1124 when she was hospitalized for COVID-19 infection.  She had significant elevated CRP 7.73 mg/dL.  Her hemoglobin was 11.6, platelets 259,000 and WBC 11,733 ANC 10,390.     Laboratory studies 12/18/2020 reported normal CBC including Hb 12.6, WBC 5180, ANC 2790, lymphocytes 1500, platelets 274,000, magnesium 1.7, electrolytes normal otherwise,, elevated glucose 185, unremarkable CMP.  Ferritin 135, and iron saturation 24%.    The patient no longer takes iron. Laboratory study on 8/22/2022 reported excellent ferritin 251 and iron saturation 17% with free iron 63 and TIBC 372.    Laboratory study today on 2/27/2023 reported stable marginal anemia with hemoglobin 11.9 g/dL, MCV 94.5, normal platelets 247,000, WBC 5330 including neutrophils 3240.  Iron study reported ferritin 267 and iron saturation 29% free iron 117 TIBC 405.    The CT of the chest, abdomen, and pelvis obtained on 08/29/2023 reported stable left upper lobe spiculated mass reporting 2.7 x 2.4 cm. There was associated scarring. No pleural effusion. No enlarging nodule or masses or pneumothorax. There was no enlarged or subacute supraclavicular, axillary, mediastinum, or hilar lymph nodes. Abdomen has no evidence for malignancy. The hepatic contour is somewhat nodular. The bone window demonstrated degenerative changes without suspicious osseous lesion.    The patient also had a DEXA bone density scan on 08/29/2023 which reported ongoing osteoporosis of the lumbar spine.    MEDICATIONS: The current medication list was reviewed with the patient and updated in the EMR this date  "per the medical assistant. Medication dosages and frequencies were confirmed to be accurate.       ALLERGIES: LETROZOLE (questionably causing skin rash).       SOCIAL HISTORY: The patient is . She finished college education.  She is retired.  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 She has never smoked. No alcohol use. No illegal drug use. No risks for HIV.        FAMILY HISTORY: Her mother  of tuberculosis at the age of 48. A sister had thyroid cancer/lung cancer - no details are available. Two brothers had lung cancer and bone cancers in their 70s - no details available. Her maternal grandmother and her mother both had diabetes. No family history of hypertension or heart disease.       REVIEW OF SYSTEMS:   As per HPI      VITAL SIGNS:   Vitals:    25 1653   BP: 139/77   Pulse: 79   Temp: 98.2 °F (36.8 °C)   TempSrc: Temporal   SpO2: 97%   Weight: 52.3 kg (115 lb 3.2 oz)   Height: 154.9 cm (60.98\")   PainSc: 10-Worst pain ever     ECO     Physical Exam  Breast: Pain in both breasts, worse on the right side. No swelling or bruising noted. No history of mastectomy on the right side; left side mastectomy noted.  GENERAL:  Well-developed, well-nourished elderly female, in no acute distress.    SKIN:  Warm, dry without rashes, purpura or petechiae.  HEENT:  Normocephalic.   LYMPHATICS:  No cervical, supraclavicular or axillary adenopathy.  CHEST: Normal respiratory effort.  Lungs clear to auscultation. Good airflow.  CARDIAC:  Regular rate and rhythm. Normal S1,S2.  ABDOMEN:  Soft, no tender.  Bowel sounds normal.  EXTREMITIES:  No lower extremity edema.      LABORATORY DATA:     Results from last 7 days   Lab Units 25  1646   WBC 10*3/mm3 5.54   NEUTROS ABS 10*3/mm3 2.85   HEMOGLOBIN g/dL 11.6*   HEMATOCRIT % 34.7   PLATELETS 10*3/mm3 215     Lab Results   Component Value Date    GLUCOSE 305 (C) 2025    BUN 18 2025    CREATININE 1.09 (H) 2025     2025 "    K 4.3 04/29/2025     04/29/2025    CALCIUM 9.6 04/29/2025    PROTEINTOT 7.3 04/29/2025    ALBUMIN 4.1 04/29/2025    ALT 15 04/29/2025    AST 20 04/29/2025    ALKPHOS 115 04/29/2025    BILITOT 0.4 04/29/2025    GLOB 3.2 04/29/2025    AGRATIO 1.3 04/29/2025    BCR 16.5 04/29/2025    ANIONGAP 9.3 04/29/2025    EGFR 52.1 (L) 04/29/2025       Lab Results   Component Value Date    LFOHCALY49 831 02/05/2024     Lab Results   Component Value Date    FOLATE >20.00 02/05/2024           Lab Results   Component Value Date    IRON 117 02/27/2023    TIBC 405 02/27/2023    FERRITIN 267.20 (H) 02/27/2023   Iron saturation 29% on 2/27/2023           IMAGING:   CT Chest With Contrast Diagnostic (09/25/2024 09:41)     CT Abdomen Pelvis With Contrast (09/25/2024 09:41)     DEXA Bone Density Axial (08/29/2023 10:27)     Assessment & Plan      ASSESSMENT:   1. Metastatic breast cancer with metastasis in the lungs, biopsy confirmed. ER/IN positive. Her2 positive.    She had good response to Arimidex for many years.  However in early 2017, CT scan showed disease progression with solitary lung metastases.  We switched her to Aromasin in early February 2017, however she could not obtain Afinitor, because of the cost issue.     We obtained chest CT scan on 5/15/2017 which showed further disease progression.  Patient was treated with stereotactic radiation therapy in early June 2017.    We obtained CT scan twice on 9/25/2017 and on 6/21/2018 which showed post radiation therapy changes.    We obtained CT scan examination again on 6/10/2019 which showed no evidence of active disease.    Patient reports she is at baseline condition.  Physical examination is no evidence for palpable disease.  She continues to tolerate endocrine therapy with Aromasin as of 12/3/2020.    CT scan examination on 6/3/2020 showed no evidence of disease progression.  Aromasin will be continued.  On 12/8/2020, patient reports she has been tolerating Aromasin.     On 6/21/2021, patient had a CT scan for chest abdomen pelvis, which reported stable small pulmonary nodules and no evidence for metastatic disease.  Discussed with patient 6/30/2021, will continue current treatment with exemestane.  She returns for 6-month evaluation 1/31/2022 without evidence of recurrent disease.  She continues to tolerate exemestane very well.  We will repeat imaging in 6 months.   CT for chest abdomen pelvis on 8/16/2022 reported no evidence for metastatic disease in chest abdomen pelvis.  02/27/2023, patient reports tolerating exemestane. This will be continued because she has metastatic disease. I recommended to obtain CT scan for chest, abdomen, and pelvis in 6 months with IV conscious for reassessment of her metastatic breast cancer.    The patient had CT scan examination for chest, abdomen, and pelvis on 08/29/2023. There is a stable left upper lobe pulmonary nodule post treatment changes. No increasing size and no new pulmonary nodules or lymphadenopathy.  There is no evidence for disease progression.  We discussed with patient today on 09/12/2023, she will continue treatment with exemestane.  Patient had a chest angiogram study on 12/11/2023 because of dyspnea.  Nevertheless CT scan showed no acute intrathoracic changes.  On 4/5/2024 patient reports she is at baseline condition.  Denies chest pain dyspnea cough hemoptysis.  Recommend to continue endocrine therapy and repeating CT scan for chest abdomen pelvis in 6 months.  The CT scan on 9/25/2024 showed no evidence of disease progression. The masslike opacity in the left apical area measures 2.7 x 2.1 cm, previously 2.7 x 2.3 cm, and the solid pulmonary nodule in the anterior basilar lobe left lower lobe measures 3 mm and is stable. She will continue exemestane 25 mg daily.   Bilateral breast pain.  Today 4/29/2025 patient reports experiencing bilateral breast pain for the past 2 weeks, with the right side being more painful. No  history of injury, swelling, or bruising. A diagnostic mammogram and ultrasound will be ordered to investigate the cause of the pain. If these tests reveal any abnormalities, a biopsy may be considered. If the results are unremarkable, the current medication regimen will be continued and follow-up will be scheduled for next year.       2. Osteoporosis. She had bone density scan on 2/27/2017 which showed statistically significant worsening osteoporosis in the left hip.  We restarted her back on Prolia on 03/02/17 and repeat every 6 months.   Her last dose of Prolia was in June 2018.  Patient reported that she had pain involving the left lower jaw after her teeth extraction in February 2018.  We concerned about possibility of necrosis of the jaw bone, so I recommended to discontinue prolia.    The patient will continue oral calcium and vitamin D supplementation.   Discussed again with the patient on 12/8/2020, recommended patient to have dental assessment before we restart her on Prolia.    6/30/2021, patient reports she was cleared by her dentist for resuming Prolia.   Repeat DEXA scan 8/11/2021 with ongoing osteoporosis  The patient has received clearance from her dentist and will therefore proceed with resumption of Prolia on 1/31/2022.  Continue Prolia today on 8/22/2022.  We will continue Prolia every 6 months. The patient will be given one dose today on 02/27/2023. I recommended to obtain a bone density scan in 08/2023, this is biannual examination.  The patient had a DEXA scan on 08/29/2023, which reported persistent osteoporosis, however, slightly improved bone density in the lumbar spine. Today, the patient will be given Prolia injection on 09/12/2023 and continue every 6 months.  Prolia will be given today 4/5/2024.   She is due for a Prolia injection today on 10/7/2024 and will continue this every 6 months.  Today 4/29/2025 Prolia will be given.  This will continue be every 6 months.     3. Mild anemia with  mild iron deficiency.    Patient has been taking oral iron supplementation with good results, laboratory study showed improved with ferritin and iron saturation, and normalized hemoglobin.   Her iron study on 6/10/2019 reported excellent ferritin 188, and the marginal iron saturation 18%.   Iron study on 6/3/2020 reported ferritin 210 and iron saturation 24%.  Patient was instructed to continue oral iron supplementation and oral vitamin B12 supplementation.   Laboratory study 12/8/2020 reported normal ferritin 135 and iron saturation 24%, together with normal hemoglobin 12.6.   Hemoglobin is within normal at 12.6 on 8/31/2022.  Hemoglobin 12.0, ferritin 251 and iron saturation 17% with free iron 63 TIBC 372 on 8/22/2022.  Patient discontinued oral iron in 08/2022.   2/27/2023 laboratory studies showed hemoglobin 11.9, excellent ferritin 267 and normal iron saturation 29% with free iron 117 TIBC 405.   On 3/14/2023 vitamin B12 level 913, and folate 16.6 ng/mL and hemoglobin 11.3.  On 09/12/2023, the patient has normal hemoglobin 12.6.   2/5/2024 hemoglobin 11.8, B12 level 831 and folate > 20 ng/mL.  On 4/5/2024 patient has normal hemoglobin 13.3.  10/7/2024 normal hemoglobin 13.5.  4/29/2025 hemoglobin 11.6.       4.  Hypomagnesemia.    She was started on oral magnesium oxide.  Repeat labs showed improved and marginally normal magnesium level 1.8 on 6/10/2019.    Recurrent hypomagnesemia 1.5 on 12/3/2019.   Magnesium 1.7 on 12/8/2020.  I asked patient to continue oral magnesium oxide.  Improved magnesium 1.9 on 2/24/2021.   Magnesium is normal today at 1.9 on 1/31/2022.  Magnesium 1.6 on 8/22/2022.  Discussed with patient, will increase oral magnesium to 800 mg twice a day.  On 2/27/2023 normal magnesium 1.9.  Continue oral magnesium 800 mg twice a day.   On 09/12/2023, laboratory studies reported normal magnesium 1.9.  Continue oral magnesium.   On 4/5/2024 magnesium level 1.8.  10/7/2024 normal magnesium 2.0.   Continue oral magnesium.  4/29/2025 magnesium 1.7.       5. Diabetes Mellitus.  On 10/7/2024 her blood glucose level is elevated at 233. She forgot to take her medicine today. She will need to continue follow-up with her PCP for management of diabetes, which is not well controlled.    4/29/2025 glucose 305.  Patient will continue follow-up PCP for management diabetes.        PLAN:  Proceed with the Prolia injection today.  This will be repeated every 6 months.   A diagnostic mammogram and ultrasound for the right breast will be ordered to investigate the cause of the pain.   If these tests reveal any abnormalities, a biopsy may be considered.   She will continue exemestane 25 mg daily. A new prescription has been sent to her pharmacy for a 90-day supply and one more refill.   Continue management of diabetes and followed by PCP.   Continue oral calcium and vitamin D.  Continue oral magnesium 800 mg twice a day.    A CT scan of the chest abdomen pelvis will be obtained on an annual basis, due in September 2025.    Patient will be due for bone density scan in September 2025.   Follow-up appointment will be arranged when the results is back for mammogram and ultrasound emanation.  Patient will need iron studies, CBC and CMP.    I spent 48 minutes caring for Brie on this date of service. This time includes time spent by me in the following activities: preparing for the visit, reviewing tests, obtaining and/or reviewing a separately obtained history, performing a medically appropriate examination and/or evaluation, counseling and educating the patient/family/caregiver, ordering medications, tests, or procedures, referring and communicating with other health care professionals, documenting information in the medical record, independently interpreting results and communicating that information with the patient/family/caregiver and care coordination       Carlos Castillo MD PhD  04/29/2025          CC:  Sussy Payne M.D.     Zari Palacios M.D.

## 2025-05-01 RX ORDER — ANTACID TABLETS 500 MG/1
1 TABLET, CHEWABLE ORAL 2 TIMES DAILY
Qty: 60 EACH | Refills: 3 | Status: SHIPPED | OUTPATIENT
Start: 2025-05-01

## 2025-05-30 RX ORDER — EXEMESTANE 25 MG/1
25 TABLET ORAL DAILY
Qty: 90 TABLET | Refills: 1 | Status: SHIPPED | OUTPATIENT
Start: 2025-05-30

## 2025-06-03 ENCOUNTER — HOSPITAL ENCOUNTER (OUTPATIENT)
Dept: ULTRASOUND IMAGING | Facility: HOSPITAL | Age: 80
Discharge: HOME | End: 2025-06-03
Payer: MEDICARE

## 2025-06-03 ENCOUNTER — HOSPITAL ENCOUNTER (OUTPATIENT)
Dept: MAMMOGRAPHY | Facility: HOSPITAL | Age: 80
Discharge: HOME | End: 2025-06-03
Payer: MEDICARE

## 2025-06-03 DIAGNOSIS — Z17.0 MALIGNANT NEOPLASM OF CENTRAL PORTION OF LEFT BREAST IN FEMALE, ESTROGEN RECEPTOR POSITIVE: ICD-10-CM

## 2025-06-03 DIAGNOSIS — C50.112 MALIGNANT NEOPLASM OF CENTRAL PORTION OF LEFT BREAST IN FEMALE, ESTROGEN RECEPTOR POSITIVE: ICD-10-CM

## 2025-06-03 PROCEDURE — 77065 DX MAMMO INCL CAD UNI: CPT

## 2025-06-03 PROCEDURE — 76642 ULTRASOUND BREAST LIMITED: CPT

## 2025-06-03 PROCEDURE — G0279 TOMOSYNTHESIS, MAMMO: HCPCS
